# Patient Record
Sex: FEMALE | Race: WHITE | Employment: UNEMPLOYED | URBAN - METROPOLITAN AREA
[De-identification: names, ages, dates, MRNs, and addresses within clinical notes are randomized per-mention and may not be internally consistent; named-entity substitution may affect disease eponyms.]

---

## 2017-03-07 DIAGNOSIS — E11.9 TYPE 2 DIABETES MELLITUS WITHOUT COMPLICATIONS (HCC): ICD-10-CM

## 2017-03-07 DIAGNOSIS — Z12.31 ENCOUNTER FOR SCREENING MAMMOGRAM FOR MALIGNANT NEOPLASM OF BREAST: ICD-10-CM

## 2017-03-07 DIAGNOSIS — Z12.11 ENCOUNTER FOR SCREENING FOR MALIGNANT NEOPLASM OF COLON: ICD-10-CM

## 2017-03-25 ENCOUNTER — ALLSCRIPTS OFFICE VISIT (OUTPATIENT)
Dept: OTHER | Facility: OTHER | Age: 62
End: 2017-03-25

## 2017-03-31 ENCOUNTER — OFFICE VISIT (OUTPATIENT)
Dept: URGENT CARE | Age: 62
End: 2017-03-31
Payer: COMMERCIAL

## 2017-03-31 PROCEDURE — 99213 OFFICE O/P EST LOW 20 MIN: CPT | Performed by: FAMILY MEDICINE

## 2017-06-23 DIAGNOSIS — E11.9 TYPE 2 DIABETES MELLITUS WITHOUT COMPLICATIONS (HCC): ICD-10-CM

## 2017-07-12 ENCOUNTER — TRANSCRIBE ORDERS (OUTPATIENT)
Dept: ADMINISTRATIVE | Facility: HOSPITAL | Age: 62
End: 2017-07-12

## 2017-07-12 ENCOUNTER — APPOINTMENT (OUTPATIENT)
Dept: LAB | Facility: HOSPITAL | Age: 62
End: 2017-07-12
Attending: INTERNAL MEDICINE
Payer: COMMERCIAL

## 2017-07-12 DIAGNOSIS — E11.9 TYPE 2 DIABETES MELLITUS WITHOUT COMPLICATIONS (HCC): ICD-10-CM

## 2017-07-12 LAB
ALBUMIN SERPL BCP-MCNC: 2.9 G/DL (ref 3.5–5)
ALP SERPL-CCNC: 81 U/L (ref 46–116)
ALT SERPL W P-5'-P-CCNC: 17 U/L (ref 12–78)
ANION GAP SERPL CALCULATED.3IONS-SCNC: 10 MMOL/L (ref 4–13)
AST SERPL W P-5'-P-CCNC: 6 U/L (ref 5–45)
BILIRUB SERPL-MCNC: 0.2 MG/DL (ref 0.2–1)
BUN SERPL-MCNC: 29 MG/DL (ref 5–25)
CALCIUM SERPL-MCNC: 8.8 MG/DL (ref 8.3–10.1)
CHLORIDE SERPL-SCNC: 107 MMOL/L (ref 100–108)
CHOLEST SERPL-MCNC: 210 MG/DL (ref 50–200)
CO2 SERPL-SCNC: 23 MMOL/L (ref 21–32)
CREAT SERPL-MCNC: 1.48 MG/DL (ref 0.6–1.3)
EST. AVERAGE GLUCOSE BLD GHB EST-MCNC: 169 MG/DL
GFR SERPL CREATININE-BSD FRML MDRD: 36 ML/MIN/1.73SQ M
GLUCOSE P FAST SERPL-MCNC: 140 MG/DL (ref 65–99)
HBA1C MFR BLD: 7.5 % (ref 4.2–6.3)
HDLC SERPL-MCNC: 39 MG/DL (ref 40–60)
LDLC SERPL CALC-MCNC: 125 MG/DL (ref 0–100)
POTASSIUM SERPL-SCNC: 5.2 MMOL/L (ref 3.5–5.3)
PROT SERPL-MCNC: 7.8 G/DL (ref 6.4–8.2)
SODIUM SERPL-SCNC: 140 MMOL/L (ref 136–145)
TRIGL SERPL-MCNC: 228 MG/DL

## 2017-07-12 PROCEDURE — 36415 COLL VENOUS BLD VENIPUNCTURE: CPT

## 2017-07-12 PROCEDURE — 80053 COMPREHEN METABOLIC PANEL: CPT

## 2017-07-12 PROCEDURE — 83036 HEMOGLOBIN GLYCOSYLATED A1C: CPT

## 2017-07-12 PROCEDURE — 80061 LIPID PANEL: CPT

## 2017-08-15 ENCOUNTER — ALLSCRIPTS OFFICE VISIT (OUTPATIENT)
Dept: OTHER | Facility: OTHER | Age: 62
End: 2017-08-15

## 2017-08-15 DIAGNOSIS — E11.9 TYPE 2 DIABETES MELLITUS WITHOUT COMPLICATIONS (HCC): ICD-10-CM

## 2017-08-15 DIAGNOSIS — I10 ESSENTIAL (PRIMARY) HYPERTENSION: ICD-10-CM

## 2017-08-15 DIAGNOSIS — E03.9 HYPOTHYROIDISM: ICD-10-CM

## 2017-09-23 DIAGNOSIS — E11.9 TYPE 2 DIABETES MELLITUS WITHOUT COMPLICATIONS (HCC): ICD-10-CM

## 2017-11-29 ENCOUNTER — OFFICE VISIT (OUTPATIENT)
Dept: URGENT CARE | Facility: CLINIC | Age: 62
End: 2017-11-29
Payer: COMMERCIAL

## 2017-11-29 PROCEDURE — 99213 OFFICE O/P EST LOW 20 MIN: CPT

## 2017-12-04 ENCOUNTER — APPOINTMENT (OUTPATIENT)
Dept: LAB | Facility: HOSPITAL | Age: 62
End: 2017-12-04
Attending: INTERNAL MEDICINE
Payer: COMMERCIAL

## 2017-12-04 ENCOUNTER — TRANSCRIBE ORDERS (OUTPATIENT)
Dept: ADMINISTRATIVE | Facility: HOSPITAL | Age: 62
End: 2017-12-04

## 2017-12-04 DIAGNOSIS — E03.9 HYPOTHYROIDISM: ICD-10-CM

## 2017-12-04 DIAGNOSIS — E11.9 TYPE 2 DIABETES MELLITUS WITHOUT COMPLICATIONS (HCC): ICD-10-CM

## 2017-12-04 DIAGNOSIS — I10 ESSENTIAL (PRIMARY) HYPERTENSION: ICD-10-CM

## 2017-12-04 LAB
ALBUMIN SERPL BCP-MCNC: 3.1 G/DL (ref 3.5–5)
ALP SERPL-CCNC: 78 U/L (ref 46–116)
ALT SERPL W P-5'-P-CCNC: 23 U/L (ref 12–78)
ANION GAP SERPL CALCULATED.3IONS-SCNC: 9 MMOL/L (ref 4–13)
AST SERPL W P-5'-P-CCNC: 10 U/L (ref 5–45)
BILIRUB SERPL-MCNC: 0.3 MG/DL (ref 0.2–1)
BUN SERPL-MCNC: 32 MG/DL (ref 5–25)
CALCIUM SERPL-MCNC: 8.8 MG/DL (ref 8.3–10.1)
CHLORIDE SERPL-SCNC: 103 MMOL/L (ref 100–108)
CO2 SERPL-SCNC: 24 MMOL/L (ref 21–32)
CREAT SERPL-MCNC: 1.22 MG/DL (ref 0.6–1.3)
CREAT UR-MCNC: 44.7 MG/DL
EST. AVERAGE GLUCOSE BLD GHB EST-MCNC: 174 MG/DL
GFR SERPL CREATININE-BSD FRML MDRD: 48 ML/MIN/1.73SQ M
GLUCOSE P FAST SERPL-MCNC: 133 MG/DL (ref 65–99)
HBA1C MFR BLD: 7.7 % (ref 4.2–6.3)
MICROALBUMIN UR-MCNC: 27.6 MG/L (ref 0–20)
MICROALBUMIN/CREAT 24H UR: 62 MG/G CREATININE (ref 0–30)
POTASSIUM SERPL-SCNC: 5 MMOL/L (ref 3.5–5.3)
PROT SERPL-MCNC: 8.5 G/DL (ref 6.4–8.2)
SODIUM SERPL-SCNC: 136 MMOL/L (ref 136–145)
TSH SERPL DL<=0.05 MIU/L-ACNC: 2.6 UIU/ML (ref 0.36–3.74)

## 2017-12-04 PROCEDURE — 83036 HEMOGLOBIN GLYCOSYLATED A1C: CPT

## 2017-12-04 PROCEDURE — 80053 COMPREHEN METABOLIC PANEL: CPT

## 2017-12-04 PROCEDURE — 82570 ASSAY OF URINE CREATININE: CPT

## 2017-12-04 PROCEDURE — 36415 COLL VENOUS BLD VENIPUNCTURE: CPT

## 2017-12-04 PROCEDURE — 82043 UR ALBUMIN QUANTITATIVE: CPT

## 2017-12-04 PROCEDURE — 84443 ASSAY THYROID STIM HORMONE: CPT

## 2017-12-05 NOTE — PROGRESS NOTES
Assessment    1  Acute URI (465 9) (J06 9)    Plan  Acute URI    · Amoxicillin 875 MG Oral Tablet; take 1 tablet every twelve hours   · Ventolin  (90 Base) MCG/ACT Inhalation Aerosol Solution; INHALE 2  PUFFS EVERY 4 HOURS AS NEEDED  Unlinked    · Aspirin EC Low Dose 81 MG Oral Tablet Delayed Release    Discussion/Summary  Medication Side Effects Reviewed: Possible side effects of new medications were reviewed with the patient/guardian today  Understands and agrees with treatment plan: The treatment plan was reviewed with the patient/guardian  The patient/guardian understands and agrees with the treatment plan   Counseling Documentation With Imm: The patient was counseled regarding instructions for management  Chief Complaint    1  Cold Symptoms  Chief Complaint Free Text Note Form: C/O harsh persistent cough , chest congestion and pain in chest and ribs with cough x 4 days      History of Present Illness  HPI: Started with nasal congestion and dry cough 4 days ago  Coughing to the point of vomiting  no fever or chills  Hospital Based Practices Required Assessment:   Pain Assessment   the patient states they do not have pain  (on a scale of 0 to 10, the patient rates the pain at 0 )   Abuse And Domestic Violence Screen    Yes, the patient is safe at home  The patient states no one is hurting them  Depression And Suicide Screen  No, the patient has not had thoughts of hurting themself  No, the patient has not felt depressed in the past 7 days  Cold Symptoms: EDDIE KWONG presents with complaints of cold symptoms     Associated symptoms include nasal congestion, runny nose, post nasal drainage and dry cough, but no sneezing, no scratchy throat, no sore throat, no productive cough, no facial pressure, no facial pain, no headache, no plugged ear(s), no ear pain, no swollen lymph nodes, no wheezing, no shortness of breath, no fatigue, no weakness, no nausea, no vomiting, no diarrhea, no fever and no chills  Review of Systems  Focused-Female:   Constitutional: no fever and no chills  ENT: no hearing loss and no hoarseness  Cardiovascular: no palpitations  Respiratory: no shortness of breath, no wheezing and no shortness of breath during exertion  Gastrointestinal: no abdominal pain, no nausea, no vomiting and no diarrhea  Integumentary: no rashes  Neurological: no headache and no dizziness  ROS Reviewed:   ROS reviewed  Active Problems    1  Advance directive discussed with patient (V65 49) (Z71 89)   2  Depression (311) (F32 9)   3  DMII (diabetes mellitus, type 2) (250 00) (E11 9)   4  Encounter for Hemoccult screening (V76 51) (Z12 11)   5  Hyperlipidemia (272 4) (E78 5)   6  Hypertension (401 9) (I10)   7  Hypothyroidism (244 9) (E03 9)   8  Need for influenza vaccination (V04 81) (Z23)   9  Neuralgia (729 2) (M79 2)   10  Skin lesion of chest wall (709 9) (L98 9)   11  Visit for screening mammogram (V76 12) (Z12 31)   12  Vitamin D deficiency (268 9) (E55 9)    Past Medical History    1  History of Bacterial sepsis (038 9,995 91) (A41 9)   2  History of Cushing's syndrome (V12 29) (Z86 39)   3  History of Nipple pain (611 71) (N64 4)  Active Problems And Past Medical History Reviewed: The active problems and past medical history were reviewed and updated today  Family History  Mother    1  Family history of Breast Cancer (V16 3)  Family History    2  Family history of Breast Cancer (V16 3)    Social History    · Always uses seat belt   · Always uses sunscreen   · Caffeine Use   · Current every day smoker (305 1) (F17 200)   · Denied: History of Current Every Day Smoker   · Dental care, regularly   · No drug use   · No living will   · Uses Safety Equipment - Seatbelts  Social History Reviewed: The social history was reviewed and updated today  Surgical History    1  History of Adrenal Gland   2  History of Hernia Repair   3  History of Injection Of Trigger Point(S)   4  History of Tonsillectomy With Adenoidectomy   5  History of Tubal Ligation    Current Meds   1  Aspirin EC Low Dose 81 MG Oral Tablet Delayed Release; Take 1 tablet daily as directed   Recorded   2  [employer] Contour Next EZ w/Device Kit; USE AS DIRECTED; Therapy: 44CYQ3248 to (Evaluate:22Nov2013)  Requested for: 21Nov2013; Last   Rx:21Nov2013 Ordered   3  [employer]; Accu check BID; Therapy: 55QUC3424 to (Evaluate:53Hlj4654)  Requested for: 21Nov2013; Last   Rx:21Nov2013 Ordered   4  Glimepiride 4 MG Oral Tablet; take one tablet by mouth twice daily; Therapy: 62VTC0128 to (Evaluate:13Mar2018)  Requested for: 90XDR2741; Last   Rx:16Jun2017 Ordered   5  Hydrocortisone 20 MG Oral Tablet; TAKE ONE TABLET BY MOUTH ONCE DAILY; Therapy: 53Utk6294 to (Evaluate:25Stn8502)  Requested for: 48LLM7532; Last   Rx:13Mar2017 Ordered   6  Lisinopril 10 MG Oral Tablet; TAKE ONE TABLET BY MOUTH ONCE DAILY; Therapy: 14Qre2504 to (Evaluate:81Fox9011)  Requested for: 13Oct2017; Last   Rx:13Oct2017 Ordered   7  Loratadine 10 MG Oral Tablet; TAKE 1 TABLET DAILY AS NEEDED; Therapy: 24Kkg8384 to (Evaluate:77Try2204)  Requested for: 61Xek3307; Last   Rx:51Rgo4610 Ordered   8  MetFORMIN HCl - 500 MG Oral Tablet; TAKE ONE TABLET BY MOUTH ONCE DAILY; Therapy: 71MMC1433 to (Evaluate:10Aug2018)  Requested for: 50LTW9027; Last   Rx:36Jts8890 Ordered   9  Pravastatin Sodium 80 MG Oral Tablet; TAKE ONE TABLET BY MOUTH ONCE DAILY; Therapy: 27Yrp6403 to (Evaluate:10Fkp5025)  Requested for: 26WDZ1191; Last   Rx:30Nov2016 Ordered   10  Sertraline HCl - 100 MG Oral Tablet; TAKE ONE TABLET BY MOUTH ONCE DAILY AS    DIRECTED; Therapy: 71BHB9377 to (Evaluate:13Mar2018)  Requested for: 47DXF6875; Last    Rx:16Jun2017 Ordered   11  Vitamin D (Ergocalciferol) 79089 UNIT Oral Capsule; TAKE 1 CAPSULE BY MOUTH    EVERY WEEK Recorded  Medication List Reviewed: The medication list was reviewed and updated today  Allergies    1  Fosamax TABS   2  Paxil TABS    Vitals  Signs   Recorded: 61IGN2777 10:26AM   Temperature: 97 2 F  Heart Rate: 90  Respiration: 20  Systolic: 594  Diastolic: 60  Height: 5 ft 2 in  Weight: 161 lb 2 08 oz  BMI Calculated: 29 47  BSA Calculated: 1 74  O2 Saturation: 95  Pain Scale: 0    Physical Exam    Constitutional   General appearance: No acute distress, well appearing and well nourished  Eyes   Conjunctiva and lids: No swelling, erythema or discharge  Ears, Nose, Mouth, and Throat   External inspection of ears and nose: Normal     Otoscopic examination: Tympanic membranes translucent with normal light reflex  Canals patent without erythema  Nasal mucosa, septum, and turbinates: Abnormal   There was a mucoid discharge from both nares  The bilateral nasal mucosa was boggy  Oropharynx: Normal with no erythema, edema, exudate or lesions  Pulmonary   Respiratory effort: No increased work of breathing or signs of respiratory distress  Auscultation of lungs: Clear to auscultation  Cardiovascular   Auscultation of heart: Normal rate and rhythm, normal S1 and S2, without murmurs  Lymphatic   Palpation of lymph nodes in neck: No lymphadenopathy  Musculoskeletal   Gait and station: Normal     Skin   Skin and subcutaneous tissue: Normal without rashes or lesions      Psychiatric   Mood and affect: Normal        Future Appointments    Date/Time Provider Specialty Site   12/11/2017 09:30 AM Juan A Quach DO Internal Medicine [facility] INTERNAL MEDICINE [facility]     Signatures   Electronically signed by : LOLA Sweeney; Nov 29 2017 10:51AM EST                       (Author)

## 2017-12-06 ENCOUNTER — ALLSCRIPTS OFFICE VISIT (OUTPATIENT)
Dept: OTHER | Facility: OTHER | Age: 62
End: 2017-12-06

## 2017-12-06 ENCOUNTER — TRANSCRIBE ORDERS (OUTPATIENT)
Dept: RADIOLOGY | Facility: MEDICAL CENTER | Age: 62
End: 2017-12-06

## 2017-12-06 ENCOUNTER — APPOINTMENT (OUTPATIENT)
Dept: RADIOLOGY | Facility: MEDICAL CENTER | Age: 62
End: 2017-12-06
Payer: COMMERCIAL

## 2017-12-06 DIAGNOSIS — E11.9 TYPE 2 DIABETES MELLITUS WITHOUT COMPLICATIONS (HCC): ICD-10-CM

## 2017-12-06 DIAGNOSIS — J20.9 ACUTE BRONCHITIS: ICD-10-CM

## 2017-12-06 DIAGNOSIS — Z12.11 ENCOUNTER FOR SCREENING FOR MALIGNANT NEOPLASM OF COLON: ICD-10-CM

## 2017-12-06 PROCEDURE — 71020 HB CHEST X-RAY 2VW FRONTAL&LATL: CPT

## 2018-01-02 ENCOUNTER — ALLSCRIPTS OFFICE VISIT (OUTPATIENT)
Dept: OTHER | Facility: OTHER | Age: 63
End: 2018-01-02

## 2018-01-13 VITALS
SYSTOLIC BLOOD PRESSURE: 122 MMHG | WEIGHT: 169 LBS | OXYGEN SATURATION: 98 % | HEIGHT: 62 IN | HEART RATE: 86 BPM | TEMPERATURE: 97.8 F | BODY MASS INDEX: 31.1 KG/M2 | DIASTOLIC BLOOD PRESSURE: 72 MMHG

## 2018-01-14 VITALS
TEMPERATURE: 97.4 F | HEART RATE: 96 BPM | WEIGHT: 161.13 LBS | BODY MASS INDEX: 29.65 KG/M2 | SYSTOLIC BLOOD PRESSURE: 118 MMHG | OXYGEN SATURATION: 95 % | HEIGHT: 62 IN | DIASTOLIC BLOOD PRESSURE: 78 MMHG

## 2018-01-18 NOTE — PROGRESS NOTES
Assessment    1  Shingles rash (053 9) (B02 9)    Plan  Shingles rash    · TraMADol HCl - 50 MG Oral Tablet; TAKE 1 TABLET 3 TIMES DAILY AS NEEDED   · ValACYclovir HCl - 1 GM Oral Tablet; Take one tablet bid    Discussion/Summary  Discussion Summary:   When the rash resolves, consider getting the Shingles vaccine at a local pharmacy  Understands and agrees with treatment plan: The treatment plan was reviewed with the patient/guardian  The patient/guardian understands and agrees with the treatment plan   Follow Up Instructions: Follow Up with your Primary Care Provider in 4-5 days  If your symptoms worsen, go to the nearest [facility] Emergency Department  Chief Complaint    1  Rash  Chief Complaint Free Text Note Form: c/o of painful/itchy rash and on the her chest area since Wedsnesday      History of Present Illness  HPI: As above  Pt is under a lot of stress caring for her 2 young grand children, and an ill   Describes a burning type pain in the area of the rash - very tender to light touch  Review of Systems  Focused-Female:   Constitutional: No fever, no chills, feels well, no tiredness, no recent weight gain or loss  Integumentary: as noted in HPI  Active Problems    1  Advance directive discussed with patient (V65 49) (Z71 89)   2  Depression (311) (F32 9)   3  DMII (diabetes mellitus, type 2) (250 00) (E11 9)   4  Encounter for Hemoccult screening (V76 51) (Z12 11)   5  Encounter for screening for malignant neoplasm of colon (V76 51) (Z12 11)   6  Hyperlipidemia (272 4) (E78 5)   7  Hypertension (401 9) (I10)   8  Hypothyroidism (244 9) (E03 9)   9  Need for influenza vaccination (V04 81) (Z23)   10  Neuralgia (729 2) (M79 2)   11  Shingles rash (053 9) (B02 9)   12  Vaginal Pap smear (V76 47) (Z12 72)   13  Visit for screening mammogram (V76 12) (Z12 31)   14  Vitamin D deficiency (268 9) (E55 9)    Past Medical History    1   History of Bacterial sepsis (038 9,995 91) (A41 9)   2  History of Chest congestion (786 9) (R09 89)   3  History of Cough (786 2) (R05)   4  History of backache (V13 59) (Z87 39)   5  History of Cushing's syndrome (V12 29) (Z86 39)   6  History of Nipple pain (611 71) (N64 4)   7  History of Sinusitis (473 9) (J32 9)   8  History of Urinary tract infection (599 0) (N39 0)   9  History of Urinary tract infection, site unspecified (599 0) (N39 0)  Active Problems And Past Medical History Reviewed: The active problems and past medical history were reviewed and updated today  Family History  Mother    1  Family history of Breast Cancer (V16 3)  Family History    2  Family history of Breast Cancer (V16 3)  Family History Reviewed: The family history was reviewed and updated today  Social History    · Always uses seat belt   · Always uses sunscreen   · Caffeine Use   · Current every day smoker (305 1) (F17 200)   · Denied: History of Current Every Day Smoker   · Dental care, regularly   · No drug use   · No living will   · Uses Safety Equipment - Seatbelts  Social History Reviewed: The social history was reviewed and updated today  Surgical History    1  History of Adrenal Gland   2  History of Hernia Repair   3  History of Injection Of Trigger Point(S)   4  History of Tonsillectomy With Adenoidectomy   5  History of Tubal Ligation    Current Meds   1  Aspirin EC Low Dose 81 MG Oral Tablet Delayed Release; Take 1 tablet daily as directed   Recorded   2  [employer] Contour Next EZ w/Device Kit; USE AS DIRECTED; Therapy: 29HFH9776 to (Evaluate:22Nov2013)  Requested for: 21Nov2013; Last   Rx:21Nov2013 Ordered   3  [employer]; Accu check BID; Therapy: 69DZD7913 to (Evaluate:71Bul0881)  Requested for: 21Nov2013; Last   Rx:21Nov2013 Ordered   4  Gabapentin 100 MG Oral Capsule; Take 1 daily; Therapy: 61Hcd7437 to (Last Rx:57Ayd5849)  Requested for: 77Zck8445 Ordered   5   Gabapentin 300 MG Oral Capsule; TAKE 1 CAPSULE AT BEDTIME; Therapy: 19WGU4632 to (Last Rx:25Mar2017)  Requested for: 25Mar2017 Ordered   6  Glimepiride 4 MG Oral Tablet; TAKE TWO TABLETS BY MOUTH ONCE DAILY; Therapy: 41JIY0976 to (Kirill Garcia)  Requested for: 05BHU4464; Last   Rx:22Xbz2900 Ordered   7  Hydrocortisone 20 MG Oral Tablet; TAKE ONE TABLET BY MOUTH ONCE DAILY; Therapy: 35Mch3792 to (Evaluate:53Ruk6406)  Requested for: 29XEP8621; Last   Rx:13Mar2017 Ordered   8  Lisinopril 10 MG Oral Tablet; TAKE ONE TABLET BY MOUTH ONCE DAILY; Therapy: 06Mxm4973 to (Donita Avendano)  Requested for: 95Xcp8149; Last   Rx:89Vsj4513 Ordered   9  Loratadine 10 MG Oral Tablet; TAKE 1 TABLET DAILY AS NEEDED; Therapy: 83Udj7728 to (Evaluate:68Kxi3836)  Requested for: 14Nmf5960; Last   Rx:78Xrq5416 Ordered   10  MetFORMIN HCl - 500 MG Oral Tablet; TAKE ONE TABLET BY MOUTH ONCE DAILY; Therapy: 49CSB8573 to (Evaluate:11Jun2017)  Requested for: 11GVG6572; Last    Rx:16Jun2016 Ordered   11  Pravastatin Sodium 80 MG Oral Tablet; TAKE ONE TABLET BY MOUTH ONCE DAILY; Therapy: 00Lun1495 to (Evaluate:95Elj6669)  Requested for: 77NED4019; Last    Rx:30Nov2016 Ordered   12  Sertraline HCl - 100 MG Oral Tablet; TAKE ONE TABLET BY MOUTH ONCE DAILY AS    DIRECTED; Therapy: 24Uyo6693 to (Evaluate:26Jun2017)  Requested for: 28Mar2017; Last    Rx:28Mar2017 Ordered   13  ValACYclovir HCl - 1 GM Oral Tablet; Take one tablet bid; Therapy: 06Tzj3183 to (Vadim Aguilar)  Requested for: 65Zug3084; Last    Rx:75Rew2513 Ordered   14  Vitamin D (Ergocalciferol) 41144 UNIT Oral Capsule; TAKE 1 CAPSULE BY MOUTH    EVERY WEEK Recorded  Medication List Reviewed: The medication list was reviewed and updated today  Allergies    1  Fosamax TABS   2   Paxil TABS    Vitals  Signs   Recorded: 34GGN4276 09:13AM   Temperature: 97 9 F  Heart Rate: 80  Respiration: 20  Systolic: 542  Diastolic: 76  Height: 5 ft 2 in  Weight: 169 lb   BMI Calculated: 30 91  BSA Calculated: 1 78  O2 Saturation: 98  Pain Scale: 8    Physical Exam    Constitutional   General appearance: No acute distress, well appearing and well nourished  Skin   Examination of the skin for lesions: Abnormal   Red, raised, vesicular type rash in a dermatomal distribution of the right anterior chest in the T5 dermatome        Future Appointments    Date/Time Provider Specialty Site   07/24/2017 09:30 AM Ruba Dawkins DO Internal Medicine [facility] INTERNAL MEDICINE [facility]     Signatures   Electronically signed by : CRISTOPHER Plummer ; Mar 31 2017  9:42AM EST                       (Author)

## 2018-01-23 VITALS
DIASTOLIC BLOOD PRESSURE: 80 MMHG | BODY MASS INDEX: 29.9 KG/M2 | HEART RATE: 97 BPM | OXYGEN SATURATION: 98 % | WEIGHT: 162.5 LBS | SYSTOLIC BLOOD PRESSURE: 142 MMHG | TEMPERATURE: 93.5 F | HEIGHT: 62 IN

## 2018-01-23 VITALS
OXYGEN SATURATION: 98 % | SYSTOLIC BLOOD PRESSURE: 128 MMHG | BODY MASS INDEX: 30.09 KG/M2 | HEART RATE: 91 BPM | WEIGHT: 163.5 LBS | DIASTOLIC BLOOD PRESSURE: 78 MMHG | HEIGHT: 62 IN | TEMPERATURE: 94.6 F

## 2018-01-24 NOTE — MISCELLANEOUS
Assessment   1  Bacterial sepsis (038 9,995 91) (A41 9)1   2  DMII (diabetes mellitus, type 2) (250 00) (E11 9)1   3  Urinary tract infection, site unspecified (599 0) (N39 0)1   4  Hypertension (401 9) (I10)1   5  History of Cushing's syndrome (V12 29) (Z86 39)2      1 Amended By: Betito Ham; May 29 2016 4:24 PM EST   2 Amended By: Betito Ham; May 29 2016 4:25 PM EST    Plan  Bacterial sepsis    · (1) CBC/ PLT (NO DIFF); Status:Active; Requested for:21Xlt3302;   Perform:[facility]; ZES:23MMZ8831;SSBIMBD; For:Bacterial sepsis; Ordered   By:Khris Palmer;  DMII (diabetes mellitus, type 2)    · Start: Glimepiride 4 MG Oral Tablet; TAKE 1 TABLET TWICE DAILY  Rx By: Betito Ham; Dispense: 90 Days ; #:180 Tablet; Refill: 3;For: DMII (diabetes   mellitus, type 2); DILIP = N; Verified Transmission to [facility]; Last   Updated By: System, [employer]; 5/27/2016 10:48:34 AM  DMII (diabetes mellitus, type 2), Health Maintenance    · *VB-Foot Exam; Status:Complete;   Done: 37WXY9670 10:40AM  Performed: In Office; MOC:43UBC2744;ESBWYDO; For:DMII (diabetes mellitus, type 2),   Health Maintenance; Ordered By:Khris Palmer;  Hyperlipidemia, Hypertension    · Follow-up visit in 2 weeks Evaluation and Treatment  Follow-up  Status: Hold For -  Scheduling  Requested for: 27JFW5207  Ordered; For: Hyperlipidemia, Hypertension;  Ordered By: Betito Ham  Performed:     Due: 51BPR3816  Hypertension    · Start: Hydrochlorothiazide 12 5 MG Oral Tablet; one po monday wednesday friday  Rx By: Betito Ham; Dispense: 0 Days ; #:15 Tablet; Refill: 0;For: Hypertension;   DILIP = N; Sent To: [facility]   · (1) BASIC METABOLIC PROFILE; Status:Active; Requested for:35Wrf0924;   Perform:[facility]; JUP:42IOL2797;EWHHLSL; For:Hypertension; Ordered   By:Khris Palmer;    Discussion/Summary  Discussion Summary:   Bernice labs  Increase fluids   Activity as tolerated, walk daily in home  Findennis cipro rx  Rto 2 weeks1    Medication SE Review and Pt Understands Tx: Possible side effects of new medications were reviewed with the patient/guardian today1  The treatment plan was reviewed with the patient/guardian  The patient/guardian understands and agrees with the treatment plan1        1 Amended By: Christiano De La Cruz; May 29 2016 4:26 PM EST    Chief Complaint  Chief Complaint Free Text Note Form: Pt presents for [facility] F/up exam  Med list updated  Pt has some questions about changing her diabetic medication  I didn't refill her med since she wanted to speak with doctor first  Foot exam was normal  She had a recent eye exam but doesn't rem  the doctors name  She did quit smoking and is wearing the patch since in the hospital       History of Present Illness  TCM Communication [facility]: The patient is being contacted for follow-up after hospitalization and  sepsis due to uti1   Hospital  records were reviewed1  1   She was hospitalized at [facility]  The date of admission: 05/19/2016, date of discharge: 05/23/2016  Diagnosis: sepsis  She was discharged to home  Medications reviewed and updated today  She scheduled a follow up appointment  The patient is currently asymptomatic  Counseling was provided to the patient  Communication performed and completed by nile   HPI: Pt slowly recovering from urosepsis  On cipro  Regaining strength and eating better  Fbs today 145  No fever/chills No cp or sob   1        1 Amended By: Christiano De La Cruz; May 29 2016 4:21 PM EST    Review of Systems  Complete-Female:   Constitutional:1  feeling poorly1  and feeling tired1 , but no fever1  and no chills1   Eyes:1  No complaints of eye pain, no red eyes, no eyesight problems, no discharge, no dry eyes, no itching of eyes1   ENT:1  no complaints of earache, no loss of hearing, no nose bleeds, no nasal discharge, no sore throat, no hoarseness1      Cardiovascular:1  No complaints of slow heart rate, no fast heart rate, no chest pain, no palpitations, no leg claudication, no lower extremity edema1   Respiratory:1  shortness of breath during exertion1   Gastrointestinal:1  no abdominal pain1 , no nausea1  and no vomiting1   Genitourinary:1  No complaints of dysuria, no incontinence, no pelvic pain, no dysmenorrhea, no vaginal discharge or bleeding1 , no dysuria1  and no incontinence1   Musculoskeletal:1  arthralgias1  and joint stiffness1   Integumentary:1  No complaints of skin rash or lesions, no itching, no skin wounds, no breast pain or lump1   Neurological:1  No complaints of headache, no confusion, no convulsions, no numbness, no dizziness or fainting, no tingling, no limb weakness, no difficulty walking1   Psychiatric:1  Not suicidal, no sleep disturbance, no anxiety or depression, no change in personality, no emotional problems1   Endocrine:1  No complaints of proptosis, no hot flashes, no muscle weakness, no deepening of the voice, no feelings of weakness1   Hematologic/Lymphatic:1  No complaints of swollen glands, no swollen glands in the neck, does not bleed easily, does not bruise easily1         1 Amended By: Alli Gave; May 29 2016 4:23 PM EST    Active Problems   1  Chest congestion (786 9) (R09 89)  2  Depression (311) (F32 9)  3  DMII (diabetes mellitus, type 2) (250 00) (E11 9)  4  Hyperlipidemia (272 4) (E78 5)  5  Hypertension (401 9) (I10)  6  Hypothyroidism (244 9) (E03 9)  7  Nipple pain (611 71) (N64 4)  8  Sinusitis (473 9) (J32 9)  9  Visit for screening mammogram (V76 12) (Z12 31)  10  Vitamin D deficiency (268 9) (E55 9)    Past Medical History   1  History of Cough (786 2) (R05)  2  History of backache (V13 59) (Z87 39)  3  History of Urinary tract infection (599 0) (N39 0)    Surgical History   1  History of Adrenal Gland  2  History of Hernia Repair  3  History of Injection Of Trigger Point(S)  4  History of Tonsillectomy With Adenoidectomy  5   History of Tubal Ligation    Family History  Family History   1  Family history of Breast Cancer (V16 3)  Family History Reviewed: The family history was reviewed and updated today  Social History    · Caffeine Use   · Current Every Day Smoker (305 1)   · Current Smoker (305 1)   · Uses Safety Equipment - Seatbelts  Social History Reviewed: The social history was reviewed and updated today  The social history was reviewed and is unchanged  Current Meds  1  Amoxicillin 500 MG Oral Capsule; TAKE 1 CAPSULE 3 TIMES DAILY; Therapy: 42XBK5863 to (Altaf Edouard)  Requested for: 25Apr2016; Last   Rx:09Ien4126 Ordered  2  Azithromycin 250 MG Oral Tablet; TAKE 2 TABLETS ON DAY 1 THEN TAKE 1 TABLET A   DAY FOR 4 DAYS; Therapy: 18ZXB2141 to (Daisy Brooks)  Requested for: 13MSE6228; Last   Rx:49Mkr1253 Ordered  3  Citlali Contour Next EZ w/Device Kit; USE AS DIRECTED; Therapy: 46JDX6799 to (Evaluate:22Nov2013)  Requested for: 21Nov2013; Last   Rx:21Nov2013 Ordered  4  Retevo In [employer]; Accu check BID; Therapy: 23QSN8498 to (Evaluate:20May2014)  Requested for: 21Nov2013; Last   Rx:21Nov2013 Ordered  5  Benzonatate 100 MG Oral Capsule; TAKE 1 CAPSULE 3 TIMES DAILY AS NEEDED; Therapy: 61WBN4371 to (Connie Abebe)  Requested for: 53VJV1211; Last   Rx:64Sxk0341 Ordered  6  Cyclobenzaprine HCl - 10 MG Oral Tablet; TAKE ONE TABLET BY MOUTH AT BEDTIME; Therapy: 72RSJ0145 to (Evaluate:14Mar2014)  Requested for: 37SHB3262; Last   Rx:13Jan2014 Ordered  7  Glimepiride 4 MG Oral Tablet; TAKE TWO TABLETS BY MOUTH ONCE DAILY; Therapy: 94HIV3519 to (Connie Abebe)  Requested for: 07GMH2364; Last   Rx:56Tep5755 Ordered  8  Hydrocortisone 10 MG Oral Tablet; Take 1 tablet daily; Therapy: 59YEP8273 to (Evaluate:22Nov2013)  Requested for: 12VWV5686; Last   Rx:34Jlq1708 Ordered  9  Hydrocortisone 20 MG Oral Tablet; TAKE ONE TABLET BY MOUTH ONCE DAILY;    Therapy: 59Ygx4417 to (Evaluate:17Oct2016)  Requested for: 21Jan2016; Last   Rx:21Jan2016 Ordered  10  Levofloxacin 500 MG Oral Tablet; TAKE 1 TABLET DAILY AS DIRECTED; Therapy: 21Jan2016 to (Evaluate:28Jan2016)  Requested for: 21Jan2016; Last    Rx:21Jan2016 Ordered  11  Lisinopril 10 MG Oral Tablet; TAKE ONE TABLET BY MOUTH ONCE DAILY; Therapy: 95Pck8011 to (Evaluate:87Hwq4023)  Requested for: 22BLL6195; Last    Rx:19Oct2015 Ordered  12  Loratadine 10 MG Oral Tablet; TAKE 1 TABLET DAILY AS NEEDED; Therapy: 63Sjp3148 to (Evaluate:89Ncc0771)  Requested for: 82Brl4968; Last    Rx:77Uyp6534 Ordered  13  MetFORMIN HCl - 500 MG Oral Tablet; TAKE ONE TABLET BY MOUTH ONCE DAILY; Therapy: 99RUG9677 to (Evaluate:71Usr1367)  Requested for: 97QGL8918; Last    Rx:12Lgk7153 Ordered  14  Pravastatin Sodium 80 MG Oral Tablet; TAKE ONE TABLET BY MOUTH ONCE DAILY; Therapy: 43UAK9322 to (Evaluate:16Tme8384)  Requested for: 67RID7631; Last    Rx:19Oct2015 Ordered  15  Sertraline HCl - 100 MG Oral Tablet; TAKE ONE TABLET BY MOUTH ONCE DAILY AS    DIRECTED; Therapy: 54UBP3983 to (Evaluate:72Uhw2023)  Requested for: 26IYX5887; Last    Rx:25Nnt5625 Ordered  16  Simvastatin 20 MG Oral Tablet; TAKE ONE TABLET BY MOUTH ONCE DAILY; Therapy: 66SAN9742 to (Evaluate:66Kgy6637)  Requested for: 21Jan2016; Last    Rx:21Jan2016 Ordered  17  Symbicort 80-4 5 MCG/ACT Inhalation Aerosol; INHALE 2 PUFFS TWICE DAILY  RINSE    MOUTH AFTER USE; Therapy: 63Tua4076 to (Last Pama Ket)  Requested for: 26Oec8837 Ordered  Medication List Reviewed: The medication list was reviewed and updated today  Allergies   1  Paxil TABS    Vitals  Signs [Data Includes: Current Encounter]   Recorded: [id number] 17:59GG    Systolic: 133 1    Diastolic: 78 1      1 Amended By: Jennifer Harrington; May 29 2016 4:23 PM EST    Physical Exam    Constitutional1    General appearance: No acute distress, well appearing and well nourished  1    Eyes1    Conjunctiva and lids: No swelling, erythema or discharge  1    Pupils and irises: Equal, round and reactive to light  1    Ears, Nose, Mouth, and Throat1    External inspection of ears and nose: Normal 1    Otoscopic examination: Tympanic membranes translucent with normal light reflex  Canals patent without erythema  1    Nasal mucosa, septum, and turbinates: Normal without edema or erythema  1    Oropharynx: Normal with no erythema, edema, exudate or lesions  1    Pulmonary1    Respiratory effort: No increased work of breathing or signs of respiratory distress  1    Auscultation of lungs: Clear to auscultation  1  Clear1   Cardiovascular1    Palpation of heart: Normal PMI, no thrills  1    Auscultation of heart: Normal rate and rhythm, normal S1 and S2, without murmurs  1    Examination of extremities for edema and/or varicosities: Normal 1    Carotid pulses: Normal 1    Abdomen1    Abdomen: Abnormal  1  Mikd ttp at heparin injection sites1   Lymphatic1    Palpation of lymph nodes in neck: No lymphadenopathy  1    Musculoskeletal1    Gait and station: Normal 1    Digits and nails: Normal without clubbing or cyanosis  1    Inspection/palpation of joints, bones, and muscles: Normal 1    Skin1            1,2    Skin and subcutaneous tissue: Abnormal  2  Superficisl bruising lower abdomen2   Neurologic1    Cranial nerves: Cranial nerves 2-12 intact  1    Reflexes: 2+ and symmetric  1    Sensation: No sensory loss  1    Psychiatric1    Orientation to person, place, and time: Normal 1    Mood and affect: Normal 1    Diabetic Foot Screen: Normal     Socks and shoes removed, the Right Foot: the foot was normal, no swelling, no erythema  The toes on the right were normal and with full range of motion  The sensory exam showed  normal vibratory sensation at the level of the toes on the right  Normal tactile sensation with monofilament testing throughout the right foot  Socks and shoes removed, Left Foot: the foot was normal, no swelling, no erythema  The toes on the left were normal and with full range of motion  The sensory exam showed  normal vibratory sensation at the level of the toes on the left , Normal tactile sensation with monofilament testing throughout the left foot  Pulses:   2+ in the dorsalis pedis on the right  Pulses:   2+ in the dorsalis pedis on the left         1 Amended By: Maira Kohler; May 29 2016 4:23 PM EST   2 Amended By: Maira Kohler; May 29 2016 4:26 PM EST    Results/Data  Encounter Results   *VB-Foot Exam 46CQZ7737 10:40AM Maira Kohler     Test Name Result Flag Reference   FOOT EXAM [id number]         Signatures   Electronically signed by : Marciano Thomas OM; May 25 2016 11:22AM EST                       (Author)    Electronically signed by : Uri Crooks DO; May 27 2016 10:50AM EST                       (Author)    Electronically signed by : Uri Crooks DO; May 29 2016  4:26PM EST                       (Author)

## 2018-01-25 DIAGNOSIS — Z12.31 ENCOUNTER FOR SCREENING MAMMOGRAM FOR MALIGNANT NEOPLASM OF BREAST: ICD-10-CM

## 2018-02-22 DIAGNOSIS — E78.2 MIXED HYPERLIPIDEMIA: Primary | ICD-10-CM

## 2018-02-22 RX ORDER — PRAVASTATIN SODIUM 80 MG/1
TABLET ORAL
Qty: 90 TABLET | Refills: 2 | Status: SHIPPED | OUTPATIENT
Start: 2018-02-22 | End: 2018-12-18 | Stop reason: SDUPTHER

## 2018-04-26 DIAGNOSIS — F32.A DEPRESSION, UNSPECIFIED DEPRESSION TYPE: Primary | ICD-10-CM

## 2018-04-26 RX ORDER — SERTRALINE HYDROCHLORIDE 100 MG/1
TABLET, FILM COATED ORAL
Qty: 90 TABLET | Refills: 2 | Status: SHIPPED | OUTPATIENT
Start: 2018-04-26 | End: 2019-01-15 | Stop reason: SDUPTHER

## 2018-06-10 ENCOUNTER — TRANSCRIBE ORDERS (OUTPATIENT)
Dept: ADMINISTRATIVE | Facility: HOSPITAL | Age: 63
End: 2018-06-10

## 2018-06-10 ENCOUNTER — LAB (OUTPATIENT)
Dept: LAB | Facility: HOSPITAL | Age: 63
End: 2018-06-10
Attending: INTERNAL MEDICINE
Payer: COMMERCIAL

## 2018-06-10 DIAGNOSIS — Z12.11 ENCOUNTER FOR SCREENING FOR MALIGNANT NEOPLASM OF COLON: ICD-10-CM

## 2018-06-10 LAB — HEMOCCULT STL QL IA: POSITIVE

## 2018-06-10 PROCEDURE — G0328 FECAL BLOOD SCRN IMMUNOASSAY: HCPCS

## 2018-06-11 ENCOUNTER — TRANSCRIBE ORDERS (OUTPATIENT)
Dept: INTERNAL MEDICINE CLINIC | Facility: CLINIC | Age: 63
End: 2018-06-11

## 2018-06-11 ENCOUNTER — TELEPHONE (OUTPATIENT)
Dept: INTERNAL MEDICINE CLINIC | Facility: CLINIC | Age: 63
End: 2018-06-11

## 2018-06-11 DIAGNOSIS — E03.9 HYPOTHYROIDISM, UNSPECIFIED TYPE: ICD-10-CM

## 2018-06-11 DIAGNOSIS — E11.9 TYPE 2 DIABETES MELLITUS WITHOUT COMPLICATION, WITHOUT LONG-TERM CURRENT USE OF INSULIN (HCC): ICD-10-CM

## 2018-06-11 DIAGNOSIS — R19.5 POSITIVE FECAL OCCULT BLOOD TEST: Primary | ICD-10-CM

## 2018-06-11 DIAGNOSIS — E55.9 VITAMIN D DEFICIENCY: Primary | ICD-10-CM

## 2018-06-14 ENCOUNTER — TELEPHONE (OUTPATIENT)
Dept: GASTROENTEROLOGY | Facility: HOSPITAL | Age: 63
End: 2018-06-14

## 2018-06-14 NOTE — TELEPHONE ENCOUNTER
Called patient, she has an appointment scheduled with GI on 7/13, offered her sooner appointment in [facility] but she stated she is unable to travel  I will put her on cancellation list if something should open at [facility]

## 2018-06-17 ENCOUNTER — APPOINTMENT (OUTPATIENT)
Dept: LAB | Facility: HOSPITAL | Age: 63
End: 2018-06-17
Attending: INTERNAL MEDICINE
Payer: COMMERCIAL

## 2018-06-17 DIAGNOSIS — E11.9 TYPE 2 DIABETES MELLITUS WITHOUT COMPLICATION, WITHOUT LONG-TERM CURRENT USE OF INSULIN (HCC): ICD-10-CM

## 2018-06-17 DIAGNOSIS — E03.9 HYPOTHYROIDISM, UNSPECIFIED TYPE: ICD-10-CM

## 2018-06-17 DIAGNOSIS — E55.9 VITAMIN D DEFICIENCY: ICD-10-CM

## 2018-06-17 LAB
25(OH)D3 SERPL-MCNC: 31.2 NG/ML (ref 30–100)
ALBUMIN SERPL BCP-MCNC: 2.9 G/DL (ref 3.5–5)
ALP SERPL-CCNC: 86 U/L (ref 46–116)
ALT SERPL W P-5'-P-CCNC: 16 U/L (ref 12–78)
ANION GAP SERPL CALCULATED.3IONS-SCNC: 10 MMOL/L (ref 4–13)
AST SERPL W P-5'-P-CCNC: 6 U/L (ref 5–45)
BASOPHILS # BLD AUTO: 0.05 THOUSANDS/ΜL (ref 0–0.1)
BASOPHILS NFR BLD AUTO: 1 % (ref 0–1)
BILIRUB SERPL-MCNC: 0.2 MG/DL (ref 0.2–1)
BUN SERPL-MCNC: 26 MG/DL (ref 5–25)
CALCIUM SERPL-MCNC: 9 MG/DL (ref 8.3–10.1)
CHLORIDE SERPL-SCNC: 106 MMOL/L (ref 100–108)
CO2 SERPL-SCNC: 21 MMOL/L (ref 21–32)
CREAT SERPL-MCNC: 1.14 MG/DL (ref 0.6–1.3)
EOSINOPHIL # BLD AUTO: 0.3 THOUSAND/ΜL (ref 0–0.61)
EOSINOPHIL NFR BLD AUTO: 3 % (ref 0–6)
ERYTHROCYTE [DISTWIDTH] IN BLOOD BY AUTOMATED COUNT: 13.3 % (ref 11.6–15.1)
GFR SERPL CREATININE-BSD FRML MDRD: 52 ML/MIN/1.73SQ M
GLUCOSE P FAST SERPL-MCNC: 129 MG/DL (ref 65–99)
HCT VFR BLD AUTO: 39.6 % (ref 34.8–46.1)
HGB BLD-MCNC: 13.4 G/DL (ref 11.5–15.4)
LYMPHOCYTES # BLD AUTO: 3.31 THOUSANDS/ΜL (ref 0.6–4.47)
LYMPHOCYTES NFR BLD AUTO: 30 % (ref 14–44)
MCH RBC QN AUTO: 30.7 PG (ref 26.8–34.3)
MCHC RBC AUTO-ENTMCNC: 33.8 G/DL (ref 31.4–37.4)
MCV RBC AUTO: 91 FL (ref 82–98)
MONOCYTES # BLD AUTO: 0.7 THOUSAND/ΜL (ref 0.17–1.22)
MONOCYTES NFR BLD AUTO: 6 % (ref 4–12)
NEUTROPHILS # BLD AUTO: 6.52 THOUSANDS/ΜL (ref 1.85–7.62)
NEUTS SEG NFR BLD AUTO: 60 % (ref 43–75)
PLATELET # BLD AUTO: 303 THOUSANDS/UL (ref 149–390)
PMV BLD AUTO: 10.3 FL (ref 8.9–12.7)
POTASSIUM SERPL-SCNC: 5 MMOL/L (ref 3.5–5.3)
PROT SERPL-MCNC: 8.1 G/DL (ref 6.4–8.2)
RBC # BLD AUTO: 4.36 MILLION/UL (ref 3.81–5.12)
SODIUM SERPL-SCNC: 137 MMOL/L (ref 136–145)
TSH SERPL DL<=0.05 MIU/L-ACNC: 1.73 UIU/ML (ref 0.36–3.74)
WBC # BLD AUTO: 10.88 THOUSAND/UL (ref 4.31–10.16)

## 2018-06-17 PROCEDURE — 82306 VITAMIN D 25 HYDROXY: CPT

## 2018-06-17 PROCEDURE — 85025 COMPLETE CBC W/AUTO DIFF WBC: CPT

## 2018-06-17 PROCEDURE — 36415 COLL VENOUS BLD VENIPUNCTURE: CPT

## 2018-06-17 PROCEDURE — 84443 ASSAY THYROID STIM HORMONE: CPT

## 2018-06-17 PROCEDURE — 80053 COMPREHEN METABOLIC PANEL: CPT

## 2018-06-20 ENCOUNTER — TELEPHONE (OUTPATIENT)
Dept: INTERNAL MEDICINE CLINIC | Facility: CLINIC | Age: 63
End: 2018-06-20

## 2018-06-20 ENCOUNTER — OFFICE VISIT (OUTPATIENT)
Dept: GASTROENTEROLOGY | Facility: HOSPITAL | Age: 63
End: 2018-06-20
Payer: COMMERCIAL

## 2018-06-20 VITALS
TEMPERATURE: 97.1 F | HEIGHT: 61 IN | HEART RATE: 83 BPM | BODY MASS INDEX: 30.78 KG/M2 | SYSTOLIC BLOOD PRESSURE: 117 MMHG | DIASTOLIC BLOOD PRESSURE: 68 MMHG | WEIGHT: 163 LBS

## 2018-06-20 DIAGNOSIS — R19.5 POSITIVE FIT (FECAL IMMUNOCHEMICAL TEST): Primary | ICD-10-CM

## 2018-06-20 DIAGNOSIS — R05.9 COUGH: Primary | ICD-10-CM

## 2018-06-20 PROCEDURE — 99243 OFF/OP CNSLTJ NEW/EST LOW 30: CPT | Performed by: INTERNAL MEDICINE

## 2018-06-20 RX ORDER — BENZONATATE 100 MG/1
100 CAPSULE ORAL 3 TIMES DAILY PRN
Qty: 30 CAPSULE | Refills: 0 | Status: ON HOLD | OUTPATIENT
Start: 2018-06-20 | End: 2018-07-10 | Stop reason: ALTCHOICE

## 2018-06-20 NOTE — PROGRESS NOTES
[facility] - Outpatient Consultation  Karissa Macdonald 65 y o  female MRN: [id number]  Encounter: [id number]          ASSESSMENT AND PLAN:      1  Positive FIT (fecal immunochemical test)  -she has no overt bleeding or anemia  This is found on routine testing  I reviewed differential diagnosis with the patient including hemorrhoids, AVMs, advanced adenoma or malignancy  We will schedule her for colonoscopy for further evaluation  We discussed risks and benefits of colonoscopy  Risks include but not limited to infection, bleeding, perforation, or missed lesion  Bowel prep instructions for suprep given  2   Adrenal insufficiency -on hydrocortisone 20 mg daily  Follow-up as needed  ______________________________________________________________________    HPI:  62-year-old female here for colonoscopy consult  She had routine fecal immunochemical testing for colon cancer screening which came back positive  She denies abdominal pain, melena or hematochezia  She reports occasional loose 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  She denies weight loss  No known family history of GI malignancy  She never had colonoscopy before  She has history of adrenal insufficiency currently on hydrocortisone 20 mg daily  She had a right adrenal resection  Her hemoglobin is 13 4    REVIEW OF SYSTEMS:    CONSTITUTIONAL: Denies any fever, chills, rigors, and weight loss  HEENT: No earache or tinnitus  Denies hearing loss or visual disturbances  CARDIOVASCULAR: No chest pain or palpitations  RESPIRATORY: Denies any cough, hemoptysis, shortness of breath or dyspnea on exertion  GASTROINTESTINAL: As noted in the History of Present Illness  GENITOURINARY: No problems with urination  Denies any hematuria or dysuria  NEUROLOGIC: No dizziness or vertigo, denies headaches  MUSCULOSKELETAL: Denies any muscle or joint pain     SKIN: Denies skin rashes or itching  ENDOCRINE: Denies excessive thirst  Denies intolerance to heat or cold  PSYCHOSOCIAL: Denies depression or anxiety  Denies any recent memory loss  Historical Information   Past Medical History:   Diagnosis Date    Adrenal insufficiency ([facility] )     Bacterial sepsis ([place] [address] )     last assessed: 06/13/16    Cushing's syndrome ([place] [address] )     last assessed: 05/29/16    Depression     last assessed: 01/02/18    Diabetes mellitus ([place] [address] )     Hyperlipidemia     Hypertension      Past Surgical History:   Procedure Laterality Date    ADRENALECTOMY  12/2002    CHOLECYSTECTOMY      HERNIA REPAIR      OTHER SURGICAL HISTORY      Injection of trigger joint    TONSILLECTOMY AND ADENOIDECTOMY      TUBAL LIGATION       Social History   History   Alcohol Use No     History   Drug Use No     History   Smoking Status    Current Every Day Smoker   Smokeless Tobacco    Not on file     Family History   Problem Relation Age of Onset    Breast cancer Mother     Breast cancer Family        Meds/Allergies       Current Outpatient Prescriptions:     acetaminophen (TYLENOL) 325 mg tablet    albuterol (VENTOLIN HFA) 90 mcg/act inhaler    benzonatate (TESSALON PERLES) 100 mg capsule    ergocalciferol (VITAMIN D2) 50,000 units    glimepiride (AMARYL) 4 mg tablet    glucose blood (KEITH CONTOUR NEXT TEST) test strip    hydrocortisone (CORTEF) 10 mg tablet    lisinopril (ZESTRIL) 10 mg tablet    loratadine (CLARITIN) 10 mg tablet    metFORMIN (GLUCOPHAGE) 500 mg tablet    pravastatin (PRAVACHOL) 80 mg tablet    sertraline (ZOLOFT) 100 mg tablet    Allergies   Allergen Reactions    Fosamax [Alendronate]     Paxil [Paroxetine Hcl] Rash           Objective     Blood pressure 117/68, pulse 83, temperature (!) 97 1 °F (36 2 °C), temperature source Tympanic, height 5' 1" (1 549 m), weight 73 9 kg (163 lb)  Body mass index is 30 8 kg/m²          PHYSICAL EXAM:      General Appearance:   Alert, cooperative, no distress HEENT:   Normocephalic, atraumatic, anicteric      Neck:  Supple, symmetrical, trachea midline   Lungs:   Clear to auscultation bilaterally; no rales, rhonchi or wheezing; respirations unlabored    Heart[de-identified]   Regular rate and rhythm; no murmur, rub, or gallop  Abdomen:   Soft, non-tender, non-distended; normal bowel sounds; no masses, no organomegaly    Genitalia:   Deferred    Rectal:   Deferred    Extremities:  No cyanosis, clubbing or edema    Pulses:  2+ and symmetric    Skin:  No jaundice, rashes, or lesions    Lymph nodes:  No palpable cervical lymphadenopathy        Lab Results:   No visits with results within 1 Day(s) from this visit     Latest known visit with results is:   Appointment on 06/17/2018   Component Date Value    Vit D, 25-Hydroxy 06/17/2018 31 2     Sodium 06/17/2018 137     Potassium 06/17/2018 5 0     Chloride 06/17/2018 106     CO2 06/17/2018 21     Anion Gap 06/17/2018 10     BUN 06/17/2018 26*    Creatinine 06/17/2018 1 14     Glucose, Fasting 06/17/2018 129*    Calcium 06/17/2018 9 0     AST 06/17/2018 6     ALT 06/17/2018 16     Alkaline Phosphatase 06/17/2018 86     Total Protein 06/17/2018 8 1     Albumin 06/17/2018 2 9*    Total Bilirubin 06/17/2018 0 20     eGFR 06/17/2018 52     TSH 3RD GENERATON 06/17/2018 1 728     WBC 06/17/2018 10 88*    RBC 06/17/2018 4 36     Hemoglobin 06/17/2018 13 4     Hematocrit 06/17/2018 39 6     MCV 06/17/2018 91     MCH 06/17/2018 30 7     MCHC 06/17/2018 33 8     RDW 06/17/2018 13 3     MPV 06/17/2018 10 3     Platelets 38/43/0272 303     Neutrophils Relative 06/17/2018 60     Lymphocytes Relative 06/17/2018 30     Monocytes Relative 06/17/2018 6     Eosinophils Relative 06/17/2018 3     Basophils Relative 06/17/2018 1     Neutrophils Absolute 06/17/2018 6 52     Lymphocytes Absolute 06/17/2018 3 31     Monocytes Absolute 06/17/2018 0 70     Eosinophils Absolute 06/17/2018 0 30     Basophils Absolute 06/17/2018 0 05

## 2018-06-20 NOTE — LETTER
June 20, 2018     Kasey Jennings DO  [address] [address] [place] [address]    Patient: Rosanna Najera   YOB: 1955   Date of Visit: 6/20/2018       Dear Dr Nadia Buenrostro: Thank you for referring Bradley George to me for evaluation  Below are my notes for this consultation  If you have questions, please do not hesitate to call me  I look forward to following your patient along with you  Sincerely,        Noe Hart MD        CC: No Recipients  Noe Hart MD  6/20/2018  4:06 PM  Sign at close encounter  [facility] - Outpatient Consultation  Rosanna Najera 65 y o  female MRN: [id number]  Encounter: [id number]          ASSESSMENT AND PLAN:      1  Positive FIT (fecal immunochemical test)  -she has no overt bleeding or anemia  This is found on routine testing  I reviewed differential diagnosis with the patient including hemorrhoids, AVMs, advanced adenoma or malignancy  We will schedule her for colonoscopy for further evaluation  We discussed risks and benefits of colonoscopy  Risks include but not limited to infection, bleeding, perforation, or missed lesion  Bowel prep instructions for suprep given  2   Adrenal insufficiency -on hydrocortisone 20 mg daily  Follow-up as needed  ______________________________________________________________________    HPI:  78-year-old female here for colonoscopy consult  She had routine fecal immunochemical testing for colon cancer screening which came back positive  She denies abdominal pain, melena or hematochezia  She reports occasional loose 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  She denies weight loss  No known family history of GI malignancy  She never had colonoscopy before  She has history of adrenal insufficiency currently on hydrocortisone 20 mg daily  She had a right adrenal resection        Her hemoglobin is 13 4    REVIEW OF SYSTEMS:    CONSTITUTIONAL: Denies any fever, chills, rigors, and weight loss  HEENT: No earache or tinnitus  Denies hearing loss or visual disturbances  CARDIOVASCULAR: No chest pain or palpitations  RESPIRATORY: Denies any cough, hemoptysis, shortness of breath or dyspnea on exertion  GASTROINTESTINAL: As noted in the History of Present Illness  GENITOURINARY: No problems with urination  Denies any hematuria or dysuria  NEUROLOGIC: No dizziness or vertigo, denies headaches  MUSCULOSKELETAL: Denies any muscle or joint pain  SKIN: Denies skin rashes or itching  ENDOCRINE: Denies excessive thirst  Denies intolerance to heat or cold  PSYCHOSOCIAL: Denies depression or anxiety  Denies any recent memory loss         Historical Information   Past Medical History:   Diagnosis Date    [facility] insufficiency ([facility] )     Bacterial sepsis ([place] [address] )     last assessed: 06/13/16    Cushing's syndrome ([place] [address] )     last assessed: 05/29/16    Depression     last assessed: 01/02/18    Diabetes mellitus ([place] [address] )     Hyperlipidemia     Hypertension      Past Surgical History:   Procedure Laterality Date    ADRENALECTOMY  12/2002    CHOLECYSTECTOMY      HERNIA REPAIR      OTHER SURGICAL HISTORY      Injection of trigger joint    TONSILLECTOMY AND ADENOIDECTOMY      TUBAL LIGATION       Social History   History   Alcohol Use No     History   Drug Use No     History   Smoking Status    Current Every Day Smoker   Smokeless Tobacco    Not on file     Family History   Problem Relation Age of Onset    Breast cancer Mother     Breast cancer Family        Meds/Allergies       Current Outpatient Prescriptions:     acetaminophen (TYLENOL) 325 mg tablet    albuterol (VENTOLIN HFA) 90 mcg/act inhaler    benzonatate (TESSALON PERLES) 100 mg capsule    ergocalciferol (VITAMIN D2) 50,000 units    glimepiride (AMARYL) 4 mg tablet    glucose blood (KEITH CONTOUR NEXT TEST) test strip    hydrocortisone (CORTEF) 10 mg tablet    lisinopril (ZESTRIL) 10 mg tablet    loratadine (CLARITIN) 10 mg tablet    metFORMIN (GLUCOPHAGE) 500 mg tablet    pravastatin (PRAVACHOL) 80 mg tablet    sertraline (ZOLOFT) 100 mg tablet    Allergies   Allergen Reactions    Fosamax [Alendronate]     Paxil [Paroxetine Hcl] Rash           Objective     Blood pressure 117/68, pulse 83, temperature (!) 97 1 °F (36 2 °C), temperature source Tympanic, height 5' 1" (1 549 m), weight 73 9 kg (163 lb)  Body mass index is 30 8 kg/m²  PHYSICAL EXAM:      General Appearance:   Alert, cooperative, no distress   HEENT:   Normocephalic, atraumatic, anicteric      Neck:  Supple, symmetrical, trachea midline   Lungs:   Clear to auscultation bilaterally; no rales, rhonchi or wheezing; respirations unlabored    Heart[de-identified]   Regular rate and rhythm; no murmur, rub, or gallop  Abdomen:   Soft, non-tender, non-distended; normal bowel sounds; no masses, no organomegaly    Genitalia:   Deferred    Rectal:   Deferred    Extremities:  No cyanosis, clubbing or edema    Pulses:  2+ and symmetric    Skin:  No jaundice, rashes, or lesions    Lymph nodes:  No palpable cervical lymphadenopathy        Lab Results:   No visits with results within 1 Day(s) from this visit     Latest known visit with results is:   Appointment on 06/17/2018   Component Date Value    Vit D, 25-Hydroxy 06/17/2018 31 2     Sodium 06/17/2018 137     Potassium 06/17/2018 5 0     Chloride 06/17/2018 106     CO2 06/17/2018 21     Anion Gap 06/17/2018 10     BUN 06/17/2018 26*    Creatinine 06/17/2018 1 14     Glucose, Fasting 06/17/2018 129*    Calcium 06/17/2018 9 0     AST 06/17/2018 6     ALT 06/17/2018 16     Alkaline Phosphatase 06/17/2018 86     Total Protein 06/17/2018 8 1     Albumin 06/17/2018 2 9*    Total Bilirubin 06/17/2018 0 20     eGFR 06/17/2018 52     TSH 3RD GENERATON 06/17/2018 1 728     WBC 06/17/2018 10 88*    RBC 06/17/2018 4 36     Hemoglobin 06/17/2018 13 4     Hematocrit 06/17/2018 39 6     MCV 06/17/2018 91     MCH 06/17/2018 30 7     MCHC 06/17/2018 33 8     RDW 06/17/2018 13 3     MPV 06/17/2018 10 3     Platelets 49/83/7706 303     Neutrophils Relative 06/17/2018 60     Lymphocytes Relative 06/17/2018 30     Monocytes Relative 06/17/2018 6     Eosinophils Relative 06/17/2018 3     Basophils Relative 06/17/2018 1     Neutrophils Absolute 06/17/2018 6 52     Lymphocytes Absolute 06/17/2018 3 31     Monocytes Absolute 06/17/2018 0 70     Eosinophils Absolute 06/17/2018 0 30     Basophils Absolute 06/17/2018 0 05

## 2018-06-22 DIAGNOSIS — R19.5 POSITIVE FIT (FECAL IMMUNOCHEMICAL TEST): Primary | ICD-10-CM

## 2018-06-22 RX ORDER — PEG-3350, SODIUM SULFATE, SODIUM CHLORIDE, POTASSIUM CHLORIDE, SODIUM ASCORBATE AND ASCORBIC ACID 7.5-2.691G
KIT ORAL
Qty: 1 EACH | Refills: 0 | Status: ON HOLD | OUTPATIENT
Start: 2018-06-22 | End: 2018-07-10 | Stop reason: ALTCHOICE

## 2018-06-22 NOTE — TELEPHONE ENCOUNTER
Called pt and set up her colon at [facility] for July 10,2018 with Dr Tim Gomez prep was called in to the pts pharmacy  Diabetic instructions were mailed out to the patient  Pt is aware she will receive a call the day prior with and arrival time

## 2018-07-09 ENCOUNTER — ANESTHESIA EVENT (OUTPATIENT)
Dept: PERIOP | Facility: HOSPITAL | Age: 63
End: 2018-07-09
Payer: COMMERCIAL

## 2018-07-10 ENCOUNTER — ANESTHESIA (OUTPATIENT)
Dept: PERIOP | Facility: HOSPITAL | Age: 63
End: 2018-07-10
Payer: COMMERCIAL

## 2018-07-10 ENCOUNTER — HOSPITAL ENCOUNTER (OUTPATIENT)
Facility: HOSPITAL | Age: 63
Setting detail: OUTPATIENT SURGERY
Discharge: HOME | End: 2018-07-10
Attending: INTERNAL MEDICINE | Admitting: INTERNAL MEDICINE
Payer: COMMERCIAL

## 2018-07-10 VITALS
HEART RATE: 61 BPM | DIASTOLIC BLOOD PRESSURE: 68 MMHG | OXYGEN SATURATION: 99 % | TEMPERATURE: 96.8 F | SYSTOLIC BLOOD PRESSURE: 107 MMHG | RESPIRATION RATE: 18 BRPM

## 2018-07-10 DIAGNOSIS — R19.5 POSITIVE FIT (FECAL IMMUNOCHEMICAL TEST): ICD-10-CM

## 2018-07-10 LAB — GLUCOSE SERPL-MCNC: 156 MG/DL (ref 65–140)

## 2018-07-10 PROCEDURE — 45385 COLONOSCOPY W/LESION REMOVAL: CPT | Performed by: INTERNAL MEDICINE

## 2018-07-10 PROCEDURE — 88305 TISSUE EXAM BY PATHOLOGIST: CPT | Performed by: PATHOLOGY

## 2018-07-10 PROCEDURE — 82948 REAGENT STRIP/BLOOD GLUCOSE: CPT

## 2018-07-10 RX ORDER — LIDOCAINE HYDROCHLORIDE 10 MG/ML
INJECTION, SOLUTION INFILTRATION; PERINEURAL AS NEEDED
Status: DISCONTINUED | OUTPATIENT
Start: 2018-07-10 | End: 2018-07-10 | Stop reason: SURG

## 2018-07-10 RX ORDER — PROPOFOL 10 MG/ML
INJECTION, EMULSION INTRAVENOUS CONTINUOUS PRN
Status: DISCONTINUED | OUTPATIENT
Start: 2018-07-10 | End: 2018-07-10 | Stop reason: SURG

## 2018-07-10 RX ORDER — SODIUM CHLORIDE, SODIUM LACTATE, POTASSIUM CHLORIDE, CALCIUM CHLORIDE 600; 310; 30; 20 MG/100ML; MG/100ML; MG/100ML; MG/100ML
125 INJECTION, SOLUTION INTRAVENOUS CONTINUOUS
Status: CANCELLED | OUTPATIENT
Start: 2018-07-10

## 2018-07-10 RX ORDER — PROPOFOL 10 MG/ML
INJECTION, EMULSION INTRAVENOUS AS NEEDED
Status: DISCONTINUED | OUTPATIENT
Start: 2018-07-10 | End: 2018-07-10 | Stop reason: SURG

## 2018-07-10 RX ORDER — SODIUM CHLORIDE, SODIUM LACTATE, POTASSIUM CHLORIDE, CALCIUM CHLORIDE 600; 310; 30; 20 MG/100ML; MG/100ML; MG/100ML; MG/100ML
125 INJECTION, SOLUTION INTRAVENOUS CONTINUOUS
Status: DISCONTINUED | OUTPATIENT
Start: 2018-07-10 | End: 2018-07-10

## 2018-07-10 RX ORDER — EPHEDRINE SULFATE 50 MG/ML
INJECTION, SOLUTION INTRAVENOUS AS NEEDED
Status: DISCONTINUED | OUTPATIENT
Start: 2018-07-10 | End: 2018-07-10 | Stop reason: SURG

## 2018-07-10 RX ADMIN — PROPOFOL 30 MG: 10 INJECTION, EMULSION INTRAVENOUS at 10:47

## 2018-07-10 RX ADMIN — SODIUM CHLORIDE, SODIUM LACTATE, POTASSIUM CHLORIDE, AND CALCIUM CHLORIDE 125 ML/HR: .6; .31; .03; .02 INJECTION, SOLUTION INTRAVENOUS at 08:44

## 2018-07-10 RX ADMIN — LIDOCAINE HYDROCHLORIDE 30 MG: 10 INJECTION, SOLUTION INFILTRATION; PERINEURAL at 10:17

## 2018-07-10 RX ADMIN — EPHEDRINE SULFATE 10 MG: 50 INJECTION, SOLUTION INTRAMUSCULAR; INTRAVENOUS; SUBCUTANEOUS at 10:32

## 2018-07-10 RX ADMIN — PROPOFOL 100 MG: 10 INJECTION, EMULSION INTRAVENOUS at 10:17

## 2018-07-10 RX ADMIN — SODIUM CHLORIDE, SODIUM LACTATE, POTASSIUM CHLORIDE, AND CALCIUM CHLORIDE: .6; .31; .03; .02 INJECTION, SOLUTION INTRAVENOUS at 09:26

## 2018-07-10 RX ADMIN — PROPOFOL 130 MCG/KG/MIN: 10 INJECTION, EMULSION INTRAVENOUS at 10:18

## 2018-07-10 NOTE — ANESTHESIA POSTPROCEDURE EVALUATION
Post-Op Assessment Note      CV Status:  Stable    Mental Status:  Alert and awake    Hydration Status:  Euvolemic    PONV Controlled:  Controlled    Airway Patency:  Patent    Post Op Vitals Reviewed: Yes          Staff: CRNA       Comments: Awake, reflexes intact, maintains own airway, VSS          /59 (07/10/18 1103)    Temp 97 6 °F (36 4 °C) (07/10/18 1103)    Pulse 63 (07/10/18 1103)   Resp 16 (07/10/18 1103)    SpO2 99 % (07/10/18 1103)

## 2018-07-10 NOTE — H&P (VIEW-ONLY)
[facility] - Outpatient Consultation  Eloisa Done 65 y o  female MRN: [id number]  Encounter: [id number]          ASSESSMENT AND PLAN:      1  Positive FIT (fecal immunochemical test)  -she has no overt bleeding or anemia  This is found on routine testing  I reviewed differential diagnosis with the patient including hemorrhoids, AVMs, advanced adenoma or malignancy  We will schedule her for colonoscopy for further evaluation  We discussed risks and benefits of colonoscopy  Risks include but not limited to infection, bleeding, perforation, or missed lesion  Bowel prep instructions for suprep given  2   Adrenal insufficiency -on hydrocortisone 20 mg daily  Follow-up as needed  ______________________________________________________________________    HPI:  50-year-old female here for colonoscopy consult  She had routine fecal immunochemical testing for colon cancer screening which came back positive  She denies abdominal pain, melena or hematochezia  She reports occasional loose 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  She denies weight loss  No known family history of GI malignancy  She never had colonoscopy before  She has history of adrenal insufficiency currently on hydrocortisone 20 mg daily  She had a right adrenal resection  Her hemoglobin is 13 4    REVIEW OF SYSTEMS:    CONSTITUTIONAL: Denies any fever, chills, rigors, and weight loss  HEENT: No earache or tinnitus  Denies hearing loss or visual disturbances  CARDIOVASCULAR: No chest pain or palpitations  RESPIRATORY: Denies any cough, hemoptysis, shortness of breath or dyspnea on exertion  GASTROINTESTINAL: As noted in the History of Present Illness  GENITOURINARY: No problems with urination  Denies any hematuria or dysuria  NEUROLOGIC: No dizziness or vertigo, denies headaches  MUSCULOSKELETAL: Denies any muscle or joint pain     SKIN: Denies skin rashes or itching  ENDOCRINE: Denies excessive thirst  Denies intolerance to heat or cold  PSYCHOSOCIAL: Denies depression or anxiety  Denies any recent memory loss  Historical Information   Past Medical History:   Diagnosis Date    Ad[facility]al insufficiency ([facility] )     Bacterial sepsis ([place] [address] )     last assessed: 06/13/16    Cushing's syndrome ([place] [address] )     last assessed: 05/29/16    Depression     last assessed: 01/02/18    Diabetes mellitus ([place] [address] )     Hyperlipidemia     Hypertension      Past Surgical History:   Procedure Laterality Date    ADRENALECTOMY  12/2002    CHOLECYSTECTOMY      HERNIA REPAIR      OTHER SURGICAL HISTORY      Injection of trigger joint    TONSILLECTOMY AND ADENOIDECTOMY      TUBAL LIGATION       Social History   History   Alcohol Use No     History   Drug Use No     History   Smoking Status    Current Every Day Smoker   Smokeless Tobacco    Not on file     Family History   Problem Relation Age of Onset    Breast cancer Mother     Breast cancer Family        Meds/Allergies       Current Outpatient Prescriptions:     acetaminophen (TYLENOL) 325 mg tablet    albuterol (VENTOLIN HFA) 90 mcg/act inhaler    benzonatate (TESSALON PERLES) 100 mg capsule    ergocalciferol (VITAMIN D2) 50,000 units    glimepiride (AMARYL) 4 mg tablet    glucose blood (KEITH CONTOUR NEXT TEST) test strip    hydrocortisone (CORTEF) 10 mg tablet    lisinopril (ZESTRIL) 10 mg tablet    loratadine (CLARITIN) 10 mg tablet    metFORMIN (GLUCOPHAGE) 500 mg tablet    pravastatin (PRAVACHOL) 80 mg tablet    sertraline (ZOLOFT) 100 mg tablet    Allergies   Allergen Reactions    Fosamax [Alendronate]     Paxil [Paroxetine Hcl] Rash           Objective     Blood pressure 117/68, pulse 83, temperature (!) 97 1 °F (36 2 °C), temperature source Tympanic, height 5' 1" (1 549 m), weight 73 9 kg (163 lb)  Body mass index is 30 8 kg/m²          PHYSICAL EXAM:      General Appearance:   Alert, cooperative, no distress HEENT:   Normocephalic, atraumatic, anicteric      Neck:  Supple, symmetrical, trachea midline   Lungs:   Clear to auscultation bilaterally; no rales, rhonchi or wheezing; respirations unlabored    Heart[de-identified]   Regular rate and rhythm; no murmur, rub, or gallop  Abdomen:   Soft, non-tender, non-distended; normal bowel sounds; no masses, no organomegaly    Genitalia:   Deferred    Rectal:   Deferred    Extremities:  No cyanosis, clubbing or edema    Pulses:  2+ and symmetric    Skin:  No jaundice, rashes, or lesions    Lymph nodes:  No palpable cervical lymphadenopathy        Lab Results:   No visits with results within 1 Day(s) from this visit     Latest known visit with results is:   Appointment on 06/17/2018   Component Date Value    Vit D, 25-Hydroxy 06/17/2018 31 2     Sodium 06/17/2018 137     Potassium 06/17/2018 5 0     Chloride 06/17/2018 106     CO2 06/17/2018 21     Anion Gap 06/17/2018 10     BUN 06/17/2018 26*    Creatinine 06/17/2018 1 14     Glucose, Fasting 06/17/2018 129*    Calcium 06/17/2018 9 0     AST 06/17/2018 6     ALT 06/17/2018 16     Alkaline Phosphatase 06/17/2018 86     Total Protein 06/17/2018 8 1     Albumin 06/17/2018 2 9*    Total Bilirubin 06/17/2018 0 20     eGFR 06/17/2018 52     TSH 3RD GENERATON 06/17/2018 1 728     WBC 06/17/2018 10 88*    RBC 06/17/2018 4 36     Hemoglobin 06/17/2018 13 4     Hematocrit 06/17/2018 39 6     MCV 06/17/2018 91     MCH 06/17/2018 30 7     MCHC 06/17/2018 33 8     RDW 06/17/2018 13 3     MPV 06/17/2018 10 3     Platelets 81/34/3097 303     Neutrophils Relative 06/17/2018 60     Lymphocytes Relative 06/17/2018 30     Monocytes Relative 06/17/2018 6     Eosinophils Relative 06/17/2018 3     Basophils Relative 06/17/2018 1     Neutrophils Absolute 06/17/2018 6 52     Lymphocytes Absolute 06/17/2018 3 31     Monocytes Absolute 06/17/2018 0 70     Eosinophils Absolute 06/17/2018 0 30     Basophils Absolute 06/17/2018 0 05

## 2018-07-10 NOTE — OP NOTE
**** GI/ENDOSCOPY REPORT ****     PATIENT NAME: EDDIE KWONG ------ VISIT ID:  Patient ID: [id number]   YOB: 1955     INTRODUCTION: Colonoscopy - A 65 female patient presents for an outpatient   Colonoscopy at [facility]  PREVIOUS COLONOSCOPY: No prior colonoscopy  INDICATIONS: Fecal occult blood positive  CONSENT:  The benefits, risks, and alternatives to the procedure were   discussed and informed consent was obtained from the patient  PREPARATION: EKG, pulse, pulse oximetry and blood pressure were monitored   throughout the procedure  The patient was identified by myself both   verbally and by visual inspection of ID band  ASA Classification: Class 2   - Patient has mild to moderate systemic disturbance that may or may not be   related to the disorder requiring surgery  Airway Assessment   Classification: Airway class 2 - Visualization of the soft palate, fauces   and uvula  MEDICATIONS: Anesthesia-check records     PROCEDURE:  The endoscope was passed without difficulty through the anus   under direct visualization and advanced to the cecum, confirmed by   ileocecal valve and appendiceal orifice  The scope was withdrawn and the   mucosa was carefully examined  The quality of the preparation was good  Cecal Intubation Time: Minute(s) Scope Withdrawal Time: Minute(s)     RECTAL EXAM: Normal rectal exam      FINDINGS:  A single pedunculated polyp, measuring 15 mm in size, was found   in the sigmoid colon  The polyp was completely removed by snare cautery   polypectomy  Retrieved  One clip placed to prevent post polypectomy   bleeding  A single pedunculated polyp, measuring 10 mm in size, was found   in the sigmoid colon  The polyp was completely removed by snare cautery   polypectomy  Retrieved  Two polyps, measuring between 5 and 10 mm in size,   were found in the descending colon  All polyps were completely removed by   snare cautery polypectomy   The polyps were retrieved  A single flat polyp,   measuring between 5 and 10 mm in size, was found in the transverse colon  The polyp was completely removed by snare cautery polypectomy  Retrieved  Three flat polyps, measuring between 5 and 10 mm in size, were found in   the ascending colon  All polyps were completely removed by snare cautery   polypectomy  The polyps were retrieved  Two flat polyps, measuring between   5 and 10 mm in size, were found in the cecum  All polyps were completely   removed by snare cautery polypectomy  The polyps were partially retrieved  COMPLICATIONS: There were no complications  IMPRESSIONS: A single pedunculated polyp found in the sigmoid colon;   removed by snare cautery polypectomy  A single pedunculated polyp found in   the sigmoid colon; removed by snare cautery polypectomy  Two polyps found   in the descending colon; all polyps removed by snare cautery polypectomy  A single flat polyp found in the transverse colon; removed by snare   cautery polypectomy  Three flat polyps found in the ascending colon; all   polyps removed by snare cautery polypectomy  Two flat polyps found in the   cecum; all polyps removed by snare cautery polypectomy  RECOMMENDATIONS: Colonoscopy recommended in 1 year ( 10 polps removed   today) Discharge home when standard parameters are met  Resume regular   diet as tolerated  Follow-up on the results of the biopsy specimens  PATHOLOGY SPECIMENS: Completely removed by snare cautery polypectomy  Completely removed by snare cautery polypectomy  All polyps completely   removed by snare cautery polypectomy  Completely removed by snare cautery   polypectomy  All polyps completely removed by snare cautery polypectomy  All polyps completely removed by snare cautery polypectomy       ESTIMATED BLOOD LOSS:     PROCEDURE CODES:     ICD-9 Codes: 792 1 Nonspecific abnormal findings in stool contents 211 3   Benign neoplasm of colon 211 3 Benign neoplasm of colon 211 3 Benign   neoplasm of colon 211 3 Benign neoplasm of colon 211 3 Benign neoplasm of   colon 211 3 Benign neoplasm of colon     ICD-10 Codes: R19 5 Other fecal abnormalities K63 5 Polyp of colon K63 5   Polyp of colon K63 5 Polyp of colon K63 5 Polyp of colon K63 5 Polyp of   colon K63 5 Polyp of colon     PERFORMED BY: MAYA Ibrahim  on 07/10/2018  Version 1, electronically signed by MAAY Hutson  on 07/10/2018   at 11:02

## 2018-07-10 NOTE — ANESTHESIA PREPROCEDURE EVALUATION
Review of Systems/Medical History  Patient summary reviewed  Chart reviewed  No history of anesthetic complications     Cardiovascular  EKG reviewed, Hyperlipidemia, Hypertension ,   Comment: Echo 2016:  Normal Rt and Lt systolic ventric  function  EF 60%,  Pulmonary  Smoker cigarette smoker  ,        GI/Hepatic            Endo/Other  Diabetes type 2 , Yuri's disease,   Comment: Hx Cushing's syndrome    GYN       Hematology   Musculoskeletal       Neurology   Psychology   Anxiety, Depression ,              Physical Exam    Airway    Mallampati score: II  TM Distance: >3 FB  Neck ROM: full     Dental   upper dentures,     Cardiovascular      Pulmonary      Other Findings        Anesthesia Plan  ASA Score- 3     Anesthesia Type- IV sedation with anesthesia with ASA Monitors  Additional Monitors:   Airway Plan:     Comment: Routine monitors, IV sedation  Questions answered, consent obtained        Plan Factors-  Patient did not smoke on day of surgery  Induction- intravenous  Postoperative Plan-     Informed Consent- Anesthetic plan and risks discussed with patient

## 2018-07-20 ENCOUNTER — OFFICE VISIT (OUTPATIENT)
Dept: INTERNAL MEDICINE CLINIC | Facility: CLINIC | Age: 63
End: 2018-07-20
Payer: COMMERCIAL

## 2018-07-20 VITALS
WEIGHT: 163 LBS | HEART RATE: 91 BPM | OXYGEN SATURATION: 97 % | DIASTOLIC BLOOD PRESSURE: 72 MMHG | SYSTOLIC BLOOD PRESSURE: 128 MMHG | HEIGHT: 61 IN | TEMPERATURE: 96.8 F | BODY MASS INDEX: 30.78 KG/M2

## 2018-07-20 DIAGNOSIS — E11.9 TYPE 2 DIABETES MELLITUS WITHOUT COMPLICATION, WITHOUT LONG-TERM CURRENT USE OF INSULIN (HCC): Primary | ICD-10-CM

## 2018-07-20 DIAGNOSIS — E27.40 ADRENAL INSUFFICIENCY (HCC): ICD-10-CM

## 2018-07-20 DIAGNOSIS — I10 ESSENTIAL HYPERTENSION: ICD-10-CM

## 2018-07-20 LAB — SL AMB POCT HEMOGLOBIN AIC: 7.7

## 2018-07-20 PROCEDURE — 99214 OFFICE O/P EST MOD 30 MIN: CPT | Performed by: INTERNAL MEDICINE

## 2018-07-20 PROCEDURE — 83036 HEMOGLOBIN GLYCOSYLATED A1C: CPT | Performed by: INTERNAL MEDICINE

## 2018-07-20 PROCEDURE — 3008F BODY MASS INDEX DOCD: CPT | Performed by: INTERNAL MEDICINE

## 2018-07-20 PROCEDURE — 3074F SYST BP LT 130 MM HG: CPT | Performed by: INTERNAL MEDICINE

## 2018-07-20 PROCEDURE — 3078F DIAST BP <80 MM HG: CPT | Performed by: INTERNAL MEDICINE

## 2018-07-20 NOTE — PROGRESS NOTES
Assessment/Plan:         Diagnoses and all orders for this visit:    Type 2 diabetes mellitus without complication, without long-term current use of insulin (HCC)  -     POCT hemoglobin A1c  Hgb a1c stable at 7 7 - she is challenged with stress due to her husbands illness    Essential hypertension  No added salt  She is more active now since her  is not well    Adrenal insufficiency ([facility] )  Continue present rx - needs long term cortef  Flu shot when available    Rto 4 months     Patient ID: Rita Sosa is a 65 y o  female  HPI   Pt doing ok but her  has MRI for recent brain lesion discovered so she is busy with home  She has more work at home now  No chest pain or sob  No falls  Has dry cough at times but sudafed has helped  She is more active at home with outside work and chores since her  is not well but he is still working      Review of Systems   Constitutional: Negative  HENT: Negative  Eyes: Negative  Respiratory: Negative  Cardiovascular: Negative  Gastrointestinal: Negative  Endocrine: Negative  Genitourinary: Negative  Musculoskeletal: Negative  Skin: Negative  Allergic/Immunologic: Negative  Neurological: Negative  Hematological: Negative  Psychiatric/Behavioral: Negative        Past Medical History:   Diagnosis Date    Adrenal insufficiency ([facility] )     Anxiety     Bacterial sepsis ([facility] )     last assessed: 06/13/16    Cushing's syndrome ([facility] )     last assessed: 05/29/16    Depression     last assessed: 01/02/18    Diabetes mellitus ([facility] )     Hyperlipidemia     Hypertension      Past Surgical History:   Procedure Laterality Date    ADRENALECTOMY  12/2002    CHOLECYSTECTOMY      HERNIA REPAIR      umbilical    OTHER SURGICAL HISTORY      Injection of trigger joint    AL COLONOSCOPY FLX DX W/COLLJ SPEC WHEN PFRMD N/A 7/10/2018    Procedure: COLONOSCOPY;  Surgeon: Rola Mcnamara MD;  Location: [facility];  Service: Gastroenterology   Cori Larson TONSILLECTOMY AND ADENOIDECTOMY      TUBAL LIGATION       Social History     Social History    Marital status: /Civil Union     Spouse name: N/A    Number of children: N/A    Years of education: N/A     Occupational History    Not on file  Social History Main Topics    Smoking status: Current Every Day Smoker     Packs/day: 0 50    Smokeless tobacco: Never Used    Alcohol use No    Drug use: No    Sexual activity: Not on file     Other Topics Concern    Not on file     Social History Narrative    Always uses seat belt    Always uses sunscreen    Caffeine use    Dental care, regularly    No living will    Uses safety equipment - seatbelts         Allergies   Allergen Reactions    Fosamax [Alendronate] Diarrhea    Paxil [Paroxetine Hcl] Rash       /72   Pulse 91   Temp (!) 96 8 °F (36 °C) (Tympanic)   Ht 5' 1" (1 549 m)   Wt 73 9 kg (163 lb)   SpO2 97%   BMI 30 80 kg/m²      Physical Exam   Constitutional: She is oriented to person, place, and time  She appears well-developed and well-nourished  No distress  HENT:   Head: Normocephalic and atraumatic  Right Ear: External ear normal    Left Ear: External ear normal    Nose: Nose normal    Mouth/Throat: Oropharynx is clear and moist  No oropharyngeal exudate  Eyes: Conjunctivae and EOM are normal  Pupils are equal, round, and reactive to light  No scleral icterus  Neck: Normal range of motion  Neck supple  No JVD present  Cardiovascular: Normal rate, regular rhythm, normal heart sounds and intact distal pulses  Pulmonary/Chest: Effort normal and breath sounds normal    Abdominal: Soft  Bowel sounds are normal  She exhibits no distension  There is no tenderness  There is no rebound and no guarding  Musculoskeletal: Normal range of motion  She exhibits no edema, tenderness or deformity  Lymphadenopathy:     She has no cervical adenopathy  Neurological: She is alert and oriented to person, place, and time   She has normal reflexes  She displays normal reflexes  No cranial nerve deficit  She exhibits normal muscle tone  Coordination normal    Skin: Skin is warm and dry  She is not diaphoretic  Psychiatric: She has a normal mood and affect  Her behavior is normal  Judgment and thought content normal    Nursing note and vitals reviewed    CN intact

## 2018-08-27 DIAGNOSIS — E11.9 TYPE 2 DIABETES MELLITUS WITHOUT COMPLICATION, WITHOUT LONG-TERM CURRENT USE OF INSULIN (HCC): Primary | ICD-10-CM

## 2018-08-27 RX ORDER — GLIMEPIRIDE 4 MG/1
TABLET ORAL
Qty: 180 TABLET | Refills: 1 | Status: SHIPPED | OUTPATIENT
Start: 2018-08-27 | End: 2019-04-02 | Stop reason: SDUPTHER
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
## 2018-09-04 DIAGNOSIS — E11.9 TYPE 2 DIABETES MELLITUS WITHOUT COMPLICATION, WITHOUT LONG-TERM CURRENT USE OF INSULIN (HCC): Primary | ICD-10-CM

## 2018-10-24 DIAGNOSIS — I10 ESSENTIAL HYPERTENSION: Primary | ICD-10-CM

## 2018-10-24 DIAGNOSIS — E27.1 ADRENAL INSUFFICIENCY (ADDISON'S DISEASE) (HCC): ICD-10-CM

## 2018-10-24 PROCEDURE — 4010F ACE/ARB THERAPY RXD/TAKEN: CPT | Performed by: INTERNAL MEDICINE

## 2018-10-24 RX ORDER — HYDROCORTISONE 20 MG/1
TABLET ORAL
Qty: 90 TABLET | Refills: 2 | Status: SHIPPED | OUTPATIENT
Start: 2018-10-24 | End: 2019-01-24 | Stop reason: SDUPTHER

## 2018-10-24 RX ORDER — LISINOPRIL 10 MG/1
TABLET ORAL
Qty: 90 TABLET | Refills: 2 | Status: SHIPPED | OUTPATIENT
Start: 2018-10-24 | End: 2019-07-11 | Stop reason: SDUPTHER

## 2018-12-18 DIAGNOSIS — E78.2 MIXED HYPERLIPIDEMIA: ICD-10-CM

## 2018-12-18 RX ORDER — PRAVASTATIN SODIUM 80 MG/1
TABLET ORAL
Qty: 90 TABLET | Refills: 2 | Status: SHIPPED | OUTPATIENT
Start: 2018-12-18 | End: 2019-08-19 | Stop reason: SDUPTHER

## 2019-01-15 DIAGNOSIS — F32.A DEPRESSION, UNSPECIFIED DEPRESSION TYPE: ICD-10-CM

## 2019-01-15 RX ORDER — SERTRALINE HYDROCHLORIDE 100 MG/1
TABLET, FILM COATED ORAL
Qty: 90 TABLET | Refills: 2 | Status: SHIPPED | OUTPATIENT
Start: 2019-01-15 | End: 2020-01-21

## 2019-01-24 ENCOUNTER — OFFICE VISIT (OUTPATIENT)
Dept: INTERNAL MEDICINE CLINIC | Facility: CLINIC | Age: 64
End: 2019-01-24
Payer: COMMERCIAL

## 2019-01-24 VITALS
DIASTOLIC BLOOD PRESSURE: 72 MMHG | BODY MASS INDEX: 31.01 KG/M2 | HEART RATE: 75 BPM | OXYGEN SATURATION: 98 % | WEIGHT: 164.25 LBS | SYSTOLIC BLOOD PRESSURE: 132 MMHG | HEIGHT: 61 IN | TEMPERATURE: 95.3 F

## 2019-01-24 DIAGNOSIS — B02.9 HERPES ZOSTER WITHOUT COMPLICATION: Primary | ICD-10-CM

## 2019-01-24 DIAGNOSIS — E66.9 OBESITY (BMI 30.0-34.9): ICD-10-CM

## 2019-01-24 DIAGNOSIS — E11.9 TYPE 2 DIABETES MELLITUS WITHOUT COMPLICATION, WITHOUT LONG-TERM CURRENT USE OF INSULIN (HCC): ICD-10-CM

## 2019-01-24 DIAGNOSIS — E27.1 ADRENAL INSUFFICIENCY (ADDISON'S DISEASE) (HCC): ICD-10-CM

## 2019-01-24 PROCEDURE — 99214 OFFICE O/P EST MOD 30 MIN: CPT | Performed by: INTERNAL MEDICINE

## 2019-01-24 PROCEDURE — 3008F BODY MASS INDEX DOCD: CPT | Performed by: INTERNAL MEDICINE

## 2019-01-24 RX ORDER — GABAPENTIN 300 MG/1
300 CAPSULE ORAL
Qty: 30 CAPSULE | Refills: 1 | Status: SHIPPED | OUTPATIENT
Start: 2019-01-24 | End: 2019-12-04 | Stop reason: ALTCHOICE

## 2019-01-24 RX ORDER — VALACYCLOVIR HYDROCHLORIDE 1 G/1
1000 TABLET, FILM COATED ORAL 2 TIMES DAILY
Qty: 14 TABLET | Refills: 0 | Status: SHIPPED | OUTPATIENT
Start: 2019-01-24 | End: 2019-01-31

## 2019-01-24 RX ORDER — PEDI MULTIVIT NO.25/FOLIC ACID 300 MCG
1 TABLET,CHEWABLE ORAL DAILY
COMMUNITY
End: 2019-12-04 | Stop reason: ALTCHOICE

## 2019-01-24 RX ORDER — HYDROCORTISONE 20 MG/1
20 TABLET ORAL 2 TIMES DAILY
Qty: 180 TABLET | Refills: 2 | Status: SHIPPED | OUTPATIENT
Start: 2019-01-24 | End: 2020-02-18

## 2019-01-24 NOTE — PROGRESS NOTES
Diabetic Foot Exam    Patient's shoes and socks removed  Right Foot/Ankle   Right Foot Inspection  Skin Exam: skin normal and skin intact no dry skin, no warmth, no callus, no erythema, no maceration, no abnormal color, no pre-ulcer, no ulcer and no callus                          Toe Exam: ROM and strength within normal limits  Sensory       Monofilament testing: intact      Left Foot/Ankle  Left Foot Inspection  Skin Exam: skin normal and skin intactno dry skin, no warmth, no erythema, no maceration, normal color, no pre-ulcer, no ulcer and no callus                         Toe Exam: ROM and strength within normal limits                   Sensory       Monofilament: intact    Assign Risk Category:  Deformity present;  Loss of protective sensation; Weak pulses       Risk: 2

## 2019-01-24 NOTE — PROGRESS NOTES
Assessment/Plan:      Diagnoses and all orders for this visit:    Herpes zoster without complication  Valtrex Bid for 7 days  Increase cortef to bid  Neurontin at hs    Type 2 diabetes mellitus without complication, without long-term current use of insulin (HCC)  -     Microalbumin / creatinine urine ratio  -     HEMOGLOBIN A1C W/ EAG ESTIMATION; Future  Pt will go tomorrow    Other orders  -     Pediatric Multiple Vit-C-FA (PEDIATRIC MULTIVITAMIN) chewable tablet; Chew 1 tablet daily    Rto prn         Patient ID: Scot Self is a 59 y o  female  HPI   Pt has painful rash on lower abdomen since Sunday  No open areas Her  is not well and his cancer has progressed -145 range  She has had rash since Sunday and thinks stress is a factor    Review of Systems   Constitutional: Positive for fatigue  Negative for chills and fever  HENT: Negative  Respiratory: Negative  Cardiovascular: Negative  Musculoskeletal: Positive for arthralgias  Skin: Positive for rash  Psychiatric/Behavioral: The patient is nervous/anxious        Past Medical History:   Diagnosis Date    Adrenal insufficiency ([facility] )     Anxiety     Bacterial sepsis ([facility] )     last assessed: 06/13/16    Cushing's syndrome ([facility] )     last assessed: 05/29/16    Depression     last assessed: 01/02/18    Diabetes mellitus ([facility] )     Hyperlipidemia     Hypertension      Past Surgical History:   Procedure Laterality Date    ADRENALECTOMY  12/2002    CHOLECYSTECTOMY      HERNIA REPAIR      umbilical    OTHER SURGICAL HISTORY      Injection of trigger joint    VT COLONOSCOPY FLX DX W/COLLJ SPEC WHEN PFRMD N/A 7/10/2018    Procedure: COLONOSCOPY;  Surgeon: Meena Cunningham MD;  Location: [facility];  Service: Gastroenterology    TONSILLECTOMY AND ADENOIDECTOMY      TUBAL LIGATION       Social History     Social History    Marital status: /Civil Union     Spouse name: N/A    Number of children: N/A    Years of education: N/A Occupational History    Not on file  Social History Main Topics    Smoking status: Current Every Day Smoker     Packs/day: 0 50    Smokeless tobacco: Never Used    Alcohol use No    Drug use: No    Sexual activity: Not on file     Other Topics Concern    Not on file     Social History Narrative    Always uses seat belt    Always uses sunscreen    Caffeine use    Dental care, regularly    No living will    Uses safety equipment - seatbelts         Allergies   Allergen Reactions    Fosamax [Alendronate] Diarrhea    Paxil [Paroxetine Hcl] Rash       Vitals:    01/24/19 1420 01/24/19 1437   BP:  132/72   Pulse: 75    Temp: (!) 95 3 °F (35 2 °C)    TempSrc: Tympanic    SpO2: 98%    Weight: 74 5 kg (164 lb 4 oz)    Height: 5' 1" (1 549 m)             Physical Exam   Constitutional: She is oriented to person, place, and time  She appears well-developed and well-nourished  No distress  HENT:   Head: Normocephalic and atraumatic  Right Ear: External ear normal    Left Ear: External ear normal    Nose: Nose normal    Mouth/Throat: Oropharynx is clear and moist  No oropharyngeal exudate  Eyes: Pupils are equal, round, and reactive to light  Conjunctivae and EOM are normal  No scleral icterus  Neck: Normal range of motion  Neck supple  No JVD present  Cardiovascular: Normal rate and regular rhythm  Pulmonary/Chest: Effort normal and breath sounds normal    Abdominal: There is tenderness  Musculoskeletal: Normal range of motion  Lymphadenopathy:     She has no cervical adenopathy  Neurological: She is alert and oriented to person, place, and time  She has normal reflexes  No cranial nerve deficit  Skin: Skin is warm and dry  Rash noted  Psychiatric: She has a normal mood and affect  Her behavior is normal  Judgment and thought content normal    Nursing note and vitals reviewed  rash right lower quadrant - zosterBMI Counseling: Body mass index is 31 03 kg/m²   Discussed the patient's BMI with her  The BMI is above average  BMI counseling and education was provided to the patient  Nutrition recommendations include decreasing overall calorie intake

## 2019-01-24 NOTE — PATIENT INSTRUCTIONS
Shingles   AMBULATORY CARE:   Shingles  is a painful rash  Shingles is caused by the same virus that causes chickenpox (varicella-zoster virus)  After you get chickenpox, the virus stays in your body for several years without causing any symptoms  Shingles occurs when the virus becomes active again  Once active, the virus will travel along a nerve to your skin and cause a rash  Common signs and symptoms include the following:  Shingles often starts with pain in the back, chest, neck, or face  A rash then develops in the same area  The rash is usually found on only one side of the body  The rash may feel itchy or painful  It starts as red dots that become blisters filled with fluid  The blisters usually grow bigger, become filled with pus, and then crust over after a few days  You may also have any of the following:  · Fatigue and muscle weakness    · Pain when your skin is lightly touched    · Headache    · Fever    · Eye pain when exposed to light  Seek care immediately if:   · You have painful, red, warm skin around the blisters, or the blisters drain pus  · Your neck is stiff or you have trouble moving it  · You have trouble moving your arms, legs, or face  · You have a seizure  · You have weakness in an arm or leg  · You become confused, or have difficulty speaking  · You have dizziness, a severe headache, or hearing or vision loss  Contact your healthcare provider if:   · You feel weak or have a headache  · You have a cough, chills, or a fever  · You have abdominal pain or nausea, or you are vomiting  · Your rash becomes more itchy or painful  · Your rash spreads to other parts of your body  · Your pain worsens and does not go away even after you take medicine  · You have questions or concerns about your condition or care  Medicines:   · Antiviral medicine  helps decrease symptoms and healing time  They may also decrease your risk of developing nerve pain   You will need to start taking them within 3 days of the start of symptoms to prevent nerve pain  · Pain medicine  may be prescribed or suggested by your healthcare provider  You may need NSAIDs, acetaminophen, or opioid medicine depending on how much pain you are in  Do not wait until the pain is severe before you take more pain medicine  · Topical anesthetics  are used to numb the skin and decrease pain  They can be a cream, gel, spray, or patch  · Anticonvulsants  decrease nerve pain and may help you sleep at night  · Antidepressants  may be used to decrease nerve pain  Follow up with your healthcare provider as directed:  Write down your questions so you remember to ask them during your visits  Self-care:  Keep your rash clean and dry  Cover your rash with a bandage or clothing  Do not use bandages that stick to your skin  The sticky part may irritate your skin and make your rash last longer  Prevent the spread of shingles: The virus can be passed to a person who has never had chickenpox  This person may get chickenpox, but not shingles  You may pass the virus to others as long as you have a rash  The virus is spread by direct contact with the fluid from the blisters  Usually, you cannot spread the virus once the blisters dry up  Prevent shingles or another shingles outbreak:  A vaccine may be given to help prevent shingles  Ask for more information about this vaccine  © 2017 [employer] Information is for End User's use only and may not be sold, redistributed or otherwise used for commercial purposes  All illustrations and images included in CareNotes® are the copyrighted property of A D A M , Inc  or Ryne Johns  The above information is an  only  It is not intended as medical advice for individual conditions or treatments  Talk to your doctor, nurse or pharmacist before following any medical regimen to see if it is safe and effective for you        Low Fat Diet   AMBULATORY CARE:   A low-fat diet  is an eating plan that is low in total fat, unhealthy fat, and cholesterol  You may need to follow a low-fat diet if you have trouble digesting or absorbing fat  You may also need to follow this diet if you have high cholesterol  You can also lower your cholesterol by increasing the amount of fiber in your diet  Soluble fiber is a type of fiber that helps to decrease cholesterol levels  Different types of fat in food:   · Limit unhealthy fats  A diet that is high in cholesterol, saturated fat, and trans fat may cause unhealthy cholesterol levels  Unhealthy cholesterol levels increase your risk of heart disease  ¨ Cholesterol:  Limit intake of cholesterol to less than 200 mg per day  Cholesterol is found in meat, eggs, and dairy  ¨ Saturated fat:  Limit saturated fat to less than 7% of your total daily calories  Ask your dietitian how many calories you need each day  Saturated fat is found in butter, cheese, ice cream, whole milk, and palm oil  Saturated fat is also found in meat, such as beef, pork, chicken skin, and processed meats  Processed meats include sausage, hot dogs, and bologna  ¨ Trans fat:  Avoid trans fat as much as possible  Trans fat is used in fried and baked foods  Foods that say trans fat free on the label may still have up to 0 5 grams of trans fat per serving  · Include healthy fats  Replace foods that are high in saturated and trans fat with foods high in healthy fats  This may help to decrease high cholesterol levels  ¨ Monounsaturated fats: These are found in avocados, nuts, and vegetable oils, such as olive, canola, and sunflower oil  ¨ Polyunsaturated fats: These can be found in vegetable oils, such as soybean or corn oil  Omega-3 fats can help to decrease the risk of heart disease  Omega-3 fats are found in fish, such as salmon, herring, trout, and tuna   Omega-3 fats can also be found in plant foods, such as walnuts, flaxseed, soybeans, and canola oil    Foods to limit or avoid:   · Grains:      ¨ Snacks that are made with partially hydrogenated oils, such as chips, regular crackers, and butter-flavored popcorn    ¨ High-fat baked goods, such as biscuits, croissants, doughnuts, pies, cookies, and pastries    · Dairy:      ¨ Whole milk, 2% milk, and yogurt and ice cream made with whole milk    ¨ Half and half creamer, heavy cream, and whipping cream    ¨ Cheese, cream cheese, and sour cream    · Meats and proteins:      ¨ High-fat cuts of meat (T-bone steak, regular hamburger, and ribs)    ¨ Fried meat, poultry (turkey and chicken), and fish    ¨ Poultry (chicken and turkey) with skin    ¨ Cold cuts (salami or bologna), hot dogs, maddox, and sausage    ¨ Whole eggs and egg yolks    · Vegetables and fruits with added fat:      ¨ Fried vegetables or vegetables in butter or high-fat sauces, such as cream or cheese sauces    ¨ Fried fruit or fruit served with butter or cream    · Fats:      ¨ Butter, stick margarine, and shortening    ¨ Coconut, palm oil, and palm kernel oil  Foods to include:   · Grains:      ¨ Whole-grain breads, cereals, pasta, and brown rice    ¨ Low-fat crackers and pretzels    · Vegetables and fruits:      ¨ Fresh, frozen, or canned vegetables (no salt or low-sodium)    ¨ Fresh, frozen, dried, or canned fruit (canned in light syrup or fruit juice)    ¨ Avocado    · Low-fat dairy products:      ¨ Nonfat (skim) or 1% milk    ¨ Nonfat or low-fat cheese, yogurt, and cottage cheese    · Meats and proteins:      ¨ Chicken or turkey with no skin    ¨ Baked or broiled fish    ¨ Lean beef and pork (loin, round, extra lean hamburger)    ¨ Beans and peas, unsalted nuts, soy products    ¨ Egg whites and substitutes    ¨ Seeds and nuts    · Fats:      ¨ Unsaturated oil, such as canola, olive, peanut, soybean, or sunflower oil    ¨ Soft or liquid margarine and vegetable oil spread    ¨ Low-fat salad dressing  Other ways to decrease fat:   · Read food labels before you buy foods  Choose foods that have less than 30% of calories from fat  Choose low-fat or fat-free dairy products  Remember that fat free does not mean calorie free  These foods still contain calories, and too many calories can lead to weight gain  · Trim fat from meat and avoid fried food  Trim all visible fat from meat before you cook it  Remove the skin from poultry  Do not hernandez meat, fish, or poultry  Bake, roast, boil, or broil these foods instead  Avoid fried foods  Eat a baked potato instead of [place] fries  Steam vegetables instead of sautéing them in butter  · Add less fat to foods  Use imitation maddox bits on salads and baked potatoes instead of regular maddox bits  Use fat-free or low-fat salad dressings instead of regular dressings  Use low-fat or nonfat butter-flavored topping instead of regular butter or margarine on popcorn and other foods  Ways to decrease fat in recipes:  Replace high-fat ingredients with low-fat or nonfat ones  This may cause baked goods to be drier than usual  You may need to use nonfat cooking spray on pans to prevent food from sticking  You also may need to change the amount of other ingredients, such as water, in the recipe  Try the following:  · Use low-fat or light margarine instead of regular margarine or shortening  · Use lean ground turkey breast or chicken, or lean ground beef (less than 5% fat) instead of hamburger  · Add 1 teaspoon of canola oil to 8 ounces of skim milk instead of using cream or half and half  · Use grated zucchini, carrots, or apples in breads instead of coconut  · Use blenderized, low-fat cottage cheese, plain tofu, or low-fat ricotta cheese instead of cream cheese  · Use 1 egg white and 1 teaspoon of canola oil, or use ¼ cup (2 ounces) of fat-free egg substitute instead of a whole egg  · Replace half of the oil that is called for in a recipe with applesauce when you bake  Use 3 tablespoons of cocoa powder and 1 tablespoon of canola oil instead of a square of baking chocolate  How to increase fiber:  Eat enough high-fiber foods to get 20 to 30 grams of fiber every day  Slowly increase your fiber intake to avoid stomach cramps, gas, and other problems  · Eat 3 ounces of whole-grain foods each day  An ounce is about 1 slice of bread  Eat whole-grain breads, such as whole-wheat bread  Whole wheat, whole-wheat flour, or other whole grains should be listed as the first ingredient on the food label  Replace white flour with whole-grain flour or use half of each in recipes  Whole-grain flour is heavier than white flour, so you may have to add more yeast or baking powder  · Eat a high-fiber cereal for breakfast   Oatmeal is a good source of soluble fiber  Look for cereals that have bran or fiber in the name  Choose whole-grain products, such as brown rice, barley, and whole-wheat pasta  · Eat more beans, peas, and lentils  For example, add beans to soups or salads  Eat at least 5 cups of fruits and vegetables each day  Eat fruits and vegetables with the peel because the peel is high in fiber  © 2017 [employer]  Information is for End User's use only and may not be sold, redistributed or otherwise used for commercial purposes  All illustrations and images included in CareNotes® are the copyrighted property of [employer] Inc  or Ryne Johns  The above information is an  only  It is not intended as medical advice for individual conditions or treatments  Talk to your doctor, nurse or pharmacist before following any medical regimen to see if it is safe and effective for you

## 2019-02-06 DIAGNOSIS — B02.9 HERPES ZOSTER WITHOUT COMPLICATION: Primary | ICD-10-CM

## 2019-02-06 RX ORDER — VALACYCLOVIR HYDROCHLORIDE 1 G/1
1000 TABLET, FILM COATED ORAL 2 TIMES DAILY
Qty: 14 TABLET | Refills: 0 | Status: SHIPPED | OUTPATIENT
Start: 2019-02-06 | End: 2020-07-14

## 2019-03-01 LAB
LEFT EYE DIABETIC RETINOPATHY: NORMAL
RIGHT EYE DIABETIC RETINOPATHY: NORMAL

## 2019-04-02 DIAGNOSIS — E11.9 TYPE 2 DIABETES MELLITUS WITHOUT COMPLICATION, WITHOUT LONG-TERM CURRENT USE OF INSULIN (HCC): ICD-10-CM

## 2019-04-02 RX ORDER — GLIMEPIRIDE 4 MG/1
4 TABLET ORAL 2 TIMES DAILY
Qty: 180 TABLET | Refills: 3 | Status: SHIPPED | OUTPATIENT
Start: 2019-04-02 | End: 2020-03-18 | Stop reason: SDUPTHER

## 2019-07-11 DIAGNOSIS — I10 ESSENTIAL HYPERTENSION: ICD-10-CM

## 2019-07-11 PROCEDURE — 4010F ACE/ARB THERAPY RXD/TAKEN: CPT | Performed by: INTERNAL MEDICINE

## 2019-07-11 RX ORDER — LISINOPRIL 10 MG/1
TABLET ORAL
Qty: 90 TABLET | Refills: 2 | Status: SHIPPED | OUTPATIENT
Start: 2019-07-11 | End: 2020-06-15

## 2019-08-02 DIAGNOSIS — E11.9 TYPE 2 DIABETES MELLITUS WITHOUT COMPLICATION, WITHOUT LONG-TERM CURRENT USE OF INSULIN (HCC): ICD-10-CM

## 2019-08-02 DIAGNOSIS — I21.A1 TYPE 2 MYOCARDIAL INFARCTION WITHOUT ST ELEVATION (HCC): ICD-10-CM

## 2019-08-02 DIAGNOSIS — E55.9 VITAMIN D DEFICIENCY: ICD-10-CM

## 2019-08-02 DIAGNOSIS — E03.9 HYPOTHYROIDISM, UNSPECIFIED TYPE: Primary | ICD-10-CM

## 2019-08-02 DIAGNOSIS — E78.5 HYPERLIPIDEMIA, UNSPECIFIED HYPERLIPIDEMIA TYPE: ICD-10-CM

## 2019-08-02 DIAGNOSIS — E27.1 ADRENAL INSUFFICIENCY (ADDISON'S DISEASE) (HCC): ICD-10-CM

## 2019-08-03 ENCOUNTER — APPOINTMENT (OUTPATIENT)
Dept: LAB | Facility: HOSPITAL | Age: 64
End: 2019-08-03
Attending: INTERNAL MEDICINE
Payer: COMMERCIAL

## 2019-08-03 DIAGNOSIS — I21.A1 TYPE 2 MYOCARDIAL INFARCTION WITHOUT ST ELEVATION (HCC): ICD-10-CM

## 2019-08-03 DIAGNOSIS — E11.9 TYPE 2 DIABETES MELLITUS WITHOUT COMPLICATION, WITHOUT LONG-TERM CURRENT USE OF INSULIN (HCC): ICD-10-CM

## 2019-08-03 DIAGNOSIS — E27.1 ADRENAL INSUFFICIENCY (ADDISON'S DISEASE) (HCC): ICD-10-CM

## 2019-08-03 DIAGNOSIS — E03.9 HYPOTHYROIDISM, UNSPECIFIED TYPE: ICD-10-CM

## 2019-08-03 DIAGNOSIS — E55.9 VITAMIN D DEFICIENCY: ICD-10-CM

## 2019-08-03 DIAGNOSIS — E78.5 HYPERLIPIDEMIA, UNSPECIFIED HYPERLIPIDEMIA TYPE: ICD-10-CM

## 2019-08-03 LAB
25(OH)D3 SERPL-MCNC: 31 NG/ML (ref 30–100)
ALBUMIN SERPL BCP-MCNC: 3 G/DL (ref 3.5–5)
ALP SERPL-CCNC: 76 U/L (ref 46–116)
ALT SERPL W P-5'-P-CCNC: 16 U/L (ref 12–78)
ANION GAP SERPL CALCULATED.3IONS-SCNC: 9 MMOL/L (ref 4–13)
AST SERPL W P-5'-P-CCNC: 10 U/L (ref 5–45)
BASOPHILS # BLD AUTO: 0.06 THOUSANDS/ΜL (ref 0–0.1)
BASOPHILS NFR BLD AUTO: 0 % (ref 0–1)
BILIRUB SERPL-MCNC: 0.4 MG/DL (ref 0.2–1)
BUN SERPL-MCNC: 25 MG/DL (ref 5–25)
CALCIUM SERPL-MCNC: 9.4 MG/DL (ref 8.3–10.1)
CHLORIDE SERPL-SCNC: 106 MMOL/L (ref 100–108)
CHOLEST SERPL-MCNC: 237 MG/DL (ref 50–200)
CO2 SERPL-SCNC: 25 MMOL/L (ref 21–32)
CREAT SERPL-MCNC: 1.26 MG/DL (ref 0.6–1.3)
CREAT UR-MCNC: 51.9 MG/DL
EOSINOPHIL # BLD AUTO: 0.3 THOUSAND/ΜL (ref 0–0.61)
EOSINOPHIL NFR BLD AUTO: 2 % (ref 0–6)
ERYTHROCYTE [DISTWIDTH] IN BLOOD BY AUTOMATED COUNT: 13.1 % (ref 11.6–15.1)
EST. AVERAGE GLUCOSE BLD GHB EST-MCNC: 177 MG/DL
GFR SERPL CREATININE-BSD FRML MDRD: 46 ML/MIN/1.73SQ M
GLUCOSE P FAST SERPL-MCNC: 59 MG/DL (ref 65–99)
HBA1C MFR BLD: 7.8 % (ref 4.2–6.3)
HCT VFR BLD AUTO: 40.5 % (ref 34.8–46.1)
HDLC SERPL-MCNC: 39 MG/DL (ref 40–60)
HGB BLD-MCNC: 12.8 G/DL (ref 11.5–15.4)
IMM GRANULOCYTES # BLD AUTO: 0.08 THOUSAND/UL (ref 0–0.2)
IMM GRANULOCYTES NFR BLD AUTO: 1 % (ref 0–2)
LDLC SERPL CALC-MCNC: 153 MG/DL (ref 0–100)
LYMPHOCYTES # BLD AUTO: 4.39 THOUSANDS/ΜL (ref 0.6–4.47)
LYMPHOCYTES NFR BLD AUTO: 28 % (ref 14–44)
MCH RBC QN AUTO: 29.7 PG (ref 26.8–34.3)
MCHC RBC AUTO-ENTMCNC: 31.6 G/DL (ref 31.4–37.4)
MCV RBC AUTO: 94 FL (ref 82–98)
MICROALBUMIN UR-MCNC: 6.4 MG/L (ref 0–20)
MICROALBUMIN/CREAT 24H UR: 12 MG/G CREATININE (ref 0–30)
MONOCYTES # BLD AUTO: 0.93 THOUSAND/ΜL (ref 0.17–1.22)
MONOCYTES NFR BLD AUTO: 6 % (ref 4–12)
NEUTROPHILS # BLD AUTO: 9.75 THOUSANDS/ΜL (ref 1.85–7.62)
NEUTS SEG NFR BLD AUTO: 63 % (ref 43–75)
NONHDLC SERPL-MCNC: 198 MG/DL
NRBC BLD AUTO-RTO: 0 /100 WBCS
PLATELET # BLD AUTO: 286 THOUSANDS/UL (ref 149–390)
PMV BLD AUTO: 10.2 FL (ref 8.9–12.7)
POTASSIUM SERPL-SCNC: 4.3 MMOL/L (ref 3.5–5.3)
PROT SERPL-MCNC: 8.6 G/DL (ref 6.4–8.2)
RBC # BLD AUTO: 4.31 MILLION/UL (ref 3.81–5.12)
SODIUM SERPL-SCNC: 140 MMOL/L (ref 136–145)
TRIGL SERPL-MCNC: 224 MG/DL
TSH SERPL DL<=0.05 MIU/L-ACNC: 1.02 UIU/ML (ref 0.36–3.74)
WBC # BLD AUTO: 15.51 THOUSAND/UL (ref 4.31–10.16)

## 2019-08-03 PROCEDURE — 82043 UR ALBUMIN QUANTITATIVE: CPT | Performed by: INTERNAL MEDICINE

## 2019-08-03 PROCEDURE — 82570 ASSAY OF URINE CREATININE: CPT | Performed by: INTERNAL MEDICINE

## 2019-08-03 PROCEDURE — 84443 ASSAY THYROID STIM HORMONE: CPT

## 2019-08-03 PROCEDURE — 80053 COMPREHEN METABOLIC PANEL: CPT

## 2019-08-03 PROCEDURE — 82306 VITAMIN D 25 HYDROXY: CPT

## 2019-08-03 PROCEDURE — 80061 LIPID PANEL: CPT

## 2019-08-03 PROCEDURE — 36415 COLL VENOUS BLD VENIPUNCTURE: CPT

## 2019-08-03 PROCEDURE — 83036 HEMOGLOBIN GLYCOSYLATED A1C: CPT

## 2019-08-03 PROCEDURE — 85025 COMPLETE CBC W/AUTO DIFF WBC: CPT

## 2019-08-19 DIAGNOSIS — E78.2 MIXED HYPERLIPIDEMIA: ICD-10-CM

## 2019-08-19 DIAGNOSIS — E11.9 TYPE 2 DIABETES MELLITUS WITHOUT COMPLICATION, WITHOUT LONG-TERM CURRENT USE OF INSULIN (HCC): ICD-10-CM

## 2019-08-19 RX ORDER — PRAVASTATIN SODIUM 80 MG/1
TABLET ORAL
Qty: 90 TABLET | Refills: 2 | Status: SHIPPED | OUTPATIENT
Start: 2019-08-19 | End: 2020-09-10

## 2019-12-04 ENCOUNTER — OFFICE VISIT (OUTPATIENT)
Dept: INTERNAL MEDICINE CLINIC | Facility: CLINIC | Age: 64
End: 2019-12-04
Payer: COMMERCIAL

## 2019-12-04 ENCOUNTER — VBI (OUTPATIENT)
Dept: INTERNAL MEDICINE CLINIC | Facility: CLINIC | Age: 64
End: 2019-12-04

## 2019-12-04 VITALS
SYSTOLIC BLOOD PRESSURE: 126 MMHG | WEIGHT: 157.38 LBS | HEART RATE: 89 BPM | TEMPERATURE: 95.7 F | BODY MASS INDEX: 29.71 KG/M2 | DIASTOLIC BLOOD PRESSURE: 78 MMHG | OXYGEN SATURATION: 93 % | HEIGHT: 61 IN

## 2019-12-04 DIAGNOSIS — Z12.31 ENCOUNTER FOR SCREENING MAMMOGRAM FOR MALIGNANT NEOPLASM OF BREAST: ICD-10-CM

## 2019-12-04 DIAGNOSIS — F41.9 ANXIETY: ICD-10-CM

## 2019-12-04 DIAGNOSIS — E03.9 HYPOTHYROIDISM, UNSPECIFIED TYPE: ICD-10-CM

## 2019-12-04 DIAGNOSIS — E11.9 TYPE 2 DIABETES MELLITUS WITHOUT COMPLICATION, WITHOUT LONG-TERM CURRENT USE OF INSULIN (HCC): Primary | ICD-10-CM

## 2019-12-04 DIAGNOSIS — I10 ESSENTIAL HYPERTENSION: ICD-10-CM

## 2019-12-04 LAB — SL AMB POCT HEMOGLOBIN AIC: 7.3 (ref ?–6.5)

## 2019-12-04 PROCEDURE — 3074F SYST BP LT 130 MM HG: CPT | Performed by: INTERNAL MEDICINE

## 2019-12-04 PROCEDURE — 3051F HG A1C>EQUAL 7.0%<8.0%: CPT | Performed by: INTERNAL MEDICINE

## 2019-12-04 PROCEDURE — 3008F BODY MASS INDEX DOCD: CPT | Performed by: INTERNAL MEDICINE

## 2019-12-04 PROCEDURE — 99214 OFFICE O/P EST MOD 30 MIN: CPT | Performed by: INTERNAL MEDICINE

## 2019-12-04 PROCEDURE — 83036 HEMOGLOBIN GLYCOSYLATED A1C: CPT | Performed by: INTERNAL MEDICINE

## 2019-12-04 PROCEDURE — 3078F DIAST BP <80 MM HG: CPT | Performed by: INTERNAL MEDICINE

## 2019-12-04 RX ORDER — DULOXETIN HYDROCHLORIDE 20 MG/1
20 CAPSULE, DELAYED RELEASE ORAL DAILY
Qty: 30 CAPSULE | Refills: 3 | Status: SHIPPED | OUTPATIENT
Start: 2019-12-04 | End: 2020-03-12 | Stop reason: ALTCHOICE

## 2019-12-04 NOTE — PROGRESS NOTES
Assessment/Plan:         Diagnoses and all orders for this visit:    Type 2 diabetes mellitus without complication, without long-term current use of insulin (HCC)  -     POCT hemoglobin A1c  -     Comprehensive metabolic panel; Future  Pt will start monitoring again and work on diet     Encounter for screening mammogram for malignant neoplasm of breast  -     Mammo screening bilateral w 3d & cad; Future  Pt will setup appt    Essential hypertension  No added salt Continue present rx    Anxiety  Add Cymbalta 20mg daily     Hypothyroidism, unspecified type  -     TSH, 3rd generation; Future  Rx for tsh in 3 months    Other orders  -     Cancel: HEMOGLOBIN A1C W/ EAG ESTIMATION; Future    Rto 3 months       Patient ID: Anay Mendoza is a 62 y o  female  HPI  Pt has been more anxious and tearful since loss of her  She does have her granddaughter living with her  She has some hard days especially with the holidays upon her No harmful thoughts   She does not sleep well She has not been checking her sugars due to the stress and loss of her spouse She struggles with repairs at the house and feels everything is going awry  No chest pain or sob  Her  was on hospice and she is aware they have a support group which she will consider in the future but not ready for now      Review of Systems   Constitutional: Negative  HENT: Negative  Respiratory: Negative  Cardiovascular: Negative  Gastrointestinal: Negative  Genitourinary: Negative  Musculoskeletal: Negative  Skin: Negative  Neurological: Negative  Hematological: Negative  Psychiatric/Behavioral: Negative          Past Medical History:   Diagnosis Date    Adrenal insufficiency ([facility] )     Anxiety     Bacterial sepsis ([facility] )     last assessed: 06/13/16    [facility]     last assessed: 05/29/16    Depression     last assessed: 01/02/18    Diabetes mellitus ([facility] )     Hyperlipidemia     Hypertension      Past Surgical History:   Procedure Laterality Date    ADRENALECTOMY  12/2002    CHOLECYSTECTOMY      HERNIA REPAIR      umbilical    OTHER SURGICAL HISTORY      Injection of trigger joint    SD COLONOSCOPY FLX DX W/COLLJ SPEC WHEN PFRMD N/A 7/10/2018    Procedure: COLONOSCOPY;  Surgeon: Dwaine Hickey MD;  Location: [facility];  Service: Gastroenterology    TONSILLECTOMY AND ADENOIDECTOMY      TUBAL LIGATION       Social History     Socioeconomic History    Marital status: /Civil Union     Spouse name: Not on file    Number of children: Not on file    Years of education: Not on file    Highest education level: Not on file   Occupational History    Not on file   Social Needs    Financial resource strain: Not on file    Food insecurity:     Worry: Not on file     Inability: Not on file    Transportation needs:     Medical: Not on file     Non-medical: Not on file   Tobacco Use    Smoking status: Current Every Day Smoker     Packs/day: 0 50    Smokeless tobacco: Never Used   Substance and Sexual Activity    Alcohol use: No    Drug use: No    Sexual activity: Not on file   Lifestyle    Physical activity:     Days per week: Not on file     Minutes per session: Not on file    Stress: Not on file   Relationships    Social connections:     Talks on phone: Not on file     Gets together: Not on file     Attends [employer] service: Not on file     Active member of club or organization: Not on file     Attends meetings of clubs or organizations: Not on file     Relationship status: Not on file    Intimate partner violence:     Fear of current or ex partner: Not on file     Emotionally abused: Not on file     Physically abused: Not on file     Forced sexual activity: Not on file   Other Topics Concern    Not on file   Social History Narrative    Always uses seat belt    Always uses sunscreen    Caffeine use    Dental care, regularly    No living will    Uses safety equipment - seatbelts     Allergies   Allergen Reactions    Fosamax [Alendronate] Diarrhea    Paxil [Paroxetine Hcl] Rash           /78   Pulse 89   Temp (!) 95 7 °F (35 4 °C) (Tympanic)   Ht 5' 1" (1 549 m)   Wt 71 4 kg (157 lb 6 oz)   SpO2 93%   BMI 29 74 kg/m²          Physical Exam   Constitutional: She is oriented to person, place, and time  She appears well-developed and well-nourished  No distress  HENT:   Head: Normocephalic and atraumatic  Mouth/Throat: Oropharynx is clear and moist  No oropharyngeal exudate  Eyes: Pupils are equal, round, and reactive to light  Conjunctivae and EOM are normal  No scleral icterus  Neck: Normal range of motion  Neck supple  No JVD present  Cardiovascular: Normal rate and regular rhythm  Pulmonary/Chest: Effort normal and breath sounds normal  No respiratory distress  She has no wheezes  Abdominal: Soft  Bowel sounds are normal    Musculoskeletal: Normal range of motion  Lymphadenopathy:     She has no cervical adenopathy  Neurological: She is alert and oriented to person, place, and time  Skin: Skin is warm and dry  Capillary refill takes less than 2 seconds  She is not diaphoretic  No erythema  Psychiatric: She has a normal mood and affect  Her behavior is normal  Judgment and thought content normal    Nursing note and vitals reviewed

## 2019-12-04 NOTE — LETTER
Diabetic Eye Exam Form    Date Requested: 19  Patient: Bishop Libman  Patient : 1955   Referring Provider: Stephanie Lyman DO    Dilated Retinal Exam        Yes         No     Date of Diabetic Eye Exam ______________________________  Left Eye      Exam did show retinopathy    Exam did not show retinopathy         Mild       Moderate       None       Proliferative       Severe     Right Eye     Exam did show retinopathy    Exam did not show retinopathy         Mild       Moderate       None       Proliferative       Severe     Comments __________________________________________________________    Practice Providing Exam ______________________________________________    Exam Performed By (print name) _______________________________________      Provider Signature ___________________________________________________      These reports are needed for  compliance    Please fax this completed form and a copy of the Diabetic Eye Exam report to our office as soon as possible to [phone] mich Rodriguez: Phone [phone]    We thank you for your assistance in treating our mutual patient

## 2020-01-21 DIAGNOSIS — F32.A DEPRESSION, UNSPECIFIED DEPRESSION TYPE: ICD-10-CM

## 2020-01-21 RX ORDER — SERTRALINE HYDROCHLORIDE 100 MG/1
TABLET, FILM COATED ORAL
Qty: 90 TABLET | Refills: 0 | Status: SHIPPED | OUTPATIENT
Start: 2020-01-21 | End: 2020-06-15

## 2020-02-18 DIAGNOSIS — E27.1 ADRENAL INSUFFICIENCY (ADDISON'S DISEASE) (HCC): ICD-10-CM

## 2020-02-18 RX ORDER — HYDROCORTISONE 20 MG/1
20 TABLET ORAL 2 TIMES DAILY
Qty: 60 TABLET | Refills: 3 | Status: SHIPPED | OUTPATIENT
Start: 2020-02-18 | End: 2020-07-14

## 2020-03-08 ENCOUNTER — APPOINTMENT (OUTPATIENT)
Dept: LAB | Facility: HOSPITAL | Age: 65
End: 2020-03-08
Attending: INTERNAL MEDICINE
Payer: COMMERCIAL

## 2020-03-08 DIAGNOSIS — E11.9 TYPE 2 DIABETES MELLITUS WITHOUT COMPLICATION, WITHOUT LONG-TERM CURRENT USE OF INSULIN (HCC): ICD-10-CM

## 2020-03-08 DIAGNOSIS — E03.9 HYPOTHYROIDISM, UNSPECIFIED TYPE: ICD-10-CM

## 2020-03-08 LAB
ALBUMIN SERPL BCP-MCNC: 3.1 G/DL (ref 3.5–5)
ALP SERPL-CCNC: 72 U/L (ref 46–116)
ALT SERPL W P-5'-P-CCNC: 15 U/L (ref 12–78)
ANION GAP SERPL CALCULATED.3IONS-SCNC: 8 MMOL/L (ref 4–13)
AST SERPL W P-5'-P-CCNC: 10 U/L (ref 5–45)
BILIRUB SERPL-MCNC: 0.3 MG/DL (ref 0.2–1)
BUN SERPL-MCNC: 44 MG/DL (ref 5–25)
CALCIUM SERPL-MCNC: 9.3 MG/DL (ref 8.3–10.1)
CHLORIDE SERPL-SCNC: 103 MMOL/L (ref 100–108)
CO2 SERPL-SCNC: 23 MMOL/L (ref 21–32)
CREAT SERPL-MCNC: 1.36 MG/DL (ref 0.6–1.3)
EST. AVERAGE GLUCOSE BLD GHB EST-MCNC: 163 MG/DL
GFR SERPL CREATININE-BSD FRML MDRD: 41 ML/MIN/1.73SQ M
GLUCOSE P FAST SERPL-MCNC: 113 MG/DL (ref 65–99)
HBA1C MFR BLD: 7.3 %
POTASSIUM SERPL-SCNC: 4.9 MMOL/L (ref 3.5–5.3)
PROT SERPL-MCNC: 8.7 G/DL (ref 6.4–8.2)
SODIUM SERPL-SCNC: 134 MMOL/L (ref 136–145)
TSH SERPL DL<=0.05 MIU/L-ACNC: 1.37 UIU/ML (ref 0.36–3.74)

## 2020-03-08 PROCEDURE — 83036 HEMOGLOBIN GLYCOSYLATED A1C: CPT

## 2020-03-08 PROCEDURE — 80053 COMPREHEN METABOLIC PANEL: CPT

## 2020-03-08 PROCEDURE — 36415 COLL VENOUS BLD VENIPUNCTURE: CPT

## 2020-03-08 PROCEDURE — 84443 ASSAY THYROID STIM HORMONE: CPT

## 2020-03-12 ENCOUNTER — OFFICE VISIT (OUTPATIENT)
Dept: INTERNAL MEDICINE CLINIC | Facility: CLINIC | Age: 65
End: 2020-03-12
Payer: COMMERCIAL

## 2020-03-12 VITALS
SYSTOLIC BLOOD PRESSURE: 122 MMHG | WEIGHT: 168.13 LBS | BODY MASS INDEX: 31.74 KG/M2 | HEIGHT: 61 IN | DIASTOLIC BLOOD PRESSURE: 76 MMHG

## 2020-03-12 DIAGNOSIS — K63.5 POLYP OF COLON, UNSPECIFIED PART OF COLON, UNSPECIFIED TYPE: ICD-10-CM

## 2020-03-12 DIAGNOSIS — E27.1 ADRENAL INSUFFICIENCY (ADDISON'S DISEASE) (HCC): ICD-10-CM

## 2020-03-12 DIAGNOSIS — Z00.00 ANNUAL PHYSICAL EXAM: Primary | ICD-10-CM

## 2020-03-12 DIAGNOSIS — E11.9 TYPE 2 DIABETES MELLITUS WITHOUT COMPLICATION, WITHOUT LONG-TERM CURRENT USE OF INSULIN (HCC): ICD-10-CM

## 2020-03-12 DIAGNOSIS — F32.9 REACTIVE DEPRESSION: ICD-10-CM

## 2020-03-12 PROCEDURE — 99396 PREV VISIT EST AGE 40-64: CPT | Performed by: INTERNAL MEDICINE

## 2020-03-12 PROCEDURE — 3051F HG A1C>EQUAL 7.0%<8.0%: CPT | Performed by: INTERNAL MEDICINE

## 2020-03-12 NOTE — PROGRESS NOTES
Assessment/Plan:         Diagnoses and all orders for this visit:    Annual physical exam  Eye exam utd  Lo carb diet  Encouraged increase activity - pt is doing better and getting out more  Increase water ntake    Type 2 diabetes mellitus without complication, without long-term current use of insulin ([facility] )  -     HEMOGLOBIN A1C W/ EAG ESTIMATION; Future  HgbA1c 7 3 Monitor in 3 months  Walking program encouraged    Reactive depression  Continue Zoloft She is doing better since last visit She started crocheting again and has been getting out of the house    Rto 3months         Patient ID: John Sage is a 62 y o  female  HPI  Pt doing much better She did not take antidepressant and is feeling more settled She is crocheting again and getting out of the house Still having stress with her granddaughter who wants to move back with her Mom so patient is considering downsizing and letting her do so She still smokes and has dry cough from smoking which is unchanged She is less tired and her sugars have been 160 range per patient usually if she checks No falls She hopes to get outside more once the weather improves       Review of Systems   Constitutional: Negative  HENT: Negative  Respiratory: Positive for cough  Still smoking  Dry cough no change   Cardiovascular: Negative for chest pain and leg swelling  Gastrointestinal: Negative  Genitourinary: Negative  Musculoskeletal: Positive for arthralgias  Skin: Negative  Neurological: Negative  Hematological: Negative  Psychiatric/Behavioral: Negative          Past Medical History:   Diagnosis Date    Adrenal insufficiency ([facility] )     Anxiety     [facility]     last assessed: 06/13/16    [facility]     last assessed: 05/29/16    Depression     last assessed: 01/02/18    Diabetes mellitus [facility]     Hyperlipidemia     Hypertension      Past Surgical History:   Procedure Laterality Date    ADRENALECTOMY  12/2002  CHOLECYSTECTOMY      HERNIA REPAIR      umbilical    OTHER SURGICAL HISTORY      Injection of trigger joint    WA COLONOSCOPY FLX DX W/COLLJ SPEC WHEN PFRMD N/A 7/10/2018    Procedure: COLONOSCOPY;  Surgeon: Robyn Melton MD;  Location: [facility];  Service: Gastroenterology    TONSILLECTOMY AND ADENOIDECTOMY      TUBAL LIGATION       Social History     Socioeconomic History    Marital status: /Civil Union     Spouse name: Not on file    Number of children: Not on file    Years of education: Not on file    Highest education level: Not on file   Occupational History    Not on file   Social Needs    Financial resource strain: Not on file    Food insecurity:     Worry: Not on file     Inability: Not on file    Transportation needs:     Medical: Not on file     Non-medical: Not on file   Tobacco Use    Smoking status: Current Every Day Smoker     Packs/day: 0 50    Smokeless tobacco: Never Used   Substance and Sexual Activity    Alcohol use: No    Drug use: No    Sexual activity: Not on file   Lifestyle    Physical activity:     Days per week: Not on file     Minutes per session: Not on file    Stress: Not on file   Relationships    Social connections:     Talks on phone: Not on file     Gets together: Not on file     Attends [employer] service: Not on file     Active member of club or organization: Not on file     Attends meetings of clubs or organizations: Not on file     Relationship status: Not on file    Intimate partner violence:     Fear of current or ex partner: Not on file     Emotionally abused: Not on file     Physically abused: Not on file     Forced sexual activity: Not on file   Other Topics Concern    Not on file   Social History Narrative    Always uses seat belt    Always uses sunscreen    Caffeine use    Dental care, regularly    No living will    Uses safety equipment - seatbelts     Allergies   Allergen Reactions    Fosamax [Alendronate] Diarrhea    Paxil [Paroxetine Hcl] Rash     BMI Counseling: Body mass index is 31 77 kg/m²  The BMI is above normal  Nutrition recommendations include moderation in carbohydrate intake  Exercise recommendations include strength training exercises  Tobacco Cessation Counseling: Tobacco cessation counseling was provided  The patient is sincerely urged to quit consumption of tobacco  She is not ready to quit tobacco  Medication options not discussed  /76   Ht 5' 1" (1 549 m)   Wt 76 3 kg (168 lb 2 oz)   BMI 31 77 kg/m²          Physical Exam   Constitutional: She is oriented to person, place, and time  She appears well-developed and well-nourished  No distress  HENT:   Head: Normocephalic and atraumatic  Mouth/Throat: Oropharynx is clear and moist    Eyes: Pupils are equal, round, and reactive to light  Conjunctivae and EOM are normal  No scleral icterus  Neck: Normal range of motion  Neck supple  No JVD present  Cardiovascular: Normal rate and regular rhythm  No murmur heard  Pulmonary/Chest: Effort normal and breath sounds normal  No respiratory distress  She has no wheezes  Abdominal: Soft  Bowel sounds are normal  She exhibits no distension  There is no tenderness  Musculoskeletal: Normal range of motion  She exhibits no edema  Lymphadenopathy:     She has no cervical adenopathy  Neurological: She is alert and oriented to person, place, and time  Skin: Skin is warm and dry  Capillary refill takes less than 2 seconds  She is not diaphoretic  Psychiatric: She has a normal mood and affect  Her behavior is normal  Judgment and thought content normal    Nursing note and vitals reviewed

## 2020-03-12 NOTE — PROGRESS NOTES
Diabetic Foot Exam    Patient's shoes and socks removed  Right Foot/Ankle   Right Foot Inspection  Skin Exam: skin normal and skin intact no dry skin, no warmth, no callus, no erythema, no maceration, no abnormal color, no pre-ulcer, no ulcer and no callus                          Toe Exam: ROM and strength within normal limits  Sensory   Vibration: intact    Monofilament testing: intact  Vascular    The right DP pulse is 2+  The right PT pulse is 1+  Left Foot/Ankle  Left Foot Inspection  Skin Exam: skin normal and skin intactno dry skin, no warmth, no erythema, no maceration, normal color, no pre-ulcer, no ulcer and no callus                         Toe Exam: ROM and strength within normal limits                   Sensory   Vibration: diminished    Monofilament: intact  Vascular    The left DP pulse is 2+  The left PT pulse is 1+  Assign Risk Category:  Deformity present; Loss of protective sensation;  No weak pulses       Risk: 1

## 2020-03-18 DIAGNOSIS — E11.9 TYPE 2 DIABETES MELLITUS WITHOUT COMPLICATION, WITHOUT LONG-TERM CURRENT USE OF INSULIN (HCC): ICD-10-CM

## 2020-03-18 RX ORDER — GLIMEPIRIDE 4 MG/1
4 TABLET ORAL 2 TIMES DAILY
Qty: 180 TABLET | Refills: 3 | Status: SHIPPED | OUTPATIENT
Start: 2020-03-18 | End: 2021-01-18

## 2020-06-10 ENCOUNTER — APPOINTMENT (OUTPATIENT)
Dept: LAB | Facility: HOSPITAL | Age: 65
End: 2020-06-10
Attending: INTERNAL MEDICINE
Payer: COMMERCIAL

## 2020-06-10 DIAGNOSIS — E11.9 TYPE 2 DIABETES MELLITUS WITHOUT COMPLICATION, WITHOUT LONG-TERM CURRENT USE OF INSULIN (HCC): ICD-10-CM

## 2020-06-10 LAB
EST. AVERAGE GLUCOSE BLD GHB EST-MCNC: 174 MG/DL
HBA1C MFR BLD: 7.7 %

## 2020-06-10 PROCEDURE — 36415 COLL VENOUS BLD VENIPUNCTURE: CPT

## 2020-06-10 PROCEDURE — 83036 HEMOGLOBIN GLYCOSYLATED A1C: CPT

## 2020-06-10 PROCEDURE — 3051F HG A1C>EQUAL 7.0%<8.0%: CPT | Performed by: NURSE PRACTITIONER

## 2020-06-12 ENCOUNTER — TELEPHONE (OUTPATIENT)
Dept: INTERNAL MEDICINE CLINIC | Facility: CLINIC | Age: 65
End: 2020-06-12

## 2020-06-14 DIAGNOSIS — I10 ESSENTIAL HYPERTENSION: ICD-10-CM

## 2020-06-14 DIAGNOSIS — F32.A DEPRESSION, UNSPECIFIED DEPRESSION TYPE: ICD-10-CM

## 2020-06-15 PROCEDURE — 4010F ACE/ARB THERAPY RXD/TAKEN: CPT | Performed by: NURSE PRACTITIONER

## 2020-06-15 RX ORDER — LISINOPRIL 10 MG/1
TABLET ORAL
Qty: 90 TABLET | Refills: 0 | Status: SHIPPED | OUTPATIENT
Start: 2020-06-15 | End: 2021-01-18

## 2020-06-15 RX ORDER — SERTRALINE HYDROCHLORIDE 100 MG/1
TABLET, FILM COATED ORAL
Qty: 90 TABLET | Refills: 0 | Status: SHIPPED | OUTPATIENT
Start: 2020-06-15 | End: 2020-09-10

## 2020-07-02 ENCOUNTER — TELEPHONE (OUTPATIENT)
Dept: GASTROENTEROLOGY | Facility: CLINIC | Age: 65
End: 2020-07-02

## 2020-07-07 ENCOUNTER — TELEPHONE (OUTPATIENT)
Dept: INTERNAL MEDICINE CLINIC | Facility: CLINIC | Age: 65
End: 2020-07-07

## 2020-07-14 ENCOUNTER — CONSULT (OUTPATIENT)
Dept: ENDOCRINOLOGY | Facility: CLINIC | Age: 65
End: 2020-07-14
Payer: COMMERCIAL

## 2020-07-14 VITALS
BODY MASS INDEX: 31.15 KG/M2 | HEART RATE: 75 BPM | WEIGHT: 165 LBS | TEMPERATURE: 98 F | DIASTOLIC BLOOD PRESSURE: 60 MMHG | SYSTOLIC BLOOD PRESSURE: 110 MMHG | HEIGHT: 61 IN

## 2020-07-14 DIAGNOSIS — E55.9 VITAMIN D DEFICIENCY: ICD-10-CM

## 2020-07-14 DIAGNOSIS — E27.1 ADRENAL INSUFFICIENCY (ADDISON'S DISEASE) (HCC): ICD-10-CM

## 2020-07-14 DIAGNOSIS — M81.0 OSTEOPOROSIS, UNSPECIFIED OSTEOPOROSIS TYPE, UNSPECIFIED PATHOLOGICAL FRACTURE PRESENCE: ICD-10-CM

## 2020-07-14 PROCEDURE — 3051F HG A1C>EQUAL 7.0%<8.0%: CPT | Performed by: INTERNAL MEDICINE

## 2020-07-14 PROCEDURE — 99244 OFF/OP CNSLTJ NEW/EST MOD 40: CPT | Performed by: INTERNAL MEDICINE

## 2020-07-14 RX ORDER — HYDROCORTISONE 5 MG/1
TABLET ORAL
Qty: 360 TABLET | Refills: 1 | Status: SHIPPED | OUTPATIENT
Start: 2020-07-14 | End: 2020-08-25 | Stop reason: DRUGHIGH

## 2020-07-14 NOTE — PROGRESS NOTES
Luis Manuel Schwarz 62 y o  female MRN: [id number]    Encounter: [id number]      Assessment/Plan     Problem List Items Addressed This Visit        Endocrine    Adrenal insufficiency (Delta's disease) ([facility] [address] )     She has been on hydrocortisone since adrenalectomy for likely Cushing's 20 years back-she will likely need lifelong steroids  Counseled on stress dose, counseled on importance of getting medical alert bracelet  For now I will check an a m  cortisol and ACTH after holding hydrocortisone for 24 hours  After that she will change her dose to 15 mg in the morning and 5 mg around 3-4 pm         Relevant Medications    hydrocortisone (CORTEF) 5 mg tablet    Other Relevant Orders    Cortisol Level, AM Specimen Lab Collect    ACTH- Lab Collect       Musculoskeletal and Integument    Osteoporosis     She recalls being told that she had osteoporosis about 20 years back  Was on Fosamax for a few months and stopped due to side effects  will repeat DEXA scan and after that will discuss pharmacological therapy if  appropriate         Relevant Orders    Vitamin D 25 hydroxy Lab Collect    DXA bone density spine hip and pelvis       Other    Vitamin D deficiency     Continue supplementations         Relevant Orders    Vitamin D 25 hydroxy Lab Collect        CC:   Adrenal insufficiency    History of Present Illness     HPI:  62-year-old female referred here for evaluation of adrenal insufficiency  She underwent Right adrenalectomy in 2001 -has been on steroids since then - he does not recall being told that she had Cushing's disease prior to the surgery  She has been on steroid since the surgery and it appears that there a few attempts made to decrease the dose which were not successful  she is currently on hydrocortisone 20 mg once daily and denies missing any doses  She denies any admissions for adrenal crisis    Last hospitalization in 2016 was for sepsis and she did require stress dose steroids when she was hospitalized    C/o fatigue , weight gain -5-10 lbs   Occasional difficulty stairs ,   Occasional dizziness and lightheaded    History of osteoporosis and was on fosamax for a few months and stopped due to GI SE -she thinks that was about 20 years back-does not recall her last DEXA was done  No history of fragility fractures    had ankle fracture 25 years back - casted         Review of Systems   Constitutional: Positive for fatigue  Negative for unexpected weight change  Cardiovascular: Positive for palpitations  Negative for leg swelling  Gastrointestinal: Negative for diarrhea, nausea and vomiting  Endocrine: Negative for polydipsia and polyuria  Musculoskeletal: Negative for gait problem  Skin: Negative for wound  Neurological: Positive for light-headedness  Psychiatric/Behavioral: Positive for sleep disturbance  Negative for confusion  All other systems reviewed and are negative        Historical Information   Past Medical History:   Diagnosis Date    Adrenal insufficiency ([facility] )     Anxiety     Bacterial sepsis ([facility] )     last assessed: 06/13/16    Cushing's syndrome ([place] [address] )     last assessed: 05/29/16    Depression     last assessed: 01/02/18    Diabetes mellitus ([place] [address] )     Hyperlipidemia     Hypertension      Past Surgical History:   Procedure Laterality Date    ADRENALECTOMY  12/2002    CHOLECYSTECTOMY      HERNIA REPAIR      umbilical    OTHER SURGICAL HISTORY      Injection of trigger joint    ME COLONOSCOPY FLX DX W/COLLJ SPEC WHEN PFRMD N/A 7/10/2018    Procedure: COLONOSCOPY;  Surgeon: Teetee Newman MD;  Location: [facility];  Service: Gastroenterology    TONSILLECTOMY AND ADENOIDECTOMY      TUBAL LIGATION       Social History   Social History     Substance and Sexual Activity   Alcohol Use No     Social History     Substance and Sexual Activity   Drug Use No     Social History     Tobacco Use   Smoking Status Current Every Day Smoker    Packs/day: 0 50   Smokeless Tobacco Never Used     Family History:   Family History   Problem Relation Age of Onset    Breast cancer Mother     Breast cancer Family        Meds/Allergies   Current Outpatient Medications   Medication Sig Dispense Refill    glimepiride (AMARYL) 4 mg tablet Take 1 tablet (4 mg total) by mouth 2 (two) times a day 180 tablet 3    glucose blood (KEITH CONTOUR NEXT TEST) test strip by In Vitro route 2 (two) times a day      lisinopril (ZESTRIL) 10 mg tablet Take 1 tablet by mouth once daily 90 tablet 0    metFORMIN (GLUCOPHAGE) 500 mg tablet TAKE 1 TABLET BY MOUTH ONCE DAILY 90 tablet 3    pravastatin (PRAVACHOL) 80 mg tablet TAKE 1 TABLET BY MOUTH ONCE DAILY 90 tablet 2    sertraline (ZOLOFT) 100 mg tablet TAKE 1 TABLET BY MOUTH ONCE DAILY AS DIRECTED 90 tablet 0    hydrocortisone (CORTEF) 5 mg tablet 3 tabs in morning and 1 tab at 4 pm 360 tablet 1     No current facility-administered medications for this visit  Allergies   Allergen Reactions    Fosamax [Alendronate] Diarrhea    Paxil [Paroxetine Hcl] Rash       Objective   Vitals: Blood pressure 110/60, pulse 75, temperature 98 °F (36 7 °C), height 5' 1" (1 549 m), weight 74 8 kg (165 lb)  Physical Exam   Constitutional: She is oriented to person, place, and time  She appears well-developed and well-nourished  No distress  HENT:   Head: Normocephalic and atraumatic  Eyes: EOM are normal  No scleral icterus  Neck: Normal range of motion  Neck supple  No thyromegaly present  Cardiovascular: Normal rate, regular rhythm and normal heart sounds  No murmur heard  Pulmonary/Chest: Effort normal and breath sounds normal  No respiratory distress  She has no wheezes  She has no rales  Abdominal: Soft  Bowel sounds are normal  She exhibits no distension  There is no tenderness  There is no guarding  Musculoskeletal: Normal range of motion  She exhibits no edema or deformity  Lymphadenopathy:     She has no cervical adenopathy     Neurological: She is alert and oriented to person, place, and time  Skin: Skin is warm and dry  Psychiatric: She has a normal mood and affect  Her behavior is normal  Judgment and thought content normal    Vitals reviewed  The history was obtained from the review of the chart, patient and family  Lab Results:   Lab Results   Component Value Date/Time    Potassium 4 9 03/08/2020 08:47 AM    Potassium 4 3 08/03/2019 08:15 AM    Chloride 103 03/08/2020 08:47 AM    Chloride 106 08/03/2019 08:15 AM    CO2 23 03/08/2020 08:47 AM    CO2 25 08/03/2019 08:15 AM    BUN 44 (H) 03/08/2020 08:47 AM    BUN 25 08/03/2019 08:15 AM    Creatinine 1 36 (H) 03/08/2020 08:47 AM    Creatinine 1 26 08/03/2019 08:15 AM    Glucose, Fasting 113 (H) 03/08/2020 08:47 AM    Glucose, Fasting 59 (L) 08/03/2019 08:15 AM    Calcium 9 3 03/08/2020 08:47 AM    Calcium 9 4 08/03/2019 08:15 AM    eGFR 41 03/08/2020 08:47 AM    eGFR 46 08/03/2019 08:15 AM    TSH 3RD GENERATON 1 369 03/08/2020 08:47 AM    TSH 3RD GENERATON 1 022 08/03/2019 08:15 AM             Imaging Studies:    Study Result     CT ABDOMEN AND PELVIS WITHOUT IV CONTRAST     INDICATION:  Weakness  Abdominal pain  Nausea  Vomiting  Back pain x5 days  Right adrenal gland removed  Tobacco abuse history  GFR 13      COMPARISON: October 17, 2013     TECHNIQUE:  CT examination of the abdomen and pelvis was performed without intravenous contrast   This examination, like all CT scans performed in the [facility], was performed utilizing techniques to minimize radiation dose exposure,   including the use of iterative reconstruction and automated exposure control  Axial, sagittal, and coronal reformatted images were submitted for interpretation  Intravenous contrast was not administered as part of this examination    Enteric contrast   was administered      FINDINGS:     ABDOMEN     LOWER CHEST:  Minimal bibasilar dependent atelectatic changes      LIVER/BILIARY TREE: Unremarkable      GALLBLADDER:  Gallbladder surgically absent      SPLEEN:  Unremarkable      PANCREAS:  Unremarkable      ADRENAL GLANDS:  Unremarkable      KIDNEYS/URETERS:  Prominent right renal pelvis  Streaking of the right perinephric fat noted and thickening of intravenous fascia is seen      Lower pole left renal cyst   Otherwise left kidney unremarkable      STOMACH AND BOWEL:  Stomach and small bowel unremarkable  Occasional colonic diverticula noted without evidence of acute diverticulitis      APPENDIX:  No findings to suggest appendicitis      ABDOMINOPELVIC CAVITY:  No ascites or free intraperitoneal air  No lymphadenopathy      VESSELS:  Unremarkable for patient's age      PELVIS     REPRODUCTIVE ORGANS:  Unremarkable for patient's age      URINARY BLADDER:  Unremarkable      ABDOMINAL WALL/INGUINAL REGIONS:  Unremarkable      OSSEOUS STRUCTURES:  Old healed right posterior 7th rib fracture      IMPRESSION:     Prominent right renal collecting system and streaking of the right perinephric fat may be secondary to recent passage of right ureteral calculus or infection/pyelonephritis      Colonic diverticular disease without evidence of acute diverticulitis      Cholecystectomy         Workstation performed: [id number]                I have personally reviewed pertinent reports  Portions of the record may have been created with voice recognition software  Occasional wrong word or "sound a like" substitutions may have occurred due to the inherent limitations of voice recognition software  Read the chart carefully and recognize, using context, where substitutions have occurred

## 2020-07-14 NOTE — PATIENT INSTRUCTIONS
Do labs tomorrow morning, fasting between 7-9 am     For mild illness/low grade fever, double hydrocortisone dose for 2-3 days then return to baseline dose  - For high grade fever, triple dose for 2-3 days then return to normal dose  - For nausea, vomiting, unable to keep Hydrocortisone pills down, use SoluCortef emergency injection and present to Er  Garden Disease   WHAT YOU NEED TO KNOW:   · Yuri disease is a condition that causes low levels of aldosterone and cortisol  These hormones are made by your adrenal glands  Cortisol helps your body handle stress  Aldosterone helps your body balance salt, potassium, and fluid  · An adrenal crisis happens when your cortisol and aldosterone levels suddenly drop  This may lead to low blood pressure, dehydration, and low blood sugar  An adrenal crisis is life-threatening and needs immediate treatment  An adrenal crisis can happen if you suddenly stop taking your medicine  It can also happen when your body is under more stress than usual  This may happen during surgery, an illness, or trauma  DISCHARGE INSTRUCTIONS:   Call 911 for the following:   · You have a seizure  · You lose consciousness or cannot be woken  · You have shortness of breath or trouble breathing  Return to the emergency department if:  The following are early signs and symptoms of an adrenal crisis:  · Your heart is beating faster than usual      · You have a headache, hallucinations, or feel confused  · You have muscle weakness or muscle cramps  · You have severe pain in your stomach, back, or legs  · You have numbness and tingling in your fingers or around your mouth  · You have trouble staying awake  · You urinate less than usual or stop urinating  Contact your healthcare provider if:   · You have a fever  · You have symptoms of a cold or the flu such as a cough or congestion  · You have 2 or more episodes of diarrhea      · You have nausea or stomach pain, or are vomiting  · You are vomiting so much that you cannot drink any liquids  · You sweat more than usual     · You have questions or concerns about your condition or care  Medicines: You may  need any of the following:  · Steroid medicine  is given to increase your cortisol level  Take this medicine as directed  Steroid medicine helps your body handle stress and prevent an adrenal crisis  Steroid medicine will also help decrease your symptoms such as weakness and fatigue  Always carry extra steroid medicine with you  You may need to take steroid medicine several many times a day  Do not skip a dose of your medicine  Do not stop taking this medicine  without talking to your healthcare provider  You may have an adrenal crisis if you skip a dose or stop taking your medicine  · Aldosterone supplements  may be given to help your body balance salt and fluid  This can help prevent dehydration and low sodium (salt) levels  · Sodium supplements  help increase the amount of salt in your blood  You may need to take salt supplements every day  Instead you may need to take salt supplements before exercise, in hot weather, or when you have diarrhea or are vomiting  Manage your condition during sick days:  Sick days may include days you have a cold, diarrhea, or are vomiting  Your body will need more steroid medicine when you are sick to prevent an adrenal crisis  Increase your steroid dose as directed when you are sick  You may need to inject your steroid medicine if you are vomiting and cannot swallow your medicine  Your healthcare provider will show you how to inject your medicine  Ask if you need to drink an oral rehydration solution (ORS) when you are sick  An ORS has the right amounts of water, salts, and sugar that you need to replace lost body fluids  Check your blood pressure and blood sugar as directed:  Write down your blood pressure readings and blood sugar levels   Bring these numbers with you to your follow-up appointments  Ask your healthcare provider for more information on how to check your blood pressure and blood sugar level  Wear medical alert jewelry or carry a card that says you have Yuri disease:  Ask your healthcare provider where to get these items  Ask about vaccines:  Vaccines can help prevent illnesses that may cause an adrenal crisis  Ask your healthcare provider if you should get a flu or pneumonia vaccine, and when to get the vaccine  Follow up with your healthcare provider as directed: You will need ongoing blood tests to check your hormone levels  Write down your questions so you remember to ask them during your visits  © 2017 [employer] Information is for End User's use only and may not be sold, redistributed or otherwise used for commercial purposes  All illustrations and images included in CareNotes® are the copyrighted property of [employer]  or [employer]ne [employer]  The above information is an  only  It is not intended as medical advice for individual conditions or treatments  Talk to your doctor, nurse or pharmacist before following any medical regimen to see if it is safe and effective for you

## 2020-07-14 NOTE — ASSESSMENT & PLAN NOTE
She has been on hydrocortisone since adrenalectomy for likely Cushing's 20 years back-she will likely need lifelong steroids  Counseled on stress dose, counseled on importance of getting medical alert huey  For now I will check an a m  cortisol and ACTH after holding hydrocortisone for 24 hours    After that she will change her dose to 15 mg in the morning and 5 mg around 3-4 pm

## 2020-07-14 NOTE — ASSESSMENT & PLAN NOTE
She recalls being told that she had osteoporosis about 20 years back  Was on Fosamax for a few months and stopped due to side effects    will repeat DEXA scan and after that will discuss pharmacological therapy if  appropriate

## 2020-07-15 ENCOUNTER — LAB (OUTPATIENT)
Dept: LAB | Facility: HOSPITAL | Age: 65
End: 2020-07-15
Attending: INTERNAL MEDICINE
Payer: COMMERCIAL

## 2020-07-15 DIAGNOSIS — E55.9 VITAMIN D DEFICIENCY: ICD-10-CM

## 2020-07-15 DIAGNOSIS — M81.0 OSTEOPOROSIS, UNSPECIFIED OSTEOPOROSIS TYPE, UNSPECIFIED PATHOLOGICAL FRACTURE PRESENCE: ICD-10-CM

## 2020-07-15 DIAGNOSIS — E27.1 ADRENAL INSUFFICIENCY (ADDISON'S DISEASE) (HCC): ICD-10-CM

## 2020-07-15 LAB
25(OH)D3 SERPL-MCNC: 29.1 NG/ML (ref 30–100)
CORTIS AM PEAK SERPL-MCNC: 21.4 UG/DL (ref 4.2–22.4)

## 2020-07-15 PROCEDURE — 82306 VITAMIN D 25 HYDROXY: CPT

## 2020-07-15 PROCEDURE — 82533 TOTAL CORTISOL: CPT

## 2020-07-15 PROCEDURE — 82024 ASSAY OF ACTH: CPT

## 2020-07-15 PROCEDURE — 36415 COLL VENOUS BLD VENIPUNCTURE: CPT

## 2020-07-16 LAB — ACTH PLAS-MCNC: 72.9 PG/ML (ref 7.2–63.3)

## 2020-07-20 ENCOUNTER — TELEPHONE (OUTPATIENT)
Dept: ENDOCRINOLOGY | Facility: CLINIC | Age: 65
End: 2020-07-20

## 2020-07-20 NOTE — TELEPHONE ENCOUNTER
----- Message from Oma Germain MD sent at 7/20/2020 12:17 PM EDT -----  Please call the patient regarding labs - did she do the labs after being off hydrocortisone for 24 hours ? So no evening dose the day before the labs and labs done before the morning dose of hydrocortisone - if so her adrenal gland appears to be making cortisol - for now decrease hydrocortisone to 10 mg morning and 5 mg in afternoon and let me know how she is feeling in the next few weeks   Vitamin D slightly low - how much is she taking ?

## 2020-07-20 NOTE — RESULT ENCOUNTER NOTE
Please call the patient regarding labs - did she do the labs after being off hydrocortisone for 24 hours ? So no evening dose the day before the labs and labs done before the morning dose of hydrocortisone - if so her adrenal gland appears to be making cortisol - for now decrease hydrocortisone to 10 mg morning and 5 mg in afternoon and let me know how she is feeling in the next few weeks   Vitamin D slightly low - how much is she taking ?

## 2020-07-20 NOTE — TELEPHONE ENCOUNTER
Patient informed of lab results, patient is currently taking Vitamin D 1,000 iu daily  Patient was off hydrocortisone for 24 hours

## 2020-07-27 ENCOUNTER — OFFICE VISIT (OUTPATIENT)
Dept: INTERNAL MEDICINE CLINIC | Facility: CLINIC | Age: 65
End: 2020-07-27
Payer: COMMERCIAL

## 2020-07-27 VITALS
TEMPERATURE: 98.6 F | HEART RATE: 89 BPM | HEIGHT: 61 IN | BODY MASS INDEX: 30.82 KG/M2 | DIASTOLIC BLOOD PRESSURE: 78 MMHG | WEIGHT: 163.25 LBS | SYSTOLIC BLOOD PRESSURE: 130 MMHG | OXYGEN SATURATION: 97 %

## 2020-07-27 DIAGNOSIS — E55.9 VITAMIN D DEFICIENCY: ICD-10-CM

## 2020-07-27 DIAGNOSIS — E27.1 ADRENAL INSUFFICIENCY (ADDISON'S DISEASE) (HCC): ICD-10-CM

## 2020-07-27 DIAGNOSIS — R19.5 POSITIVE FIT (FECAL IMMUNOCHEMICAL TEST): ICD-10-CM

## 2020-07-27 DIAGNOSIS — E11.9 TYPE 2 DIABETES MELLITUS WITHOUT COMPLICATION, WITHOUT LONG-TERM CURRENT USE OF INSULIN (HCC): Primary | ICD-10-CM

## 2020-07-27 PROBLEM — B02.9 HERPES ZOSTER WITHOUT COMPLICATION: Status: RESOLVED | Noted: 2019-01-24 | Resolved: 2020-07-27

## 2020-07-27 PROCEDURE — 4004F PT TOBACCO SCREEN RCVD TLK: CPT | Performed by: INTERNAL MEDICINE

## 2020-07-27 PROCEDURE — 3008F BODY MASS INDEX DOCD: CPT | Performed by: INTERNAL MEDICINE

## 2020-07-27 PROCEDURE — 99214 OFFICE O/P EST MOD 30 MIN: CPT | Performed by: INTERNAL MEDICINE

## 2020-07-27 PROCEDURE — 2022F DILAT RTA XM EVC RTNOPTHY: CPT | Performed by: INTERNAL MEDICINE

## 2020-07-27 PROCEDURE — 3075F SYST BP GE 130 - 139MM HG: CPT | Performed by: INTERNAL MEDICINE

## 2020-07-27 PROCEDURE — 3078F DIAST BP <80 MM HG: CPT | Performed by: INTERNAL MEDICINE

## 2020-07-27 PROCEDURE — 3051F HG A1C>EQUAL 7.0%<8.0%: CPT | Performed by: INTERNAL MEDICINE

## 2020-07-27 NOTE — PROGRESS NOTES
Assessment/Plan:         Diagnoses and all orders for this visit:    Type 2 diabetes mellitus without complication, without long-term current use of insulin ([facility])  -     HEMOGLOBIN A1C W/ EAG ESTIMATION; Future  She has followup with endocrine scheduled  Recent labs reviewed She will improve her diet    Vitamin D deficiency  Increase vit d 2000 iu daily     Positive FIT (fecal immunochemical test)  -     Ambulatory referral to Colorectal Surgery; Future  Refer to colorectal     Adrenal insufficiency (Ashaway's disease) [facility])  She is followed by Dr Michele Pineda and has appt in August       Rto 6 months       Patient ID: Evelyn Abarca is a 62 y o  female  HPI  Pt doing better overall She is followed by Dr Michele Pineda and has followup in August She feels better overall She is smoking  She wants to setup Mammo, dexa and colon screen  She has been keeping busy with [employer] and art work which she is enjoying No change in bowels and is going to schedule colonoscopy    Review of Systems   Constitutional: Negative  HENT: Negative  Eyes: Negative for visual disturbance  Respiratory: Negative  Cardiovascular: Negative  Gastrointestinal: Negative  Genitourinary: Negative  Musculoskeletal: Negative  Skin: Negative  Neurological: Negative  Hematological: Negative  Psychiatric/Behavioral: Negative          Past Medical History:   Diagnosis Date    Adrenal insufficiency ([facility] [address] [facility]     Anxiety     Bacterial sepsis ([facility] [address] )     last assessed: 06/13/16    Cushing's syndrome ([facility] [address] )     last assessed: 05/29/16    Depression     last assessed: 01/02/18    Diabetes mellitus ([facility] [address] [facility]     Hyperlipidemia     Hypertension      Past Surgical History:   Procedure Laterality Date    ADRENALECTOMY  12/2002    CHOLECYSTECTOMY      HERNIA REPAIR      umbilical    OTHER SURGICAL HISTORY      Injection of trigger joint    GA COLONOSCOPY FLX DX W/COLLJ SPEC WHEN PFRMD N/A 7/10/2018    Procedure: COLONOSCOPY;  Surgeon: Mohini Marmolejo MD;  Location: [facility];  Service: Gastroenterology    TONSILLECTOMY AND ADENOIDECTOMY      TUBAL LIGATION       Social History     Socioeconomic History    Marital status: /Civil Union     Spouse name: Not on file    Number of children: Not on file    Years of education: Not on file    Highest education level: Not on file   Occupational History    Not on file   Social Needs    Financial resource strain: Not on file    Food insecurity:     Worry: Not on file     Inability: Not on file    Transportation needs:     Medical: Not on file     Non-medical: Not on file   Tobacco Use    Smoking status: Current Every Day Smoker     Packs/day: 0 50    Smokeless tobacco: Never Used   Substance and Sexual Activity    Alcohol use: No    Drug use: No    Sexual activity: Not on file   Lifestyle    Physical activity:     Days per week: Not on file     Minutes per session: Not on file    Stress: Not on file   Relationships    Social connections:     Talks on phone: Not on file     Gets together: Not on file     Attends [employer] service: Not on file     Active member of club or organization: Not on file     Attends meetings of clubs or organizations: Not on file     Relationship status: Not on file    Intimate partner violence:     Fear of current or ex partner: Not on file     Emotionally abused: Not on file     Physically abused: Not on file     Forced sexual activity: Not on file   Other Topics Concern    Not on file   Social History Narrative    Always uses seat belt    Always uses sunscreen    Caffeine use    Dental care, regularly    No living will    Uses safety equipment - seatbelts     Allergies   Allergen Reactions    Fosamax [Alendronate] Diarrhea    Paxil [Paroxetine Hcl] Rash       Tobacco Cessation Counseling: Tobacco cessation counseling was provided   The patient is sincerely urged to quit consumption of tobacco  She is not ready to quit tobacco  Medication options discussed  /78   Pulse 89   Temp 98 6 °F (37 °C) (Tympanic)   Ht 5' 1" (1 549 m)   Wt 74 kg (163 lb 4 oz)   SpO2 97%   BMI 30 85 kg/m²          Physical Exam   Constitutional: She is oriented to person, place, and time  She appears well-developed and well-nourished  No distress  HENT:   Head: Normocephalic and atraumatic  Right Ear: External ear normal    Left Ear: External ear normal    Nose: Nose normal    Mouth/Throat: Oropharynx is clear and moist  No oropharyngeal exudate  Eyes: Pupils are equal, round, and reactive to light  Conjunctivae and EOM are normal  No scleral icterus  Neck: Normal range of motion  Neck supple  No JVD present  Cardiovascular: Normal rate and regular rhythm  No murmur heard  Pulmonary/Chest: Effort normal and breath sounds normal  No respiratory distress  She has no wheezes  Abdominal: Soft  Bowel sounds are normal  She exhibits no distension  There is no tenderness  Musculoskeletal: Normal range of motion  She exhibits no edema  Lymphadenopathy:     She has no cervical adenopathy  Neurological: She is alert and oriented to person, place, and time  She displays normal reflexes  No cranial nerve deficit  She exhibits normal muscle tone  Coordination normal    Skin: Skin is warm and dry  Capillary refill takes less than 2 seconds  She is not diaphoretic  No erythema  Psychiatric: She has a normal mood and affect  Her behavior is normal  Judgment and thought content normal    Vitals reviewed

## 2020-08-25 ENCOUNTER — OFFICE VISIT (OUTPATIENT)
Dept: ENDOCRINOLOGY | Facility: CLINIC | Age: 65
End: 2020-08-25
Payer: COMMERCIAL

## 2020-08-25 VITALS
HEIGHT: 61 IN | DIASTOLIC BLOOD PRESSURE: 60 MMHG | BODY MASS INDEX: 31.38 KG/M2 | WEIGHT: 166.2 LBS | HEART RATE: 76 BPM | SYSTOLIC BLOOD PRESSURE: 110 MMHG

## 2020-08-25 DIAGNOSIS — E27.1 ADRENAL INSUFFICIENCY (ADDISON'S DISEASE) (HCC): Primary | ICD-10-CM

## 2020-08-25 PROCEDURE — 99214 OFFICE O/P EST MOD 30 MIN: CPT | Performed by: NURSE PRACTITIONER

## 2020-08-25 PROCEDURE — 3051F HG A1C>EQUAL 7.0%<8.0%: CPT | Performed by: NURSE PRACTITIONER

## 2020-08-25 PROCEDURE — 3074F SYST BP LT 130 MM HG: CPT | Performed by: NURSE PRACTITIONER

## 2020-08-25 PROCEDURE — 4004F PT TOBACCO SCREEN RCVD TLK: CPT | Performed by: NURSE PRACTITIONER

## 2020-08-25 PROCEDURE — 2022F DILAT RTA XM EVC RTNOPTHY: CPT | Performed by: NURSE PRACTITIONER

## 2020-08-25 PROCEDURE — 3008F BODY MASS INDEX DOCD: CPT | Performed by: NURSE PRACTITIONER

## 2020-08-25 PROCEDURE — 3078F DIAST BP <80 MM HG: CPT | Performed by: NURSE PRACTITIONER

## 2020-08-25 RX ORDER — HYDROCORTISONE 5 MG/1
TABLET ORAL
Qty: 270 TABLET | Refills: 1 | Status: SHIPPED | OUTPATIENT
Start: 2020-08-25 | End: 2021-03-16 | Stop reason: ALTCHOICE

## 2020-08-25 RX ORDER — MULTIVIT-MIN/IRON/FOLIC ACID/K 18-600-40
3000 CAPSULE ORAL DAILY
COMMUNITY

## 2020-08-25 NOTE — PROGRESS NOTES
Established Patient Progress Note       Chief Complaint   Patient presents with    Adrenal Problem        History of Present Illness:     Brandan Boswell is a 62 y o  female with HTN, HLD, adrenal insufficiency, vitamin-D deficiency, and type 2 diabetes  Patient had right adrenalectomy in 2001 and has been on steroids since then  She does not recall being told that she had Cushing's disease prior to the surgery  At present she is taking hydrocortisone 10 mg in the morning and 5 mg in the afternoon  This reduction occurred after July 15th labs showed normal cortisol level and mildly elevated ACTH  It appears remaining adrenal gland is producing cortisol therefore 20 mg was not indicated  Patient does report that she felt poorly while reducing steroid dose  She states that she had palpitations, fatigue, and daily headaches for approximately one week  She states that all these symptoms have since resolved  She denies missing any doses  She denies any hospital admissions for adrenal crisis  Last hospitalization was in 2016  This was for sepsis and she did require stress dose steroids while hospitalized  Since her last appointment in this office, she did purchase a medical alert bracelet  Her primary complaint today is fatigue  She states that she gets sleepy in the evening but only sleeps for an hour or 2 at a time  The patient watches television in bed  And is not on a regular schedule at this time  She lost her  1 year ago and is still adjusting  For vitamin-D deficiency, she had been taking 1000 mg of vitamin-D daily  Last vitamin-D level was low at 29  She is now taking 2000 International Units daily  For type 2 diabetes she is taking glimepiride 4 mg 2 times daily and metformin 500 mg once daily  Patient did not bring in blood sugar logs today  She denies any episodes of hyper or hypoglycemia  For HTN, she is taking lisinopril  She denies HA and cough   For HLD, she is taking pravastatin  She denies myalgias  Patient recalls being told that she had osteoporosis about 20 years ago  For a time she was taking Fosamax but then stopped due to side effects  She has a repeat DEXA scan ordered that will be discussed after resulted  This is scheduled for September        Patient Active Problem List   Diagnosis    Pyelonephritis    Hypertension    Type 2 diabetes mellitus with hyperglycemia (HCC)    Adrenal insufficiency ([place]'s disease) ([facility] [address] [facility]    Tobacco abuse    Non-ST elevation myocardial infarction (NSTEMI), type 2    Vitamin D deficiency    Positive FIT (fecal immunochemical test)    Depression    DMII (diabetes mellitus, type 2) (HCC)    Hyperlipidemia    Hypothyroidism    Anxiety    Annual physical exam    Osteoporosis      Past Medical History:   Diagnosis Date    Adrenal insufficiency ([facility] )     Anxiety     Bacterial sepsis ([place] [address] )     last assessed: 06/13/16    Cushing's syndrome ([facility] )     last assessed: 05/29/16    Depression     last assessed: 01/02/18    Diabetes mellitus ([facility] )     Hyperlipidemia     Hypertension       Past Surgical History:   Procedure Laterality Date    ADRENALECTOMY  12/2002    CHOLECYSTECTOMY      HERNIA REPAIR      umbilical    OTHER SURGICAL HISTORY      Injection of trigger joint    PA COLONOSCOPY FLX DX W/COLLJ SPEC WHEN PFRMD N/A 7/10/2018    Procedure: COLONOSCOPY;  Surgeon: Meena Cunningham MD;  Location: [facility];  Service: Gastroenterology    TONSILLECTOMY AND ADENOIDECTOMY      TUBAL LIGATION        Family History   Problem Relation Age of Onset    Breast cancer Mother     Breast cancer Family      Social History     Tobacco Use    Smoking status: Current Every Day Smoker     Packs/day: 0 50    Smokeless tobacco: Never Used   Substance Use Topics    Alcohol use: No     Allergies   Allergen Reactions    Fosamax [Alendronate] Diarrhea    Paxil [Paroxetine Hcl] Rash       Current Outpatient Medications:     glucose blood (KEITH CONTOUR NEXT TEST) test strip, by In Vitro route 2 (two) times a day, Disp: , Rfl:     lisinopril (ZESTRIL) 10 mg tablet, Take 1 tablet by mouth once daily, Disp: 90 tablet, Rfl: 0    metFORMIN (GLUCOPHAGE) 500 mg tablet, TAKE 1 TABLET BY MOUTH ONCE DAILY, Disp: 90 tablet, Rfl: 3    pravastatin (PRAVACHOL) 80 mg tablet, TAKE 1 TABLET BY MOUTH ONCE DAILY, Disp: 90 tablet, Rfl: 2    sertraline (ZOLOFT) 100 mg tablet, TAKE 1 TABLET BY MOUTH ONCE DAILY AS DIRECTED, Disp: 90 tablet, Rfl: 0    glimepiride (AMARYL) 4 mg tablet, Take 1 tablet (4 mg total) by mouth 2 (two) times a day, Disp: 180 tablet, Rfl: 3    hydrocortisone (CORTEF) 5 mg tablet, Take two tablets (10mg) every morning and 1 tablet (5mg) in the afternoon  , Disp: 270 tablet, Rfl: 1    Review of Systems   Constitutional: Positive for fatigue  Negative for activity change, appetite change and unexpected weight change  Eyes: Negative for visual disturbance  Respiratory: Negative for cough, chest tightness and shortness of breath  Cardiovascular: Negative for chest pain, palpitations and leg swelling  Gastrointestinal: Negative for constipation, diarrhea, nausea and vomiting  Endocrine: Negative for polydipsia, polyphagia and polyuria  Genitourinary: Negative for frequency  Musculoskeletal: Positive for arthralgias  Negative for back pain, gait problem, joint swelling and myalgias  Skin: Negative for wound  Neurological: Negative for dizziness, weakness, light-headedness, numbness and headaches  Psychiatric/Behavioral: Positive for sleep disturbance  Negative for decreased concentration and dysphoric mood  The patient is not nervous/anxious  Physical Exam:  Body mass index is 31 4 kg/m²    /60   Pulse 76   Ht 5' 1" (1 549 m)   Wt 75 4 kg (166 lb 3 2 oz)   BMI 31 40 kg/m²    Wt Readings from Last 3 Encounters:   08/25/20 75 4 kg (166 lb 3 2 oz)   07/27/20 74 kg (163 lb 4 oz)   07/14/20 74 8 kg (165 lb) Physical Exam  Constitutional:       Appearance: Normal appearance  She is well-developed  HENT:      Head: Normocephalic  Comments: Mask in place  Eyes:      Pupils: Pupils are equal, round, and reactive to light  Neck:      Musculoskeletal: Normal range of motion and neck supple  Thyroid: No thyromegaly  Cardiovascular:      Rate and Rhythm: Normal rate and regular rhythm  Pulses: Normal pulses  Heart sounds: Normal heart sounds  Pulmonary:      Effort: Pulmonary effort is normal       Breath sounds: Normal breath sounds  Abdominal:      General: Bowel sounds are normal  There is no distension  Palpations: Abdomen is soft  Tenderness: There is no abdominal tenderness  Musculoskeletal:      Right lower leg: No edema  Left lower leg: No edema  Lymphadenopathy:      Cervical: No cervical adenopathy  Skin:     General: Skin is warm and dry  Capillary Refill: Capillary refill takes less than 2 seconds  Neurological:      Mental Status: She is alert and oriented to person, place, and time     Psychiatric:         Mood and Affect: Mood normal          Behavior: Behavior normal          Labs:       Lab Results   Component Value Date    CREATININE 1 36 (H) 03/08/2020    CREATININE 1 26 08/03/2019    CREATININE 1 14 06/17/2018    BUN 44 (H) 03/08/2020     12/28/2014    K 4 9 03/08/2020     03/08/2020    CO2 23 03/08/2020     eGFR   Date Value Ref Range Status   03/08/2020 41 ml/min/1 73sq m Final       Lab Results   Component Value Date    HDL 39 (L) 08/03/2019    TRIG 224 (H) 08/03/2019       Lab Results   Component Value Date    ALT 15 03/08/2020    AST 10 03/08/2020    ALKPHOS 72 03/08/2020    BILITOT 0 4 12/28/2014         Impression & Plan:    Problem List Items Addressed This Visit        Endocrine    Adrenal insufficiency ([place]'s disease) ([facility] [address] ) - Primary    Relevant Medications    hydrocortisone (CORTEF) 5 mg tablet        Component Latest Ref Rng & Units 6/10/2020 7/15/2020   Hemoglobin A1C      Normal 3 8-5 6%; PreDiabetic 5 7-6 4%; Diabetic >=6 5%; Glycemic control for adults with diabetes <7 0% % 7 7 (H)    eAG, EST AVG Glucose      mg/dl 174    Cortisol - AM      4 2 - 22 4 ug/dL  21 4   ACTH      7 2 - 63 3 pg/mL  72 9 (H)   Vit D, 25-Hydroxy      30 0 - 100 0 ng/mL  29 1 (L)     No orders of the defined types were placed in this encounter  Patient Instructions   1  Melatonin 5mg        Discussed with the patient and all questioned fully answered  She will call me if any problems arise  Follow-up appointment in 3 months       Counseled patient on diagnostic results, prognosis, risk and benefit of treatment options, instruction for management, importance of treatment compliance, Risk  factor reduction and impressions      NATHAN Carter

## 2020-08-25 NOTE — ASSESSMENT & PLAN NOTE
The patient is currently above goal   Discussed increasing metformin to 500 mg twice a day  At this point patient would like to work on diet and exercise  Will continue to monitor    Lab Results   Component Value Date    HGBA1C 7 7 (H) 06/10/2020

## 2020-08-25 NOTE — ASSESSMENT & PLAN NOTE
Based on most recent lab work, the patient did not require 20 mg of Cortef daily  Patient states she feels well after reduction  Continue current regimen of 10 mg in the morning and 5 mg in the afternoon

## 2020-09-04 ENCOUNTER — HOSPITAL ENCOUNTER (OUTPATIENT)
Dept: BONE DENSITY | Facility: HOSPITAL | Age: 65
Discharge: HOME | End: 2020-09-04
Attending: INTERNAL MEDICINE
Payer: COMMERCIAL

## 2020-09-04 ENCOUNTER — HOSPITAL ENCOUNTER (OUTPATIENT)
Dept: MAMMOGRAPHY | Facility: HOSPITAL | Age: 65
Discharge: HOME | End: 2020-09-04
Attending: INTERNAL MEDICINE
Payer: COMMERCIAL

## 2020-09-04 VITALS — BODY MASS INDEX: 31.34 KG/M2 | HEIGHT: 61 IN | WEIGHT: 166 LBS

## 2020-09-04 DIAGNOSIS — M81.0 OSTEOPOROSIS, UNSPECIFIED OSTEOPOROSIS TYPE, UNSPECIFIED PATHOLOGICAL FRACTURE PRESENCE: ICD-10-CM

## 2020-09-04 DIAGNOSIS — Z12.31 ENCOUNTER FOR SCREENING MAMMOGRAM FOR MALIGNANT NEOPLASM OF BREAST: ICD-10-CM

## 2020-09-04 PROCEDURE — 77080 DXA BONE DENSITY AXIAL: CPT

## 2020-09-04 PROCEDURE — 77063 BREAST TOMOSYNTHESIS BI: CPT

## 2020-09-04 PROCEDURE — 77067 SCR MAMMO BI INCL CAD: CPT

## 2020-09-09 ENCOUNTER — TELEPHONE (OUTPATIENT)
Dept: ENDOCRINOLOGY | Facility: CLINIC | Age: 65
End: 2020-09-09

## 2020-09-09 NOTE — RESULT ENCOUNTER NOTE
Please call the patient regarding dexa scan- shows osteopenia but may meet criteria for starting medication to decrease risk of fracture - will discuss on visit

## 2020-09-09 NOTE — TELEPHONE ENCOUNTER
----- Message from Frederick Howe MD sent at 9/9/2020  8:50 AM EDT -----  Please call the patient regarding dexa scan- shows osteopenia but may meet criteria for starting medication to decrease risk of fracture - will discuss on visit

## 2020-09-10 DIAGNOSIS — E78.2 MIXED HYPERLIPIDEMIA: ICD-10-CM

## 2020-09-10 DIAGNOSIS — F32.A DEPRESSION, UNSPECIFIED DEPRESSION TYPE: ICD-10-CM

## 2020-09-10 RX ORDER — PRAVASTATIN SODIUM 80 MG/1
TABLET ORAL
Qty: 90 TABLET | Refills: 0 | Status: SHIPPED | OUTPATIENT
Start: 2020-09-10 | End: 2021-01-18

## 2020-09-10 RX ORDER — SERTRALINE HYDROCHLORIDE 100 MG/1
TABLET, FILM COATED ORAL
Qty: 90 TABLET | Refills: 0 | Status: SHIPPED | OUTPATIENT
Start: 2020-09-10 | End: 2021-01-18

## 2020-11-25 ENCOUNTER — LAB (OUTPATIENT)
Dept: LAB | Facility: HOSPITAL | Age: 65
End: 2020-11-25
Payer: COMMERCIAL

## 2020-11-25 DIAGNOSIS — E27.1 ADRENAL INSUFFICIENCY (ADDISON'S DISEASE) (HCC): ICD-10-CM

## 2020-11-25 LAB
ANION GAP SERPL CALCULATED.3IONS-SCNC: 8 MMOL/L (ref 4–13)
BUN SERPL-MCNC: 26 MG/DL (ref 5–25)
CALCIUM SERPL-MCNC: 9.3 MG/DL (ref 8.3–10.1)
CHLORIDE SERPL-SCNC: 104 MMOL/L (ref 100–108)
CO2 SERPL-SCNC: 24 MMOL/L (ref 21–32)
CREAT SERPL-MCNC: 1.26 MG/DL (ref 0.6–1.3)
GFR SERPL CREATININE-BSD FRML MDRD: 45 ML/MIN/1.73SQ M
GLUCOSE P FAST SERPL-MCNC: 117 MG/DL (ref 65–99)
POTASSIUM SERPL-SCNC: 4.9 MMOL/L (ref 3.5–5.3)
SODIUM SERPL-SCNC: 136 MMOL/L (ref 136–145)

## 2020-11-25 PROCEDURE — 36415 COLL VENOUS BLD VENIPUNCTURE: CPT

## 2020-11-25 PROCEDURE — 80048 BASIC METABOLIC PNL TOTAL CA: CPT

## 2021-01-18 DIAGNOSIS — E11.9 TYPE 2 DIABETES MELLITUS WITHOUT COMPLICATION, WITHOUT LONG-TERM CURRENT USE OF INSULIN (HCC): ICD-10-CM

## 2021-01-18 DIAGNOSIS — F32.A DEPRESSION, UNSPECIFIED DEPRESSION TYPE: ICD-10-CM

## 2021-01-18 DIAGNOSIS — E78.2 MIXED HYPERLIPIDEMIA: ICD-10-CM

## 2021-01-18 DIAGNOSIS — I10 ESSENTIAL HYPERTENSION: ICD-10-CM

## 2021-01-18 PROCEDURE — 4010F ACE/ARB THERAPY RXD/TAKEN: CPT | Performed by: INTERNAL MEDICINE

## 2021-01-18 RX ORDER — GLIMEPIRIDE 4 MG/1
TABLET ORAL
Qty: 180 TABLET | Refills: 0 | Status: SHIPPED | OUTPATIENT
Start: 2021-01-18 | End: 2021-04-26

## 2021-01-18 RX ORDER — LISINOPRIL 10 MG/1
TABLET ORAL
Qty: 90 TABLET | Refills: 0 | Status: SHIPPED | OUTPATIENT
Start: 2021-01-18 | End: 2021-04-26

## 2021-01-18 RX ORDER — SERTRALINE HYDROCHLORIDE 100 MG/1
TABLET, FILM COATED ORAL
Qty: 90 TABLET | Refills: 0 | Status: SHIPPED | OUTPATIENT
Start: 2021-01-18 | End: 2021-04-27

## 2021-01-18 RX ORDER — PRAVASTATIN SODIUM 80 MG/1
TABLET ORAL
Qty: 90 TABLET | Refills: 0 | Status: SHIPPED | OUTPATIENT
Start: 2021-01-18 | End: 2021-04-26

## 2021-01-27 ENCOUNTER — OFFICE VISIT (OUTPATIENT)
Dept: INTERNAL MEDICINE CLINIC | Facility: CLINIC | Age: 66
End: 2021-01-27
Payer: COMMERCIAL

## 2021-01-27 VITALS
HEART RATE: 72 BPM | HEIGHT: 61 IN | OXYGEN SATURATION: 96 % | BODY MASS INDEX: 30.87 KG/M2 | WEIGHT: 163.5 LBS | SYSTOLIC BLOOD PRESSURE: 124 MMHG | DIASTOLIC BLOOD PRESSURE: 78 MMHG | TEMPERATURE: 96.4 F

## 2021-01-27 DIAGNOSIS — E27.1 ADRENAL INSUFFICIENCY (ADDISON'S DISEASE) (HCC): ICD-10-CM

## 2021-01-27 DIAGNOSIS — Z23 IMMUNIZATION DUE: ICD-10-CM

## 2021-01-27 DIAGNOSIS — E11.9 TYPE 2 DIABETES MELLITUS WITHOUT COMPLICATION, WITHOUT LONG-TERM CURRENT USE OF INSULIN (HCC): Primary | ICD-10-CM

## 2021-01-27 DIAGNOSIS — R19.5 POSITIVE FIT (FECAL IMMUNOCHEMICAL TEST): ICD-10-CM

## 2021-01-27 DIAGNOSIS — Z72.0 TOBACCO ABUSE: ICD-10-CM

## 2021-01-27 DIAGNOSIS — E03.9 HYPOTHYROIDISM, UNSPECIFIED TYPE: ICD-10-CM

## 2021-01-27 LAB — SL AMB POCT HEMOGLOBIN AIC: 7.2 (ref ?–6.5)

## 2021-01-27 PROCEDURE — 3725F SCREEN DEPRESSION PERFORMED: CPT | Performed by: INTERNAL MEDICINE

## 2021-01-27 PROCEDURE — 3074F SYST BP LT 130 MM HG: CPT | Performed by: INTERNAL MEDICINE

## 2021-01-27 PROCEDURE — 90670 PCV13 VACCINE IM: CPT | Performed by: INTERNAL MEDICINE

## 2021-01-27 PROCEDURE — 4004F PT TOBACCO SCREEN RCVD TLK: CPT | Performed by: INTERNAL MEDICINE

## 2021-01-27 PROCEDURE — 83036 HEMOGLOBIN GLYCOSYLATED A1C: CPT | Performed by: INTERNAL MEDICINE

## 2021-01-27 PROCEDURE — 3078F DIAST BP <80 MM HG: CPT | Performed by: INTERNAL MEDICINE

## 2021-01-27 PROCEDURE — 3008F BODY MASS INDEX DOCD: CPT | Performed by: INTERNAL MEDICINE

## 2021-01-27 PROCEDURE — 3051F HG A1C>EQUAL 7.0%<8.0%: CPT | Performed by: INTERNAL MEDICINE

## 2021-01-27 PROCEDURE — 99214 OFFICE O/P EST MOD 30 MIN: CPT | Performed by: INTERNAL MEDICINE

## 2021-01-27 PROCEDURE — 90471 IMMUNIZATION ADMIN: CPT | Performed by: INTERNAL MEDICINE

## 2021-01-27 NOTE — PROGRESS NOTES
Diabetic Foot Exam    Patient's shoes and socks removed  Right Foot/Ankle   Right Foot Inspection  Skin Exam: skin normal and skin intact no dry skin, no warmth, no callus, no erythema, no maceration, no abnormal color, no pre-ulcer, no ulcer and no callus                          Toe Exam: ROM and strength within normal limits  Sensory       Monofilament testing: intact  Vascular    The right DP pulse is 2+  The right PT pulse is 1+  Left Foot/Ankle  Left Foot Inspection  Skin Exam: skin normal and skin intactno dry skin, no warmth, no erythema, no maceration, normal color, no pre-ulcer, no ulcer and no callus                         Toe Exam: ROM and strength within normal limits                   Sensory       Monofilament: intact  Vascular    The left DP pulse is 2+  The left PT pulse is 1+  Assign Risk Category:  No deformity present;  Loss of protective sensation; Weak pulses       Risk: 1

## 2021-01-27 NOTE — PROGRESS NOTES
Assessment/Plan:         Diagnoses and all orders for this visit:    Type 2 diabetes mellitus without complication, without long-term current use of insulin (HCC)  -     POCT hemoglobin A1c  A1c has improved to 7 2  Lo carb diet discussed   She has endocrine appt in march     Hypothyroidism, unspecified type  Pt has upcoming appt with endocrine  Stable on current rx     Positive FIT (fecal immunochemical test)  Pt aware and did get letter from colorectal but hesistant to have done due to covid She will plan for spring likely     Tobacco abuse Encouraged smoking cessation    Rto 4 months       Patient ID: Marisa Palafox is a 60 y o  female  HPI  Pt doing ok She still has he granddaughter living with her Still lonely after loss of her  She has a good friend she keeps in touch with often and sees at times She is still smoking Blood sugars have been around 140-150 range Diet fair control - not as interested in eating She stays active at home No falls She is interested in the covid vaccine when available       Review of Systems   Constitutional: Negative  HENT: Negative  Respiratory: Negative  Cardiovascular: Negative  Gastrointestinal: Negative  Genitourinary: Negative  Musculoskeletal: Positive for arthralgias  Neurological: Negative for dizziness, light-headedness and numbness  Hematological: Negative  Psychiatric/Behavioral: Negative for sleep disturbance  The patient is not nervous/anxious          Past Medical History:   Diagnosis Date    Ad[facility]al insufficiency ([facility] [address] [facility]     Anxiety     Bacterial sepsis ([facility] [address] )     last assessed: 06/13/16    Cushing's syndrome ([facility] [address] )     last assessed: 05/29/16    Depression     last assessed: 01/02/18    Diabetes mellitus ([facility] [address] )     Hyperlipidemia     Hypertension      Past Surgical History:   Procedure Laterality Date    ADRENALECTOMY  12/2002    CHOLECYSTECTOMY      HERNIA REPAIR      umbilical    OTHER SURGICAL HISTORY      Injection of trigger joint    GA COLONOSCOPY FLX DX W/COLLJ SPEC WHEN PFRMD N/A 7/10/2018    Procedure: COLONOSCOPY;  Surgeon: Dwaine Hickey MD;  Location: [facility];  Service: Gastroenterology    TONSILLECTOMY AND ADENOIDECTOMY      TUBAL LIGATION       Social History     Socioeconomic History    Marital status: /Civil Union     Spouse name: Not on file    Number of children: Not on file    Years of education: Not on file    Highest education level: Not on file   Occupational History    Not on file   Social Needs    Financial resource strain: Not on file    Food insecurity     Worry: Not on file     Inability: Not on file    Transportation needs     Medical: Not on file     Non-medical: Not on file   Tobacco Use    Smoking status: Current Every Day Smoker     Packs/day: 0 50    Smokeless tobacco: Never Used   Substance and Sexual Activity    Alcohol use: No    Drug use: No    Sexual activity: Not on file   Lifestyle    Physical activity     Days per week: Not on file     Minutes per session: Not on file    Stress: Not on file   Relationships    Social connections     Talks on phone: Not on file     Gets together: Not on file     Attends Anabaptism service: Not on file     Active member of club or organization: Not on file     Attends meetings of clubs or organizations: Not on file     Relationship status: Not on file    Intimate partner violence     Fear of current or ex partner: Not on file     Emotionally abused: Not on file     Physically abused: Not on file     Forced sexual activity: Not on file   Other Topics Concern    Not on file   Social History Narrative    Always uses seat belt    Always uses sunscreen    Caffeine use    Dental care, regularly    No living will    Uses safety equipment - seatbelts     Allergies   Allergen Reactions    Fosamax [Alendronate] Diarrhea    Paxil [Paroxetine Hcl] Rash           /78   Pulse 72   Temp (!) 96 4 °F (35 8 °C) (Temporal)   Ht 5' 1" (1 549 m)   Wt 74 2 kg (163 lb 8 oz)   SpO2 96%   BMI 30 89 kg/m²          Physical Exam  Vitals signs reviewed  Constitutional:       General: She is not in acute distress  Appearance: Normal appearance  She is not ill-appearing or toxic-appearing  HENT:      Head: Normocephalic and atraumatic  Right Ear: Tympanic membrane, ear canal and external ear normal  There is no impacted cerumen  Left Ear: Tympanic membrane, ear canal and external ear normal  There is no impacted cerumen  Nose: Nose normal       Mouth/Throat:      Mouth: Mucous membranes are dry  Eyes:      General: No scleral icterus  Extraocular Movements: Extraocular movements intact  Conjunctiva/sclera: Conjunctivae normal       Pupils: Pupils are equal, round, and reactive to light  Neck:      Musculoskeletal: Normal range of motion and neck supple  No neck rigidity  Cardiovascular:      Rate and Rhythm: Normal rate and regular rhythm  Pulses: Normal pulses  Heart sounds: Normal heart sounds  Pulmonary:      Effort: Pulmonary effort is normal  No respiratory distress  Breath sounds: No wheezing  Abdominal:      General: Bowel sounds are normal  There is no distension  Palpations: Abdomen is soft  Tenderness: There is no abdominal tenderness  Musculoskeletal: Normal range of motion  General: No swelling  Lymphadenopathy:      Cervical: No cervical adenopathy  Skin:     General: Skin is dry  Coloration: Skin is not jaundiced  Neurological:      General: No focal deficit present  Mental Status: She is alert and oriented to person, place, and time  Mental status is at baseline  Cranial Nerves: No cranial nerve deficit  Sensory: Sensory deficit present  Motor: No weakness  Psychiatric:         Mood and Affect: Mood normal          Behavior: Behavior normal          Thought Content:  Thought content normal          Judgment: Judgment normal

## 2021-03-12 ENCOUNTER — OFFICE VISIT (OUTPATIENT)
Dept: ENDOCRINOLOGY | Facility: CLINIC | Age: 66
End: 2021-03-12
Payer: COMMERCIAL

## 2021-03-12 VITALS — HEART RATE: 74 BPM | HEIGHT: 61 IN | BODY MASS INDEX: 30.96 KG/M2 | WEIGHT: 164 LBS

## 2021-03-12 DIAGNOSIS — E27.1 ADRENAL INSUFFICIENCY (ADDISON'S DISEASE) (HCC): ICD-10-CM

## 2021-03-12 DIAGNOSIS — E55.9 VITAMIN D DEFICIENCY: ICD-10-CM

## 2021-03-12 DIAGNOSIS — M81.0 OSTEOPOROSIS, UNSPECIFIED OSTEOPOROSIS TYPE, UNSPECIFIED PATHOLOGICAL FRACTURE PRESENCE: Primary | ICD-10-CM

## 2021-03-12 PROCEDURE — 4004F PT TOBACCO SCREEN RCVD TLK: CPT | Performed by: INTERNAL MEDICINE

## 2021-03-12 PROCEDURE — 99214 OFFICE O/P EST MOD 30 MIN: CPT | Performed by: INTERNAL MEDICINE

## 2021-03-12 PROCEDURE — 3008F BODY MASS INDEX DOCD: CPT | Performed by: INTERNAL MEDICINE

## 2021-03-12 NOTE — PROGRESS NOTES
Cristina Juarez 60 y o  female MRN: [id number]    Encounter: [id number]       Assessment/Plan     Assessment: This is a 60y o -year-old female with Adrenal insufficiency status post right adrenalectomy in 2001  As well as type 2 diabetes and osteoporosis  Plan:    1) Adrenal insuffiency:  Will check a m  cortisol levels and ACTH,  Patient was instructed to skip her hydrocortisone evening dose today and morning dose tomorrow  She is then to continue her hydrocortisone until further notice   To avoid precipitating adrenal crisis  2) Osteoporosis:   Discussed the potential benefits and risks of Reclast therapy  Provided with educational information, patient will discuss with daughter and make an informed decision at that time  Will also measure vitamin-D levels  3) Diabetes mellitus 2:   Continue current treatment  Continue to monitor may see some improvement if and when hydrocortisone is discontinued  CC: Diabetes    History of Present Illness     HPI:    Bryce Adame is here for follow-up Yuri's disease  Says she states she has been doing well following her hydrocortisone dose decrease  She complains of some lower extremity weakness which she describes as difficulty standing up and poor balance  She is however able to ambulate appropriately and get up a  From a chair  Without evidence of proximal muscle weakness  She has not needed to use any intramuscular hydrocortisone  Has not had any recent illness or trauma  Review of Systems   Constitutional: Negative for activity change, appetite change, chills, fatigue, fever and unexpected weight change  Respiratory: Negative for shortness of breath  Cardiovascular: Negative for chest pain         Historical Information   Past Medical History:   Diagnosis Date    Adrenal insufficiency ([facility] [address] [facility]     Anxiety     Bacterial sepsis ([facility] [address] )     last assessed: 06/13/16    Cushing's syndrome ([facility] [address] )     last assessed: 05/29/16    Depression     last assessed: 01/02/18    Diabetes mellitus ([facility] [address] [facility]     Hyperlipidemia     Hypertension      Past Surgical History:   Procedure Laterality Date    ADRENALECTOMY  12/2002    CHOLECYSTECTOMY      HERNIA REPAIR      umbilical    OTHER SURGICAL HISTORY      Injection of trigger joint    [place] COLONOSCOPY FLX DX W/COLLJ SPEC WHEN PFRMD N/A 7/10/2018    Procedure: COLONOSCOPY;  Surgeon: Cortez Flynn MD;  Location: [facility];  Service: Gastroenterology    TONSILLECTOMY AND ADENOIDECTOMY      TUBAL LIGATION       Social History   Social History     Substance and Sexual Activity   Alcohol Use No     Social History     Substance and Sexual Activity   Drug Use No     Social History     Tobacco Use   Smoking Status Current Every Day Smoker    Packs/day: 0 50   Smokeless Tobacco Never Used     Family History:   Family History   Problem Relation Age of Onset    Breast cancer Mother     No Known Problems Sister     No Known Problems Daughter     No Known Problems Daughter     No Known Problems Paternal Aunt     No Known Problems Paternal Aunt     No Known Problems Paternal Aunt        Meds/Allergies   Current Outpatient Medications   Medication Sig Dispense Refill    glimepiride (AMARYL) 4 mg tablet Take 1 tablet by mouth twice daily 180 tablet 0    glucose blood (KEITH CONTOUR NEXT TEST) test strip by In Vitro route 2 (two) times a day      hydrocortisone (CORTEF) 5 mg tablet Take two tablets (10mg) every morning and 1 tablet (5mg) in the afternoon   270 tablet 1    hydrocortisone sodium succinate, PF, (Solu-CORTEF) 100 mg SOLR Inject 2 mL (100 mg total) into a muscle as needed (low cortisol) 1 each 1    lisinopril (ZESTRIL) 10 mg tablet Take 1 tablet by mouth once daily 90 tablet 0    metFORMIN (GLUCOPHAGE) 500 mg tablet Take 1 tablet by mouth once daily 90 tablet 0    pravastatin (PRAVACHOL) 80 mg tablet Take 1 tablet by mouth once daily 90 tablet 0    sertraline (ZOLOFT) 100 mg tablet TAKE 1 TABLET BY MOUTH ONCE DAILY AS DIRECTED 90 tablet 0    Vitamin D, Cholecalciferol, 25 MCG (1000 UT) TABS Take 3,000 Units by mouth daily Take 2 capsules (2,000 iu) daily  No current facility-administered medications for this visit  Allergies   Allergen Reactions    Fosamax [Alendronate] Diarrhea    Paxil [Paroxetine Hcl] Rash       Objective   Vitals: Pulse 74, height 5' 1" (1 549 m), weight 74 4 kg (164 lb)  Physical Exam  Vitals signs and nursing note reviewed  Constitutional:       General: She is not in acute distress  Appearance: She is obese  She is not ill-appearing  Comments: There is evidence of centripetal obesity as well as thin extremities  HENT:      Head: Normocephalic and atraumatic  Right Ear: Tympanic membrane, ear canal and external ear normal       Left Ear: Tympanic membrane, ear canal and external ear normal       Nose: Nose normal       Mouth/Throat:      Mouth: Mucous membranes are moist       Pharynx: Oropharynx is clear  No oropharyngeal exudate or posterior oropharyngeal erythema  Eyes:      Extraocular Movements: Extraocular movements intact  Conjunctiva/sclera: Conjunctivae normal       Pupils: Pupils are equal, round, and reactive to light  Neck:      Musculoskeletal: Normal range of motion  Cardiovascular:      Rate and Rhythm: Normal rate and regular rhythm  Pulses: Normal pulses  Heart sounds: Normal heart sounds  No murmur  No friction rub  No gallop  Pulmonary:      Effort: Pulmonary effort is normal       Breath sounds: Normal breath sounds  No wheezing  Abdominal:      General: There is no distension  Tenderness: There is no abdominal tenderness  Musculoskeletal: Normal range of motion  Skin:     General: Skin is warm  Capillary Refill: Capillary refill takes less than 2 seconds  Neurological:      General: No focal deficit present  Mental Status: She is alert and oriented to person, place, and time   Mental status is at baseline  Psychiatric:         Mood and Affect: Mood normal          Behavior: Behavior normal          Thought Content: Thought content normal          Judgment: Judgment normal          The history was obtained from the review of the chart, patient  Lab Results:   Lab Results   Component Value Date/Time    Hemoglobin A1C 7 2 (A) 01/27/2021 01:05 PM    Hemoglobin A1C 7 7 (H) 06/10/2020 06:26 AM    BUN 26 (H) 11/25/2020 08:47 AM    Potassium 4 9 11/25/2020 08:47 AM    Chloride 104 11/25/2020 08:47 AM    CO2 24 11/25/2020 08:47 AM    Creatinine 1 26 11/25/2020 08:47 AM           Imaging Studies: I have personally reviewed pertinent reports  Portions of the record may have been created with voice recognition software  Occasional wrong word or "sound a like" substitutions may have occurred due to the inherent limitations of voice recognition software  Read the chart carefully and recognize, using context, where substitutions have occurred

## 2021-03-12 NOTE — PATIENT INSTRUCTIONS
DONT TAKE HYDROCORTISONE THIS EVENING  AND TOMORROW MORNING - LABS TO BE DONE MORNING AND FASTING TOMORROW 7-9 AM   RESTART HYDROCORTISONE AFTER THE LAB DRAW               Zoledronic Acid (By injection)   Zoledronic Acid (lby-ei-UPVG-ik AS-id)  Treats high blood calcium levels  Also treats bone damage caused by Paget disease, multiple myeloma, and cancers that spread to the bone  Also treats osteoporosis and reduces the risk of hip fractures in certain patients  Brand Name(s): Reclast, Zoledronic Acid Novaplus   There may be other brand names for this medicine  When This Medicine Should Not Be Used: This medicine is not right for everyone  You should not receive it if you had an allergic reaction to zoledronic acid, or if you are pregnant  How to Use This Medicine:   Injectable  · A nurse or other health provider will give you this medicine  · Your doctor will prescribe your dose and schedule  This medicine is given through a needle placed in a vein  · Your doctor may tell you to drink extra liquids before your treatment to prevent kidney problems  · Your doctor may also give you vitamin D and calcium supplements  Tell your doctor if you are not able to take these medicines  · This medicine should come with a Medication Guide  Ask your pharmacist for a copy if you do not have one  · Missed dose: You must use this medicine on a fixed schedule  Call your doctor or pharmacist if you miss a dose  Drugs and Foods to Avoid:   Ask your doctor or pharmacist before using any other medicine, including over-the-counter medicines, vitamins, and herbal products  · Do not use other medicines that also contain zoledronic acid  Do not use zoledronic acid together with another bisphosphonate medicine  · Some foods and medicines can affect how zoledronic acid works   Tell your doctor if you are using digoxin, antibiotics, diuretics (water pills), an NSAID pain or arthritis medicine (such as aspirin, celecoxib, ibuprofen, naproxen), steroid medicines, or cancer medicines  Warnings While Using This Medicine:   · It is not safe to take this medicine during pregnancy  It could harm an unborn baby  Tell your doctor right away if you become pregnant  · Tell your doctor if you are breastfeeding, or if you have kidney disease, anemia, aspirin-sensitive asthma, bleeding problems, cancer, congestive heart failure, low blood calcium levels, stomach absorption problems, mineral imbalance, dental problems or gum disease  Also tell your doctor if you had surgery on your bowel or parathyroid or thyroid gland  · This medicine may cause the following problems:  ? Jaw or teeth problems  ? Severe bone, joint, or muscle pain  ? Increased risk of thigh bone fracture  ? Low calcium levels in your blood  · You must have regular dental exams while you are being treated with this medicine  Tell your dentist or oral surgeon that you are using this medicine  · Your doctor will do lab tests at regular visits to check on the effects of this medicine  Keep all appointments    Possible Side Effects While Using This Medicine:   Call your doctor right away if you notice any of these side effects:  · Allergic reaction: Itching or hives, swelling in your face or hands, swelling or tingling in your mouth or throat, chest tightness, trouble breathing  · Chest pain, trouble breathing, fast or uneven heartbeat  · Decrease in how much or how often you urinate, blood in the urine, lower back or side pain, burning or painful urination  · Muscle spasm or twitching, or numbness or tingling in your fingers, feet, or around your mouth  · Pain, swelling, or numbness in the mouth or jaw, loose teeth or other teeth problems  · Severe muscle, bone, or joint pain  · Unusual pain in your thigh, groin, or hip  If you notice these less serious side effects, talk with your doctor:   · Fever, chills, cough, sore throat, and body aches  · Headache  · Mild nausea, constipation, diarrhea, stomach pain or upset  · Redness, pain, or swelling of your skin where the needle is placed  If you notice other side effects that you think are caused by this medicine, tell your doctor  Call your doctor for medical advice about side effects  You may report side effects to FDA at 9-214-IMS-9215  © Copyright [address] Information is for End User's use only and may not be sold, redistributed or otherwise used for commercial purposes  The above information is an  only  It is not intended as medical advice for individual conditions or treatments  Talk to your doctor, nurse or pharmacist before following any medical regimen to see if it is safe and effective for you  Calcium and Osteoporosis   WHAT YOU NEED TO KNOW:   Calcium is important for osteoporosis because calcium helps build bone mass  Osteoporosis is a long-term medical condition that causes your body to break down more bone than it makes  Your bones become weak, brittle, and more likely to fracture  DISCHARGE INSTRUCTIONS:   Your calcium needs:   · Women:      ? 19 to 50 years: 1,000 mg    ? Over 50: 1,200 mg    ? Pregnant or breastfeeding, 19 years to 50 years: 1,000 mg    · Men:      ? 19 to 70: 1,000 mg    ? Over 70: 1,200 mg    Foods that are high in calcium: The following list shows the number of calcium milligrams (mg) per serving  Your dietitian or healthcare provider can help you create a balanced meal plan for your calcium needs  · Dairy:      ? 1 cup of low-fat plain yogurt (415 mg) or low-fat fruit yogurt (245 to 384 mg)    ? 1½ ounces of shredded cheddar cheese (306 mg) or part skim mozzarella cheese (275 mg)    ? 1 cup of skim, 2%, or whole milk (300 mg)    ? 1 cup of cottage cheese made with 2% milk fat (138 mg)    ? ½ cup of frozen yogurt (103 mg)    · Other foods:      ? 1 cup of calcium-fortified orange juice (300 mg)    ? ½ cup of cooked carloz greens (220 mg)    ?  4 canned sardines, with bones (242 mg)    ? ½ cup of tofu (with added calcium) (204 mg)     How to get extra calcium:   · Add powdered milk to puddings, cocoa, custard, or hot cereal     · Sift powdered milk into flour when you make cakes, cookies, or breads  · Use low-fat or fat-free milk instead of water in pancake mix, mashed potatoes, pudding, or hot breakfast cereal     · Add low-fat or fat-free cheese to salad, soup, or pasta  · Add tofu (with added calcium) to vegetable stir-hernandez  · Take calcium supplements if you cannot get enough calcium from the foods you eat  Your body can absorb the most calcium from supplements when you take 500 mg or less at one time  Do not take more than 2,500 mg of calcium supplements each day  Follow up with your doctor or dietitian as directed:  Write down your questions so you remember to ask them during your visits  © Copyright [address] [place] [address] Drive Information is for End User's use only and may not be sold, redistributed or otherwise used for commercial purposes  All illustrations and images included in CareNotes® are the copyrighted property of A D A M , Inc  or [facility] Ricardo Good   The above information is an  only  It is not intended as medical advice for individual conditions or treatments  Talk to your doctor, nurse or pharmacist before following any medical regimen to see if it is safe and effective for you

## 2021-03-13 ENCOUNTER — LAB (OUTPATIENT)
Dept: LAB | Facility: HOSPITAL | Age: 66
End: 2021-03-13
Attending: INTERNAL MEDICINE
Payer: COMMERCIAL

## 2021-03-13 DIAGNOSIS — E55.9 VITAMIN D DEFICIENCY: ICD-10-CM

## 2021-03-13 DIAGNOSIS — M81.0 OSTEOPOROSIS, UNSPECIFIED OSTEOPOROSIS TYPE, UNSPECIFIED PATHOLOGICAL FRACTURE PRESENCE: ICD-10-CM

## 2021-03-13 DIAGNOSIS — E27.1 ADRENAL INSUFFICIENCY (ADDISON'S DISEASE) (HCC): ICD-10-CM

## 2021-03-13 LAB
25(OH)D3 SERPL-MCNC: 46.5 NG/ML (ref 30–100)
ANION GAP SERPL CALCULATED.3IONS-SCNC: 9 MMOL/L (ref 4–13)
BUN SERPL-MCNC: 24 MG/DL (ref 5–25)
CALCIUM SERPL-MCNC: 9.8 MG/DL (ref 8.3–10.1)
CHLORIDE SERPL-SCNC: 103 MMOL/L (ref 100–108)
CO2 SERPL-SCNC: 24 MMOL/L (ref 21–32)
CORTIS AM PEAK SERPL-MCNC: 15 UG/DL (ref 4.2–22.4)
CREAT SERPL-MCNC: 1.27 MG/DL (ref 0.6–1.3)
GFR SERPL CREATININE-BSD FRML MDRD: 45 ML/MIN/1.73SQ M
GLUCOSE P FAST SERPL-MCNC: 114 MG/DL (ref 65–99)
POTASSIUM SERPL-SCNC: 5.2 MMOL/L (ref 3.5–5.3)
SODIUM SERPL-SCNC: 136 MMOL/L (ref 136–145)

## 2021-03-13 PROCEDURE — 82306 VITAMIN D 25 HYDROXY: CPT

## 2021-03-13 PROCEDURE — 36415 COLL VENOUS BLD VENIPUNCTURE: CPT

## 2021-03-13 PROCEDURE — 82024 ASSAY OF ACTH: CPT

## 2021-03-13 PROCEDURE — 82533 TOTAL CORTISOL: CPT

## 2021-03-13 PROCEDURE — 80048 BASIC METABOLIC PNL TOTAL CA: CPT

## 2021-03-15 LAB — ACTH PLAS-MCNC: 48.6 PG/ML (ref 7.2–63.3)

## 2021-03-16 ENCOUNTER — TELEPHONE (OUTPATIENT)
Dept: ENDOCRINOLOGY | Facility: CLINIC | Age: 66
End: 2021-03-16

## 2021-03-16 DIAGNOSIS — E27.1 ADRENAL INSUFFICIENCY (ADDISON'S DISEASE) (HCC): Primary | ICD-10-CM

## 2021-03-16 NOTE — TELEPHONE ENCOUNTER
Pt called to let you know that  she received a call from [facility] to get her First Covid vaccine tomorrow 03/17/2021 at 2:00 pm  Patient would like to know   if she should take in tablet of her Coreft 5mg before getting the vaccine, Please advise

## 2021-03-16 NOTE — RESULT ENCOUNTER NOTE
Please call the patient regarding labs - morning cortisol ok- discontinue hydrocortisone and repeat am cortisol and ACTH in 1 month

## 2021-03-17 ENCOUNTER — IMMUNIZATIONS (OUTPATIENT)
Dept: FAMILY MEDICINE CLINIC | Facility: HOSPITAL | Age: 66
End: 2021-03-17

## 2021-03-17 DIAGNOSIS — Z23 ENCOUNTER FOR IMMUNIZATION: Primary | ICD-10-CM

## 2021-03-17 PROCEDURE — 0011A SARS-COV-2 / COVID-19 MRNA VACCINE (MODERNA) 100 MCG: CPT

## 2021-03-17 PROCEDURE — 91301 SARS-COV-2 / COVID-19 MRNA VACCINE (MODERNA) 100 MCG: CPT

## 2021-03-17 NOTE — TELEPHONE ENCOUNTER
Patient wants to know if she should take 10 mg this morning and every morning for a weeks  and 5 mg in the afternoon for this week

## 2021-04-12 ENCOUNTER — TELEPHONE (OUTPATIENT)
Dept: ENDOCRINOLOGY | Facility: CLINIC | Age: 66
End: 2021-04-12

## 2021-04-12 NOTE — TELEPHONE ENCOUNTER
Pt called she is going on Thursday for her second covid shot and want to known if she should take her steroid or not

## 2021-04-12 NOTE — TELEPHONE ENCOUNTER
Continue taking it 1 week after the second shot- if she is not feeling well , fever etc then double up for a couple of days

## 2021-04-13 ENCOUNTER — TELEPHONE (OUTPATIENT)
Dept: ENDOCRINOLOGY | Facility: CLINIC | Age: 66
End: 2021-04-13

## 2021-04-13 NOTE — TELEPHONE ENCOUNTER
Spoke to pt and she is ware to continue taking for 1 week after the second covid shot if not feeling well then can double up for a few days, she will go off it then and wait 30 days to do blood work for her apt in July

## 2021-04-15 ENCOUNTER — IMMUNIZATIONS (OUTPATIENT)
Dept: FAMILY MEDICINE CLINIC | Facility: HOSPITAL | Age: 66
End: 2021-04-15

## 2021-04-15 DIAGNOSIS — Z23 ENCOUNTER FOR IMMUNIZATION: Primary | ICD-10-CM

## 2021-04-15 PROCEDURE — 91301 SARS-COV-2 / COVID-19 MRNA VACCINE (MODERNA) 100 MCG: CPT

## 2021-04-15 PROCEDURE — 0012A SARS-COV-2 / COVID-19 MRNA VACCINE (MODERNA) 100 MCG: CPT

## 2021-04-26 DIAGNOSIS — E78.2 MIXED HYPERLIPIDEMIA: ICD-10-CM

## 2021-04-26 DIAGNOSIS — I10 ESSENTIAL HYPERTENSION: ICD-10-CM

## 2021-04-26 DIAGNOSIS — E11.9 TYPE 2 DIABETES MELLITUS WITHOUT COMPLICATION, WITHOUT LONG-TERM CURRENT USE OF INSULIN (HCC): ICD-10-CM

## 2021-04-26 PROCEDURE — 4010F ACE/ARB THERAPY RXD/TAKEN: CPT | Performed by: INTERNAL MEDICINE

## 2021-04-26 RX ORDER — LISINOPRIL 10 MG/1
TABLET ORAL
Qty: 90 TABLET | Refills: 0 | Status: SHIPPED | OUTPATIENT
Start: 2021-04-26 | End: 2021-08-26

## 2021-04-26 RX ORDER — GLIMEPIRIDE 4 MG/1
TABLET ORAL
Qty: 180 TABLET | Refills: 0 | Status: SHIPPED | OUTPATIENT
Start: 2021-04-26 | End: 2021-08-26

## 2021-04-26 RX ORDER — PRAVASTATIN SODIUM 80 MG/1
TABLET ORAL
Qty: 90 TABLET | Refills: 0 | Status: SHIPPED | OUTPATIENT
Start: 2021-04-26 | End: 2021-05-20 | Stop reason: SDUPTHER

## 2021-04-27 DIAGNOSIS — F32.A DEPRESSION, UNSPECIFIED DEPRESSION TYPE: ICD-10-CM

## 2021-04-27 RX ORDER — SERTRALINE HYDROCHLORIDE 100 MG/1
TABLET, FILM COATED ORAL
Qty: 90 TABLET | Refills: 0 | Status: SHIPPED | OUTPATIENT
Start: 2021-04-27 | End: 2021-08-26

## 2021-05-20 DIAGNOSIS — E78.2 MIXED HYPERLIPIDEMIA: ICD-10-CM

## 2021-05-20 RX ORDER — PRAVASTATIN SODIUM 80 MG/1
80 TABLET ORAL DAILY
Qty: 90 TABLET | Refills: 3 | Status: SHIPPED | OUTPATIENT
Start: 2021-05-20

## 2021-05-29 ENCOUNTER — LAB (OUTPATIENT)
Dept: LAB | Facility: HOSPITAL | Age: 66
End: 2021-05-29
Attending: INTERNAL MEDICINE
Payer: COMMERCIAL

## 2021-05-29 DIAGNOSIS — E27.1 ADRENAL INSUFFICIENCY (ADDISON'S DISEASE) (HCC): ICD-10-CM

## 2021-05-29 LAB — CORTIS AM PEAK SERPL-MCNC: 15.9 UG/DL (ref 4.2–22.4)

## 2021-05-29 PROCEDURE — 36415 COLL VENOUS BLD VENIPUNCTURE: CPT

## 2021-05-29 PROCEDURE — 82533 TOTAL CORTISOL: CPT

## 2021-05-29 PROCEDURE — 82024 ASSAY OF ACTH: CPT

## 2021-06-01 ENCOUNTER — OFFICE VISIT (OUTPATIENT)
Dept: INTERNAL MEDICINE CLINIC | Facility: CLINIC | Age: 66
End: 2021-06-01
Payer: COMMERCIAL

## 2021-06-01 VITALS
WEIGHT: 163.13 LBS | TEMPERATURE: 96.6 F | BODY MASS INDEX: 30.8 KG/M2 | HEIGHT: 61 IN | SYSTOLIC BLOOD PRESSURE: 134 MMHG | DIASTOLIC BLOOD PRESSURE: 80 MMHG

## 2021-06-01 DIAGNOSIS — Z00.00 ANNUAL PHYSICAL EXAM: Primary | ICD-10-CM

## 2021-06-01 DIAGNOSIS — E11.9 TYPE 2 DIABETES MELLITUS WITHOUT COMPLICATION, WITHOUT LONG-TERM CURRENT USE OF INSULIN (HCC): ICD-10-CM

## 2021-06-01 LAB
ACTH PLAS-MCNC: 59.7 PG/ML (ref 7.2–63.3)
SL AMB POCT HEMOGLOBIN AIC: 7.3 (ref ?–6.5)

## 2021-06-01 PROCEDURE — 83036 HEMOGLOBIN GLYCOSYLATED A1C: CPT | Performed by: INTERNAL MEDICINE

## 2021-06-01 PROCEDURE — 3008F BODY MASS INDEX DOCD: CPT | Performed by: INTERNAL MEDICINE

## 2021-06-01 PROCEDURE — 4004F PT TOBACCO SCREEN RCVD TLK: CPT | Performed by: INTERNAL MEDICINE

## 2021-06-01 PROCEDURE — 3051F HG A1C>EQUAL 7.0%<8.0%: CPT | Performed by: INTERNAL MEDICINE

## 2021-06-01 PROCEDURE — 99396 PREV VISIT EST AGE 40-64: CPT | Performed by: INTERNAL MEDICINE

## 2021-06-01 NOTE — PATIENT INSTRUCTIONS

## 2021-06-01 NOTE — PROGRESS NOTES
ADULT ANNUAL [facility]  INTERNAL MEDICINE AT [facility]    NAME: Joel Murguia  AGE: 60 y o  SEX: female  : 1955     DATE: 2021     Assessment and Plan:     Problem List Items Addressed This Visit        Endocrine    DMII (diabetes mellitus, type 2) ([facility] [address] )    Relevant Orders    POCT hemoglobin A1c (Completed)    HEMOGLOBIN A1C W/ EAG ESTIMATION       Other    Annual physical exam - Primary      A1c stable range  Setup Iris here  Smoking cessation  RTO 4 months     Immunizations and preventive care screenings were discussed with patient today  Appropriate education was printed on patient's after visit summary  Counseling:  · Exercise: the importance of regular exercise/physical activity was discussed  Recommend exercise 3-5 times per week for at least 30 minutes  BMI Counseling: Body mass index is 30 82 kg/m²  The BMI is above normal  Nutrition recommendations include consuming healthier snacks and moderation in carbohydrate intake  Exercise recommendations include exercising 3-5 times per week  No pharmacotherapy was ordered  No follow-ups on file  Chief Complaint:     Chief Complaint   Patient presents with    Annual Exam      History of Present Illness:     Adult Annual Physical   Patient here for a comprehensive physical exam  The patient reports problems - stressed about sewage pip issue this weekend She did get labs for endocrine this weekend  Still smoking Encouraged cessation    Diet and Physical Activity  · Diet/Nutrition: low calorie diet and low carb diet  · Exercise: walking  Depression Screening  PHQ-9 Depression Screening    PHQ-9:   Frequency of the following problems over the past two weeks:           General Health  · Sleep: gets 4-6 hours of sleep on average  · Hearing: decreased - bilateral   · Vision: no vision problems and wears glasses  · Dental: regular dental visits         /GYN Health  · Patient is: postmenopausal  · Last menstrual period: years  · Contraceptive method: postmenopausal      Review of Systems:     Review of Systems   Constitutional: Negative for chills and fever  HENT: Negative  Eyes: Negative for visual disturbance  Respiratory: Negative for cough and shortness of breath  Cardiovascular: Negative for chest pain, palpitations and leg swelling  Gastrointestinal: Negative for abdominal distention and abdominal pain  Genitourinary: Positive for frequency  Negative for difficulty urinating and flank pain  Musculoskeletal: Positive for arthralgias  Negative for joint swelling  Neurological: Negative for dizziness, light-headedness and headaches  Psychiatric/Behavioral: Positive for sleep disturbance  The patient is nervous/anxious         Past Medical History:     Past Medical History:   Diagnosis Date    Adrenal insufficiency ([facility] )     Anxiety     Bacterial sepsis ([place] [address] )     last assessed: 06/13/16    Cushing's syndrome ([facility] )     last assessed: 05/29/16    Depression     last assessed: 01/02/18    Diabetes mellitus ([facility] )     Hyperlipidemia     Hypertension       Past Surgical History:     Past Surgical History:   Procedure Laterality Date    ADRENALECTOMY  12/2002    CHOLECYSTECTOMY      HERNIA REPAIR      umbilical    OTHER SURGICAL HISTORY      Injection of trigger joint    VT COLONOSCOPY FLX DX W/COLLJ SPEC WHEN PFRMD N/A 7/10/2018    Procedure: COLONOSCOPY;  Surgeon: Destiney Apple MD;  Location: [facility];  Service: Gastroenterology    TONSILLECTOMY AND ADENOIDECTOMY      TUBAL LIGATION        Social History:     E-Cigarette/Vaping    E-Cigarette Use Never User      E-Cigarette/Vaping Substances    Nicotine No     THC No     CBD No     Flavoring No     Other No     Unknown No      Social History     Socioeconomic History    Marital status: /Civil Union     Spouse name: None    Number of children: None    Years of education: None    Highest education level: None   Occupational History    None   Social Needs    Financial resource strain: None    Food insecurity     Worry: None     Inability: None    Transportation needs     Medical: None     Non-medical: None   Tobacco Use    Smoking status: Current Every Day Smoker     Packs/day: 0 50    Smokeless tobacco: Never Used   Substance and Sexual Activity    Alcohol use: No    Drug use: No    Sexual activity: None   Lifestyle    Physical activity     Days per week: None     Minutes per session: None    Stress: None   Relationships    Social connections     Talks on phone: None     Gets together: None     Attends [employer] service: None     Active member of club or organization: None     Attends meetings of clubs or organizations: None     Relationship status: None    Intimate partner violence     Fear of current or ex partner: None     Emotionally abused: None     Physically abused: None     Forced sexual activity: None   Other Topics Concern    None   Social History Narrative    Always uses seat belt    Always uses sunscreen    Caffeine use    Dental care, regularly    No living will    Uses safety equipment - seatbelts      Family History:     Family History   Problem Relation Age of Onset    Breast cancer Mother     No Known Problems Sister     No Known Problems Daughter     No Known Problems Daughter     No Known Problems Paternal Aunt     No Known Problems Paternal Aunt     No Known Problems Paternal Aunt       Current Medications:     Current Outpatient Medications   Medication Sig Dispense Refill    glimepiride (AMARYL) 4 mg tablet Take 1 tablet by mouth twice daily 180 tablet 0    glucose blood (KEITH CONTOUR NEXT TEST) test strip by In Vitro route 2 (two) times a day      lisinopril (ZESTRIL) 10 mg tablet Take 1 tablet by mouth once daily 90 tablet 0    metFORMIN (GLUCOPHAGE) 500 mg tablet Take 1 tablet by mouth once daily 90 tablet 0    pravastatin (PRAVACHOL) 80 mg tablet Take 1 tablet (80 mg total) by mouth daily 90 tablet 3    sertraline (ZOLOFT) 100 mg tablet TAKE 1 TABLET BY MOUTH ONCE DAILY AS DIRECTED 90 tablet 0    Vitamin D, Cholecalciferol, 25 MCG (1000 UT) TABS Take 3,000 Units by mouth daily Take 2 capsules (2,000 iu) daily   hydrocortisone sodium succinate, PF, (Solu-CORTEF) 100 mg SOLR Inject 2 mL (100 mg total) into a muscle as needed (low cortisol) (Patient not taking: Reported on 6/1/2021) 1 each 1     No current facility-administered medications for this visit  Allergies: Allergies   Allergen Reactions    Fosamax [Alendronate] Diarrhea    Paxil [Paroxetine Hcl] Rash      Physical Exam:     /80   Temp (!) 96 6 °F (35 9 °C) (Temporal)   Ht 5' 1" (1 549 m)   Wt 74 kg (163 lb 2 oz)   BMI 30 82 kg/m²     Physical Exam  Constitutional:       General: She is not in acute distress  Appearance: Normal appearance  She is not ill-appearing, toxic-appearing or diaphoretic  HENT:      Head: Normocephalic and atraumatic  Right Ear: Tympanic membrane and external ear normal       Left Ear: Tympanic membrane and external ear normal       Nose: Nose normal       Mouth/Throat:      Mouth: Mucous membranes are dry  Eyes:      General: No scleral icterus  Extraocular Movements: Extraocular movements intact  Conjunctiva/sclera: Conjunctivae normal       Pupils: Pupils are equal, round, and reactive to light  Neck:      Musculoskeletal: Normal range of motion and neck supple  No neck rigidity  Cardiovascular:      Rate and Rhythm: Normal rate and regular rhythm  Pulses: Normal pulses  Heart sounds: Normal heart sounds  No murmur  Pulmonary:      Effort: Pulmonary effort is normal  No respiratory distress  Breath sounds: Normal breath sounds  No wheezing  Abdominal:      General: Bowel sounds are normal       Palpations: Abdomen is soft  Lymphadenopathy:      Cervical: No cervical adenopathy     Neurological: Mental Status: She is alert            Ochoa Cristobal DO  [facility] INTERNAL MEDICINE AT [facility]

## 2021-06-02 ENCOUNTER — TELEPHONE (OUTPATIENT)
Dept: ENDOCRINOLOGY | Facility: CLINIC | Age: 66
End: 2021-06-02

## 2021-06-02 NOTE — TELEPHONE ENCOUNTER
----- Message from Frederick Howe MD sent at 6/2/2021 12:40 AM EDT -----  Please call the patient regarding labs - cortisol normal

## 2021-06-10 ENCOUNTER — VBI (OUTPATIENT)
Dept: ADMINISTRATIVE | Facility: OTHER | Age: 66
End: 2021-06-10

## 2021-06-25 ENCOUNTER — CLINICAL SUPPORT (OUTPATIENT)
Dept: INTERNAL MEDICINE CLINIC | Facility: CLINIC | Age: 66
End: 2021-06-25
Payer: COMMERCIAL

## 2021-06-25 DIAGNOSIS — E11.9 TYPE 2 DIABETES MELLITUS WITHOUT COMPLICATION, WITHOUT LONG-TERM CURRENT USE OF INSULIN (HCC): Primary | ICD-10-CM

## 2021-06-25 PROCEDURE — 92250 FUNDUS PHOTOGRAPHY W/I&R: CPT | Performed by: INTERNAL MEDICINE

## 2021-06-27 LAB
LEFT EYE DIABETIC RETINOPATHY: ABNORMAL
LEFT EYE IMAGE QUALITY: ABNORMAL
LEFT EYE MACULAR EDEMA: ABNORMAL
LEFT EYE OTHER RETINOPATHY: ABNORMAL
RIGHT EYE DIABETIC RETINOPATHY: ABNORMAL
RIGHT EYE IMAGE QUALITY: ABNORMAL
RIGHT EYE MACULAR EDEMA: ABNORMAL
RIGHT EYE OTHER RETINOPATHY: ABNORMAL
SEVERITY (EYE EXAM): ABNORMAL

## 2021-06-27 PROCEDURE — 2025F 7 FLD RTA PHOTO W/O RTNOPTHY: CPT | Performed by: INTERNAL MEDICINE

## 2021-07-16 ENCOUNTER — VBI (OUTPATIENT)
Dept: ADMINISTRATIVE | Facility: OTHER | Age: 66
End: 2021-07-16

## 2021-07-30 ENCOUNTER — TELEMEDICINE (OUTPATIENT)
Dept: ENDOCRINOLOGY | Facility: CLINIC | Age: 66
End: 2021-07-30
Payer: COMMERCIAL

## 2021-07-30 DIAGNOSIS — M81.0 OSTEOPOROSIS, UNSPECIFIED OSTEOPOROSIS TYPE, UNSPECIFIED PATHOLOGICAL FRACTURE PRESENCE: Primary | ICD-10-CM

## 2021-07-30 DIAGNOSIS — Z79.52 CURRENT CHRONIC USE OF SYSTEMIC STEROIDS: ICD-10-CM

## 2021-07-30 DIAGNOSIS — E55.9 VITAMIN D DEFICIENCY: ICD-10-CM

## 2021-07-30 PROCEDURE — 99214 OFFICE O/P EST MOD 30 MIN: CPT | Performed by: INTERNAL MEDICINE

## 2021-07-30 NOTE — PROGRESS NOTES
Virtual Brief Visit    Verification of patient location:    Patient is located in the following state in which I hold an active license [place]      Assessment/Plan:    Problem List Items Addressed This Visit        Musculoskeletal and Integument    Osteoporosis - Primary     DEXA scan reviewed with the patient-counseled to take calcium and vitamin-D supplementations-fall prevention  Discussed treatment with IV bisphosphonates and she is agreeable-side effects discussed            Other    Current chronic use of systemic steroids     Off hydrocortisone past 2-3 months-will monitor a m  cortisol/ACTH         Relevant Orders    Cortisol Level, AM Specimen Lab Collect    ACTH- Lab Collect    Vitamin D deficiency     Continue supplementations         Relevant Orders    Comprehensive metabolic panel Lab Collect                Reason for visit is   Chief Complaint   Patient presents with    Virtual Brief Visit        Encounter provider Kenyatta Cardenas MD    Provider located at [address] [facility],[address]  [address] [place] [id number] [phone]    Recent Visits  No visits were found meeting these conditions  Showing recent visits within past 7 days and meeting all other requirements  Today's Visits  Date Type Provider Dept   07/30/21 Telemedicine Kenyatta Cardenas MD [facility]   Showing today's visits and meeting all other requirements  Future Appointments  No visits were found meeting these conditions  Showing future appointments within next 150 days and meeting all other requirements       After connecting through [employer] and patient was informed that this is a secure, HIPAA-compliant platform  She agrees to proceed  , the patient was identified by name and date of birth   Adrianne Muñoz was informed that this is a telemedicine visit and that the visit is being conducted through [facility] and patient was informed that this is a secure, HIPAA-compliant platform  She agrees to proceed  My office door was closed  No one else was in the room  She acknowledged consent and understanding of privacy and security of the platform  The patient has agreed to participate and understands she can discontinue the visit at any time  Patient is aware this is a billable service  Subjective    Jairo Rodríguez is a 60 y o  female  With osteoporosis and vitamin D  Seen in follow up  HPI 69-year-old female with history of chronic steroid use since right adrenalectomy 20 years back    In the past year steroids were tapered and she has been Off hydrocortisone for the past 2 1/2 months -   C/o fatigue , weight stable , rare dizziness   Sleep disturbances    Calcium -tums daily- 2-3  Serving of dairy  Vitamin D3 2000 iu daily   No falls/fractures      Was on fosamax 15 years back stopped due to GI SE  Ankle fracture 25 years back- no other fragility fractures , no unsteady gait    Past Medical History:   Diagnosis Date    Adrenal insufficiency (HCC)     Anxiety     Bacterial sepsis ([facility] )     last assessed: 06/13/16    Cushing's syndrome ([place] [address] )     last assessed: 05/29/16    Depression     last assessed: 01/02/18    Diabetes mellitus ([facility] )     Hyperlipidemia     Hypertension        Past Surgical History:   Procedure Laterality Date    ADRENALECTOMY  12/2002    CHOLECYSTECTOMY      HERNIA REPAIR      umbilical    OTHER SURGICAL HISTORY      Injection of trigger joint    MN COLONOSCOPY FLX DX W/COLLJ SPEC WHEN PFRMD N/A 7/10/2018    Procedure: COLONOSCOPY;  Surgeon: Mary Ellen Swenson MD;  [facility];  Service: Gastroenterology    TONSILLECTOMY AND ADENOIDECTOMY      TUBAL LIGATION         Current Outpatient Medications   Medication Sig Dispense Refill    glimepiride (AMARYL) 4 mg tablet Take 1 tablet by mouth twice daily 180 tablet 0    glucose blood (KEITH CONTOUR NEXT TEST) test strip by In Vitro route 2 (two) times a day      lisinopril (ZESTRIL) 10 mg tablet Take 1 tablet by mouth once daily 90 tablet 0    metFORMIN (GLUCOPHAGE) 500 mg tablet Take 1 tablet by mouth once daily 90 tablet 0    pravastatin (PRAVACHOL) 80 mg tablet Take 1 tablet (80 mg total) by mouth daily 90 tablet 3    sertraline (ZOLOFT) 100 mg tablet TAKE 1 TABLET BY MOUTH ONCE DAILY AS DIRECTED 90 tablet 0    Vitamin D, Cholecalciferol, 25 MCG (1000 UT) TABS Take 3,000 Units by mouth daily Take 2 capsules (2,000 iu) daily  No current facility-administered medications for this visit  Allergies   Allergen Reactions    Fosamax [Alendronate] Diarrhea    Paxil [Paroxetine Hcl] Rash       Review of Systems    There were no vitals filed for this visit  I spent 18 minutes directly with the patient during this visit    VIRTUAL VISIT [address] verbally agrees to participate in [employer]  Pt is aware that [employer] could be limited without vital signs or the ability to perform a full hands-on physical exam  Ndiia [employer] understands she or the provider may request at any time to terminate the video visit and request the patient to seek care or treatment in person

## 2021-07-30 NOTE — ASSESSMENT & PLAN NOTE
DEXA scan reviewed with the patient-counseled to take calcium and vitamin-D supplementations-fall prevention    Discussed treatment with IV bisphosphonates and she is agreeable-side effects discussed

## 2021-07-30 NOTE — PATIENT INSTRUCTIONS
Zoledronic Acid (By injection)   Zoledronic Acid (mcj-tw-BASH-ik AS-id)  Treats high blood calcium levels  Also treats bone damage caused by Paget disease, multiple myeloma, and cancers that spread to the bone  Also treats osteoporosis and reduces the risk of hip fractures in certain patients  Brand Name(s): Reclast, Zoledronic Acid Novaplus   There may be other brand names for this medicine  When This Medicine Should Not Be Used: This medicine is not right for everyone  You should not receive it if you had an allergic reaction to zoledronic acid, or if you are pregnant  How to Use This Medicine:   Injectable  · A nurse or other health provider will give you this medicine  · Your doctor will prescribe your dose and schedule  This medicine is given through a needle placed in a vein  · Your doctor may tell you to drink extra liquids before your treatment to prevent kidney problems  · Your doctor may also give you vitamin D and calcium supplements  Tell your doctor if you are not able to take these medicines  · This medicine should come with a Medication Guide  Ask your pharmacist for a copy if you do not have one  · Missed dose: You must use this medicine on a fixed schedule  Call your doctor or pharmacist if you miss a dose  Drugs and Foods to Avoid:   Ask your doctor or pharmacist before using any other medicine, including over-the-counter medicines, vitamins, and herbal products  · Do not use other medicines that also contain zoledronic acid  Do not use zoledronic acid together with another bisphosphonate medicine  · Some foods and medicines can affect how zoledronic acid works  Tell your doctor if you are using digoxin, antibiotics, diuretics (water pills), an NSAID pain or arthritis medicine (such as aspirin, celecoxib, ibuprofen, naproxen), steroid medicines, or cancer medicines  Warnings While Using This Medicine:   · It is not safe to take this medicine during pregnancy   It could harm an unborn mellissa Mcgarry your doctor right away if you become pregnant  · Tell your doctor if you are breastfeeding, or if you have kidney disease, anemia, aspirin-sensitive asthma, bleeding problems, cancer, congestive heart failure, low blood calcium levels, stomach absorption problems, mineral imbalance, dental problems or gum disease  Also tell your doctor if you had surgery on your bowel or parathyroid or thyroid gland  · This medicine may cause the following problems:  ? Jaw or teeth problems  ? Severe bone, joint, or muscle pain  ? Increased risk of thigh bone fracture  ? Low calcium levels in your blood  · You must have regular dental exams while you are being treated with this medicine  Tell your dentist or oral surgeon that you are using this medicine  · Your doctor will do lab tests at regular visits to check on the effects of this medicine  Keep all appointments    Possible Side Effects While Using This Medicine:   Call your doctor right away if you notice any of these side effects:  · Allergic reaction: Itching or hives, swelling in your face or hands, swelling or tingling in your mouth or throat, chest tightness, trouble breathing  · Chest pain, trouble breathing, fast or uneven heartbeat  · Decrease in how much or how often you urinate, blood in the urine, lower back or side pain, burning or painful urination  · Muscle spasm or twitching, or numbness or tingling in your fingers, feet, or around your mouth  · Pain, swelling, or numbness in the mouth or jaw, loose teeth or other teeth problems  · Severe muscle, bone, or joint pain  · Unusual pain in your thigh, groin, or hip  If you notice these less serious side effects, talk with your doctor:   · Fever, chills, cough, sore throat, and body aches  · Headache  · Mild nausea, constipation, diarrhea, stomach pain or upset  · Redness, pain, or swelling of your skin where the needle is placed  If you notice other side effects that you think are caused by this medicine, tell your doctor  Call your doctor for medical advice about side effects  You may report side effects to FDA at 2-953-FDA-8549  © Copyright [facility] 2021 Information is for End User's use only and may not be sold, redistributed or otherwise used for commercial purposes  The above information is an  only  It is not intended as medical advice for individual conditions or treatments  Talk to your doctor, nurse or pharmacist before following any medical regimen to see if it is safe and effective for you

## 2021-08-03 ENCOUNTER — TELEPHONE (OUTPATIENT)
Dept: ENDOCRINOLOGY | Facility: CLINIC | Age: 66
End: 2021-08-03

## 2021-08-03 DIAGNOSIS — M81.0 OSTEOPOROSIS, UNSPECIFIED OSTEOPOROSIS TYPE, UNSPECIFIED PATHOLOGICAL FRACTURE PRESENCE: Primary | ICD-10-CM

## 2021-08-03 RX ORDER — SODIUM CHLORIDE 9 MG/ML
20 INJECTION, SOLUTION INTRAVENOUS ONCE
Status: CANCELLED | OUTPATIENT
Start: 2021-08-03

## 2021-08-03 RX ORDER — ZOLEDRONIC ACID 5 MG/100ML
5 INJECTION, SOLUTION INTRAVENOUS ONCE
Status: CANCELLED | OUTPATIENT
Start: 2021-08-03

## 2021-08-03 NOTE — TELEPHONE ENCOUNTER
Left message for [facility],trying  to schedule patient for reclast, patient prefers after 08/16 to do infusion

## 2021-08-03 NOTE — TELEPHONE ENCOUNTER
----- Message from Maida Cortés MD sent at 7/30/2021  7:57 PM EDT -----  Set up for Reclast infusion at infusion center at [facility]

## 2021-08-04 DIAGNOSIS — M81.0 OSTEOPOROSIS, UNSPECIFIED OSTEOPOROSIS TYPE, UNSPECIFIED PATHOLOGICAL FRACTURE PRESENCE: Primary | ICD-10-CM

## 2021-08-04 RX ORDER — ZOLEDRONIC ACID 5 MG/100ML
5 INJECTION, SOLUTION INTRAVENOUS ONCE
Status: CANCELLED | OUTPATIENT
Start: 2021-08-17

## 2021-08-04 RX ORDER — SODIUM CHLORIDE 9 MG/ML
20 INJECTION, SOLUTION INTRAVENOUS ONCE
Status: CANCELLED | OUTPATIENT
Start: 2021-08-17

## 2021-08-05 ENCOUNTER — TELEPHONE (OUTPATIENT)
Dept: ENDOCRINOLOGY | Facility: CLINIC | Age: 66
End: 2021-08-05

## 2021-08-05 NOTE — TELEPHONE ENCOUNTER
----- Message from Taye Aldana MD sent at 8/3/2021  3:22 PM EDT -----  done  ----- Message -----  From:  Dottie Castelan  Sent: 8/3/2021   2:58 PM EDT  To: Taye Aldana MD    Can you please put reclast orders so I can schedule this  ----- Message -----  From: Taye Aldana MD  Sent: 7/30/2021   7:57 PM EDT  To: Dottie Castelan    Set up for Reclast infusion at infusion center at [facility]

## 2021-08-06 ENCOUNTER — VBI (OUTPATIENT)
Dept: ADMINISTRATIVE | Facility: OTHER | Age: 66
End: 2021-08-06

## 2021-08-08 ENCOUNTER — LAB (OUTPATIENT)
Dept: LAB | Facility: HOSPITAL | Age: 66
End: 2021-08-08
Attending: INTERNAL MEDICINE
Payer: COMMERCIAL

## 2021-08-08 DIAGNOSIS — Z79.52 CURRENT CHRONIC USE OF SYSTEMIC STEROIDS: ICD-10-CM

## 2021-08-08 DIAGNOSIS — M81.0 OSTEOPOROSIS, UNSPECIFIED OSTEOPOROSIS TYPE, UNSPECIFIED PATHOLOGICAL FRACTURE PRESENCE: ICD-10-CM

## 2021-08-08 DIAGNOSIS — E55.9 VITAMIN D DEFICIENCY: ICD-10-CM

## 2021-08-08 DIAGNOSIS — E11.9 TYPE 2 DIABETES MELLITUS WITHOUT COMPLICATION, WITHOUT LONG-TERM CURRENT USE OF INSULIN (HCC): ICD-10-CM

## 2021-08-08 LAB
ALBUMIN SERPL BCP-MCNC: 2.8 G/DL (ref 3.5–5)
ALP SERPL-CCNC: 86 U/L (ref 46–116)
ALT SERPL W P-5'-P-CCNC: 17 U/L (ref 12–78)
ANION GAP SERPL CALCULATED.3IONS-SCNC: 10 MMOL/L (ref 4–13)
AST SERPL W P-5'-P-CCNC: 9 U/L (ref 5–45)
BILIRUB SERPL-MCNC: 0.33 MG/DL (ref 0.2–1)
BUN SERPL-MCNC: 31 MG/DL (ref 5–25)
CALCIUM ALBUM COR SERPL-MCNC: 9.6 MG/DL (ref 8.3–10.1)
CALCIUM SERPL-MCNC: 8.6 MG/DL (ref 8.3–10.1)
CHLORIDE SERPL-SCNC: 105 MMOL/L (ref 100–108)
CO2 SERPL-SCNC: 24 MMOL/L (ref 21–32)
CORTIS AM PEAK SERPL-MCNC: 22.1 UG/DL (ref 4.2–22.4)
CREAT SERPL-MCNC: 1.54 MG/DL (ref 0.6–1.3)
EST. AVERAGE GLUCOSE BLD GHB EST-MCNC: 151 MG/DL
GFR SERPL CREATININE-BSD FRML MDRD: 35 ML/MIN/1.73SQ M
GLUCOSE P FAST SERPL-MCNC: 149 MG/DL (ref 65–99)
HBA1C MFR BLD: 6.9 %
POTASSIUM SERPL-SCNC: 5.1 MMOL/L (ref 3.5–5.3)
PROT SERPL-MCNC: 8.8 G/DL (ref 6.4–8.2)
SODIUM SERPL-SCNC: 139 MMOL/L (ref 136–145)

## 2021-08-08 PROCEDURE — 82024 ASSAY OF ACTH: CPT

## 2021-08-08 PROCEDURE — 83036 HEMOGLOBIN GLYCOSYLATED A1C: CPT

## 2021-08-08 PROCEDURE — 80053 COMPREHEN METABOLIC PANEL: CPT

## 2021-08-08 PROCEDURE — 36415 COLL VENOUS BLD VENIPUNCTURE: CPT

## 2021-08-08 PROCEDURE — 82533 TOTAL CORTISOL: CPT

## 2021-08-08 PROCEDURE — 3044F HG A1C LEVEL LT 7.0%: CPT | Performed by: INTERNAL MEDICINE

## 2021-08-08 NOTE — RESULT ENCOUNTER NOTE
Please call the patient regarding labs - morning cortisol is normal - she may be a little dehydrated as the kidney function is a little worse - keep well hydrated and repeat BMP in 1 week

## 2021-08-09 ENCOUNTER — TELEPHONE (OUTPATIENT)
Dept: ENDOCRINOLOGY | Facility: CLINIC | Age: 66
End: 2021-08-09

## 2021-08-09 DIAGNOSIS — M81.0 OSTEOPOROSIS, UNSPECIFIED OSTEOPOROSIS TYPE, UNSPECIFIED PATHOLOGICAL FRACTURE PRESENCE: Primary | ICD-10-CM

## 2021-08-09 NOTE — TELEPHONE ENCOUNTER
----- Message from Matias Ribera MD sent at 8/8/2021  1:32 PM EDT -----  Please call the patient regarding labs - morning cortisol is normal - she may be a little dehydrated as the kidney function is a little worse - keep well hydrated and repeat BMP in 1 week

## 2021-08-10 LAB — ACTH PLAS-MCNC: 107 PG/ML (ref 7.2–63.3)

## 2021-08-11 NOTE — RESULT ENCOUNTER NOTE
Please call the patient regarding labs - cortisol normal but ACTH high - repeat morning and fasting ACTH and cortisol in 3 months

## 2021-08-17 ENCOUNTER — LAB (OUTPATIENT)
Dept: LAB | Facility: HOSPITAL | Age: 66
End: 2021-08-17
Attending: INTERNAL MEDICINE
Payer: COMMERCIAL

## 2021-08-17 ENCOUNTER — HOSPITAL ENCOUNTER (OUTPATIENT)
Dept: INFUSION CENTER | Facility: HOSPITAL | Age: 66
Discharge: HOME | End: 2021-08-17
Attending: INTERNAL MEDICINE
Payer: COMMERCIAL

## 2021-08-17 VITALS
HEART RATE: 72 BPM | WEIGHT: 162.04 LBS | DIASTOLIC BLOOD PRESSURE: 68 MMHG | BODY MASS INDEX: 30.59 KG/M2 | SYSTOLIC BLOOD PRESSURE: 136 MMHG | TEMPERATURE: 96.4 F | RESPIRATION RATE: 18 BRPM | HEIGHT: 61 IN | OXYGEN SATURATION: 95 %

## 2021-08-17 DIAGNOSIS — M81.0 OSTEOPOROSIS, UNSPECIFIED OSTEOPOROSIS TYPE, UNSPECIFIED PATHOLOGICAL FRACTURE PRESENCE: ICD-10-CM

## 2021-08-17 DIAGNOSIS — M81.0 OSTEOPOROSIS, UNSPECIFIED OSTEOPOROSIS TYPE, UNSPECIFIED PATHOLOGICAL FRACTURE PRESENCE: Primary | ICD-10-CM

## 2021-08-17 LAB
ALBUMIN SERPL BCP-MCNC: 3.2 G/DL (ref 3.5–5)
ALP SERPL-CCNC: 85 U/L (ref 46–116)
ALT SERPL W P-5'-P-CCNC: 15 U/L (ref 12–78)
ANION GAP SERPL CALCULATED.3IONS-SCNC: 8 MMOL/L (ref 4–13)
AST SERPL W P-5'-P-CCNC: 10 U/L (ref 5–45)
BILIRUB SERPL-MCNC: 0.24 MG/DL (ref 0.2–1)
BUN SERPL-MCNC: 22 MG/DL (ref 5–25)
CALCIUM ALBUM COR SERPL-MCNC: 9.8 MG/DL (ref 8.3–10.1)
CALCIUM SERPL-MCNC: 9.2 MG/DL (ref 8.3–10.1)
CHLORIDE SERPL-SCNC: 101 MMOL/L (ref 100–108)
CO2 SERPL-SCNC: 26 MMOL/L (ref 21–32)
CREAT SERPL-MCNC: 1.19 MG/DL (ref 0.6–1.3)
GFR SERPL CREATININE-BSD FRML MDRD: 48 ML/MIN/1.73SQ M
GLUCOSE SERPL-MCNC: 81 MG/DL (ref 65–140)
POTASSIUM SERPL-SCNC: 4.8 MMOL/L (ref 3.5–5.3)
PROT SERPL-MCNC: 9.1 G/DL (ref 6.4–8.2)
SODIUM SERPL-SCNC: 135 MMOL/L (ref 136–145)

## 2021-08-17 PROCEDURE — 96365 THER/PROPH/DIAG IV INF INIT: CPT

## 2021-08-17 PROCEDURE — 36415 COLL VENOUS BLD VENIPUNCTURE: CPT

## 2021-08-17 PROCEDURE — 80053 COMPREHEN METABOLIC PANEL: CPT

## 2021-08-17 RX ORDER — SODIUM CHLORIDE 9 MG/ML
20 INJECTION, SOLUTION INTRAVENOUS ONCE
OUTPATIENT
Start: 2022-08-17

## 2021-08-17 RX ORDER — ZOLEDRONIC ACID 5 MG/100ML
5 INJECTION, SOLUTION INTRAVENOUS ONCE
OUTPATIENT
Start: 2022-08-17

## 2021-08-17 RX ORDER — ZOLEDRONIC ACID 5 MG/100ML
5 INJECTION, SOLUTION INTRAVENOUS ONCE
Status: COMPLETED | OUTPATIENT
Start: 2021-08-17 | End: 2021-08-17

## 2021-08-17 RX ORDER — SODIUM CHLORIDE 9 MG/ML
20 INJECTION, SOLUTION INTRAVENOUS ONCE
Status: COMPLETED | OUTPATIENT
Start: 2021-08-17 | End: 2021-08-17

## 2021-08-17 RX ADMIN — SODIUM CHLORIDE 20 ML/HR: 0.9 INJECTION, SOLUTION INTRAVENOUS at 15:45

## 2021-08-17 RX ADMIN — ZOLEDRONIC ACID 5 MG: 5 INJECTION, SOLUTION INTRAVENOUS at 15:58

## 2021-08-17 NOTE — PROGRESS NOTES
1033 CrCl 30 6 from labs 08/08/2021 and message sent to Renny Rodriguez Waiting for instructions   1043 Patient is to have a BMP on arrival  Patient made aware of same and she will go to lab at 2:30pm for a BMP

## 2021-08-17 NOTE — DISCHARGE INSTRUCTIONS
Zoledronic Acid (By injection)   Zoledronic Acid (ukk-ag-BWIY-ik AS-id)  Treats high blood calcium levels  Also treats bone damage caused by Paget disease, multiple myeloma, and cancers that spread to the bone  Also treats osteoporosis and reduces the risk of hip fractures in certain patients  Brand Name(s): Reclast, Zoledronic Acid Novaplus   There may be other brand names for this medicine  When This Medicine Should Not Be Used: This medicine is not right for everyone  You should not receive it if you had an allergic reaction to zoledronic acid, or if you are pregnant  How to Use This Medicine:   Injectable  · A nurse or other health provider will give you this medicine  · Your doctor will prescribe your dose and schedule  This medicine is given through a needle placed in a vein  · Your doctor may tell you to drink extra liquids before your treatment to prevent kidney problems  · Your doctor may also give you vitamin D and calcium supplements  Tell your doctor if you are not able to take these medicines  · This medicine should come with a Medication Guide  Ask your pharmacist for a copy if you do not have one  · Missed dose: You must use this medicine on a fixed schedule  Call your doctor or pharmacist if you miss a dose  Drugs and Foods to Avoid:   Ask your doctor or pharmacist before using any other medicine, including over-the-counter medicines, vitamins, and herbal products  · Do not use other medicines that also contain zoledronic acid  Do not use zoledronic acid together with another bisphosphonate medicine  · Some foods and medicines can affect how zoledronic acid works  Tell your doctor if you are using digoxin, antibiotics, diuretics (water pills), an NSAID pain or arthritis medicine (such as aspirin, celecoxib, ibuprofen, naproxen), steroid medicines, or cancer medicines  Warnings While Using This Medicine:   · It is not safe to take this medicine during pregnancy   It could harm an unborn baby  Tell your doctor right away if you become pregnant  · Tell your doctor if you are breastfeeding, or if you have kidney disease, anemia, aspirin-sensitive asthma, bleeding problems, cancer, congestive heart failure, low blood calcium levels, stomach absorption problems, mineral imbalance, dental problems or gum disease  Also tell your doctor if you had surgery on your bowel or parathyroid or thyroid gland  · This medicine may cause the following problems:  ? Jaw or teeth problems  ? Severe bone, joint, or muscle pain  ? Increased risk of thigh bone fracture  ? Low calcium levels in your blood  · You must have regular dental exams while you are being treated with this medicine  Tell your dentist or oral surgeon that you are using this medicine  · Your doctor will do lab tests at regular visits to check on the effects of this medicine  Keep all appointments    Possible Side Effects While Using This Medicine:   Call your doctor right away if you notice any of these side effects:  · Allergic reaction: Itching or hives, swelling in your face or hands, swelling or tingling in your mouth or throat, chest tightness, trouble breathing  · Chest pain, trouble breathing, fast or uneven heartbeat  · Decrease in how much or how often you urinate, blood in the urine, lower back or side pain, burning or painful urination  · Muscle spasm or twitching, or numbness or tingling in your fingers, feet, or around your mouth  · Pain, swelling, or numbness in the mouth or jaw, loose teeth or other teeth problems  · Severe muscle, bone, or joint pain  · Unusual pain in your thigh, groin, or hip  If you notice these less serious side effects, talk with your doctor:   · Fever, chills, cough, sore throat, and body aches  · Headache  · Mild nausea, constipation, diarrhea, stomach pain or upset  · Redness, pain, or swelling of your skin where the needle is placed  If you notice other side effects that you think are caused by this medicine, tell your doctor  Call your doctor for medical advice about side effects  You may report side effects to FDA at 6-473-FDA-2583  © Copyright [facility] 2021 Information is for End User's use only and may not be sold, redistributed or otherwise used for commercial purposes  The above information is an  only  It is not intended as medical advice for individual conditions or treatments  Talk to your doctor, nurse or pharmacist before following any medical regimen to see if it is safe and effective for you

## 2021-08-17 NOTE — PLAN OF CARE
Problem: Potential for Falls  Goal: Patient will remain free of falls  Description: INTERVENTIONS:  - Educate patient/family on patient safety including physical limitations  - Instruct patient to call for assistance with activity   - Consult OT/PT to assist with strengthening/mobility   - Keep Call bell within reach  - Keep bed low and locked with side rails adjusted as appropriate  - Keep care items and personal belongings within reach  - Initiate and maintain comfort rounds  - Make Fall Risk Sign visible to staff  - Apply yellow socks and bracelet for high fall risk patients  - Consider moving patient to room near nurses station  Outcome: Progressing     Problem: INFECTION - ADULT  Goal: Absence or prevention of progression during hospitalization  Description: INTERVENTIONS:  - Assess and monitor for signs and symptoms of infection  - Monitor lab/diagnostic results  - Monitor all insertion sites, i e  indwelling lines, tubes, and drains  - Monitor endotracheal if appropriate and nasal secretions for changes in amount and color  - [place] appropriate cooling/warming therapies per order  - Administer medications as ordered  - Instruct and encourage patient and family to use good hand hygiene technique  - Identify and instruct in appropriate isolation precautions for identified infection/condition  Outcome: Progressing     Problem: Knowledge Deficit  Goal: Patient/family/caregiver demonstrates understanding of disease process, treatment plan, medications, and discharge instructions  Description: Complete learning assessment and assess knowledge base    Interventions:  - Provide teaching at level of understanding  - Provide teaching via preferred learning methods  Outcome: Progressing

## 2021-08-26 PROCEDURE — 4010F ACE/ARB THERAPY RXD/TAKEN: CPT | Performed by: INTERNAL MEDICINE

## 2021-10-05 ENCOUNTER — OFFICE VISIT (OUTPATIENT)
Dept: FAMILY MEDICINE CLINIC | Facility: CLINIC | Age: 66
End: 2021-10-05
Payer: COMMERCIAL

## 2021-10-05 VITALS
WEIGHT: 160.2 LBS | BODY MASS INDEX: 30.25 KG/M2 | HEIGHT: 61 IN | TEMPERATURE: 97 F | SYSTOLIC BLOOD PRESSURE: 118 MMHG | DIASTOLIC BLOOD PRESSURE: 70 MMHG

## 2021-10-05 DIAGNOSIS — I10 PRIMARY HYPERTENSION: ICD-10-CM

## 2021-10-05 DIAGNOSIS — Z12.31 SCREENING MAMMOGRAM FOR BREAST CANCER: Primary | ICD-10-CM

## 2021-10-05 DIAGNOSIS — F32.9 REACTIVE DEPRESSION: ICD-10-CM

## 2021-10-05 DIAGNOSIS — E11.65 TYPE 2 DIABETES MELLITUS WITH HYPERGLYCEMIA, WITHOUT LONG-TERM CURRENT USE OF INSULIN (HCC): ICD-10-CM

## 2021-10-05 DIAGNOSIS — E11.9 TYPE 2 DIABETES MELLITUS WITHOUT COMPLICATION, WITHOUT LONG-TERM CURRENT USE OF INSULIN (HCC): ICD-10-CM

## 2021-10-05 DIAGNOSIS — R19.5 POSITIVE FIT (FECAL IMMUNOCHEMICAL TEST): ICD-10-CM

## 2021-10-05 PROBLEM — Z79.52 CURRENT CHRONIC USE OF SYSTEMIC STEROIDS: Status: RESOLVED | Noted: 2021-07-30 | Resolved: 2021-10-05

## 2021-10-05 PROCEDURE — 99214 OFFICE O/P EST MOD 30 MIN: CPT | Performed by: INTERNAL MEDICINE

## 2021-10-05 PROCEDURE — 3008F BODY MASS INDEX DOCD: CPT | Performed by: INTERNAL MEDICINE

## 2021-10-05 PROCEDURE — 4004F PT TOBACCO SCREEN RCVD TLK: CPT | Performed by: INTERNAL MEDICINE

## 2021-10-08 ENCOUNTER — TELEPHONE (OUTPATIENT)
Dept: SURGERY | Facility: HOSPITAL | Age: 66
End: 2021-10-08

## 2021-10-10 ENCOUNTER — ANESTHESIA (OUTPATIENT)
Dept: ANESTHESIOLOGY | Facility: HOSPITAL | Age: 66
End: 2021-10-10

## 2021-10-10 ENCOUNTER — ANESTHESIA EVENT (OUTPATIENT)
Dept: ANESTHESIOLOGY | Facility: HOSPITAL | Age: 66
End: 2021-10-10

## 2021-10-11 ENCOUNTER — ANESTHESIA (OUTPATIENT)
Dept: PERIOP | Facility: HOSPITAL | Age: 66
End: 2021-10-11

## 2021-10-11 ENCOUNTER — ANESTHESIA EVENT (OUTPATIENT)
Dept: PERIOP | Facility: HOSPITAL | Age: 66
End: 2021-10-11

## 2021-10-11 ENCOUNTER — HOSPITAL ENCOUNTER (OUTPATIENT)
Dept: PERIOP | Facility: HOSPITAL | Age: 66
Setting detail: OUTPATIENT SURGERY
Discharge: HOME | End: 2021-10-11
Attending: COLON & RECTAL SURGERY
Payer: COMMERCIAL

## 2021-10-11 VITALS
OXYGEN SATURATION: 97 % | WEIGHT: 160 LBS | BODY MASS INDEX: 30.21 KG/M2 | HEART RATE: 58 BPM | RESPIRATION RATE: 18 BRPM | TEMPERATURE: 97 F | SYSTOLIC BLOOD PRESSURE: 119 MMHG | HEIGHT: 61 IN | DIASTOLIC BLOOD PRESSURE: 64 MMHG

## 2021-10-11 DIAGNOSIS — Z86.010 PERSONAL HISTORY OF COLONIC POLYPS: ICD-10-CM

## 2021-10-11 LAB — GLUCOSE SERPL-MCNC: 112 MG/DL (ref 65–140)

## 2021-10-11 PROCEDURE — 88305 TISSUE EXAM BY PATHOLOGIST: CPT | Performed by: PATHOLOGY

## 2021-10-11 PROCEDURE — 45385 COLONOSCOPY W/LESION REMOVAL: CPT | Performed by: COLON & RECTAL SURGERY

## 2021-10-11 PROCEDURE — 82948 REAGENT STRIP/BLOOD GLUCOSE: CPT

## 2021-10-11 RX ORDER — LIDOCAINE HYDROCHLORIDE 10 MG/ML
INJECTION, SOLUTION EPIDURAL; INFILTRATION; INTRACAUDAL; PERINEURAL AS NEEDED
Status: DISCONTINUED | OUTPATIENT
Start: 2021-10-11 | End: 2021-10-11

## 2021-10-11 RX ORDER — PROPOFOL 10 MG/ML
INJECTION, EMULSION INTRAVENOUS CONTINUOUS PRN
Status: DISCONTINUED | OUTPATIENT
Start: 2021-10-11 | End: 2021-10-11

## 2021-10-11 RX ORDER — SODIUM CHLORIDE, SODIUM LACTATE, POTASSIUM CHLORIDE, CALCIUM CHLORIDE 600; 310; 30; 20 MG/100ML; MG/100ML; MG/100ML; MG/100ML
INJECTION, SOLUTION INTRAVENOUS CONTINUOUS PRN
Status: DISCONTINUED | OUTPATIENT
Start: 2021-10-11 | End: 2021-10-11

## 2021-10-11 RX ORDER — PROPOFOL 10 MG/ML
INJECTION, EMULSION INTRAVENOUS AS NEEDED
Status: DISCONTINUED | OUTPATIENT
Start: 2021-10-11 | End: 2021-10-11

## 2021-10-11 RX ADMIN — LIDOCAINE HYDROCHLORIDE 50 MG: 10 INJECTION, SOLUTION EPIDURAL; INFILTRATION; INTRACAUDAL; PERINEURAL at 13:27

## 2021-10-11 RX ADMIN — SODIUM CHLORIDE, SODIUM LACTATE, POTASSIUM CHLORIDE, AND CALCIUM CHLORIDE: .6; .31; .03; .02 INJECTION, SOLUTION INTRAVENOUS at 13:25

## 2021-10-11 RX ADMIN — PROPOFOL 80 MG: 10 INJECTION, EMULSION INTRAVENOUS at 13:27

## 2021-10-11 RX ADMIN — PROPOFOL 120 MCG/KG/MIN: 10 INJECTION, EMULSION INTRAVENOUS at 13:27

## 2022-02-06 ENCOUNTER — APPOINTMENT (OUTPATIENT)
Dept: LAB | Facility: HOSPITAL | Age: 67
End: 2022-02-06
Attending: INTERNAL MEDICINE
Payer: MEDICARE

## 2022-02-06 DIAGNOSIS — E11.65 TYPE 2 DIABETES MELLITUS WITH HYPERGLYCEMIA, WITHOUT LONG-TERM CURRENT USE OF INSULIN (HCC): ICD-10-CM

## 2022-02-06 LAB
EST. AVERAGE GLUCOSE BLD GHB EST-MCNC: 151 MG/DL
HBA1C MFR BLD: 6.9 %

## 2022-02-06 PROCEDURE — 36415 COLL VENOUS BLD VENIPUNCTURE: CPT

## 2022-02-06 PROCEDURE — 83036 HEMOGLOBIN GLYCOSYLATED A1C: CPT

## 2022-02-09 ENCOUNTER — OFFICE VISIT (OUTPATIENT)
Dept: ENDOCRINOLOGY | Facility: CLINIC | Age: 67
End: 2022-02-09
Payer: MEDICARE

## 2022-02-09 VITALS
BODY MASS INDEX: 29.45 KG/M2 | DIASTOLIC BLOOD PRESSURE: 72 MMHG | HEART RATE: 70 BPM | SYSTOLIC BLOOD PRESSURE: 128 MMHG | HEIGHT: 61 IN | WEIGHT: 156 LBS

## 2022-02-09 DIAGNOSIS — E11.65 TYPE 2 DIABETES MELLITUS WITH HYPERGLYCEMIA, WITHOUT LONG-TERM CURRENT USE OF INSULIN (HCC): ICD-10-CM

## 2022-02-09 DIAGNOSIS — E78.5 HYPERLIPIDEMIA, UNSPECIFIED HYPERLIPIDEMIA TYPE: ICD-10-CM

## 2022-02-09 DIAGNOSIS — M81.0 OSTEOPOROSIS, UNSPECIFIED OSTEOPOROSIS TYPE, UNSPECIFIED PATHOLOGICAL FRACTURE PRESENCE: Primary | ICD-10-CM

## 2022-02-09 DIAGNOSIS — I10 PRIMARY HYPERTENSION: ICD-10-CM

## 2022-02-09 DIAGNOSIS — Z86.39 HISTORY OF ADRENAL INSUFFICIENCY: ICD-10-CM

## 2022-02-09 DIAGNOSIS — E55.9 VITAMIN D DEFICIENCY: ICD-10-CM

## 2022-02-09 DIAGNOSIS — N18.31 STAGE 3A CHRONIC KIDNEY DISEASE (HCC): ICD-10-CM

## 2022-02-09 PROCEDURE — 99214 OFFICE O/P EST MOD 30 MIN: CPT | Performed by: STUDENT IN AN ORGANIZED HEALTH CARE EDUCATION/TRAINING PROGRAM

## 2022-02-09 NOTE — PATIENT INSTRUCTIONS
Goal calcium intake 1200 mg daily    Dietary sources of calcium and the amount of calcium (in milligrams) provided per serving are listed below  The actual amount of calcium may differ slightly depending upon the brand of the food       Dairy:   1 cup of skim milk (302 mg)  1 cup of 2 percent milk (297 mg)  1 cup of whole milk (291 mg)  1 cup of buttermilk (285 mg)  1 cup of lactose reduced milk (285-302 mg)  1/3 cup of powdered nonfat milk (283 mg)  1 cup of lowfat plain yogurt (415 mg)  1 cup of lowfat fruit yogurt (245-384 mg)  1/2 cup ice cream (100 mg)  1 cup sour cream, cultured (250 mg)  1 cup of cottage cheese made with one percent milk fat (138 mg)  1 5 ounces of part skim mozzarella cheese (275 mg)  1 0 ounces of hard cheese (cheddar or salud) (200 mg)  1 ounce Brie cheese (50 mg)  1 Tablespoon Parmesan Cheese (70 mg)  1 ounce Swiss or Gruyere cheese (270 mg)    Dairy Alternatives:   1 cup Soy milk, calcium fortified (200 to 400 mg)  1 cup [place] milk, calcium fortified (450 mg)      Fruits and Vegetables:   1 cup of calcium-fortified orange juice (300 mg)  1 cup of orange juice from concentrate (20 mg)  1 cup acorn squash, cooked (90 mg)  1 cup raw arugula (125 mg)  1 cup raw bok danielle (40 mg)  1 cup chard or okra, cooked (100 mg)  1 cup spinach, cooked (240 mg)  1/2 cup of cooked carloz greens (220 mg)  1/2 cup of cooked turnip greens (99 mg)  1/2 cup of steamed broccoli (47 mg)  1/2 cup of cooked kale (45 mg)  1 cup, dried, uncooked figs (300 mg)  1 cup raw kiwi (50 mg)    Legumes:  1 cup garbanzo beans (80 mg)  1/2 cup Legumes, general, cooked (15-50 mg)  1 cup melo beans (75)  1/2 cup boiled soy beans (100 mg)  1/2 cup tempeh (75 mg)  4 oz Tofu, firm, calcium set (250-750 mg)  4 oz Tofu, soft regular (120-390 mg)  1/2 cup White beans, cooked (70 mg)    Grains:  1/2 to 1 cup Cereals (calcium fortified) (250-1000 mg)  1/2 cup amaranth (919 mg)  1 slice bread, calcium fortified (150-200 mg)  1 cup brown rice, long grain, raw (50 mg)  1 package oatmeal (100-150 mg)  2 corn tortillas 85 mg)    Seafood:   4 canned sardines, with bones (242 mg)  3 ounces of cooked crab (50 mg)  3 ounces of Tenino, canned with bones (170 to 210 mg)  3 ounces of canned mackerel (250 mg)    Nuts and Seeds:   1 ounce almonds, toasted unblanched (80 mg)  1 ounce sesame seeds, whole roasted (280 mg)  1 ounce (2 Tbsp) Sesame tahini (130 mg)  1 ounce dried sunflower seeds (50 mg)    Desserts:   1/2 cup of frozen yogurt (103 mg)  1/2 cup of vanilla ice cream (85 mg)    Other:  1 tbsp Blackstrap Molasses (135 mg)    What are some ways to add extra calcium to the foods I eat? You can increase your calcium intake by adding foods that contain calcium to the foods you normally eat  For example, non-fat powdered dry milk has about 52 mg of calcium in one tablespoon  You can add powdered milk to several different foods  The following are some ideas for adding extra calcium to your foods:   Add 3 tablespoons of powdered milk to each cup of milk in puddings, cocoa, or custard  Add 4 tablespoons of powdered milk to each cup of hot cereal before cooking  Sift two tablespoons of powdered milk into each cup of flour in cakes, cookies, or breads  Use lowfat or fat free milk instead of water in pancake mix, mashed potatoes, pudding, and hot breakfast cereals  Add lowfat or fat free cheese to salad, soup, or pasta  Add tofu with added calcium to vegetable stir hernandez

## 2022-02-09 NOTE — ASSESSMENT & PLAN NOTE
Currently on therapy with zoledronic acid, dose #2 planned for August 2021  Last DXA September 2020  May consider repeating DXA following 3rd dose of zoledronic acid in 2023  Will consider holiday versus extended course versus alternative therapy at that point depending on results and events

## 2022-02-09 NOTE — PROGRESS NOTES
Monet Chou 73 y o  female MRN: [id number]    Encounter: [id number]      Assessment/Plan     Problem List Items Addressed This Visit        Endocrine    Type 2 diabetes mellitus with hyperglycemia ([facility] )     Stable control  Advised checking BG at scattering times  Continue with present therapy            Cardiovascular and Mediastinum    Hypertension     Controlled  Continue with lisinopril  Musculoskeletal and Integument    Osteoporosis - Primary     Currently on therapy with zoledronic acid, dose #2 planned for August 2021  Last DXA September 2020  May consider repeating DXA following 3rd dose of zoledronic acid in 2023  Will consider holiday versus extended course versus alternative therapy at that point depending on results and events  Relevant Orders    Basic metabolic panel Lab Collect    Albumin Lab Collect       Other    Vitamin D deficiency    Relevant Orders    Vitamin D 25 hydroxy Lab Collect    Hyperlipidemia     Continue with statin         History of adrenal insufficiency     History of Cushing's sp adrenalectomy and long-standing steroid replacement therapy  Currently stable off steroid replacement for at least 8 months  Last labs showed high normal cortisol with elevated ACTH  Will plan to repeat         Relevant Orders    ACTH- Lab Collect    Cortisol Level, AM Specimen Lab Collect      Other Visit Diagnoses     Stage 3a chronic kidney disease ([facility] )        Relevant Orders    Basic metabolic panel Lab Collect    Albumin Lab Collect          CC: Osteoporosis / History of Cushing's s/p adrenalectomy and secondary adrenal insufficiency / type 2 diabetes    History of Present Illness     HPI:    Levar Negrete returns today for follow up of multiple endocrine issues  She received her first infusion of reclast in August 2021 and denies adverse reactions, aside from mentioning developing trigger finger  She has had no recent fractures, but notes one fall down the steps   She is usually sturdy on her feet  She takes Vit D 1000 units daily and gets calcium through a balanced diet  She remains of steroid therapy  She reports some intentional weight loss but no nausea or vomiting  She does get dizziness spells if she sits up too quickly but has not had any syncope  She reports dizziness spells are chronic and existed even when she was on steroid therapy  She has a dark complexion but reports that she has [employer] and that her complexion is always this way  For diabetes, she takes metformin 500 mg once daily and glimepiride 4 mg daily  She checks FBG and they are usually <110  She does not take daytime sugars  For blood pressure she takes lisinopril 10 mg daily  She cholesterol she takes pravastatin 80 mg daily  Review of Systems   Constitutional: Negative for fatigue, fever and unexpected weight change  HENT: Negative for trouble swallowing and voice change  Eyes: Negative for photophobia and visual disturbance  Respiratory: Negative for cough and shortness of breath  Cardiovascular: Negative for chest pain and leg swelling  Gastrointestinal: Negative for abdominal pain, nausea and vomiting  Endocrine: Negative for polydipsia and polyuria  Neurological: Positive for dizziness (When sits up too fast - chronic, even when on the steroid)  Negative for syncope  Psychiatric/Behavioral: Negative for agitation and behavioral problems         Historical Information   Past Medical History:   Diagnosis Date    Adrenal insufficiency ([facility] )     Anxiety     Bacterial sepsis ([facility] )     last assessed: 06/13/16    Cushing's syndrome ([facility] )     last assessed: 05/29/16    Depression     last assessed: 01/02/18    Diabetes mellitus ([facility] )     Hyperlipidemia     Hypertension      Past Surgical History:   Procedure Laterality Date    ADRENALECTOMY  12/2002    CHOLECYSTECTOMY      HERNIA REPAIR      umbilical    OTHER SURGICAL HISTORY      Injection of trigger joint    UT COLONOSCOPY FLX DX W/COLLJ SPEC WHEN PFRMD N/A 7/10/2018    Procedure: COLONOSCOPY;  Surgeon: Yeny Pulliam MD;  Location: [facility];  Service: Gastroenterology    TONSILLECTOMY AND ADENOIDECTOMY      TUBAL LIGATION       Social History   Social History     Substance and Sexual Activity   Alcohol Use No     Social History     Substance and Sexual Activity   Drug Use No     Social History     Tobacco Use   Smoking Status Current Every Day Smoker    Packs/day: 0 50    Years: 50 00    Pack years: 25 00   Smokeless Tobacco Never Used     Family History:   Family History   Problem Relation Age of Onset    Breast cancer Mother     No Known Problems Sister     No Known Problems Daughter     No Known Problems Daughter     No Known Problems Paternal Aunt     No Known Problems Paternal Aunt     No Known Problems Paternal Aunt        Meds/Allergies   Current Outpatient Medications   Medication Sig Dispense Refill    glimepiride (AMARYL) 4 mg tablet Take 1 tablet by mouth twice daily 180 tablet 0    glucose blood (KEITH CONTOUR NEXT TEST) test strip by In Vitro route 2 (two) times a day      lisinopril (ZESTRIL) 10 mg tablet Take 1 tablet by mouth once daily 90 tablet 3    metFORMIN (GLUCOPHAGE) 500 mg tablet Take 1 tablet by mouth once daily 90 tablet 3    pravastatin (PRAVACHOL) 80 mg tablet Take 1 tablet (80 mg total) by mouth daily 90 tablet 3    sertraline (ZOLOFT) 100 mg tablet TAKE 1 TABLET BY MOUTH ONCE DAILY AS DIRECTED 90 tablet 3    Vitamin D, Cholecalciferol, 25 MCG (1000 UT) TABS Take 3,000 Units by mouth daily Take 2 capsules (2,000 iu) daily   CALCIUM PO Take by mouth Pt not sure of dose (Patient not taking: Reported on 2/9/2022 )       No current facility-administered medications for this visit       Allergies   Allergen Reactions    Fosamax [Alendronate] Diarrhea    Paxil [Paroxetine Hcl] Rash       Objective   Vitals: Blood pressure 128/72, pulse 70, height 5' 1" (1 549 m), weight 70 8 kg (156 lb)  Physical Exam  Vitals reviewed  Constitutional:       General: She is not in acute distress  Appearance: She is not ill-appearing or diaphoretic  HENT:      Head: Normocephalic and atraumatic  Nose: Nose normal    Eyes:      General: No scleral icterus  Conjunctiva/sclera: Conjunctivae normal    Cardiovascular:      Rate and Rhythm: Normal rate and regular rhythm  Pulmonary:      Effort: Pulmonary effort is normal  No respiratory distress  Musculoskeletal:         General: No tenderness  Cervical back: Normal range of motion  Neurological:      General: No focal deficit present  Mental Status: She is alert  Psychiatric:         Mood and Affect: Mood normal          Behavior: Behavior normal          The history was obtained from the review of the chart, patient      Lab Results:   Lab Results   Component Value Date/Time    Potassium 4 8 08/17/2021 02:23 PM    Potassium 5 1 08/08/2021 08:34 AM    Potassium 5 2 03/13/2021 07:16 AM    Chloride 101 08/17/2021 02:23 PM    Chloride 105 08/08/2021 08:34 AM    Chloride 103 03/13/2021 07:16 AM    CO2 26 08/17/2021 02:23 PM    CO2 24 08/08/2021 08:34 AM    CO2 24 03/13/2021 07:16 AM    BUN 22 08/17/2021 02:23 PM    BUN 31 (H) 08/08/2021 08:34 AM    BUN 24 03/13/2021 07:16 AM    Creatinine 1 19 08/17/2021 02:23 PM    Creatinine 1 54 (H) 08/08/2021 08:34 AM    Creatinine 1 27 03/13/2021 07:16 AM    Glucose, Fasting 149 (H) 08/08/2021 08:34 AM    Glucose, Fasting 114 (H) 03/13/2021 07:16 AM    Calcium 9 2 08/17/2021 02:23 PM    Calcium 8 6 08/08/2021 08:34 AM    Calcium 9 8 03/13/2021 07:16 AM    eGFR 48 08/17/2021 02:23 PM    eGFR 35 08/08/2021 08:34 AM    eGFR 45 03/13/2021 07:16 AM    Vit D, 25-Hydroxy 46 5 03/13/2021 07:16 AM     Component      Latest Ref Rng & Units 8/8/2021   Cortisol - AM      4 2 - 22 4 ug/dL 22 1   ACTH      7 2 - 63 3 pg/mL 107 0 (H)     Lab Results   Component Value Date    CHOLESTEROL 237 (H) 08/03/2019    CHOLESTEROL 210 (H) 07/12/2017    CHOLESTEROL 348 (H) 09/10/2016     Lab Results   Component Value Date    HDL 39 (L) 08/03/2019    HDL 39 (L) 07/12/2017    HDL 41 09/10/2016     Lab Results   Component Value Date    TRIG 224 (H) 08/03/2019    TRIG 228 (H) 07/12/2017    TRIG 252 (H) 09/10/2016     Lab Results   Component Value Date    NONHDLC 198 08/03/2019     Lab Results   Component Value Date    LDLCALC 153 (H) 08/03/2019       Imaging Studies:         Results for orders placed during the hospital encounter of 09/04/20    DXA bone density spine hip and pelvis    Impression  1  Low bone mass (osteopenia)  2   The 10 year risk of hip fracture is 3 3% with the 10 year risk of major osteoporotic fracture being 16% as calculated by the [facility]/WHO fracture risk assessment tool (FRAX)  3   The current NOF guidelines recommend treating patients with a T-score of -2 5 or less in the lumbar spine or hips, or in post-menopausal women and men over the age of 48 with low bone mass (osteopenia) and a FRAX 10 year risk score of >3% for hip  fracture and/or >20% for major osteoporotic fracture  4   The NOF recommends follow-up DXA in 1-2 years after initiating therapy for osteoporosis and every 2 years thereafter  More frequent evaluation is appropriate for patients with conditions associated with rapid bone loss, such as glucocorticoid  therapy  The interval between DXA screenings may be longer for individuals without major risk factors and initial T-score in the normal or upper low bone mass range  The FRAX algorithm has certain limitations:  -FRAX has not been validated in patients currently or previously treated with pharmacotherapy for osteoporosis  In such patients, clinical judgment must be exercised in interpreting FRAX scores    -Prior hip, vertebral and humeral fragility fractures appear to confer greater risk of subsequent fracture than fractures at other sites (this is especially true for individuals with severe vertebral fractures), but quantification of this incremental  risk is not possible with FRAX  -FRAX underestimates fracture risk in patients with history of multiple fragility fractures  -FRAX may underestimate fracture risk in patients with history of frequent falls   -It is not appropriate to use FRAX to monitor treatment response  WHO CLASSIFICATION:  Normal (a T-score of -1 0 or higher)  Low bone mineral density (a T-score of less than -1 0 but higher than -2 5)  Osteoporosis (a T-score of -2 5 or less)  Severe osteoporosis (a T-score of -2 5 or less with a fragility fracture)              Workstation performed: [id number]            I have personally reviewed pertinent reports  Portions of the record may have been created with voice recognition software  Occasional wrong word or "sound a like" substitutions may have occurred due to the inherent limitations of voice recognition software  Read the chart carefully and recognize, using context, where substitutions have occurred

## 2022-02-09 NOTE — ASSESSMENT & PLAN NOTE
History of Cushing's sp adrenalectomy and long-standing steroid replacement therapy  Currently stable off steroid replacement for at least 8 months  Last labs showed high normal cortisol with elevated ACTH   Will plan to repeat

## 2022-03-07 ENCOUNTER — OFFICE VISIT (OUTPATIENT)
Dept: FAMILY MEDICINE CLINIC | Facility: CLINIC | Age: 67
End: 2022-03-07
Payer: MEDICARE

## 2022-03-07 VITALS — BODY MASS INDEX: 29.83 KG/M2 | WEIGHT: 158 LBS | TEMPERATURE: 97.7 F | HEIGHT: 61 IN

## 2022-03-07 DIAGNOSIS — M65.331 TRIGGER FINGER, RIGHT MIDDLE FINGER: ICD-10-CM

## 2022-03-07 DIAGNOSIS — Z00.00 MEDICARE ANNUAL WELLNESS VISIT, SUBSEQUENT: Primary | ICD-10-CM

## 2022-03-07 DIAGNOSIS — E11.9 TYPE 2 DIABETES MELLITUS WITHOUT COMPLICATION, WITHOUT LONG-TERM CURRENT USE OF INSULIN (HCC): ICD-10-CM

## 2022-03-07 DIAGNOSIS — E78.2 MIXED HYPERLIPIDEMIA: ICD-10-CM

## 2022-03-07 PROCEDURE — 1123F ACP DISCUSS/DSCN MKR DOCD: CPT | Performed by: INTERNAL MEDICINE

## 2022-03-07 PROCEDURE — G0402 INITIAL PREVENTIVE EXAM: HCPCS | Performed by: INTERNAL MEDICINE

## 2022-03-07 NOTE — PATIENT INSTRUCTIONS

## 2022-03-07 NOTE — PROGRESS NOTES
Assessment and Plan:     Problem List Items Addressed This Visit        Musculoskeletal and Integument    Trigger finger, right middle finger    Relevant Orders    Ambulatory Referral to Orthopedic Surgery       Other    Medicare annual wellness visit, subsequent - Primary      Pt is following with Dr Ji Rossi and has labs ordered I added FLP for August labs   Lo carb diet and exercise encouraged  Eye exam scheduled and upcoming   Rto 6 months   BMI Counseling: Body mass index is 29 85 kg/m²  The BMI is above normal  Nutrition recommendations include consuming healthier snacks, moderation in carbohydrate intake and increasing intake of lean protein  Exercise recommendations include exercising 3-5 times per week  Rationale for BMI follow-up plan is due to patient being overweight or obese  Falls Plan of Care: balance, strength, and gait training instructions were provided  Preventive health issues were discussed with patient, and age appropriate screening tests were ordered as noted in patient's After Visit Summary  Personalized health advice and appropriate referrals for health education or preventive services given if needed, as noted in patient's After Visit Summary       History of Present Illness:     Patient presents for Medicare Annual Wellness visit    Patient Care Team:  Suman Candelaria DO as PCP - General Zita Irwin MD as PCP - [address] [place] [address] (RTE)  Suman Candelaria DO as PCP - PCP-[facility] (RTE)  Esther Deluca DO as PCP - Endocrinology (Endocrinology)  DO Edd Bloom MD as Endoscopist     Problem List:     Patient Active Problem List   Diagnosis    Hypertension    Type 2 diabetes mellitus with hyperglycemia ([facility] )    Tobacco abuse    Non-ST elevation myocardial infarction (NSTEMI), type 2    Vitamin D deficiency    Positive FIT (fecal immunochemical test)    Depression    DMII (diabetes mellitus, type 2) ([facility] )    Hyperlipidemia    Hypothyroidism    Anxiety    Medicare annual wellness visit, subsequent    Osteoporosis    History of adrenal insufficiency    Trigger finger, right middle finger      Past Medical and Surgical History:     Past Medical History:   Diagnosis Date    Ad[facility]al insufficiency ([facility] )     Anxiety     Bacterial sepsis ([facility] )     last assessed: 06/13/16    Cushing's syndrome ([facility] )     last assessed: 05/29/16    Depression     last assessed: 01/02/18    Diabetes mellitus ([place] [address] )     Hyperlipidemia     Hypertension      Past Surgical History:   Procedure Laterality Date    ADRENALECTOMY  12/2002    CHOLECYSTECTOMY      HERNIA REPAIR      umbilical    OTHER SURGICAL HISTORY      Injection of trigger joint    HI COLONOSCOPY FLX DX W/COLLJ SPEC WHEN PFRMD N/A 7/10/2018    Procedure: COLONOSCOPY;  Surgeon: Juan Carlos Isabel MD;  Location: [facility];  Service: Gastroenterology    TONSILLECTOMY AND ADENOIDECTOMY      TUBAL LIGATION        Family History:     Family History   Problem Relation Age of Onset    Breast cancer Mother     No Known Problems Sister     No Known Problems Daughter     No Known Problems Daughter     No Known Problems Paternal Aunt     No Known Problems Paternal Aunt     No Known Problems Paternal Aunt       Social History:     Social History     Socioeconomic History    Marital status:       Spouse name: None    Number of children: None    Years of education: None    Highest education level: None   Occupational History    None   Tobacco Use    Smoking status: Current Every Day Smoker     Packs/day: 0 50     Years: 50 00     Pack years: 25 00    Smokeless tobacco: Never Used   Vaping Use    Vaping Use: Never used   Substance and Sexual Activity    Alcohol use: No    Drug use: No    Sexual activity: None   Other Topics Concern    None   Social History Narrative    Always uses seat belt    Always uses sunscreen    Caffeine use    Dental care, regularly    No living will    Uses safety equipment - seatbelts     Social Determinants of Health     Financial Resource Strain: Not on file   Food Insecurity: Not on file   Transportation Needs: Not on file   Physical Activity: Not on file   Stress: Not on file   Social Connections: Not on file   Intimate Partner Violence: Not on file   Housing Stability: Not on file      Medications and Allergies:     Current Outpatient Medications   Medication Sig Dispense Refill    CALCIUM PO Take by mouth Pt not sure of dose        glimepiride (AMARYL) 4 mg tablet Take 1 tablet by mouth twice daily 180 tablet 0    glucose blood (KEITH CONTOUR NEXT TEST) test strip by In Vitro route 2 (two) times a day      lisinopril (ZESTRIL) 10 mg tablet Take 1 tablet by mouth once daily 90 tablet 3    metFORMIN (GLUCOPHAGE) 500 mg tablet Take 1 tablet by mouth once daily 90 tablet 3    pravastatin (PRAVACHOL) 80 mg tablet Take 1 tablet (80 mg total) by mouth daily 90 tablet 3    sertraline (ZOLOFT) 100 mg tablet TAKE 1 TABLET BY MOUTH ONCE DAILY AS DIRECTED 90 tablet 3    Vitamin D, Cholecalciferol, 25 MCG (1000 UT) TABS Take 3,000 Units by mouth daily Take 2 capsules (2,000 iu) daily  No current facility-administered medications for this visit       Allergies   Allergen Reactions    Fosamax [Alendronate] Diarrhea    Paxil [Paroxetine Hcl] Rash      Immunizations:     Immunization History   Administered Date(s) Administered    COVID-19 MODERNA VACC 0 5 ML IM 03/17/2021, 04/15/2021    Influenza Quadrivalent, 6-35 Months IM 09/13/2016    Pneumococcal Conjugate 13-Valent 01/27/2021    Pneumococcal Conjugate PCV 7 05/19/2016      Health Maintenance:         Topic Date Due    HIV Screening  Never done    Cervical Cancer Screening  Never done    Lung Cancer Screening  Never done    Breast Cancer Screening: Mammogram  09/04/2021    Colorectal Cancer Screening  10/10/2024    Hepatitis C Screening  Completed         Topic Date Due    DTaP,Tdap,and Td Vaccines (1 - Tdap) Never done    Pneumococcal Vaccine: 65+ Years (2 of 4 - PPSV23) 03/24/2021    Influenza Vaccine (1) 09/01/2021    COVID-19 Vaccine (3 - Booster for Moderna series) 09/15/2021      Medicare Health Risk Assessment:     Temp 97 7 °F (36 5 °C) (Temporal)   Ht 5' 1" (1 549 m)   Wt 71 7 kg (158 lb)   BMI 29 85 kg/m²      Beverly Salas is here for her Subsequent Wellness visit  Last Medicare Wellness visit information reviewed, patient interviewed, no change since last AWV  Health Risk Assessment:   Patient rates overall health as very good  Patient feels that their physical health rating is slightly better  Patient is very satisfied with their life  Eyesight was rated as same  Hearing was rated as same  Patient feels that their emotional and mental health rating is much better  Patients states they are never, rarely angry  Patient states they are sometimes unusually tired/fatigued  Pain experienced in the last 7 days has been some  Patient's pain rating has been 3/10  Patient states that she has experienced no weight loss or gain in last 6 months  Depression Screening:   PHQ-9 Score: 0      Fall Risk Screening: In the past year, patient has experienced: no history of falling in past year      Urinary Incontinence Screening:   Patient has not leaked urine accidently in the last six months  Home Safety:  Patient does not have trouble with stairs inside or outside of their home  Patient has working smoke alarms and has working carbon monoxide detector  Home safety hazards include: none  Nutrition:   Current diet is Low Carb  Medications:   Patient is currently taking over-the-counter supplements  OTC medications include: see medication list  Patient is able to manage medications       Activities of Daily Living (ADLs)/Instrumental Activities of Daily Living (IADLs):   Walk and transfer into and out of bed and chair?: Yes  Dress and groom yourself?: Yes    Bathe or shower yourself?: Yes    Feed yourself? Yes  Do your laundry/housekeeping?: Yes  Manage your money, pay your bills and track your expenses?: Yes  Make your own meals?: Yes    Do your own shopping?: Yes    Previous Hospitalizations:   Any hospitalizations or ED visits within the last 12 months?: No      Advance Care Planning:   Living will: Yes    Durable POA for healthcare: Yes    Advanced directive: Yes    End of Life Decisions reviewed with patient: Yes    Provider agrees with end of life decisions: Yes      Comments: Requested copy for chart    Cognitive Screening:   Provider or family/friend/caregiver concerned regarding cognition?: No    PREVENTIVE SCREENINGS      Cardiovascular Screening:    General: History Lipid Disorder and Screening Current      Diabetes Screening:     General: Screening Not Indicated, History Diabetes and Screening Current      Colorectal Cancer Screening:     General: Screening Current      Breast Cancer Screening:     General: Screening Current      Cervical Cancer Screening:    General: Screening Not Indicated      Osteoporosis Screening:    General: Screening Not Indicated and History Osteoporosis      Lung Cancer Screening:     General: Screening Not Indicated      Hepatitis C Screening:    General: Screening Current    Screening, Brief Intervention, and Referral to Treatment (SBIRT)    Screening  Typical number of drinks in a day: 0  Typical number of drinks in a week: 0  Interpretation: Low risk drinking behavior      AUDIT-C Screenin) How often did you have a drink containing alcohol in the past year? never  2) How many drinks did you have on a typical day when you were drinking in the past year? 0  3) How often did you have 6 or more drinks on one occasion in the past year? never    AUDIT-C Score: 0  Interpretation: Score 0-2 (female): Negative screen for alcohol misuse    Single Item Drug Screening:  How often have you used an illegal drug (including marijuana) or a prescription medication for non-medical reasons in the past year? never    Single Item Drug Screen Score: 0  Interpretation: Negative screen for possible drug use disorder      Ochoa Cristobal, DO anterior cervical R/anterior cervical L

## 2022-03-07 NOTE — PROGRESS NOTES
Diabetic Foot Exam    Patient's shoes and socks removed  Right Foot/Ankle   Right Foot Inspection  Skin Exam: skin normal, skin intact and dry skin  No warmth, no callus, no erythema, no maceration, no abnormal color, no pre-ulcer, no ulcer and no callus  Toe Exam: ROM and strength within normal limits  Sensory   Vibration: intact  Monofilament testing: diminished    Vascular  The right DP pulse is 2+  The right PT pulse is 1+  Left Foot/Ankle  Left Foot Inspection  Skin Exam: skin normal, skin intact and dry skin  No warmth, no erythema, no maceration, normal color, no pre-ulcer, no ulcer and no callus  Toe Exam: ROM and strength within normal limits  Sensory   Vibration: intact  Monofilament testing: diminished    Vascular  The left DP pulse is 2+  The left PT pulse is 1+       Assign Risk Category  No deformity present  Loss of protective sensation  No weak pulses  Risk: 1

## 2022-04-12 ENCOUNTER — EVALUATION (OUTPATIENT)
Dept: PHYSICAL THERAPY | Facility: CLINIC | Age: 67
End: 2022-04-12
Payer: MEDICARE

## 2022-04-12 DIAGNOSIS — M65.331 TRIGGER FINGER, RIGHT MIDDLE FINGER: Primary | ICD-10-CM

## 2022-04-12 PROCEDURE — 97110 THERAPEUTIC EXERCISES: CPT | Performed by: PHYSICAL THERAPIST

## 2022-04-12 PROCEDURE — 97140 MANUAL THERAPY 1/> REGIONS: CPT | Performed by: PHYSICAL THERAPIST

## 2022-04-12 PROCEDURE — 97161 PT EVAL LOW COMPLEX 20 MIN: CPT | Performed by: PHYSICAL THERAPIST

## 2022-04-12 PROCEDURE — 97035 APP MDLTY 1+ULTRASOUND EA 15: CPT | Performed by: PHYSICAL THERAPIST

## 2022-04-12 PROCEDURE — 97112 NEUROMUSCULAR REEDUCATION: CPT | Performed by: PHYSICAL THERAPIST

## 2022-04-12 NOTE — LETTER
2022    MIGDALIA Silva    Patient: Marcel Lefort   YOB: 1955   Date of Visit: 2022     Encounter Diagnosis     ICD-10-CM    1  Trigger finger, right middle finger  M65 331        Dear Dr Cuca Valle: Thank you for your recent referral of Marcel Lefort  Please review the attached evaluation summary from Nidia's recent visit  Please verify that you agree with the plan of care by signing the attached order  If you have any questions or concerns, please do not hesitate to call  I sincerely appreciate the opportunity to share in the care of one of your patients and hope to have another opportunity to work with you in the near future  Sincerely,    Ashley Nguyen, ALEXT, CHT    Referring Provider:      I certify that I have read the below Plan of Care and certify the need for these services furnished under this plan of treatment while under my care  MIGDALIA Silva  Via Fax: [phone]          PT Evaluation     Today's date: 2022  Patient name: Marcel Lefort  : 1955  MRN: [id number]  Referring provider: Lauryn Rouse PA-C  Dx:   Encounter Diagnosis     ICD-10-CM    1  Trigger finger, right middle finger  M65 331                   Assessment  Assessment details: Pt is a 72 YO female/male presenting to PT with pain, decreased AROM, strength and tolerance to activity  Pt would benefit from skilled intervention to address these issues and maximize overall function  Occupation- retired  Dominant- right; Involved- right      Goals  ST  Decrease pain to 0-2/10 in 4 weeks            2  Decrease swelling right hand and fingers            3  Increase AROM to [facility] in 6-8 weeks            4  Maintain clean wound and promote healing            5   Provide orthotic for protection, compression for support  LT    Increase functional motion and strength for independence with ADL and self care by DC            2   Ability to RT recreational activity by DC    Plan  Patient would benefit from: skilled physical therapy  Planned modality interventions: cryotherapy, thermotherapy: hydrocollator packs and ultrasound  Planned therapy interventions: activity modification, manual therapy, neuromuscular re-education, strengthening, stretching, therapeutic activities, therapeutic exercise and home exercise program  Frequency: 2x week  Duration in weeks: 4  Treatment plan discussed with: patient        Subjective Evaluation    History of Present Illness  Date of surgery: 2022  Mechanism of injury: Progressive decrease in motion with triggering and locking of Right MF approximately 9 months  Pain  Current pain rating: 3  At best pain rating: 3  At worst pain ratin  Location: right palm and MF  Quality: dull ache and throbbing    Hand dominance: right    Treatments  Current treatment: physical therapy  Patient Goals  Patient goals for therapy: decreased edema, decreased pain, increased motion, increased strength, independence with ADLs/IADLs and return to sport/leisure activities          Objective     General Comments:      Wrist/Hand Comments  AROM right wrist E/F- 55/75                      MF MP- 30/45; PIP- 15/40; DIP- 0/35  Strength- un-assessed  Inspection- sutures at MF A1, clean and dry  Sensation- no tingling noted  Circumference at MPs- 18 2 cm; MF P1- 6 6 cm; RF P1- 6 1 cm             Precautions: avoid strong and aggressive grasp with right hand for 3-4 weeks after surgery      Manuals             STM 15                         isotoner sm lt                         Neuro Re-Ed             HP/pulsed biph 15                                                                                          Ther Ex             TGE 5x Modalities             US 12            CP 15

## 2022-04-12 NOTE — PROGRESS NOTES
PT Evaluation     Today's date: 2022  Patient name: Bishop Libman  : 1955  MRN: [id number]  Referring provider: Belén Coppola PA-C  Dx:   Encounter Diagnosis     ICD-10-CM    1  Trigger finger, right middle finger  M65 331                   Assessment  Assessment details: Pt is a 74 YO female/male presenting to PT with pain, decreased AROM, strength and tolerance to activity  Pt would benefit from skilled intervention to address these issues and maximize overall function  Occupation- retired  Dominant- right; Involved- right      Goals  ST  Decrease pain to 0-2/10 in 4 weeks            2  Decrease swelling right hand and fingers            3  Increase [facility]OM to [facility] in 6-8 weeks            4  Maintain clean wound and promote healing            5   Provide orthotic for protection, compression for support  LT  Increase functional motion and strength for independence with ADL and self care by DC            2   Ability to RT recreational activity by DC    Plan  Patient would benefit from: skilled physical therapy  Planned modality interventions: cryotherapy, thermotherapy: hydrocollator packs and ultrasound  Planned therapy interventions: activity modification, manual therapy, neuromuscular re-education, strengthening, stretching, therapeutic activities, therapeutic exercise and home exercise program  Frequency: 2x week  Duration in weeks: 4  Treatment plan discussed with: patient        Subjective Evaluation    History of Present Illness  Date of surgery: 2022  Mechanism of injury: Progressive decrease in motion with triggering and locking of Right MF approximately 9 months  Pain  Current pain rating: 3  At best pain rating: 3  At worst pain ratin  Location: right palm and MF  Quality: dull ache and throbbing    Hand dominance: right    Treatments  Current treatment: physical therapy  Patient Goals  Patient goals for therapy: decreased edema, decreased pain, increased motion, increased strength, independence with ADLs/IADLs and return to sport/leisure activities          Objective     General Comments:      Wrist/Hand Comments  AROM right wrist E/F- 55/75                      MF MP- 30/45; PIP- 15/40; DIP- 0/35  Strength- un-assessed  Inspection- sutures at MF A1, clean and dry  Sensation- no tingling noted  Circumference at MPs- 18 2 cm; MF P1- 6 6 cm; RF P1- 6 1 cm             Precautions: avoid strong and aggressive grasp with right hand for 3-4 weeks after surgery      Manuals 4/12            STM 15                         isotoner sm lt                         Neuro Re-Ed             HP/pulsed biph 15                                                                                          Ther Ex             TGE 5x                                                                                                                                                                                     Modalities             US 12            CP 15

## 2022-04-14 ENCOUNTER — OFFICE VISIT (OUTPATIENT)
Dept: PHYSICAL THERAPY | Facility: CLINIC | Age: 67
End: 2022-04-14
Payer: MEDICARE

## 2022-04-14 DIAGNOSIS — M65.331 TRIGGER FINGER, RIGHT MIDDLE FINGER: Primary | ICD-10-CM

## 2022-04-14 PROCEDURE — 97035 APP MDLTY 1+ULTRASOUND EA 15: CPT

## 2022-04-14 PROCEDURE — 97112 NEUROMUSCULAR REEDUCATION: CPT

## 2022-04-14 PROCEDURE — 97140 MANUAL THERAPY 1/> REGIONS: CPT

## 2022-04-14 PROCEDURE — 97110 THERAPEUTIC EXERCISES: CPT

## 2022-04-14 NOTE — PROGRESS NOTES
Daily Note     Today's date: 2022  Patient name: Bishop Libman  : 1955  MRN: [id number]  Referring provider: Belén Coppola PA-C  Dx:   Encounter Diagnosis     ICD-10-CM    1  Trigger finger, right middle finger  M65 331                   Subjective: Pt reports her LF is achy on the top "       Objective: See treatment diary below    General Comments:      Wrist/Hand Comments  AROM right wrist E/F- 55/75                      MF MP- 30/45; PIP- 15/40; DIP- 0/35  Strength- un-assessed  Inspection- sutures at MF A1, clean and dry  Sensation- no tingling noted  Circumference at MPs- 18 2 cm; MF P1- 6 6 cm; RF P1- 6 1 cm      Assessment: Tolerated treatment well today  Compliant with program  Continue with current program while monitoring symptoms  M/D appt next Monday  Plan: Progress treatment as tolerated         Precautions: avoid strong and aggressive grasp with right hand for 3-4 weeks after surgery      Manuals            STM 15 15                        isotoner sm lt                         Neuro Re-Ed             HP/pulsed biph 15 15                                                                                         Ther Ex             TGE 5x 5x                                                                                                                                                                                    Modalities             US 12 12           CP 15 15

## 2022-04-19 ENCOUNTER — OFFICE VISIT (OUTPATIENT)
Dept: PHYSICAL THERAPY | Facility: CLINIC | Age: 67
End: 2022-04-19
Payer: MEDICARE

## 2022-04-19 DIAGNOSIS — M65.331 TRIGGER FINGER, RIGHT MIDDLE FINGER: Primary | ICD-10-CM

## 2022-04-19 PROCEDURE — 97110 THERAPEUTIC EXERCISES: CPT | Performed by: PHYSICAL THERAPIST

## 2022-04-19 PROCEDURE — 97140 MANUAL THERAPY 1/> REGIONS: CPT | Performed by: PHYSICAL THERAPIST

## 2022-04-19 PROCEDURE — 97112 NEUROMUSCULAR REEDUCATION: CPT | Performed by: PHYSICAL THERAPIST

## 2022-04-19 PROCEDURE — 97035 APP MDLTY 1+ULTRASOUND EA 15: CPT | Performed by: PHYSICAL THERAPIST

## 2022-04-19 NOTE — PROGRESS NOTES
Daily Note     Today's date: 2022  Patient name: Otf Weiss  : 1955  MRN: [id number]  Referring provider: Steven Bueno PA-C  Dx:   Encounter Diagnosis     ICD-10-CM    1  Trigger finger, right middle finger  M65 331                   Subjective: pt was seen by her physician with sutures removed  Objective: See treatment diary below    AROM right wrist E/F- 55/75                      MF MP- 10/55; PIP- 15/50; DIP- 0/35  Strength- un-assessed  Inspection- sutures removed  Dry incision  Sensation- no tingling noted  Circumference at MPs- 18 2 cm; MF P1- 6 6 cm; RF P1- 6 1 cm     Assessment: Tolerated treatment well  Patient would benefit from continued PT      Plan: Continue per plan of care        Precautions: avoid strong and aggressive grasp with right hand for 3-4 weeks after surgery      Manuals           STM 15 15 15          Coban/kenesio   MAG          isotoner sm lt                         Neuro Re-Ed             HP/pulsed biph 15 15 15                                                                                        Ther Ex             TGE 5x 5x 5x                                                                                                                                                                                   Modalities             US 12 12 12          CP 15 15 15

## 2022-04-21 ENCOUNTER — OFFICE VISIT (OUTPATIENT)
Dept: PHYSICAL THERAPY | Facility: CLINIC | Age: 67
End: 2022-04-21
Payer: MEDICARE

## 2022-04-21 DIAGNOSIS — M65.331 TRIGGER FINGER, RIGHT MIDDLE FINGER: Primary | ICD-10-CM

## 2022-04-21 PROCEDURE — 97112 NEUROMUSCULAR REEDUCATION: CPT

## 2022-04-21 PROCEDURE — 97535 SELF CARE MNGMENT TRAINING: CPT

## 2022-04-21 PROCEDURE — 97140 MANUAL THERAPY 1/> REGIONS: CPT

## 2022-04-21 PROCEDURE — 97110 THERAPEUTIC EXERCISES: CPT

## 2022-04-21 PROCEDURE — 97035 APP MDLTY 1+ULTRASOUND EA 15: CPT

## 2022-04-21 NOTE — PROGRESS NOTES
Daily Note     Today's date: 2022  Patient name: Basia Franks  : 1955  MRN: [id number]  Referring provider: Jhoana Leyva PA-C  Dx:   Encounter Diagnosis     ICD-10-CM    1  Trigger finger, right middle finger  M65 331                   Subjective: Pt reports her finger feels pretty good, "the tape and coban helped"      Objective: See treatment diary below    Objective: See treatment diary below    AROM right wrist E/F- 55/75                      MF MP- 0/85; PIP- 0/80; DIP- 0/45  Strength-   Inspection- sutures removed  Dry incision  Sensation- no tingling noted  Circumference at MPs- 17 9 cm; MF P1- 6 0 cm; RF P1- 5 4 cm    Assessment: Tolerated treatment well today  Pt initiated with theraputty today for function increase     Plan: Continue with current program     Precautions: avoid strong and aggressive grasp with right hand for 3-4 weeks after surgery      Manuals          STM 15 15 15 15         Coban/kenesio   MAG TS         isotoner sm lt                         Neuro Re-Ed             HP/pulsed biph 15 15 15 15                                                                                       Ther Ex             TGE 5x 5x 5x 5x         Theraputty     T2'                   5 finger    T 2/10                                                                                                                                                        Modalities             US 12 12 12 12         CP 15 15 15 15

## 2022-04-26 ENCOUNTER — OFFICE VISIT (OUTPATIENT)
Dept: PHYSICAL THERAPY | Facility: CLINIC | Age: 67
End: 2022-04-26
Payer: MEDICARE

## 2022-04-26 DIAGNOSIS — M65.331 TRIGGER FINGER, RIGHT MIDDLE FINGER: Primary | ICD-10-CM

## 2022-04-26 PROCEDURE — 97140 MANUAL THERAPY 1/> REGIONS: CPT

## 2022-04-26 PROCEDURE — 97110 THERAPEUTIC EXERCISES: CPT

## 2022-04-26 PROCEDURE — 97535 SELF CARE MNGMENT TRAINING: CPT

## 2022-04-26 PROCEDURE — 97112 NEUROMUSCULAR REEDUCATION: CPT

## 2022-04-26 PROCEDURE — 97035 APP MDLTY 1+ULTRASOUND EA 15: CPT

## 2022-04-26 NOTE — PROGRESS NOTES
Daily Note     Today's date: 2022  Patient name: Lisa Atkins  : 1955  MRN: [id number]  Referring provider: Tab Miller PA-C  Dx:   Encounter Diagnosis     ICD-10-CM    1  Trigger finger, right middle finger  M65 331                   Subjective: Patient reports that her finger has improvement with pain and ROM  Objective: See treatment diary below  AROM right wrist E/F- 55/                      MF MP- 10; PIP- 15; DIP- 0/35  Strength- un-assessed  Inspection- sutures removed  Dry incision  Sensation- no tingling noted  Circumference at MPs- 18 2 cm; MF P1- 6 6 cm; RF P1- 6 1 cm    Assessment: Tolerated treatment well  Patient demonstrated fatigue post treatment, exhibited good technique with therapeutic exercises and would benefit from continued PT to improve strength and symptoms  She had no issue with putty today  Plan: Continue per plan of care        Precautions: avoid strong and aggressive grasp with right hand for 3-4 weeks after surgery      Manuals         STM 15 15 15 15 15        Coban/kenesio   MAG TS CM        isotoner sm lt                         Neuro Re-Ed             HP/pulsed biph 15 15 15 15 15                                                                                      Ther Ex             TGE 5x 5x 5x 5x 5x        Theraputty     T2' T 2'                  5 finger    T 2/10 T 2/10                                                                                                                                                       Modalities             US 12 12 12 12 12        CP 15 15 15 15 15

## 2022-04-28 ENCOUNTER — OFFICE VISIT (OUTPATIENT)
Dept: PHYSICAL THERAPY | Facility: CLINIC | Age: 67
End: 2022-04-28
Payer: MEDICARE

## 2022-04-28 DIAGNOSIS — M65.331 TRIGGER FINGER, RIGHT MIDDLE FINGER: Primary | ICD-10-CM

## 2022-04-28 PROCEDURE — 97035 APP MDLTY 1+ULTRASOUND EA 15: CPT

## 2022-04-28 PROCEDURE — 97535 SELF CARE MNGMENT TRAINING: CPT

## 2022-04-28 PROCEDURE — 97140 MANUAL THERAPY 1/> REGIONS: CPT

## 2022-04-28 PROCEDURE — 97112 NEUROMUSCULAR REEDUCATION: CPT

## 2022-04-28 PROCEDURE — 97110 THERAPEUTIC EXERCISES: CPT

## 2022-04-28 NOTE — PROGRESS NOTES
Daily Note     Today's date: 2022  Patient name: Karthik Sommer  : 1955  MRN: [id number]  Referring provider: Yash Vega PA-C  Dx:   Encounter Diagnosis     ICD-10-CM    1  Trigger finger, right middle finger  M65 331                   Subjective: Pt reports her finger feels pretty good  "It feels great to be able to bend and straighten my fingers "      Objective: See treatment diary below  Objective: See treatment diary below  AROM right wrist E/F-                       MF MP- 10/55; PIP- 15/50; DIP- 0  Strength- un-assessed  Inspection- sutures removed  Dry incision  Sensation- no tingling noted  Circumference at MPs- 18 2 cm; MF P1- 6 6 cm; RF P1- 6 1 cm      Assessment: Tolerated treatment well today  Pt able to complete program which included initial  and pinch activities  Plan: Progress treatment as tolerated         Precautions: avoid strong and aggressive grasp with right hand for 3-4 weeks after surgery      Manuals        STM 15 15 15 15 15 15       Coban/kenesio   MAG TS CM TS HEP       isotoner sm lt                         Neuro Re-Ed             HP/pulsed biph 15 15 15 15 15 15                                                                                     Ther Ex             TGE 5x 5x 5x 5x 5x 5x       Theraputty     T2' T 2' T                 5 finger    T 2/10 T 2/10 T 2/10       Blue      II 2/10       Digiflex      Y 2/10                                                                                                                            Modalities             US 12 12 12 12 12 12       CP 15 15 15 15 15 15

## 2022-05-03 ENCOUNTER — OFFICE VISIT (OUTPATIENT)
Dept: PHYSICAL THERAPY | Facility: CLINIC | Age: 67
End: 2022-05-03
Payer: COMMERCIAL

## 2022-05-03 DIAGNOSIS — M65.331 TRIGGER FINGER, RIGHT MIDDLE FINGER: Primary | ICD-10-CM

## 2022-05-03 PROCEDURE — 97112 NEUROMUSCULAR REEDUCATION: CPT

## 2022-05-03 PROCEDURE — 97035 APP MDLTY 1+ULTRASOUND EA 15: CPT

## 2022-05-03 PROCEDURE — 97110 THERAPEUTIC EXERCISES: CPT

## 2022-05-03 PROCEDURE — 97140 MANUAL THERAPY 1/> REGIONS: CPT

## 2022-05-03 NOTE — PROGRESS NOTES
Daily Note     Today's date: 5/3/2022  Patient name: Ricki Gaston  : 1955  MRN: [id number]  Referring provider: Perla Nix PA-C  Dx:   Encounter Diagnosis     ICD-10-CM    1  Trigger finger, right middle finger  M65 331                   Subjective: Pt reports her finger is improving but still tends to "burn at times  Pt educated on possible reasons for that symptom "        Objective: See treatment diary below  AROM right wrist E/F-                       MF MP- 0; PIP- 0; DIP- 060  Strength- 35/50 3jc  key   Inspection- sutures removed  Dry incision  Sensation- no tingling noted  Circumference at MPs- 18 2 cm; MF P1- 6 0 cm; RF P1- 4 5 cm    Assessment: Tolerated treatment well today  Flexbar added today and pt felt slight symptom increase  Pt instructed for modification  Plan: Progress treatment as tolerated         Precautions: avoid strong and aggressive grasp with right hand for 3-4 weeks after surgery      Manuals       STM 15 15 15 15 15 15 15      Coban/kenesio   MAG TS CM TS HEP TS HEP      isotoner sm lt                         Neuro Re-Ed             HP/pulsed biph 15 15 15 15 15 15 15                                                                                    Ther Ex             TGE 5x 5x 5x 5x 5x 5x 5x      Theraputty     T2' T 2' T T 2'                5 finger    T 2/10 T 2/10 T 2/10 T2/10      Blue      II 2/10 III 2/10      Digiflex      Y 2/10 Y 2/10      Flexbar       Y 2/10                                                                                                              Modalities             US 12 12 12 12 12 12 12      CP 15 15 15 15 15 15 15

## 2022-05-03 NOTE — LETTER
May 3, 2022    MIGDALIA Raymond    Patient: Tere Treviño   YOB: 1955   Date of Visit: 5/3/2022     Encounter Diagnosis     ICD-10-CM    1  Trigger finger, right middle finger  M65 331        Dear Dr Jenelle Lambert: Thank you for your recent referral of Tere Treviño  Please review the attached evaluation summary from Nidia's recent visit  Please verify that you agree with the plan of care by signing the attached order  If you have any questions or concerns, please do not hesitate to call  I sincerely appreciate the opportunity to share in the care of one of your patients and hope to have another opportunity to work with you in the near future  Sincerely,    Marija Colon, ALEXT, CHT    Referring Provider:      I certify that I have read the below Plan of Care and certify the need for these services furnished under this plan of treatment while under my care  MIGDALIA Raymond  Via Fax: [phone]          Daily Note     Today's date: 5/3/2022  Patient name: Tere Treviño  : 1955  MRN: [id number]  Referring provider: Ara Briggs PA-C  Dx:   Encounter Diagnosis     ICD-10-CM    1  Trigger finger, right middle finger  M65 331                   Subjective: Pt reports her finger is improving but still tends to "burn at times  Pt educated on possible reasons for that symptom "        Objective: See treatment diary below  AROM right wrist E/F- 55/75                      MF MP- 0/90; PIP- 0/90; DIP- 0/60  Strength- 35/50 3jc 8 key   Inspection- sutures removed  Dry incision  Sensation- no tingling noted  Circumference at MPs- 18 2 cm; MF P1- 6 0 cm; RF P1- 4 5 cm    Assessment: Tolerated treatment well today  Flexbar added today and pt felt slight symptom increase  Pt instructed for modification  Plan: Progress treatment as tolerated         Precautions: avoid strong and aggressive grasp with right hand for 3-4 weeks after surgery      Manuals       STM 15 15 15 15 15 15 15      Coban/kenesio   MAG TS CM TS HEP TS HEP      isotoner sm lt                         Neuro Re-Ed             HP/pulsed biph 15 15 15 15 15 15 15                                                                                    Ther Ex             TGE 5x 5x 5x 5x 5x 5x 5x      Theraputty     T2' T 2' T T 2'                5 finger    T 2/10 T 2/10 T 2/10 T2/10      Blue      II 2/10 III 2/10      Digiflex      Y 2/10 Y 2/10      Flexbar       Y 2/10                                                                                                              Modalities             US 12 12 12 12 12 12 12      CP 15 15 15 15 15 15 15             PT Re-Evaluation     Today's date: 5/3/2022  Patient name: Fransisco Hernandez  : 1955  MRN: [id number]  Referring provider: Christy Akhtar PA-C  Dx:   Encounter Diagnosis     ICD-10-CM    1  Trigger finger, right middle finger  M65 331        Start Time: 1000  Stop Time: 1115  Total time in clinic (min): 75 minutes    Assessment  Assessment details: Pt is a 74 YO female/male presenting to PT with pain, decreased AROM, strength and tolerance to activity  Pt would benefit from skilled intervention to address these issues and maximize overall function  Occupation- retired  Dominant- right; Involved- right  Pt is progressing with AROM and functional strength  She cont to have thick scar along the palmar A1 area, as well as decreased functional strength and swelling that creates decreased AROM  Goals  ST  Decrease pain to 0-2/10 in 4 weeks            2  Decrease swelling right hand and fingers            3  Increase AROM to [facility] in 6-8 weeks            4  Maintain clean wound and promote healing            5   Provide orthotic for protection, compression for support  LT    Increase functional motion and strength for independence with ADL and self care by DC            2  Ability to RT recreational activity by DC    Plan  Patient would benefit from: skilled physical therapy  Planned modality interventions: cryotherapy, thermotherapy: hydrocollator packs and ultrasound  Planned therapy interventions: activity modification, manual therapy, neuromuscular re-education, strengthening, stretching, therapeutic activities, therapeutic exercise and home exercise program  Frequency: 2x week  Duration in weeks: 4  Treatment plan discussed with: patient        Subjective Evaluation    History of Present Illness  Date of surgery: 2022  Mechanism of injury: Progressive decrease in motion with triggering and locking of Right MF approximately 9 months  Pain  Current pain ratin  At best pain ratin  At worst pain ratin  Location: right palm and MF  Quality: dull ache and throbbing    Hand dominance: right    Treatments  Current treatment: physical therapy  Patient Goals  Patient goals for therapy: decreased edema, decreased pain, increased motion, increased strength, independence with ADLs/IADLs and return to sport/leisure activities          Objective     General Comments:      Wrist/Hand Comments  AROM right wrist E/F- 55/75                      MF MP- 0/90; PIP- 0/90; DIP- 0/60  Strength-  II- 35/50; 3 BRENDAN- 6/8; Key- 8/8  Inspection- thickened scar at A1 area  Sensation- no tingling noted  Circumference at MPs- 18 2 cm; MF P1- 6 0 cm; RF P1- 4 5 cm                Precautions: avoid strong and aggressive grasp with right hand      Manuals  5/3         STM 15 15 15 15 15 15 15         Coban/kenvenkato     MAG TS CM TS HEP TS HEP         isotoner sm lt                                             Neuro Re-Ed                       HP/pulsed biph 15 15 15 15 15 15 15                                                                                                                                                         Ther Ex                       TGE 5x 5x 5x 5x 5x 5x 5x         Theraputty        T2' T 2' T T 2'                   5 finger       T 2/10 T 2/10 T 2/10 T2/10         Blue           II 2/10 III 2/10         Digiflex           Y 2/10 Y 2/10         Flexbar             Y 2/10                                                                                                                                                                                                         Modalities                       US 12 12 12 12 12 12 12         CP 15 15 15 15 15 15 15

## 2022-05-03 NOTE — PROGRESS NOTES
PT Re-Evaluation     Today's date: 5/3/2022  Patient name: Ruby Love  : 1955  MRN: [id number]  Referring provider: Corey Still PA-C  Dx:   Encounter Diagnosis     ICD-10-CM    1  Trigger finger, right middle finger  M65 331        Start Time: 1000  Stop Time: 1115  Total time in clinic (min): 75 minutes    Assessment  Assessment details: Pt is a 74 YO female/male presenting to PT with pain, decreased AROM, strength and tolerance to activity  Pt would benefit from skilled intervention to address these issues and maximize overall function  Occupation- retired  Dominant- right; Involved- right  Pt is progressing with AROM and functional strength  She cont to have thick scar along the palmar A1 area, as well as decreased functional strength and swelling that creates decreased AROM  Goals  ST  Decrease pain to 0-2/10 in 4 weeks            2  Decrease swelling right hand and fingers            3  Increase AROM to [facility] in 6-8 weeks            4  Maintain clean wound and promote healing            5   Provide orthotic for protection, compression for support  LT  Increase functional motion and strength for independence with ADL and self care by DC            2   Ability to RT recreational activity by DC    Plan  Patient would benefit from: skilled physical therapy  Planned modality interventions: cryotherapy, thermotherapy: hydrocollator packs and ultrasound  Planned therapy interventions: activity modification, manual therapy, neuromuscular re-education, strengthening, stretching, therapeutic activities, therapeutic exercise and home exercise program  Frequency: 2x week  Duration in weeks: 4  Treatment plan discussed with: patient        Subjective Evaluation    History of Present Illness  Date of surgery: 2022  Mechanism of injury: Progressive decrease in motion with triggering and locking of Right MF approximately 9 months  Pain  Current pain ratin  At best pain ratin  At worst pain ratin  Location: right palm and MF  Quality: dull ache and throbbing    Hand dominance: right    Treatments  Current treatment: physical therapy  Patient Goals  Patient goals for therapy: decreased edema, decreased pain, increased motion, increased strength, independence with ADLs/IADLs and return to sport/leisure activities          Objective     General Comments:      Wrist/Hand Comments  AROM right wrist E/F- 55/75                      MF MP- 0/90; PIP- 0/90; DIP- 0/60  Strength-  II- 35/50; 3 BRENDAN- ; Key-   Inspection- thickened scar at A1 area  Sensation- no tingling noted  Circumference at MPs- 18 2 cm; MF P1- 6 0 cm; RF P1- 4 5 cm                Precautions: avoid strong and aggressive grasp with right hand      Manuals  5/3         STM 15 15 15 15 15 15 15         Coban/kenesio     MAG TS CM TS HEP TS HEP         isotoner sm lt                                             Neuro Re-Ed                       HP/pulsed biph 15 15 15 15 15 15 15                                                                                                                                                         Ther Ex                       TGE 5x 5x 5x 5x 5x 5x 5x         Theraputty        T2' T 2' T T 2'                   5 finger       T 2/10 T 2/10 T 2/10 T2/10         Blue           II 2/10 III 2/10         Digiflex           Y 2/10 Y 2/10         Flexbar             Y 2/10                                                                                                                                                                                                         Modalities                       US 12 12 12 12 12 12 12         CP 15 15 15 15 15 15 15

## 2022-05-05 ENCOUNTER — OFFICE VISIT (OUTPATIENT)
Dept: PHYSICAL THERAPY | Facility: CLINIC | Age: 67
End: 2022-05-05
Payer: COMMERCIAL

## 2022-05-05 DIAGNOSIS — M65.331 TRIGGER FINGER, RIGHT MIDDLE FINGER: Primary | ICD-10-CM

## 2022-05-05 PROCEDURE — 97140 MANUAL THERAPY 1/> REGIONS: CPT

## 2022-05-05 PROCEDURE — 97112 NEUROMUSCULAR REEDUCATION: CPT

## 2022-05-05 PROCEDURE — 97035 APP MDLTY 1+ULTRASOUND EA 15: CPT

## 2022-05-05 PROCEDURE — 97110 THERAPEUTIC EXERCISES: CPT

## 2022-05-05 NOTE — PROGRESS NOTES
PT Discharge    Today's date: 2022  Patient name: Sherry Paz  : 1955  MRN: [id number]  Referring provider: Finesse Iglesias PA-C  Dx:   Encounter Diagnosis     ICD-10-CM    1  Trigger finger, right middle finger  M65 331        Start Time: 1000  Stop Time: 1115  Total time in clinic (min): 75 minutes    Assessment  Assessment details: Pt is a 74 YO female/male presenting to PT with pain, decreased AROM, strength and tolerance to activity  Pt would benefit from skilled intervention to address these issues and maximize overall function  Occupation- retired  Dominant- right; Involved- right  Pt is progressing with AROM and functional strength  She cont to have thick scar along the palmar A1 area, as well as decreased functional strength and swelling that creates decreased AROM  Pt feels she is able to cont as a HEP-  Will transition today  Goals  ST  Decrease pain to 0-2/10 in 4 weeks            2  Decrease swelling right hand and fingers            3  Increase AROM to [facility] in 6-8 weeks            4  Maintain clean wound and promote healing            5   Provide orthotic for protection, compression for support  LT  Increase functional motion and strength for independence with ADL and self care by DC            2   Ability to RT recreational activity by DC  Goals met    Plan  Patient would benefit from: skilled physical therapy  Planned modality interventions: cryotherapy, thermotherapy: hydrocollator packs and ultrasound  Planned therapy interventions: activity modification, manual therapy, neuromuscular re-education, strengthening, stretching, therapeutic activities, therapeutic exercise and home exercise program  Frequency: 2x week  Duration in weeks: 4  Treatment plan discussed with: patient        Subjective Evaluation    History of Present Illness  Date of surgery: 2022  Mechanism of injury: Progressive decrease in motion with triggering and locking of Right MF approximately 9 months  Pain  Current pain ratin  At best pain ratin  At worst pain ratin  Location: right palm and MF  Quality: dull ache and throbbing    Hand dominance: right    Treatments  Current treatment: physical therapy  Patient Goals  Patient goals for therapy: decreased edema, decreased pain, increased motion, increased strength, independence with ADLs/IADLs and return to sport/leisure activities          Objective     General Comments:      Wrist/Hand Comments  AROM right wrist E/F- 55/75                      MF MP- 0/90; PIP- 0/90; DIP- 0/60  Strength-  II- 35/50; 3 BRENDAN- ; Key-   Inspection- thickened scar at A1 area  Sensation- no tingling noted  Circumference at MPs- 18 2 cm; MF P1- 6 0 cm; RF P1- 4 5 cm                Precautions: avoid strong and aggressive grasp with right hand      Manuals 4/12 4/14 4/19 4/21 4/26 4/28 5/3  5       STM 15 15 15 15 15 15 15  15       Coban/kenesio     MAG TS CM TS HEP TS HEP  TS       isotoner sm lt                                             Neuro Re-Ed                       HP/pulsed biph 15 15 15 15 15 15 15 15                                                                                                                                                       Ther Ex                       TGE 5x 5x 5x 5x 5x 5x 5x  5x       Theraputty        T2' T 2' T T 2'  T 2'                 5 finger       T 2/10 T 2/10 T 2/10 T2/10  T 2/10       Blue           II 2/10 III 2/10  III 2/10       Digiflex           Y 2/10 Y 2/10  Y 2/10       Flexbar             Y 2/10  Y 2/10                                                                                                                                                                                                       Modalities                       US 12 12 12 12 12 12 12 12       CP 15 15 15 15 15 15 15  15

## 2022-05-05 NOTE — PROGRESS NOTES
Daily Note     Today's date: 2022  Patient name: Sariah Olsen  : 1955  MRN: [id number]  Referring provider: Rosalinda Tinajero PA-C  Dx:   Encounter Diagnosis     ICD-10-CM    1  Trigger finger, right middle finger  M65 331                   Subjective: Pt states she is feeling "pretty good"  Pt may transition to HEP as pt has tools at home to perform  Objective: See treatment diary below    Wrist/Hand Comments  AROM right wrist E/F- 55/75                      MF MP- 0/90; PIP- 0/90; DIP- 0/60  Strength-  II- 35/50; 3 BRENDAN- ; Key-   Inspection- thickened scar at A1 area  Sensation- no tingling noted  Circumference at MPs- 18 2 cm; MF P1- 6 0 cm; RF P1- 4 5 cm       Assessment: Tolerated treatment well today  Pt responding well to program as minimal complaints were issued  Program increased today and pt responded well  Plan: Progress treatment as tolerated  Precautions: avoid strong and aggressive grasp with right hand      Manuals 4/12 4/14 4/19 4/21 4/26 4/28 5/3  5       STM 15 15 15 15 15 15 15  15       Coban/kenesio     MAG TS CM TS HEP TS HEP  TS HEP       isotoner sm lt                                             Neuro Re-Ed                       HP/pulsed biph 15 15 15 15 15 15 15  15                                                                                                                                                       Ther Ex                       TGE 5x 5x 5x 5x 5x 5x 5x  5x       Theraputty        T2' T 2' T T 2'  T 2'                 5 finger       T 2/10 T 2/10 T 2/10 T2/10  T2/10       Blue           II 2/10 III 2/10  III 2/10       Digiflex           Y 2/10 Y 2/10  Y 2/10       Flexbar             Y 2/10  Y 2/10        DB E/F               3 2/10        Jean                20 2/10                                                                                                                                                       Modalities                       US 12 12 12 12 12 12 12  12       CP 15 15 15 15 15 15 15 DEF

## 2022-05-10 ENCOUNTER — APPOINTMENT (OUTPATIENT)
Dept: PHYSICAL THERAPY | Facility: CLINIC | Age: 67
End: 2022-05-10
Payer: COMMERCIAL

## 2022-05-12 ENCOUNTER — APPOINTMENT (OUTPATIENT)
Dept: PHYSICAL THERAPY | Facility: CLINIC | Age: 67
End: 2022-05-12
Payer: COMMERCIAL

## 2022-05-17 ENCOUNTER — APPOINTMENT (OUTPATIENT)
Dept: PHYSICAL THERAPY | Facility: CLINIC | Age: 67
End: 2022-05-17
Payer: COMMERCIAL

## 2022-05-19 ENCOUNTER — APPOINTMENT (OUTPATIENT)
Dept: PHYSICAL THERAPY | Facility: CLINIC | Age: 67
End: 2022-05-19
Payer: COMMERCIAL

## 2022-05-24 ENCOUNTER — APPOINTMENT (OUTPATIENT)
Dept: PHYSICAL THERAPY | Facility: CLINIC | Age: 67
End: 2022-05-24
Payer: COMMERCIAL

## 2022-05-26 ENCOUNTER — APPOINTMENT (OUTPATIENT)
Dept: PHYSICAL THERAPY | Facility: CLINIC | Age: 67
End: 2022-05-26
Payer: COMMERCIAL

## 2022-05-31 ENCOUNTER — APPOINTMENT (OUTPATIENT)
Dept: PHYSICAL THERAPY | Facility: CLINIC | Age: 67
End: 2022-05-31
Payer: COMMERCIAL

## 2022-06-04 ENCOUNTER — APPOINTMENT (OUTPATIENT)
Dept: RADIOLOGY | Facility: MEDICAL CENTER | Age: 67
End: 2022-06-04
Payer: COMMERCIAL

## 2022-06-04 ENCOUNTER — OFFICE VISIT (OUTPATIENT)
Dept: URGENT CARE | Facility: MEDICAL CENTER | Age: 67
End: 2022-06-04
Payer: COMMERCIAL

## 2022-06-04 VITALS
HEART RATE: 88 BPM | WEIGHT: 156 LBS | SYSTOLIC BLOOD PRESSURE: 136 MMHG | RESPIRATION RATE: 18 BRPM | OXYGEN SATURATION: 97 % | HEIGHT: 61 IN | DIASTOLIC BLOOD PRESSURE: 62 MMHG | BODY MASS INDEX: 29.45 KG/M2 | TEMPERATURE: 98.1 F

## 2022-06-04 DIAGNOSIS — S39.92XA INJURY OF BACK, INITIAL ENCOUNTER: ICD-10-CM

## 2022-06-04 DIAGNOSIS — S49.92XA ARM INJURY, LEFT, INITIAL ENCOUNTER: ICD-10-CM

## 2022-06-04 DIAGNOSIS — S50.12XA CONTUSION OF LEFT FOREARM, INITIAL ENCOUNTER: ICD-10-CM

## 2022-06-04 DIAGNOSIS — S29.019A THORACIC MYOFASCIAL STRAIN, INITIAL ENCOUNTER: ICD-10-CM

## 2022-06-04 DIAGNOSIS — S29.9XXA INJURY OF CHEST WALL, INITIAL ENCOUNTER: ICD-10-CM

## 2022-06-04 DIAGNOSIS — R07.89 CHEST WALL PAIN: Primary | ICD-10-CM

## 2022-06-04 DIAGNOSIS — S51.812A SKIN TEAR OF LEFT FOREARM WITHOUT COMPLICATION, INITIAL ENCOUNTER: ICD-10-CM

## 2022-06-04 PROCEDURE — 72072 X-RAY EXAM THORAC SPINE 3VWS: CPT

## 2022-06-04 PROCEDURE — 90715 TDAP VACCINE 7 YRS/> IM: CPT

## 2022-06-04 PROCEDURE — 99202 OFFICE O/P NEW SF 15 MIN: CPT | Performed by: PHYSICIAN ASSISTANT

## 2022-06-04 PROCEDURE — 73090 X-RAY EXAM OF FOREARM: CPT

## 2022-06-04 PROCEDURE — 90471 IMMUNIZATION ADMIN: CPT | Performed by: PHYSICIAN ASSISTANT

## 2022-06-04 PROCEDURE — 71101 X-RAY EXAM UNILAT RIBS/CHEST: CPT

## 2022-06-04 PROCEDURE — S9083 URGENT CARE CENTER GLOBAL: HCPCS | Performed by: PHYSICIAN ASSISTANT

## 2022-06-04 RX ORDER — METHOCARBAMOL 750 MG/1
750 TABLET, FILM COATED ORAL 3 TIMES DAILY PRN
Qty: 20 TABLET | Refills: 0 | Status: SHIPPED | OUTPATIENT
Start: 2022-06-04

## 2022-06-04 NOTE — PROGRESS NOTES
[facility]        NAME: Candido Pena is a 73 y o  female  : 1955    MRN: [id number]  DATE: 2022  TIME: 6:32 PM    Assessment and Plan   Chest wall pain [R07 89]  1  Chest wall pain     2  Skin tear of left forearm without complication, initial encounter  Tdap Vaccine greater than or equal to 6yo   3  Thoracic myofascial strain, initial encounter     4  Contusion of left forearm, initial encounter     5  Injury of back, initial encounter  XR spine thoracic 3 vw   6  Arm injury, left, initial encounter  XR forearm 2 vw left   7  Injury of chest wall, initial encounter  XR ribs left w pa chest min 3 views         Patient Instructions       Follow up with PCP in 3-5 days  Proceed to  ER if symptoms worsen  Chief Complaint     Chief Complaint   Patient presents with    Fall     Pt  Was using cutting anahi and hit her head on a hanging basket, when she was removing the basket she fell forward over bricks and fell onto left side, and her back was arched, c/o lower back pain and pain under left breast, small abrasion to left arm, pain to left arm, wrist and fingers, denies LOC, GCS 15 ,A x O x 4         History of Present Illness       Patient was outside in her garden when she lost her balance and fell  Initially she struck her head on a empty wire basket which cause no injury  She went to hang something on a wire hook when it gave away and caused her to fall forward  She felt over brick border  In the process her back became arched and she landed on her left side  She sustained a skin tear to left forearm  She is having pain in the left forearm pain in her left ribs and pain in her thoracic spine  No syncope  Last tetanus unknown  Review of Systems   Review of Systems   Constitutional: Negative for chills and fever  Respiratory: Negative for shortness of breath  Cardiovascular:        Left lateral chest wall pain   Musculoskeletal: Positive for arthralgias and back pain  Skin: Positive for wound  Neurological: Negative for dizziness, weakness, numbness and headaches  Current Medications       Current Outpatient Medications:     CALCIUM PO, Take by mouth Pt not sure of dose  , Disp: , Rfl:     glimepiride (AMARYL) 4 mg tablet, Take 1 tablet by mouth twice daily, Disp: 180 tablet, Rfl: 3    glucose blood (KEITH CONTOUR NEXT TEST) test strip, by In Vitro route 2 (two) times a day, Disp: , Rfl:     lisinopril (ZESTRIL) 10 mg tablet, Take 1 tablet by mouth once daily, Disp: 90 tablet, Rfl: 3    metFORMIN (GLUCOPHAGE) 500 mg tablet, Take 1 tablet by mouth once daily, Disp: 90 tablet, Rfl: 3    pravastatin (PRAVACHOL) 80 mg tablet, Take 1 tablet (80 mg total) by mouth daily, Disp: 90 tablet, Rfl: 3    sertraline (ZOLOFT) 100 mg tablet, TAKE 1 TABLET BY MOUTH ONCE DAILY AS DIRECTED, Disp: 90 tablet, Rfl: 3    Vitamin D, Cholecalciferol, 25 MCG (1000 UT) TABS, Take 3,000 Units by mouth daily Take 2 capsules (2,000 iu) daily  , Disp: , Rfl:     Current Allergies     Allergies as of 06/04/2022 - Reviewed 06/04/2022   Allergen Reaction Noted    Fosamax [alendronate] Diarrhea 05/19/2016    Paxil [paroxetine hcl] Rash 05/19/2016            The following portions of the patient's history were reviewed and updated as appropriate: allergies, current medications, past family history, past medical history, past social history, past surgical history and problem list      Past Medical History:   Diagnosis Date    Ad[facility] insufficiency ([facility] )     Anxiety     [facility]     last assessed: 06/13/16    Cushing's syndrome [facility] )     last assessed: 05/29/16    Depression     last assessed: 01/02/18    Diabetes mellitus ([facility] )     Hyperlipidemia     Hypertension        Past Surgical History:   Procedure Laterality Date    ADRENALECTOMY  12/2002    CHOLECYSTECTOMY      HERNIA REPAIR      umbilical    OTHER SURGICAL HISTORY      Injection of trigger joint    IN COLONOSCOPY FLX DX W/COLLJ SPEC WHEN PFRMD N/A 7/10/2018    Procedure: COLONOSCOPY;  Surgeon: Ruth Ann Garduno MD;  Location: [facility];  Service: Gastroenterology    TONSILLECTOMY AND ADENOIDECTOMY      TUBAL LIGATION         Family History   Problem Relation Age of Onset    Breast cancer Mother     No Known Problems Sister     No Known Problems Daughter     No Known Problems Daughter     No Known Problems Paternal Aunt     No Known Problems Paternal Aunt     No Known Problems Paternal Aunt          Medications have been verified  Objective   /62   Pulse 88   Temp 98 1 °F (36 7 °C)   Resp 18   Ht 5' 1" (1 549 m)   Wt 70 8 kg (156 lb)   SpO2 97%   BMI 29 48 kg/m²   No LMP recorded  Patient is postmenopausal        Physical Exam     Physical Exam  Vitals and nursing note reviewed  Constitutional:       Appearance: Normal appearance  HENT:      Head: Normocephalic and atraumatic  Eyes:      Pupils: Pupils are equal, round, and reactive to light  Cardiovascular:      Rate and Rhythm: Normal rate and regular rhythm  Pulmonary:      Effort: Pulmonary effort is normal    Chest:      Chest wall: No deformity  Musculoskeletal:      Cervical back: Normal range of motion  Back:       Comments: 1 5 cm skin tear left forearm, no bleeding  Wound clean no debris  Left forearm with tenderness to palpation   Skin:     General: Skin is warm  Neurological:      Mental Status: She is alert

## 2022-06-13 ENCOUNTER — TELEPHONE (OUTPATIENT)
Dept: FAMILY MEDICINE CLINIC | Facility: CLINIC | Age: 67
End: 2022-06-13

## 2022-06-13 NOTE — TELEPHONE ENCOUNTER
Pt had a fall on 6/4 and went to urgent care  They gave her robaxin-750 but states that is not helping at all with her pain  Asking if you could prescribe anything to help? Kellee Ochoa

## 2022-06-14 ENCOUNTER — OFFICE VISIT (OUTPATIENT)
Dept: FAMILY MEDICINE CLINIC | Facility: CLINIC | Age: 67
End: 2022-06-14
Payer: COMMERCIAL

## 2022-06-14 VITALS
HEIGHT: 61 IN | HEART RATE: 78 BPM | TEMPERATURE: 97.6 F | BODY MASS INDEX: 29.83 KG/M2 | RESPIRATION RATE: 18 BRPM | DIASTOLIC BLOOD PRESSURE: 78 MMHG | SYSTOLIC BLOOD PRESSURE: 130 MMHG | WEIGHT: 158 LBS
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
## 2022-06-14 DIAGNOSIS — Z12.31 ENCOUNTER FOR SCREENING MAMMOGRAM FOR BREAST CANCER: ICD-10-CM

## 2022-06-14 DIAGNOSIS — S20.212S RIB CONTUSION, LEFT, SEQUELA: ICD-10-CM
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
## 2022-06-14 PROCEDURE — 3008F BODY MASS INDEX DOCD: CPT | Performed by: INTERNAL MEDICINE

## 2022-06-14 PROCEDURE — 3075F SYST BP GE 130 - 139MM HG: CPT | Performed by: INTERNAL MEDICINE

## 2022-06-14 PROCEDURE — 4004F PT TOBACCO SCREEN RCVD TLK: CPT | Performed by: INTERNAL MEDICINE

## 2022-06-14 PROCEDURE — 99214 OFFICE O/P EST MOD 30 MIN: CPT | Performed by: INTERNAL MEDICINE

## 2022-06-14 PROCEDURE — 1160F RVW MEDS BY RX/DR IN RCRD: CPT | Performed by: INTERNAL MEDICINE

## 2022-06-14 PROCEDURE — 3078F DIAST BP <80 MM HG: CPT | Performed by: INTERNAL MEDICINE

## 2022-06-14 RX ORDER — TRAMADOL HYDROCHLORIDE 50 MG/1
50 TABLET ORAL EVERY 6 HOURS PRN
Qty: 20 TABLET | Refills: 0 | Status: SHIPPED | OUTPATIENT
Start: 2022-06-14

## 2022-06-14 RX ORDER — OXYCODONE HYDROCHLORIDE 5 MG/1
5 TABLET ORAL
COMMUNITY
Start: 2022-04-07

## 2022-06-14 NOTE — PROGRESS NOTES
Assessment/Plan:         Diagnoses and all orders for this visit:    Fall, sequela  -     traMADol (Ultram) 50 mg tablet; Take 1 tablet (50 mg total) by mouth every 6 (six) hours as needed for moderate pain  Pt deferred Therapy at this time but will use otc lidocaine patch and/or biofreeze topically and ice to area   Rest, limit exertional activity at least for next week as it continues to heal   Encouraged patient to retry muscle relaxant     Encounter for screening mammogram for breast cancer  -     Mammo screening bilateral w 3d & cad; Future  Pt will schedule when feeling better    Rib contusion, left, sequela  -     traMADol (Ultram) 50 mg tablet; Take 1 tablet (50 mg total) by mouth every 6 (six) hours as needed for moderate pain  Pt aware to monitor for constipation and use Tylenol in between for -pain  Encouraged to use Tramadol 2/day and then Tylenol every 6 hours prn     Other orders  -     oxyCODONE (ROXICODONE) 5 immediate release tablet; Take 5 mg by mouth  Pt no longer using (had from April surgery)    Rto as scheduled/prn         Patient ID: Tere Treviño is a 73 y o  female  HPI   Pt fell a week ago Saturday in her yard and struck something when she was trying to hang something on a pole She has pain left rib area [facility] and had xrays not showing any fracture She had a cuple pain pills from trigger finger surgery in April and took at [facility] with some relief She is not using anything topically or ice or heat at this time as she did not know what to use She took one muscle relaxant but did not feel it helped   It does hurt on left side outer aspect of breast to take deep breath but she is not having chest pain or overt sob   She has a lot of stress with house issues and need of large repairs due to issue with  line    Review of Systems   Constitutional: Positive for activity change  Negative for chills and fever  Respiratory: Positive for shortness of breath           Discomfort with deep breath   Cardiovascular: Negative for chest pain  Gastrointestinal: Negative for abdominal distention, abdominal pain and blood in stool  Genitourinary: Negative  Musculoskeletal: Positive for arthralgias and myalgias  Neurological: Negative for dizziness, light-headedness and headaches  Psychiatric/Behavioral: Negative for sleep disturbance  The patient is not nervous/anxious  Past Medical History:   Diagnosis Date    Adrenal insufficiency ([place] [address] )     Anxiety     Bacterial sepsis ([place] [address] )     last assessed: 06/13/16    Cushing's syndrome ([place] [address] )     last assessed: 05/29/16    Depression     last assessed: 01/02/18    Diabetes mellitus ([place] [address] )     Hyperlipidemia     Hypertension      Past Surgical History:   Procedure Laterality Date    ADRENALECTOMY  12/2002    CHOLECYSTECTOMY      HERNIA REPAIR      umbilical    OTHER SURGICAL HISTORY      Injection of trigger joint    [place] COLONOSCOPY FLX DX W/COLLJ SPEC WHEN PFRMD N/A 7/10/2018    Procedure: COLONOSCOPY;  Surgeon: Paulette Manriquez MD;  Location: [facility];  Service: Gastroenterology    TONSILLECTOMY AND ADENOIDECTOMY      TUBAL LIGATION       Social History     Socioeconomic History    Marital status:       Spouse name: Not on file    Number of children: Not on file    Years of education: Not on file    Highest education level: Not on file   Occupational History    Not on file   Tobacco Use    Smoking status: Current Every Day Smoker     Packs/day: 0 50     Years: 50 00     Pack years: 25 00    Smokeless tobacco: Never Used   Vaping Use    Vaping Use: Never used   Substance and Sexual Activity    Alcohol use: No    Drug use: No    Sexual activity: Not on file   Other Topics Concern    Not on file   Social History Narrative    Always uses seat belt    Always uses sunscreen    Caffeine use    Dental care, regularly    No living will    Uses safety equipment - seatbelts     Social Determinants of Health     Financial Resource Strain: Not on file   Food Insecurity: Not on file   Transportation Needs: Not on file   Physical Activity: Not on file   Stress: Not on file   Social Connections: Not on file   Intimate Partner Violence: Not on file   Housing Stability: Not on file     Allergies   Allergen Reactions    Fosamax [Alendronate] Diarrhea    Paxil [Paroxetine Hcl] Rash           /78   Pulse 78   Temp 97 6 °F (36 4 °C) (Temporal)   Resp 18   Ht 5' 1" (1 549 m)   Wt 71 7 kg (158 lb)   BMI 29 85 kg/m²          Physical Exam  Vitals reviewed  Constitutional:       General: She is not in acute distress  Appearance: Normal appearance  She is ill-appearing  She is not toxic-appearing or diaphoretic  HENT:      Head: Normocephalic and atraumatic  Right Ear: External ear normal       Left Ear: External ear normal       Mouth/Throat:      Mouth: Mucous membranes are dry  Cardiovascular:      Rate and Rhythm: Normal rate and regular rhythm  Pulses: Normal pulses  Pulmonary:      Breath sounds: Normal breath sounds  Abdominal:      General: Bowel sounds are normal  There is no distension  Palpations: Abdomen is soft  Tenderness: There is no abdominal tenderness  Musculoskeletal:      Cervical back: Tenderness present  Right lower leg: No edema  Left lower leg: No edema  Skin:     General: Skin is dry  Coloration: Skin is not jaundiced or pale  Neurological:      General: No focal deficit present  Mental Status: She is alert and oriented to person, place, and time  Mental status is at baseline  Cranial Nerves: No cranial nerve deficit  Psychiatric:         Mood and Affect: Mood normal          Behavior: Behavior normal          Thought Content:  Thought content normal          Judgment: Judgment normal

## 2022-07-18 ENCOUNTER — TELEPHONE (OUTPATIENT)
Dept: FAMILY MEDICINE CLINIC | Facility: CLINIC | Age: 67
End: 2022-07-18

## 2022-07-18 NOTE — TELEPHONE ENCOUNTER
Looks like we have cancels for tomorrow - check with Sarabjit Oshea as not sure if they are out of the schedule yet but got 2 messges  So can add tomorrow or she can go to [facility] today

## 2022-07-19 ENCOUNTER — OFFICE VISIT (OUTPATIENT)
Dept: FAMILY MEDICINE CLINIC | Facility: CLINIC | Age: 67
End: 2022-07-19
Payer: COMMERCIAL

## 2022-07-19 ENCOUNTER — APPOINTMENT (OUTPATIENT)
Dept: RADIOLOGY | Facility: MEDICAL CENTER | Age: 67
End: 2022-07-19
Payer: COMMERCIAL

## 2022-07-19 VITALS
BODY MASS INDEX: 28.89 KG/M2 | DIASTOLIC BLOOD PRESSURE: 70 MMHG | SYSTOLIC BLOOD PRESSURE: 128 MMHG | WEIGHT: 153 LBS | TEMPERATURE: 97.5 F | RESPIRATION RATE: 18 BRPM | HEIGHT: 61 IN | HEART RATE: 76 BPM

## 2022-07-19 DIAGNOSIS — R05.9 COUGH: ICD-10-CM

## 2022-07-19 DIAGNOSIS — Z03.818 ENCOUNTER FOR OBSERVATION FOR SUSPECTED EXPOSURE TO OTHER BIOLOGICAL AGENTS RULED OUT: ICD-10-CM

## 2022-07-19 DIAGNOSIS — R05.9 COUGH: Primary | ICD-10-CM

## 2022-07-19 DIAGNOSIS — J40 BRONCHITIS: ICD-10-CM

## 2022-07-19 PROCEDURE — 71046 X-RAY EXAM CHEST 2 VIEWS: CPT

## 2022-07-19 PROCEDURE — 3074F SYST BP LT 130 MM HG: CPT | Performed by: INTERNAL MEDICINE

## 2022-07-19 PROCEDURE — U0005 INFEC AGEN DETEC AMPLI PROBE: HCPCS | Performed by: INTERNAL MEDICINE

## 2022-07-19 PROCEDURE — 3078F DIAST BP <80 MM HG: CPT | Performed by: INTERNAL MEDICINE

## 2022-07-19 PROCEDURE — U0003 INFECTIOUS AGENT DETECTION BY NUCLEIC ACID (DNA OR RNA); SEVERE ACUTE RESPIRATORY SYNDROME CORONAVIRUS 2 (SARS-COV-2) (CORONAVIRUS DISEASE [COVID-19]), AMPLIFIED PROBE TECHNIQUE, MAKING USE OF HIGH THROUGHPUT TECHNOLOGIES AS DESCRIBED BY CMS-2020-01-R: HCPCS | Performed by: INTERNAL MEDICINE

## 2022-07-19 PROCEDURE — 99214 OFFICE O/P EST MOD 30 MIN: CPT | Performed by: INTERNAL MEDICINE

## 2022-07-19 PROCEDURE — 1160F RVW MEDS BY RX/DR IN RCRD: CPT | Performed by: INTERNAL MEDICINE

## 2022-07-19 RX ORDER — DEXTROMETHORPHAN HYDROBROMIDE AND PROMETHAZINE HYDROCHLORIDE 15; 6.25 MG/5ML; MG/5ML
5 SOLUTION ORAL 4 TIMES DAILY PRN
Qty: 180 ML | Refills: 1 | Status: SHIPPED | OUTPATIENT
Start: 2022-07-19 | End: 2022-09-12 | Stop reason: ALTCHOICE

## 2022-07-19 RX ORDER — AZITHROMYCIN 250 MG/1
TABLET, FILM COATED ORAL
Qty: 6 TABLET | Refills: 0 | Status: SHIPPED | OUTPATIENT
Start: 2022-07-19 | End: 2022-07-23

## 2022-07-19 NOTE — PROGRESS NOTES
Assessment/Plan:      Diagnoses and all orders for this visit:    Cough  -     azithromycin (ZITHROMAX) 250 mg tablet; Take 2 tablets today then 1 tablet daily x 4 days  -     Promethazine-DM (PHENERGAN-DM) 6 25-15 mg/5 mL oral syrup; Take 5 mL by mouth 4 (four) times a day as needed for cough  -     XR chest pa & lateral; Future    Bronchitis    Encounter for observation for suspected exposure to other biological agents ruled out  -     Novel Coronavirus (Covid-19),PCR [facility] - Collected in Office      Xray today - pt going from visit and start antibx/cough rx today   Increase fluids, rest   Covid swab done today         Patient ID: Kane Amin is a 73 y o  female  HPI   Pt has cough since Thursday PM No fever Some clemons No ill contacts No chest pain She is eating and drinking ok NO relief with otc cough medication Feels more tired No falls or joint pain No headache No n/v/d    Review of Systems   Constitutional: Positive for activity change  Negative for chills and fever  HENT: Positive for congestion and postnasal drip  Eyes: Negative for visual disturbance  Respiratory: Positive for cough  Negative for shortness of breath and wheezing  Cardiovascular: Negative for chest pain  Musculoskeletal: Positive for arthralgias  Skin: Negative for rash  Neurological: Negative for dizziness, light-headedness and headaches  Psychiatric/Behavioral: Negative for sleep disturbance  The patient is not nervous/anxious          Past Medical History:   Diagnosis Date    Adrenal insufficiency ([facility] [address] )     Anxiety     Bacterial sepsis ([facility] [address] )     last assessed: 06/13/16    Cushing's syndrome ([facility] )     last assessed: 05/29/16    Depression     last assessed: 01/02/18    Diabetes mellitus ([facility] )     Hyperlipidemia     Hypertension      Past Surgical History:   Procedure Laterality Date    ADRENALECTOMY  12/2002    CHOLECYSTECTOMY      HERNIA REPAIR      umbilical    OTHER SURGICAL HISTORY      Injection of You need to take your Effexor and follow-up with your doctor as planned trigger joint    MD COLONOSCOPY FLX DX W/COLLJ SPEC WHEN PFRMD N/A 7/10/2018    Procedure: COLONOSCOPY;  Surgeon: Carri Cortes MD;  Location: [facility];  Service: Gastroenterology    TONSILLECTOMY AND ADENOIDECTOMY      TUBAL LIGATION       Social History     Socioeconomic History    Marital status:      Spouse name: Not on file    Number of children: Not on file    Years of education: Not on file    Highest education level: Not on file   Occupational History    Not on file   Tobacco Use    Smoking status: Current Some Day Smoker     Packs/day: 0 50     Years: 50 00     Pack years: 25 00    Smokeless tobacco: Never Used   Vaping Use    Vaping Use: Never used   Substance and Sexual Activity    Alcohol use: No    Drug use: No    Sexual activity: Not on file   Other Topics Concern    Not on file   Social History Narrative    Always uses seat belt    Always uses sunscreen    Caffeine use    Dental care, regularly    No living will    Uses safety equipment - seatbelts     Social Determinants of Health     Financial Resource Strain: Not on file   Food Insecurity: Not on file   Transportation Needs: Not on file   Physical Activity: Not on file   Stress: Not on file   Social Connections: Not on file   Intimate Partner Violence: Not on file   Housing Stability: Not on file     Allergies   Allergen Reactions    Fosamax [Alendronate] Diarrhea    Paxil [Paroxetine Hcl] Rash     Visit Vitals  /70   Pulse 76   Temp 97 5 °F (36 4 °C) (Temporal)   Resp 18   Ht 5' 1" (1 549 m)   Wt 69 4 kg (153 lb)   BMI 28 91 kg/m²   OB Status Postmenopausal   Smoking Status Current Some Day Smoker   BSA 1 69 m²          Physical Exam  Vitals reviewed  Constitutional:       General: She is not in acute distress  Appearance: Normal appearance  She is not ill-appearing, toxic-appearing or diaphoretic  HENT:      Head: Normocephalic and atraumatic        Right Ear: External ear normal       Left Ear: External ear normal       Nose: Nose normal       Mouth/Throat:      Mouth: Mucous membranes are dry  Eyes:      General: No scleral icterus  Extraocular Movements: Extraocular movements intact  Conjunctiva/sclera: Conjunctivae normal       Pupils: Pupils are equal, round, and reactive to light  Cardiovascular:      Rate and Rhythm: Normal rate and regular rhythm  Pulses: Normal pulses  Heart sounds: Normal heart sounds  No murmur heard  Pulmonary:      Effort: Pulmonary effort is normal  No respiratory distress  Breath sounds: Normal breath sounds  No wheezing  Abdominal:      General: Bowel sounds are normal  There is no distension  Palpations: Abdomen is soft  Tenderness: There is no abdominal tenderness  Musculoskeletal:      Cervical back: Normal range of motion and neck supple  No rigidity  Right lower leg: No edema  Left lower leg: No edema  Lymphadenopathy:      Cervical: No cervical adenopathy  Skin:     General: Skin is dry  Coloration: Skin is not jaundiced or pale  Neurological:      General: No focal deficit present  Mental Status: She is alert and oriented to person, place, and time  Mental status is at baseline  Psychiatric:         Mood and Affect: Mood normal          Behavior: Behavior normal          Thought Content:  Thought content normal          Judgment: Judgment normal

## 2022-07-20 LAB — SARS-COV-2 RNA RESP QL NAA+PROBE: NEGATIVE

## 2022-08-17 ENCOUNTER — APPOINTMENT (OUTPATIENT)
Dept: LAB | Facility: HOSPITAL | Age: 67
End: 2022-08-17
Attending: INTERNAL MEDICINE
Payer: COMMERCIAL

## 2022-08-17 ENCOUNTER — HOSPITAL ENCOUNTER (OUTPATIENT)
Dept: INFUSION CENTER | Facility: HOSPITAL | Age: 67
Discharge: HOME | End: 2022-08-17
Payer: COMMERCIAL

## 2022-08-17 VITALS
TEMPERATURE: 96.5 F | OXYGEN SATURATION: 93 % | HEART RATE: 69 BPM | SYSTOLIC BLOOD PRESSURE: 115 MMHG | DIASTOLIC BLOOD PRESSURE: 61 MMHG | RESPIRATION RATE: 18 BRPM

## 2022-08-17 DIAGNOSIS — M81.0 OSTEOPOROSIS, UNSPECIFIED OSTEOPOROSIS TYPE, UNSPECIFIED PATHOLOGICAL FRACTURE PRESENCE: Primary | ICD-10-CM

## 2022-08-17 DIAGNOSIS — M81.0 OSTEOPOROSIS, UNSPECIFIED OSTEOPOROSIS TYPE, UNSPECIFIED PATHOLOGICAL FRACTURE PRESENCE: ICD-10-CM

## 2022-08-17 DIAGNOSIS — N18.31 STAGE 3A CHRONIC KIDNEY DISEASE (HCC): ICD-10-CM

## 2022-08-17 LAB
ALBUMIN SERPL BCP-MCNC: 2.9 G/DL (ref 3.5–5)
ALP SERPL-CCNC: 72 U/L (ref 46–116)
ALT SERPL W P-5'-P-CCNC: 16 U/L (ref 12–78)
ANION GAP SERPL CALCULATED.3IONS-SCNC: 10 MMOL/L (ref 4–13)
AST SERPL W P-5'-P-CCNC: 6 U/L (ref 5–45)
BILIRUB SERPL-MCNC: 0.22 MG/DL (ref 0.2–1)
BUN SERPL-MCNC: 28 MG/DL (ref 5–25)
CALCIUM ALBUM COR SERPL-MCNC: 10 MG/DL (ref 8.3–10.1)
CALCIUM SERPL-MCNC: 9.1 MG/DL (ref 8.3–10.1)
CHLORIDE SERPL-SCNC: 102 MMOL/L (ref 96–108)
CO2 SERPL-SCNC: 23 MMOL/L (ref 21–32)
CREAT SERPL-MCNC: 1.26 MG/DL (ref 0.6–1.3)
GFR SERPL CREATININE-BSD FRML MDRD: 44 ML/MIN/1.73SQ M
GLUCOSE P FAST SERPL-MCNC: 116 MG/DL (ref 65–99)
POTASSIUM SERPL-SCNC: 4.9 MMOL/L (ref 3.5–5.3)
PROT SERPL-MCNC: 9.1 G/DL (ref 6.4–8.4)
SODIUM SERPL-SCNC: 135 MMOL/L (ref 135–147)

## 2022-08-17 PROCEDURE — 80053 COMPREHEN METABOLIC PANEL: CPT

## 2022-08-17 PROCEDURE — 96365 THER/PROPH/DIAG IV INF INIT: CPT

## 2022-08-17 RX ORDER — SODIUM CHLORIDE 9 MG/ML
20 INJECTION, SOLUTION INTRAVENOUS ONCE
OUTPATIENT
Start: 2023-08-17

## 2022-08-17 RX ORDER — ZOLEDRONIC ACID 5 MG/100ML
5 INJECTION, SOLUTION INTRAVENOUS ONCE
Status: COMPLETED | OUTPATIENT
Start: 2022-08-17 | End: 2022-08-17

## 2022-08-17 RX ORDER — ZOLEDRONIC ACID 5 MG/100ML
5 INJECTION, SOLUTION INTRAVENOUS ONCE
OUTPATIENT
Start: 2023-08-17

## 2022-08-17 RX ORDER — SODIUM CHLORIDE 9 MG/ML
20 INJECTION, SOLUTION INTRAVENOUS ONCE
Status: COMPLETED | OUTPATIENT
Start: 2022-08-17 | End: 2022-08-17

## 2022-08-17 RX ADMIN — SODIUM CHLORIDE 20 ML/HR: 0.9 INJECTION, SOLUTION INTRAVENOUS at 15:05

## 2022-08-17 RX ADMIN — ZOLEDRONIC ACID 5 MG: 5 INJECTION, SOLUTION INTRAVENOUS at 15:33

## 2022-08-17 NOTE — PLAN OF CARE
Problem: Potential for Falls  Goal: Patient will remain free of falls  Description: INTERVENTIONS:  - Educate patient/family on patient safety including physical limitations  - Instruct patient to call for assistance with activity   - Consult OT/PT to assist with strengthening/mobility   - Keep Call bell within reach  - Keep bed low and locked with side rails adjusted as appropriate  - Keep care items and personal belongings within reach  - Initiate and maintain comfort rounds  -     - Consider moving patient to room near nurses station  Outcome: Progressing     Problem: DISCHARGE PLANNING  Goal: Discharge to home or other facility with appropriate resources  Description: INTERVENTIONS:  - Identify barriers to discharge w/patient and caregiver  - Arrange for needed discharge resources and transportation as appropriate  - Identify discharge learning needs (meds, wound care, etc )  - Arrange for interpretive services to assist at discharge as needed  - Refer to Case Management Department for coordinating discharge planning if the patient needs post-hospital services based on physician/advanced practitioner order or complex needs related to functional status, cognitive ability, or social support system  Outcome: Progressing     Problem: Knowledge Deficit  Goal: Patient/family/caregiver demonstrates understanding of disease process, treatment plan, medications, and discharge instructions  Description: Complete learning assessment and assess knowledge base    Interventions:  - Provide teaching at level of understanding  - Provide teaching via preferred learning methods  Outcome: Progressing

## 2022-08-17 NOTE — PROCEDURES
Spoke to dr Katherine Monge who is now pts dr Saeed Roland aware that CTcl is 35 5 and paramrters stat 35   Dr gives ok to treat, run over 30 min not 15

## 2022-08-25 ENCOUNTER — OFFICE VISIT (OUTPATIENT)
Dept: ENDOCRINOLOGY | Facility: CLINIC | Age: 67
End: 2022-08-25
Payer: COMMERCIAL

## 2022-08-25 VITALS
DIASTOLIC BLOOD PRESSURE: 60 MMHG | HEIGHT: 61 IN | SYSTOLIC BLOOD PRESSURE: 110 MMHG | BODY MASS INDEX: 28.66 KG/M2 | HEART RATE: 85 BPM | WEIGHT: 151.8 LBS

## 2022-08-25 DIAGNOSIS — M81.0 OSTEOPOROSIS, UNSPECIFIED OSTEOPOROSIS TYPE, UNSPECIFIED PATHOLOGICAL FRACTURE PRESENCE: ICD-10-CM

## 2022-08-25 DIAGNOSIS — I10 PRIMARY HYPERTENSION: ICD-10-CM

## 2022-08-25 DIAGNOSIS — E78.5 HYPERLIPIDEMIA, UNSPECIFIED HYPERLIPIDEMIA TYPE: ICD-10-CM

## 2022-08-25 DIAGNOSIS — Z86.39 HISTORY OF ADRENAL INSUFFICIENCY: ICD-10-CM

## 2022-08-25 DIAGNOSIS — N18.32 STAGE 3B CHRONIC KIDNEY DISEASE (HCC): ICD-10-CM

## 2022-08-25 DIAGNOSIS — E11.65 TYPE 2 DIABETES MELLITUS WITH HYPERGLYCEMIA, WITHOUT LONG-TERM CURRENT USE OF INSULIN (HCC): Primary | ICD-10-CM

## 2022-08-25 PROCEDURE — 3074F SYST BP LT 130 MM HG: CPT | Performed by: STUDENT IN AN ORGANIZED HEALTH CARE EDUCATION/TRAINING PROGRAM

## 2022-08-25 PROCEDURE — 1160F RVW MEDS BY RX/DR IN RCRD: CPT | Performed by: STUDENT IN AN ORGANIZED HEALTH CARE EDUCATION/TRAINING PROGRAM

## 2022-08-25 PROCEDURE — 3078F DIAST BP <80 MM HG: CPT | Performed by: STUDENT IN AN ORGANIZED HEALTH CARE EDUCATION/TRAINING PROGRAM

## 2022-08-25 PROCEDURE — 99214 OFFICE O/P EST MOD 30 MIN: CPT | Performed by: STUDENT IN AN ORGANIZED HEALTH CARE EDUCATION/TRAINING PROGRAM

## 2022-08-25 RX ORDER — ATORVASTATIN CALCIUM 40 MG/1
40 TABLET, FILM COATED ORAL EVERY EVENING
Qty: 90 TABLET | Refills: 1 | Status: SHIPPED | OUTPATIENT
Start: 2022-08-25 | End: 2023-02-21

## 2022-08-25 RX ORDER — BLOOD SUGAR DIAGNOSTIC
STRIP MISCELLANEOUS
Qty: 100 EACH | Refills: 3 | Status: SHIPPED | OUTPATIENT
Start: 2022-08-25

## 2022-08-25 RX ORDER — BLOOD-GLUCOSE METER
KIT MISCELLANEOUS
Qty: 1 KIT | Refills: 0 | Status: SHIPPED | OUTPATIENT
Start: 2022-08-25

## 2022-08-25 RX ORDER — LANCETS 33 GAUGE
EACH MISCELLANEOUS
Qty: 100 EACH | Refills: 3 | Status: SHIPPED | OUTPATIENT
Start: 2022-08-25

## 2022-08-25 NOTE — PROGRESS NOTES
Chichi Teague 73 y o  female MRN: [id number]    Encounter: [id number]      Assessment/Plan     Problem List Items Addressed This Visit        Endocrine    Type 2 diabetes mellitus with hyperglycemia, without long-term current use of insulin ([facility] [place] [address] ) - Primary     Diabetes remains in stable control on metformin 500 mg daily and glimepiride 4 mg bid  I provided Tyler Durán a script for a new glucometer for her to monitor her blood sugars  We will plan to continue to monitor her diabetes on her present therapy         Relevant Medications    Blood Glucose Monitoring Suppl (OneTouch Verio Reflect) w/Device KIT    glucose blood (OneTouch Verio) test strip    OneTouch Delica Lancets 28T MISC    Other Relevant Orders    Basic metabolic panel Lab Collect    HEMOGLOBIN A1C W/ EAG ESTIMATION Lab Collect       Musculoskeletal and Integument    Osteoporosis     Tyler Durán is s/p reclast x2  She is scheduled for her 3rd dose next year  Her CKD may become a barrier to her receiving this therapy  I advised that we will plan to administer 3 doses of reclast and that we will arrange follow up testing for DXA after that therapy If her kidney health is prohibitive of receiving a 3rd reclast dose, we may plan to suspend therapy and arrange for an earlier DXA study  Tyler Durán was reminded today of the pillars of osteoporosis, including adequate nutrition and activity and fall prevention  Other    Hyperlipidemia     Hyperlipidemia is uncontrolled  I recommended intensification of statin therapy to Tyler Durán  We will stop pravachol and start lipitor 40 mg daily  We will repeat her lipid panel in 4-months and consider further intensification of therapy depending on the outcome of that lab test           Relevant Medications    atorvastatin (LIPITOR) 40 mg tablet    Other Relevant Orders    Lipid panel Lab Collect Lab Collect    History of adrenal insufficiency     Tyler Durán is clinically stable   Her serum cortisol remains stable and her ACTH is mildly elevated  I explained to Sarita Hill that I would suggest monitoring her on clinical grounds at this point in time  Other Visit Diagnoses     Stage 3b chronic kidney disease ([facility] )            CC: Diabetes, osteoporosis, history of chronic steroid use and secondary adrenal insufficiency    History of Present Illness     HPI:    Sarita Hill returns for follow up of diabetes, osteoporosis and history of secondary adrenal insufficiency  Sarita Hill denies any significant interval she has not been checking fingersticks recently and is requesting that script for new meter  She is up-to-date on her eye exam recently having been examined at [facility]  She denies any hypoglycemia or hyperglycemic symptoms  For hyperlipidemia, she takes pravachol 80 mg daily  For hypertension she takes lisinopril 10 mg daily  For osteoporosis, Sarita Hill recently received her second dose of reclast, which she tolerated without incident  She is expected to get her 3rd dose of reclast in a year  The reclast infusion was delivered more slowly on account of her CKD3b  For history of unilateral adrenalectomy and 2/2 AI, Sarita Hill has been stable off of hydrocortisone replacement  She denies any symptoms concerning for adrenal insufficiency  Review of Systems   Constitutional: Negative for appetite change and unexpected weight change  HENT: Negative for trouble swallowing and voice change  Eyes: Negative for photophobia and visual disturbance  Respiratory: Negative for shortness of breath  Cardiovascular: Negative for chest pain and palpitations  Gastrointestinal: Negative for nausea and vomiting  Endocrine: Negative for polydipsia and polyuria  Skin: Negative  Neurological: Negative for tremors and weakness  Psychiatric/Behavioral: Negative for agitation and behavioral problems         Historical Information   Past Medical History:   Diagnosis Date    Adrenal insufficiency ([facility] )     Anxiety     Bacterial sepsis ([place] [address] ) last assessed: 06/13/16    Cushing's syndrome ([facility] )     last assessed: 05/29/16    Depression     last assessed: 01/02/18    Diabetes mellitus ([facility] )     Hyperlipidemia     Hypertension      Past Surgical History:   Procedure Laterality Date    ADRENALECTOMY  12/2002    CHOLECYSTECTOMY      HERNIA REPAIR      umbilical    OTHER SURGICAL HISTORY      Injection of trigger joint    DE COLONOSCOPY FLX DX W/COLLJ SPEC WHEN PFRMD N/A 7/10/2018    Procedure: COLONOSCOPY;  Surgeon: Jas Pickett MD;  Location: [facility];  Service: Gastroenterology    TONSILLECTOMY AND ADENOIDECTOMY      TUBAL LIGATION       Social History   Social History     Substance and Sexual Activity   Alcohol Use No     Social History     Substance and Sexual Activity   Drug Use No     Social History     Tobacco Use   Smoking Status Current Some Day Smoker    Packs/day: 0 50    Years: 50 00    Pack years: 25 00   Smokeless Tobacco Never Used     Family History:   Family History   Problem Relation Age of Onset    Breast cancer Mother     No Known Problems Sister     No Known Problems Daughter     No Known Problems Daughter     No Known Problems Paternal Aunt     No Known Problems Paternal Aunt     No Known Problems Paternal Aunt        Meds/Allergies   Current Outpatient Medications   Medication Sig Dispense Refill    CALCIUM PO Take by mouth Pt not sure of dose        glimepiride (AMARYL) 4 mg tablet Take 1 tablet by mouth twice daily 180 tablet 3    glucose blood test strip by In Vitro route 2 (two) times a day      lisinopril (ZESTRIL) 10 mg tablet Take 1 tablet by mouth once daily 90 tablet 3    metFORMIN (GLUCOPHAGE) 500 mg tablet Take 1 tablet by mouth once daily 90 tablet 3    pravastatin (PRAVACHOL) 80 mg tablet Take 1 tablet by mouth once daily 90 tablet 3    sertraline (ZOLOFT) 100 mg tablet TAKE 1 TABLET BY MOUTH ONCE DAILY AS DIRECTED 90 tablet 3    Vitamin D, Cholecalciferol, 25 MCG (1000 UT) TABS Take 3,000 Units by mouth daily Take 2 capsules (2,000 iu) daily   methocarbamol (Robaxin-750) 750 mg tablet Take 1 tablet (750 mg total) by mouth 3 (three) times a day as needed for muscle spasms (Patient not taking: No sig reported) 20 tablet 0    oxyCODONE (ROXICODONE) 5 immediate release tablet Take 5 mg by mouth (Patient not taking: No sig reported)      Promethazine-DM (PHENERGAN-DM) 6 25-15 mg/5 mL oral syrup Take 5 mL by mouth 4 (four) times a day as needed for cough (Patient not taking: Reported on 8/25/2022) 180 mL 1    traMADol (Ultram) 50 mg tablet Take 1 tablet (50 mg total) by mouth every 6 (six) hours as needed for moderate pain (Patient not taking: No sig reported) 20 tablet 0     No current facility-administered medications for this visit  Allergies   Allergen Reactions    Fosamax [Alendronate] Diarrhea    Paxil [Paroxetine Hcl] Rash       Objective   Vitals: Blood pressure 110/60, pulse 85, height 5' 1" (1 549 m), weight 68 9 kg (151 lb 12 8 oz)  Physical Exam  Vitals reviewed  Constitutional:       General: She is not in acute distress  HENT:      Head: Normocephalic and atraumatic  Eyes:      General: No scleral icterus  Conjunctiva/sclera: Conjunctivae normal    Cardiovascular:      Rate and Rhythm: Normal rate and regular rhythm  Pulmonary:      Effort: Pulmonary effort is normal  No respiratory distress  Abdominal:      Palpations: Abdomen is soft  Tenderness: There is no abdominal tenderness  Musculoskeletal:      Right lower leg: No edema  Left lower leg: No edema  Skin:     General: Skin is warm and dry  Neurological:      General: No focal deficit present  Mental Status: She is alert  Psychiatric:         Mood and Affect: Mood normal          Behavior: Behavior normal          The history was obtained from the review of the chart, patient      Lab Results:   Lab Results   Component Value Date/Time    Hemoglobin A1C 6 8 (H) 08/17/2022 06:41 AM Hemoglobin A1C 6 9 (H) 02/06/2022 08:36 AM    BUN 28 (H) 08/17/2022 06:41 AM    Potassium 4 9 08/17/2022 06:41 AM    Chloride 102 08/17/2022 06:41 AM    CO2 23 08/17/2022 06:41 AM    Creatinine 1 26 08/17/2022 06:41 AM    AST 6 08/17/2022 06:41 AM    ALT 16 08/17/2022 06:41 AM    Albumin 2 9 (L) 08/17/2022 06:41 AM    HDL, Direct 32 (L) 08/17/2022 06:41 AM    Triglycerides 281 (H) 08/17/2022 06:41 AM     Lab Results   Component Value Date    LDLCALC 141 (H) 08/17/2022       Component      Latest Ref Rng & Units 8/17/2022   Sodium      135 - 147 mmol/L 135   Potassium      3 5 - 5 3 mmol/L 4 9   Chloride      96 - 108 mmol/L 102   CO2      21 - 32 mmol/L 23   Anion Gap      4 - 13 mmol/L 10   BUN      5 - 25 mg/dL 28 (H)   Creatinine      0 60 - 1 30 mg/dL 1 26   GLUCOSE FASTING      65 - 99 mg/dL 116 (H)   Calcium      8 3 - 10 1 mg/dL 9 1   CORRECTED CALCIUM      8 3 - 10 1 mg/dL 10 0   AST      5 - 45 U/L 6   ALT      12 - 78 U/L 16   Alkaline Phosphatase      46 - 116 U/L 72   Total Protein      6 4 - 8 4 g/dL 9 1 (H)   Albumin      3 5 - 5 0 g/dL 2 9 (L)   TOTAL BILIRUBIN      0 20 - 1 00 mg/dL 0 22   eGFR      ml/min/1 73sq m 44   Cholesterol      See Comment mg/dL 229 (H)   Triglycerides      See Comment mg/dL 281 (H)   HDL      >=50 mg/dL 32 (L)   LDL Calculated      0 - 100 mg/dL 141 (H)   Non-HDL Cholesterol      mg/dl 197   Hemoglobin A1C      Normal 3 8-5 6%; PreDiabetic 5 7-6 4%; Diabetic >=6 5%; Glycemic control for adults with diabetes <7 0% % 6 8 (H)   eAG, EST AVG Glucose      mg/dl 148   ACTH      7 2 - 63 3 pg/mL 73 3 (H)   Cortisol - AM      4 2 - 22 4 ug/dL 18 9   Vit D, 25-Hydroxy      30 0 - 100 0 ng/mL 34 6         Imaging Studies: I have personally reviewed pertinent reports  Portions of the record may have been created with voice recognition software  Occasional wrong word or "sound a like" substitutions may have occurred due to the inherent limitations of voice recognition software  Read the chart carefully and recognize, using context, where substitutions have occurred

## 2022-08-25 NOTE — PATIENT INSTRUCTIONS
Stop Pravastatin, start lipitor 40 mg daily    Labs in 4-months    Check sugars daily    Best times to monitor are on waking, before meals or bedtime  Ok to alternate different times of day    Goal Blood Sugars:   Premeal , even better <110  2hr after a meal <180, even better <140  A1C <7%, even better <6 5%  Aim for 45g carbohydrates with meals  15-20g with snacks    Goal exercise is 30 minutes daily, most days of the week    Please have labs done before next visit    Bring your meter or logbook with you each visit    Return appointment 3 months      Information about Cholesterol (See more at heart org/cholesterol)    If your doctor has decided that you need to take medicine to reduce high cholesterol, its because youre at borderline to intermediate risk and have risk enhancing factors or are at high risk for heart disease or stroke  Usually the treatment combines healthy lifestyle changes including diet and physical activity, and medicine  Most heart disease and many strokes are caused by a buildup of fat, cholesterol and other substances called plaque in the inner walls of your arteries  The arteries can become clogged and narrowed, and blood flow is reduced  If a blood clot forms and blocks blood flow to your heart, it causes a heart attack  If a blood clot blocks an artery leading to or in the brain, a stroke results  By following your doctors advice, you can help prevent a heart attack or stroke  What type of medicine may I be prescribed? Various medications can lower blood cholesterol levels  Statins are recommended for most patients and have been directly associated with reducing risk for heart attack and stroke  Statins continue to provide the most effective lipid-lowering treatment in most cases  Statins (HMG-CoA reductase inhibitors) prevent the production of cholesterol in the liver  Their major effect is to lower LDL cholesterol    You should talk to your doctor about the risks and benefits of statin therapy if you fall into one of the following groups:    Adults with known cardiovascular disease, including stroke, caused by atherosclerosis    Adults, aged 43-69 years, with diabetes    Adults with LDL-cholesterol level of greater than or equal to 190 mg/dL    Adults, aged 36 - 76 years, with LDL-C level of  mg/dL and a 5% to 19 9% 10-year risk of developing CVD from atherosclerosis, with risk enhancing factors    Adults, aged 43-69 years, with LDL-C level of  mg/dL and a 20% or greater 10-year risk of developing CVD from atherosclerosis     Statins  This class of drugs, also known as HMG CoA reductase inhibitors, works in the liver to prevent cholesterol from forming  This reduces the amount of cholesterol circulating in the blood  Statins are most effective at lowering LDL (bad) cholesterol  They also help lower triglycerides (blood fats) and raise HDL (good) cholesterol  Talk to your doctor about the possible side effects before starting statins  Most side effects are mild and go away as your body adjusts  Muscle problems and liver abnormalities are rare, but your doctor may order regular liver function tests  Women who are pregnant or people who have active or chronic liver disease should not take statins  Statins available in the [place]  include: Atorvastatin (Lipitor®)   Fluvastatin (Lescol®)   Lovastatin (Mevacor®, Altoprev)   Pravastatin (Pravachol®)   Rosuvastatin Calcium (Crestor®)   Simvastatin (Zocor®)     If statins dont help you enough, or if you develop side effects, your doctor may recommend different medications  Please let us know if you develop any side effects and your doctor can prescribe a different statin,  reduce the dose or frequency of dosing, or change to a non-statin medication

## 2022-08-25 NOTE — ASSESSMENT & PLAN NOTE
Vasiliybryson Loaiza is s/p reclast x2  She is scheduled for her 3rd dose next year  Her CKD may become a barrier to her receiving this therapy  I advised that we will plan to administer 3 doses of reclast and that we will arrange follow up testing for DXA after that therapy If her kidney health is prohibitive of receiving a 3rd reclast dose, we may plan to suspend therapy and arrange for an earlier DXA study  Vasiliy Loaiza was reminded today of the pillars of osteoporosis, including adequate nutrition and activity and fall prevention

## 2022-08-25 NOTE — ASSESSMENT & PLAN NOTE
Diabetes remains in stable control on metformin 500 mg daily and glimepiride 4 mg bid  I provided Domingo Harvey a script for a new glucometer for her to monitor her blood sugars   We will plan to continue to monitor her diabetes on her present therapy

## 2022-08-25 NOTE — ASSESSMENT & PLAN NOTE
Elvis Muir is clinically stable  Her serum cortisol remains stable and her ACTH is mildly elevated  I explained to Elvis Muir that I would suggest monitoring her on clinical grounds at this point in time

## 2022-08-25 NOTE — ASSESSMENT & PLAN NOTE
Hyperlipidemia is uncontrolled  I recommended intensification of statin therapy to Lavelle Rahman  We will stop pravachol and start lipitor 40 mg daily   We will repeat her lipid panel in 4-months and consider further intensification of therapy depending on the outcome of that lab test

## 2022-09-12 ENCOUNTER — OFFICE VISIT (OUTPATIENT)
Dept: FAMILY MEDICINE CLINIC | Facility: CLINIC | Age: 67
End: 2022-09-12
Payer: COMMERCIAL

## 2022-09-12 VITALS
DIASTOLIC BLOOD PRESSURE: 78 MMHG | TEMPERATURE: 97.3 F | BODY MASS INDEX: 28.7 KG/M2 | SYSTOLIC BLOOD PRESSURE: 130 MMHG | WEIGHT: 152 LBS | HEART RATE: 76 BPM | HEIGHT: 61 IN

## 2022-09-12 DIAGNOSIS — E03.9 ACQUIRED HYPOTHYROIDISM: ICD-10-CM

## 2022-09-12 DIAGNOSIS — I10 PRIMARY HYPERTENSION: ICD-10-CM

## 2022-09-12 DIAGNOSIS — E11.65 TYPE 2 DIABETES MELLITUS WITH HYPERGLYCEMIA, WITHOUT LONG-TERM CURRENT USE OF INSULIN (HCC): ICD-10-CM

## 2022-09-12 DIAGNOSIS — N60.01 BREAST CYST, RIGHT: ICD-10-CM

## 2022-09-12 DIAGNOSIS — E16.2 HYPOGLYCEMIA: Primary | ICD-10-CM

## 2022-09-12 PROBLEM — R19.5 POSITIVE FIT (FECAL IMMUNOCHEMICAL TEST): Status: RESOLVED | Noted: 2018-06-22 | Resolved: 2022-09-12

## 2022-09-12 PROBLEM — M65.331 TRIGGER FINGER, RIGHT MIDDLE FINGER: Status: RESOLVED | Noted: 2022-03-07 | Resolved: 2022-09-12
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
## 2022-09-12 PROBLEM — R05.9 COUGH: Status: RESOLVED | Noted: 2022-07-19 | Resolved: 2022-09-12

## 2022-09-12 PROBLEM — S20.212S RIB CONTUSION, LEFT, SEQUELA: Status: RESOLVED | Noted: 2022-06-14 | Resolved: 2022-09-12

## 2022-09-12 PROBLEM — J40 BRONCHITIS: Status: RESOLVED | Noted: 2022-07-19 | Resolved: 2022-09-12

## 2022-09-12 PROCEDURE — 3078F DIAST BP <80 MM HG: CPT | Performed by: INTERNAL MEDICINE

## 2022-09-12 PROCEDURE — 1160F RVW MEDS BY RX/DR IN RCRD: CPT | Performed by: INTERNAL MEDICINE

## 2022-09-12 PROCEDURE — 99214 OFFICE O/P EST MOD 30 MIN: CPT | Performed by: INTERNAL MEDICINE

## 2022-09-12 PROCEDURE — G0008 ADMIN INFLUENZA VIRUS VAC: HCPCS | Performed by: INTERNAL MEDICINE

## 2022-09-12 PROCEDURE — 90662 IIV NO PRSV INCREASED AG IM: CPT | Performed by: INTERNAL MEDICINE

## 2022-09-12 PROCEDURE — 3075F SYST BP GE 130 - 139MM HG: CPT | Performed by: INTERNAL MEDICINE

## 2022-09-12 RX ORDER — CLINDAMYCIN HYDROCHLORIDE 300 MG/1
300 CAPSULE ORAL 3 TIMES DAILY
Qty: 21 CAPSULE | Refills: 0 | Status: SHIPPED | OUTPATIENT
Start: 2022-09-12 | End: 2022-09-19

## 2022-09-12 NOTE — PROGRESS NOTES
Assessment/Plan:         Diagnoses and all orders for this visit:    Hypoglycemia  Pt will reach out to endocrine but told her to take half tab Amaryl at HS and eat several small meals /day     Type 2 diabetes mellitus with hyperglycemia, without long-term current use of insulin (HCC)  Pt has labs ordered for endocrine and will call this wek with low blood sugar reading last PM     Primary hypertension  Bp stable Increase hydration    Acquired hypothyroidism  -     TSH, 3rd generation; Future  Added to upcoming labs       Breast cyst, infected Cleocin ordered Diagnostic mammo may need surgery followup for I and d if does not resolve   Pt will schedule routine Mammo once this is resolved/assssed better to see if further intervention needed       Rto 6 months      Patient ID: Raul Hernandez is a 73 y o  female  HPI  Pt still has ongoing house issues which cause great stress Her BS last Pm was symptomatically low which is unusual for her Her eating habits are not ideal and lots of stress recently She does follow with Dr Liz Adamson but not reporting BS regularly there No chest pain or sob Still smoking She feels tired as she was up checking her sugars thru the night since it was low at bedtime This am was int he 70s range She takes her meds as prescribed She has red raised warm area right breast that drained pus earlier   She is overdue for mammogram and aware       Review of Systems   Constitutional: Negative for chills and fever  HENT: Negative  Eyes: Negative for visual disturbance  Respiratory: Negative for cough and shortness of breath  Cardiovascular: Negative for chest pain, palpitations and leg swelling  Gastrointestinal: Negative for abdominal distention and abdominal pain  Genitourinary: Negative  Musculoskeletal: Positive for arthralgias  Neurological: Negative for dizziness, light-headedness and headaches  Psychiatric/Behavioral: Negative for sleep disturbance   The patient is not nervous/anxious  Past Medical History:   Diagnosis Date    Adrenal insufficiency ([facility] )     Anxiety     Bacterial sepsis ([place] [address] )     last assessed: 06/13/16    Cushing's syndrome ([place] [address] )     last assessed: 05/29/16    Depression     last assessed: 01/02/18    Diabetes mellitus ([place] [address] )     Hyperlipidemia     Hypertension     Positive FIT (fecal immunochemical test) 6/22/2018    Added automatically from request for surgery [id number]    Rib contusion, left, sequela 6/14/2022    Trigger finger, right middle finger 3/7/2022     Past Surgical History:   Procedure Laterality Date    ADRENALECTOMY  12/2002    CHOLECYSTECTOMY      HERNIA REPAIR      umbilical    OTHER SURGICAL HISTORY      Injection of trigger joint    MA COLONOSCOPY FLX DX W/COLLJ SPEC WHEN PFRMD N/A 7/10/2018    Procedure: COLONOSCOPY;  Surgeon: Chaim Williamson MD;  Location: [facility];  Service: Gastroenterology    TONSILLECTOMY AND ADENOIDECTOMY      TUBAL LIGATION       Social History     Socioeconomic History    Marital status:       Spouse name: Not on file    Number of children: Not on file    Years of education: Not on file    Highest education level: Not on file   Occupational History    Not on file   Tobacco Use    Smoking status: Current Some Day Smoker     Packs/day: 0 25     Years: 50 00     Pack years: 12 50    Smokeless tobacco: Never Used   Vaping Use    Vaping Use: Never used   Substance and Sexual Activity    Alcohol use: No    Drug use: No    Sexual activity: Not on file   Other Topics Concern    Not on file   Social History Narrative    Always uses seat belt    Always uses sunscreen    Caffeine use    Dental care, regularly    No living will    Uses safety equipment - seatbelts     Social Determinants of Health     Financial Resource Strain: Not on file   Food Insecurity: Not on file   Transportation Needs: Not on file   Physical Activity: Not on file   Stress: Not on file   Social Connections: Not on file Intimate Partner Violence: Not on file   Housing Stability: Not on file     Allergies   Allergen Reactions    Fosamax [Alendronate] Diarrhea    Paxil [Paroxetine Hcl] Rash       Tobacco Cessation Counseling: Tobacco cessation counseling was provided  The patient is sincerely urged to quit consumption of tobacco  She is not ready to quit tobacco  Medication options not discussed  /78   Pulse 76   Temp (!) 97 3 °F (36 3 °C)   Ht 5' 1" (1 549 m)   Wt 68 9 kg (152 lb)   BMI 28 72 kg/m²          Physical Exam  Vitals reviewed  Constitutional:       General: She is not in acute distress  Appearance: Normal appearance  She is not ill-appearing, toxic-appearing or diaphoretic  HENT:      Head: Normocephalic and atraumatic  Right Ear: External ear normal       Left Ear: External ear normal       Nose: Nose normal       Mouth/Throat:      Mouth: Mucous membranes are dry  Eyes:      General: No scleral icterus  Extraocular Movements: Extraocular movements intact  Conjunctiva/sclera: Conjunctivae normal       Pupils: Pupils are equal, round, and reactive to light  Cardiovascular:      Rate and Rhythm: Normal rate and regular rhythm  Pulses: Normal pulses  Pulmonary:      Effort: Pulmonary effort is normal  No respiratory distress  Breath sounds: Normal breath sounds  No wheezing  Abdominal:      General: Bowel sounds are normal  There is no distension  Palpations: Abdomen is soft  There is no mass  Musculoskeletal:      Cervical back: Normal range of motion and neck supple  No rigidity  Right lower leg: Edema present  Left lower leg: No edema  Lymphadenopathy:      Cervical: No cervical adenopathy  Skin:     General: Skin is dry  Coloration: Skin is not jaundiced or pale  Neurological:      General: No focal deficit present  Mental Status: She is alert and oriented to person, place, and time  Mental status is at baseline  Cranial Nerves: No cranial nerve deficit  Sensory: No sensory deficit  Psychiatric:         Mood and Affect: Mood normal          Behavior: Behavior normal          Thought Content:  Thought content normal          Judgment: Judgment normal

## 2022-09-20 ENCOUNTER — TELEPHONE (OUTPATIENT)
Dept: ENDOCRINOLOGY | Facility: CLINIC | Age: 67
End: 2022-09-20

## 2022-09-20 NOTE — TELEPHONE ENCOUNTER
Spoke to pt and she is aware to stop evening dose of glimepiride and take 1/2 pill in am, to call if she continues to get lows and to send blood sugars ins one week

## 2022-09-20 NOTE — TELEPHONE ENCOUNTER
Pt called and she is having low sugars she is taking glimepiride 4 mg tablets 1 am 1 pm,   Blood sugar    09/12-6 am 36, at 6:20 was 76  09/13-6:30 am 121  09/14- 6 am 90  09/ in am  09/19- 430 ate dinner checked at 6:45 pm and was 28      She saw dr Savanna Pedroza she asked her to only take 2 mg at night but she was afraid to do that as its dropping so low

## 2022-11-08 LAB
LEFT EYE DIABETIC RETINOPATHY: NORMAL
RIGHT EYE DIABETIC RETINOPATHY: NORMAL

## 2022-11-11 ENCOUNTER — TELEPHONE (OUTPATIENT)
Dept: ADMINISTRATIVE | Facility: OTHER | Age: 67
End: 2022-11-11

## 2022-11-11 NOTE — TELEPHONE ENCOUNTER
Upon review of the In Basket request we have found this is a duplicate request and no further action is needed  This request was completed upon initial request, the patient chart is up to date, and this message will now be closed  Any additional questions or concerns should be emailed to the Practice Liaisons via the appropriate education email address, please do not reply via In Basket      Thank you  Anay Villarreal

## 2022-11-11 NOTE — TELEPHONE ENCOUNTER
----- Message from Heydi Singh sent at 11/10/2022  4:52 PM EST -----  Regarding: DM Eye Exam  11/10/22 4:52 PM    Hello, our patient Candido Pena has had Diabetic Eye Exam completed/performed  Please assist in updating the patient chart by pulling the document from the Media Tab  The date of service is 11/8/22 (document was scanned into chart today, 11/10/22)       Thank you,  Carolina Rouse   [facility]

## 2023-01-07 DIAGNOSIS — E78.5 HYPERLIPIDEMIA, UNSPECIFIED HYPERLIPIDEMIA TYPE: ICD-10-CM

## 2023-01-09 RX ORDER — ATORVASTATIN CALCIUM 40 MG/1
TABLET, FILM COATED ORAL
Qty: 90 TABLET | Refills: 0 | Status: SHIPPED | OUTPATIENT
Start: 2023-01-09

## 2023-01-10 ENCOUNTER — APPOINTMENT (OUTPATIENT)
Dept: LAB | Facility: HOSPITAL | Age: 68
End: 2023-01-10
Attending: STUDENT IN AN ORGANIZED HEALTH CARE EDUCATION/TRAINING PROGRAM

## 2023-01-10 DIAGNOSIS — E78.5 HYPERLIPIDEMIA, UNSPECIFIED HYPERLIPIDEMIA TYPE: ICD-10-CM

## 2023-01-10 DIAGNOSIS — E11.65 TYPE 2 DIABETES MELLITUS WITH HYPERGLYCEMIA, WITHOUT LONG-TERM CURRENT USE OF INSULIN (HCC): ICD-10-CM

## 2023-01-10 DIAGNOSIS — E03.9 ACQUIRED HYPOTHYROIDISM: ICD-10-CM

## 2023-01-10 LAB
ANION GAP SERPL CALCULATED.3IONS-SCNC: 9 MMOL/L (ref 4–13)
BUN SERPL-MCNC: 32 MG/DL (ref 5–25)
CALCIUM SERPL-MCNC: 9.5 MG/DL (ref 8.3–10.1)
CHLORIDE SERPL-SCNC: 102 MMOL/L (ref 96–108)
CHOLEST SERPL-MCNC: 177 MG/DL
CO2 SERPL-SCNC: 24 MMOL/L (ref 21–32)
CREAT SERPL-MCNC: 1.54 MG/DL (ref 0.6–1.3)
EST. AVERAGE GLUCOSE BLD GHB EST-MCNC: 148 MG/DL
GFR SERPL CREATININE-BSD FRML MDRD: 34 ML/MIN/1.73SQ M
GLUCOSE P FAST SERPL-MCNC: 136 MG/DL (ref 65–99)
HBA1C MFR BLD: 6.8 %
HDLC SERPL-MCNC: 40 MG/DL
LDLC SERPL CALC-MCNC: 114 MG/DL (ref 0–100)
NONHDLC SERPL-MCNC: 137 MG/DL
POTASSIUM SERPL-SCNC: 5.1 MMOL/L (ref 3.5–5.3)
SODIUM SERPL-SCNC: 135 MMOL/L (ref 135–147)
TRIGL SERPL-MCNC: 116 MG/DL
TSH SERPL DL<=0.05 MIU/L-ACNC: 2.67 UIU/ML (ref 0.45–4.5)

## 2023-01-17 ENCOUNTER — OFFICE VISIT (OUTPATIENT)
Dept: ENDOCRINOLOGY | Facility: CLINIC | Age: 68
End: 2023-01-17

## 2023-01-17 VITALS
SYSTOLIC BLOOD PRESSURE: 118 MMHG | BODY MASS INDEX: 28.4 KG/M2 | HEIGHT: 61 IN | HEART RATE: 74 BPM | DIASTOLIC BLOOD PRESSURE: 68 MMHG | WEIGHT: 150.4 LBS

## 2023-01-17 DIAGNOSIS — N18.32 STAGE 3B CHRONIC KIDNEY DISEASE (HCC): ICD-10-CM

## 2023-01-17 DIAGNOSIS — E11.9 TYPE 2 DIABETES MELLITUS WITHOUT COMPLICATION, WITHOUT LONG-TERM CURRENT USE OF INSULIN (HCC): Primary | ICD-10-CM

## 2023-01-17 DIAGNOSIS — E78.5 HYPERLIPIDEMIA, UNSPECIFIED HYPERLIPIDEMIA TYPE: ICD-10-CM

## 2023-01-17 DIAGNOSIS — Z86.39 HISTORY OF ADRENAL INSUFFICIENCY: ICD-10-CM

## 2023-01-17 DIAGNOSIS — M81.0 OSTEOPOROSIS, UNSPECIFIED OSTEOPOROSIS TYPE, UNSPECIFIED PATHOLOGICAL FRACTURE PRESENCE: ICD-10-CM

## 2023-01-17 DIAGNOSIS — E11.65 TYPE 2 DIABETES MELLITUS WITH HYPERGLYCEMIA, WITHOUT LONG-TERM CURRENT USE OF INSULIN (HCC): ICD-10-CM

## 2023-01-17 DIAGNOSIS — I10 PRIMARY HYPERTENSION: ICD-10-CM

## 2023-01-17 RX ORDER — GLIMEPIRIDE 2 MG/1
2 TABLET ORAL 2 TIMES DAILY
Qty: 90 TABLET | Refills: 1
Start: 2023-01-17

## 2023-01-17 NOTE — ASSESSMENT & PLAN NOTE
Diabetes remains in fair control  Will continue metformin 500 mg daily, reduce glimepiride 2 mg daily on account of daytime hypoglycemia  Recommend daily fingersticks scattering times  Goal to maintain blood sugars within  range throughout day  In future, would like to titrate off GONG as possible  Consider alternatives such as DDPIV (e g , tradjenta), SGLT2i, GLP1 if necessary  Repeat labs prior to next visit, call sooner with concerns

## 2023-01-17 NOTE — ASSESSMENT & PLAN NOTE
History of adrenal Cushing's s/p adrenalectomy with prolonged steroid taper  Now off steroid replacement therapy x2 years  Clinically stable   Last labs indicate recovery of HPA

## 2023-01-17 NOTE — PROGRESS NOTES
Lul Alvarado 78 y o  female MRN: [id number]    Encounter: [id number]      Assessment/Plan     Problem List Items Addressed This Visit        Endocrine    Type 2 diabetes mellitus with hyperglycemia, without long-term current use of insulin ([facility] [address] )     Diabetes remains in fair control  Will continue metformin 500 mg daily, reduce glimepiride 2 mg daily on account of daytime hypoglycemia  Recommend daily fingersticks scattering times  Goal to maintain blood sugars within  range throughout day  In future, would like to titrate off GONG as possible  Consider alternatives such as DDPIV (e g , tradjenta), SGLT2i, GLP1 if necessary  Repeat labs prior to next visit, call sooner with concerns  Relevant Medications    glimepiride (AMARYL) 2 mg tablet       Cardiovascular and Mediastinum    Hypertension     Well controlled  Continue present therapy            Musculoskeletal and Integument    Osteoporosis     Fam Ross has received 2 rounds of reclast and is scheduled for next infusion in August  I anticipate having a repeat DXA study following that treatment  Will address at upcoming visit  Relevant Orders    Vitamin D 25 hydroxy Lab Collect       Other    Hyperlipidemia     Lipids improving on lipitor 40 mg daily  Will plan to follow lipids again at near future visit  History of adrenal insufficiency     History of adrenal Cushing's s/p adrenalectomy with prolonged steroid taper  Now off steroid replacement therapy x2 years  Clinically stable   Last labs indicate recovery of HPA        Other Visit Diagnoses     Type 2 diabetes mellitus without complication, without long-term current use of insulin ([facility])    -  Primary    Relevant Medications    glimepiride (AMARYL) 2 mg tablet    Other Relevant Orders    Basic metabolic panel Lab Collect    HEMOGLOBIN A1C W/ EAG ESTIMATION Lab Collect    Microalbumin / creatinine urine ratio Lab Collect    Stage 3b chronic kidney disease (HCC)            CC: Diabetes, osteoporosis, history of chronic steroid use and secondary adrenal insufficiency    History of Present Illness     HPI:    Josse Ladd returns for follow up of diabetes, osteoporosis and history of secondary adrenal insufficiency  Josse Ladd reports no acute concerns  She is presently taking metformin 500 mg daily, glimepiride 4 mg daily  She did have an episode of symptomatic hypoglycemia into 50s during daytime  She has not had any more recent events  She comes today with BG log showing fasting values between   She does report polydipsia and polyuria  She has been losing weight steadily over course of past 2-years, deliberately  For hyperlipidemia, she takes lipitor 40 mg daily  For hypertension she takes lisinopril 10 mg daily  For osteoporosis, Josse Ladd received her second dose of reclast in August 2022  She tolerated treatment without incident  She is expected to get her 3rd dose of reclast next august  The reclast infusion was delivered more slowly on account of her CKD3b  For history of unilateral adrenalectomy and 2/2 AI, Josse Ladd has been stable off of steroid replacement therapy for almost 2 years  She denies any symptoms concerning for adrenal insufficiency  Review of Systems   Constitutional: Negative for appetite change and unexpected weight change  HENT: Negative for trouble swallowing and voice change  Eyes: Negative for photophobia and visual disturbance  Respiratory: Negative for shortness of breath  Cardiovascular: Negative for chest pain and palpitations  Gastrointestinal: Negative for nausea and vomiting  Endocrine: Negative for polydipsia and polyuria  Skin: Negative  Neurological: Negative for tremors and weakness  Psychiatric/Behavioral: Negative for agitation and behavioral problems         Historical Information   Past Medical History:   Diagnosis Date   • Adrenal insufficiency ([facility] )    • Anxiety    • Bacterial sepsis ([facility] )     last assessed: 06/13/16   • Cushing's syndrome ([facility] )     last assessed: 05/29/16   • Depression     last assessed: 01/02/18   • Diabetes mellitus ([facility] )    • Hyperlipidemia    • Hypertension    • Positive FIT (fecal immunochemical test) 6/22/2018    Added automatically from request for surgery [id number]   • Rib contusion, left, sequela 6/14/2022   • Trigger finger, right middle finger 3/7/2022     Past Surgical History:   Procedure Laterality Date   • ADRENALECTOMY  12/2002   • CHOLECYSTECTOMY     • HERNIA REPAIR      umbilical   • OTHER SURGICAL HISTORY      Injection of trigger joint   • RI COLONOSCOPY FLX DX W/COLLJ SPEC WHEN PFRMD N/A 7/10/2018    Procedure: COLONOSCOPY;  Surgeon: Rod Mcdonough MD;  Location: [facility];  Service: Gastroenterology   • TONSILLECTOMY AND ADENOIDECTOMY     • TUBAL LIGATION       Social History   Social History     Substance and Sexual Activity   Alcohol Use No     Social History     Substance and Sexual Activity   Drug Use No     Social History     Tobacco Use   Smoking Status Some Days   • Packs/day: 0 25   • Years: 50 00   • Pack years: 12 50   • Types: Cigarettes   Smokeless Tobacco Never     Family History:   Family History   Problem Relation Age of Onset   • Breast cancer Mother    • No Known Problems Sister    • No Known Problems Daughter    • No Known Problems Daughter    • No Known Problems Paternal Aunt    • No Known Problems Paternal Aunt    • No Known Problems Paternal Aunt        Meds/Allergies   Current Outpatient Medications   Medication Sig Dispense Refill   • atorvastatin (LIPITOR) 40 mg tablet TAKE 1 TABLET BY MOUTH ONCE DAILY IN THE EVENING 90 tablet 0   • Blood Glucose Monitoring Suppl (OneTouch Verio Reflect) w/Device KIT Check blood sugars once daily  Please substitute with appropriate alternative as covered by patient's insurance   Dx: E11 65 1 kit 0   • CALCIUM PO Take by mouth Pt not sure of dose       • glimepiride (AMARYL) 2 mg tablet Take 1 tablet (2 mg total) by mouth 2 (two) times a day 90 tablet 1   • glucose blood (OneTouch Verio) test strip Check blood sugars once daily  Please substitute with appropriate alternative as covered by patient's insurance  Dx: E11 65 100 each 3   • metFORMIN (GLUCOPHAGE) 500 mg tablet Take 1 tablet by mouth once daily 90 tablet 3   • OneTouch Delica Lancets 18U MISC Check blood sugars once daily  Please substitute with appropriate alternative as covered by patient's insurance  Dx: E11 65 100 each 3   • sertraline (ZOLOFT) 100 mg tablet TAKE 1 TABLET BY MOUTH ONCE DAILY AS DIRECTED (Patient taking differently: 50 mg) 90 tablet 3   • Vitamin D, Cholecalciferol, 25 MCG (1000 UT) TABS Take 3,000 Units by mouth daily Take 2 capsules (2,000 iu) daily  No current facility-administered medications for this visit  Allergies   Allergen Reactions   • Fosamax [Alendronate] Diarrhea   • Paxil [Paroxetine Hcl] Rash       Objective   Vitals: Blood pressure 118/68, pulse 74, height 5' 1" (1 549 m), weight 68 2 kg (150 lb 6 4 oz)  Physical Exam  Vitals reviewed  Constitutional:       General: She is not in acute distress  HENT:      Head: Normocephalic and atraumatic  Eyes:      General: No scleral icterus  Conjunctiva/sclera: Conjunctivae normal    Cardiovascular:      Rate and Rhythm: Normal rate and regular rhythm  Pulmonary:      Effort: Pulmonary effort is normal  No respiratory distress  Abdominal:      Palpations: Abdomen is soft  Tenderness: There is no abdominal tenderness  Musculoskeletal:      Right lower leg: No edema  Left lower leg: No edema  Skin:     General: Skin is warm and dry  Neurological:      General: No focal deficit present  Mental Status: She is alert  Psychiatric:         Mood and Affect: Mood normal          Behavior: Behavior normal          The history was obtained from the review of the chart, patient      Lab Results:   Lab Results   Component Value Date/Time    Hemoglobin A1C 6 8 (H) 01/10/2023 07:29 AM    Hemoglobin A1C 6 8 (H) 08/17/2022 06:41 AM    Hemoglobin A1C 6 9 (H) 02/06/2022 08:36 AM    BUN 32 (H) 01/10/2023 07:29 AM    BUN 28 (H) 08/17/2022 06:41 AM    Potassium 5 1 01/10/2023 07:29 AM    Potassium 4 9 08/17/2022 06:41 AM    Chloride 102 01/10/2023 07:29 AM    Chloride 102 08/17/2022 06:41 AM    CO2 24 01/10/2023 07:29 AM    CO2 23 08/17/2022 06:41 AM    Creatinine 1 54 (H) 01/10/2023 07:29 AM    Creatinine 1 26 08/17/2022 06:41 AM    AST 6 08/17/2022 06:41 AM    ALT 16 08/17/2022 06:41 AM    Albumin 2 9 (L) 08/17/2022 06:41 AM    HDL, Direct 40 (L) 01/10/2023 07:29 AM    HDL, Direct 32 (L) 08/17/2022 06:41 AM    Triglycerides 116 01/10/2023 07:29 AM    Triglycerides 281 (H) 08/17/2022 06:41 AM     Lab Results   Component Value Date    LDLCALC 114 (H) 01/10/2023       Component      Latest Ref Rng & Units 8/17/2022   Sodium      135 - 147 mmol/L 135   Potassium      3 5 - 5 3 mmol/L 4 9   Chloride      96 - 108 mmol/L 102   CO2      21 - 32 mmol/L 23   Anion Gap      4 - 13 mmol/L 10   BUN      5 - 25 mg/dL 28 (H)   Creatinine      0 60 - 1 30 mg/dL 1 26   GLUCOSE FASTING      65 - 99 mg/dL 116 (H)   Calcium      8 3 - 10 1 mg/dL 9 1   CORRECTED CALCIUM      8 3 - 10 1 mg/dL 10 0   AST      5 - 45 U/L 6   ALT      12 - 78 U/L 16   Alkaline Phosphatase      46 - 116 U/L 72   Total Protein      6 4 - 8 4 g/dL 9 1 (H)   Albumin      3 5 - 5 0 g/dL 2 9 (L)   TOTAL BILIRUBIN      0 20 - 1 00 mg/dL 0 22   eGFR      ml/min/1 73sq m 44   Cholesterol      See Comment mg/dL 229 (H)   Triglycerides      See Comment mg/dL 281 (H)   HDL      >=50 mg/dL 32 (L)   LDL Calculated      0 - 100 mg/dL 141 (H)   Non-HDL Cholesterol      mg/dl 197   Hemoglobin A1C      Normal 3 8-5 6%; PreDiabetic 5 7-6 4%;  Diabetic >=6 5%; Glycemic control for adults with diabetes <7 0% % 6 8 (H)   eAG, EST AVG Glucose      mg/dl 148   ACTH      7 2 - 63 3 pg/mL 73 3 (H)   Cortisol - AM      4 2 - 22 4 ug/dL 18 9   Vit D, 25-Hydroxy 30 0 - 100 0 ng/mL 34 6         Imaging Studies: I have personally reviewed pertinent reports  Portions of the record may have been created with voice recognition software  Occasional wrong word or "sound a like" substitutions may have occurred due to the inherent limitations of voice recognition software  Read the chart carefully and recognize, using context, where substitutions have occurred

## 2023-01-17 NOTE — ASSESSMENT & PLAN NOTE
Ceasar Bosworth has received 2 rounds of reclast and is scheduled for next infusion in August  I anticipate having a repeat DXA study following that treatment  Will address at upcoming visit

## 2023-02-23 ENCOUNTER — TELEPHONE (OUTPATIENT)
Dept: FAMILY MEDICINE CLINIC | Facility: CLINIC | Age: 68
End: 2023-02-23

## 2023-02-23 DIAGNOSIS — M10.00 ACUTE IDIOPATHIC GOUT, UNSPECIFIED SITE: Primary | ICD-10-CM

## 2023-02-23 RX ORDER — PREDNISONE 20 MG/1
20 TABLET ORAL DAILY
Qty: 5 TABLET | Refills: 0 | Status: SHIPPED | OUTPATIENT
Start: 2023-02-23

## 2023-03-08 ENCOUNTER — RA CDI HCC (OUTPATIENT)
Dept: OTHER | Facility: HOSPITAL | Age: 68
End: 2023-03-08

## 2023-03-08 NOTE — PROGRESS NOTES
[address] [address]  coding opportunities     E11 22     Chart Reviewed number of suggestions sent to Provider: 1     Patients Insurance     Medicare Insurance: [address] [place] [address]

## 2023-03-15 DIAGNOSIS — I10 ESSENTIAL HYPERTENSION: ICD-10-CM

## 2023-03-15 DIAGNOSIS — F32.A DEPRESSION, UNSPECIFIED DEPRESSION TYPE: ICD-10-CM

## 2023-03-15 RX ORDER — LISINOPRIL 10 MG/1
TABLET ORAL
Qty: 90 TABLET | Refills: 0 | Status: SHIPPED | OUTPATIENT
Start: 2023-03-15

## 2023-03-15 RX ORDER — SERTRALINE HYDROCHLORIDE 100 MG/1
TABLET, FILM COATED ORAL
Qty: 90 TABLET | Refills: 0 | Status: SHIPPED | OUTPATIENT
Start: 2023-03-15

## 2023-05-16 ENCOUNTER — APPOINTMENT (OUTPATIENT)
Dept: LAB | Facility: HOSPITAL | Age: 68
End: 2023-05-16
Attending: STUDENT IN AN ORGANIZED HEALTH CARE EDUCATION/TRAINING PROGRAM

## 2023-05-16 ENCOUNTER — CONSULT (OUTPATIENT)
Dept: FAMILY MEDICINE CLINIC | Facility: CLINIC | Age: 68
End: 2023-05-16

## 2023-05-16 VITALS
HEART RATE: 72 BPM | SYSTOLIC BLOOD PRESSURE: 134 MMHG | DIASTOLIC BLOOD PRESSURE: 80 MMHG | BODY MASS INDEX: 28.51 KG/M2 | WEIGHT: 151 LBS | HEIGHT: 61 IN | TEMPERATURE: 97.1 F | RESPIRATION RATE: 18 BRPM

## 2023-05-16 DIAGNOSIS — Z00.00 MEDICARE ANNUAL WELLNESS VISIT, SUBSEQUENT: Primary | ICD-10-CM

## 2023-05-16 DIAGNOSIS — M81.0 OSTEOPOROSIS, UNSPECIFIED OSTEOPOROSIS TYPE, UNSPECIFIED PATHOLOGICAL FRACTURE PRESENCE: ICD-10-CM

## 2023-05-16 DIAGNOSIS — E11.9 TYPE 2 DIABETES MELLITUS WITHOUT COMPLICATION, WITHOUT LONG-TERM CURRENT USE OF INSULIN (HCC): ICD-10-CM

## 2023-05-16 LAB
25(OH)D3 SERPL-MCNC: 35.5 NG/ML (ref 30–100)
ANION GAP SERPL CALCULATED.3IONS-SCNC: 7 MMOL/L (ref 4–13)
BUN SERPL-MCNC: 15 MG/DL (ref 5–25)
CALCIUM SERPL-MCNC: 9.7 MG/DL (ref 8.4–10.2)
CHLORIDE SERPL-SCNC: 108 MMOL/L (ref 96–108)
CO2 SERPL-SCNC: 22 MMOL/L (ref 21–32)
CREAT SERPL-MCNC: 1.18 MG/DL (ref 0.6–1.3)
CREAT UR-MCNC: 78.7 MG/DL
GFR SERPL CREATININE-BSD FRML MDRD: 48 ML/MIN/1.73SQ M
GLUCOSE P FAST SERPL-MCNC: 107 MG/DL (ref 65–99)
MICROALBUMIN UR-MCNC: 308 MG/L (ref 0–20)
MICROALBUMIN/CREAT 24H UR: 391 MG/G CREATININE (ref 0–30)
POTASSIUM SERPL-SCNC: 4.6 MMOL/L (ref 3.5–5.3)
SODIUM SERPL-SCNC: 137 MMOL/L (ref 135–147)

## 2023-05-16 RX ORDER — OFLOXACIN 3 MG/ML
SOLUTION/ DROPS OPHTHALMIC
COMMUNITY
Start: 2023-05-15

## 2023-05-16 RX ORDER — PREDNISOLONE ACETATE 10 MG/ML
SUSPENSION/ DROPS OPHTHALMIC
COMMUNITY
Start: 2023-05-15

## 2023-05-16 NOTE — PROGRESS NOTES
Assessment and Plan:     Problem List Items Addressed This Visit        Other    Medicare annual wellness visit, subsequent - Primary   Pt going for labs now since has endo appt this week BS log have been fairly stable  Stay hydrated  Encouraged pt to schedule Mammogram  Pt has POA and Ad and she will get copy for our chart - she thought she had already brought to us but we do not have in the chart   Rto 4 months   BMI Counseling: Body mass index is 28 53 kg/m²  The BMI is above normal  Nutrition recommendations include consuming healthier snacks  Exercise recommendations include strength training exercises  Rationale for BMI follow-up plan is due to patient being overweight or obese  Preventive health issues were discussed with patient, and age appropriate screening tests were ordered as noted in patient's After Visit Summary  Personalized health advice and appropriate referrals for health education or preventive services given if needed, as noted in patient's After Visit Summary  History of Present Illness:     Patient presents for a Medicare Wellness Visit    HPI   Pt doing ok BS more stable since meds decreased Still smoking at times Seeing ophtho for cataracts - has had decrease focus on the computer at times   Patient Care Team:  Edwina Terry DO as PCP - General  Kay Lunsford MD as PCP - [address] [place] [address] (RTE)  Edw[facility] Terry [facility] as [facility]CP [facility]  Bri Magana DO as PCP - Endocrinology (Endocrinology)  DO Denis Mcbride MD as Endoscopist     Review of Systems:     Review of Systems   Constitutional: Negative for chills and fever  HENT: Negative  Eyes: Negative for visual disturbance  Respiratory: Negative for cough and shortness of breath  Cardiovascular: Negative for chest pain, palpitations and leg swelling  Gastrointestinal: Negative for abdominal distention and abdominal pain  Genitourinary: Negative      Musculoskeletal: Positive for arthralgias  Neurological: Negative for dizziness, light-headedness and headaches  Psychiatric/Behavioral: Negative for sleep disturbance  The patient is not nervous/anxious  Diabetic Foot Exam    Patient's shoes and socks removed  Right Foot/Ankle   Right Foot Inspection  Skin Exam: skin normal and skin intact  No dry skin, no warmth, no callus, no erythema, no maceration, no abnormal color, no pre-ulcer, no ulcer and no callus  Toe Exam: ROM and strength within normal limits  Sensory   Vibration: intact  Monofilament testing: intact    Vascular  The right DP pulse is 2+  The right PT pulse is 1+  Left Foot/Ankle  Left Foot Inspection  Skin Exam: skin normal and skin intact  No dry skin, no warmth, no erythema, no maceration, normal color, no pre-ulcer, no ulcer and no callus  Toe Exam: ROM and strength within normal limits  Sensory   Vibration: intact      Vascular  The left DP pulse is 2+  The left PT pulse is 1+       Assign Risk Category  No deformity present  Loss of protective sensation  No weak pulses  Risk: 1     Problem List:     Patient Active Problem List   Diagnosis   • Hypertension   • Type 2 diabetes mellitus with hyperglycemia (HCC)   • Tobacco abuse   • Non-ST elevation myocardial infarction (NSTEMI), type 2   • Vitamin D deficiency   • Depression   • Type 2 diabetes mellitus with hyperglycemia, without long-term current use of insulin ([facility])   • Hyperlipidemia   • Hypothyroidism   • Anxiety   • Medicare annual wellness visit, subsequent   • Osteoporosis   • History of adrenal insufficiency   • Hypoglycemia      Past Medical and Surgical History:     Past Medical History:   Diagnosis Date   • Adrenal insufficiency ([facility] )    • Anxiety    • Bacterial sepsis ([facility] )     last assessed: 06/13/16   • Cushing's syndrome ([facility] )     last assessed: 05/29/16   • Depression     last assessed: 01/02/18   • Diabetes mellitus ([facility] )    • Hyperlipidemia    • Hypertension    • Positive FIT (fecal immunochemical test) 6/22/2018    Added automatically from request for surgery [id number]   • Rib contusion, left, sequela 6/14/2022   • Trigger finger, right middle finger 3/7/2022     Past Surgical History:   Procedure Laterality Date   • ADRENALECTOMY  12/2002   • CHOLECYSTECTOMY     • HERNIA REPAIR      umbilical   • OTHER SURGICAL HISTORY      Injection of trigger joint   • GA COLONOSCOPY FLX DX W/COLLJ SPEC WHEN PFRMD N/A 7/10/2018    Procedure: COLONOSCOPY;  Surgeon: Lawanda Garnica MD;  Location: [facility];  Service: Gastroenterology   • TONSILLECTOMY AND ADENOIDECTOMY     • TUBAL LIGATION        Family History:     Family History   Problem Relation Age of Onset   • Breast cancer Mother    • No Known Problems Sister    • No Known Problems Daughter    • No Known Problems Daughter    • No Known Problems Paternal Aunt    • No Known Problems Paternal Aunt    • No Known Problems Paternal Aunt       Social History:     Social History     Socioeconomic History   • Marital status:      Spouse name: None   • Number of children: None   • Years of education: None   • Highest education level: None   Occupational History   • None   Tobacco Use   • Smoking status: Some Days     Packs/day: 0 25     Years: 50 00     Pack years: 12 50     Types: Cigarettes   • Smokeless tobacco: Never   Vaping Use   • Vaping Use: Never used   Substance and Sexual Activity   • Alcohol use: No   • Drug use: No   • Sexual activity: None   Other Topics Concern   • None   Social History Narrative    Always uses seat belt    Always uses sunscreen    Caffeine use    Dental care, regularly    No living will    Uses safety equipment - seatbelts     Social Determinants of Health     Financial Resource Strain: Low Risk    • Difficulty of Paying Living Expenses: Not very hard   Food Insecurity: Not on file   Transportation Needs: No Transportation Needs   • Lack of Transportation (Medical):  No   • Lack of Transportation (Non-Medical): No   Physical Activity: Not on file   Stress: Not on file   Social Connections: Not on file   Intimate Partner Violence: Not on file   Housing Stability: Not on file      Medications and Allergies:     Current Outpatient Medications   Medication Sig Dispense Refill   • atorvastatin (LIPITOR) 40 mg tablet TAKE 1 TABLET BY MOUTH ONCE DAILY IN THE EVENING 90 tablet 0   • Blood Glucose Monitoring Suppl (OneTouch Verio Reflect) w/Device KIT Check blood sugars once daily  Please substitute with appropriate alternative as covered by patient's insurance  Dx: E11 65 1 kit 0   • CALCIUM PO Take by mouth Pt not sure of dose       • glimepiride (AMARYL) 2 mg tablet Take 1 tablet (2 mg total) by mouth 2 (two) times a day (Patient taking differently: Take 2 mg by mouth in the morning) 90 tablet 1   • glucose blood (OneTouch Verio) test strip Check blood sugars once daily  Please substitute with appropriate alternative as covered by patient's insurance  Dx: E11 65 100 each 3   • lisinopril (ZESTRIL) 10 mg tablet Take 1 tablet by mouth once daily 90 tablet 0   • metFORMIN (GLUCOPHAGE) 500 mg tablet Take 1 tablet by mouth once daily 90 tablet 3   • OneTouch Delica Lancets 99F MISC Check blood sugars once daily  Please substitute with appropriate alternative as covered by patient's insurance  Dx: E11 65 100 each 3   • sertraline (ZOLOFT) 100 mg tablet TAKE 1 TABLET BY MOUTH ONCE DAILY AS DIRECTED 90 tablet 0   • Vitamin D, Cholecalciferol, 25 MCG (1000 UT) TABS Take 3,000 Units by mouth daily Take 2 capsules (2,000 iu) daily  • ofloxacin (OCUFLOX) 0 3 % ophthalmic solution  (Patient not taking: Reported on 5/16/2023)     • prednisoLONE acetate (PRED FORTE) 1 % ophthalmic suspension  (Patient not taking: Reported on 5/16/2023)     • predniSONE 20 mg tablet Take 1 tablet (20 mg total) by mouth daily (Patient not taking: Reported on 5/16/2023) 5 tablet 0     No current facility-administered medications for this visit  Allergies   Allergen Reactions   • Fosamax [Alendronate] Diarrhea   • Paxil [Paroxetine Hcl] Rash      Immunizations:     Immunization History   Administered Date(s) Administered   • COVID-19 MODERNA VACC 0 5 ML IM 03/17/2021, 04/15/2021   • Influenza Quadrivalent, 6-35 Months IM 09/13/2016   • Influenza, high dose seasonal 0 7 mL 09/12/2022   • Pneumococcal Conjugate 13-Valent 01/27/2021   • Pneumococcal Conjugate PCV 7 05/19/2016   • Tdap 06/04/2022      Health Maintenance:         Topic Date Due   • Cervical Cancer Screening  Never done   • Breast Cancer Screening: Mammogram  09/04/2021   • Colorectal Cancer Screening  10/10/2024   • Hepatitis C Screening  Completed         Topic Date Due   • COVID-19 Vaccine (3 - Booster for Moderna series) 06/10/2021   • Pneumococcal Vaccine: 65+ Years (2 - PPSV23 if available, else PCV20) 01/27/2022      Medicare Screening Tests and Risk Assessments:     Glen Roberts is here for her Subsequent Wellness visit  Last Medicare Wellness visit information reviewed, patient interviewed, no change since last AWV  Health Risk Assessment:   Patient rates overall health as good  Patient feels that their physical health rating is slightly better  Patient is satisfied with their life  Eyesight was rated as slightly worse  Hearing was rated as same  Patient feels that their emotional and mental health rating is same  Patients states they are sometimes angry  Patient states they are sometimes unusually tired/fatigued  Pain experienced in the last 7 days has been some  Patient's pain rating has been 2/10  Patient states that she has experienced no weight loss or gain in last 6 months  Depression Screening:   PHQ-9 Score: 5      Fall Risk Screening: In the past year, patient has experienced: no history of falling in past year      Urinary Incontinence Screening:   Patient has leaked urine accidently in the last six months       Home Safety:  Patient does not have trouble with stairs inside or outside of their home  Patient has working smoke alarms and has working carbon monoxide detector  Home safety hazards include: none  Nutrition:   Current diet is Low Carb  Medications:   Patient is currently taking over-the-counter supplements  OTC medications include: see medication list  Patient is able to manage medications  Activities of Daily Living (ADLs)/Instrumental Activities of Daily Living (IADLs):   Walk and transfer into and out of bed and chair?: Yes  Dress and groom yourself?: Yes    Bathe or shower yourself?: Yes    Feed yourself? Yes  Do your laundry/housekeeping?: Yes  Manage your money, pay your bills and track your expenses?: Yes  Make your own meals?: Yes    Do your own shopping?: Yes    Advance Care Planning:   Living will: Yes    Durable POA for healthcare: Yes    Advanced directive: Yes    End of Life Decisions reviewed with patient: Yes    Provider agrees with end of life decisions: Yes      Cognitive Screening:   Provider or family/friend/caregiver concerned regarding cognition?: No    PREVENTIVE SCREENINGS      Cardiovascular Screening:    General: Screening Not Indicated and History Lipid Disorder      Diabetes Screening:     General: Screening Not Indicated and History Diabetes      Colorectal Cancer Screening:     General: Screening Current      Cervical Cancer Screening:    General: Screening Not Indicated      Osteoporosis Screening:    General: Screening Not Indicated and History Osteoporosis      Lung Cancer Screening:     General: Screening Not Indicated      Hepatitis C Screening:    General: Screening Current    Screening, Brief Intervention, and Referral to Treatment (SBIRT)    Screening  Typical number of drinks in a day: 0  Typical number of drinks in a week: 0  Interpretation: Low risk drinking behavior      AUDIT-C Screenin) How often did you have a drink containing alcohol in the past year? never  2) How many drinks did you have on a typical day "when you were drinking in the past year? 0  3) How often did you have 6 or more drinks on one occasion in the past year? never    AUDIT-C Score: 0  Interpretation: Score 0-2 (female): Negative screen for alcohol misuse    Single Item Drug Screening:  How often have you used an illegal drug (including marijuana) or a prescription medication for non-medical reasons in the past year? never    Single Item Drug Screen Score: 0  Interpretation: Negative screen for possible drug use disorder    No results found  Physical Exam:     /80   Pulse 72   Temp (!) 97 1 °F (36 2 °C) (Temporal)   Resp 18   Ht 5' 1\" (1 549 m)   Wt 68 5 kg (151 lb)   BMI 28 53 kg/m²     Physical Exam  Vitals reviewed  Constitutional:       General: She is not in acute distress  Appearance: Normal appearance  She is not ill-appearing, toxic-appearing or diaphoretic  HENT:      Head: Normocephalic and atraumatic  Right Ear: External ear normal       Left Ear: External ear normal       Nose: Nose normal       Mouth/Throat:      Mouth: Mucous membranes are moist    Eyes:      General: No scleral icterus  Extraocular Movements: Extraocular movements intact  Conjunctiva/sclera: Conjunctivae normal       Pupils: Pupils are equal, round, and reactive to light  Cardiovascular:      Rate and Rhythm: Normal rate and regular rhythm  Pulses: no weak pulses          Dorsalis pedis pulses are 2+ on the right side and 2+ on the left side  Posterior tibial pulses are 1+ on the right side and 1+ on the left side  Heart sounds: Normal heart sounds  No murmur heard  Pulmonary:      Effort: Pulmonary effort is normal  No respiratory distress  Breath sounds: Normal breath sounds  No wheezing  Abdominal:      General: Bowel sounds are normal  There is no distension  Palpations: Abdomen is soft  Tenderness: There is no abdominal tenderness     Musculoskeletal:      Cervical back: Normal range of motion " and neck supple  Right lower leg: No edema  Left lower leg: No edema  Feet:      Right foot:      Skin integrity: No ulcer, skin breakdown, erythema, warmth, callus or dry skin  Left foot:      Skin integrity: No ulcer, skin breakdown, erythema, warmth, callus or dry skin  Lymphadenopathy:      Cervical: No cervical adenopathy  Skin:     General: Skin is warm and dry  Coloration: Skin is not jaundiced or pale  Neurological:      General: No focal deficit present  Mental Status: She is alert and oriented to person, place, and time  Mental status is at baseline  Cranial Nerves: No cranial nerve deficit  Sensory: No sensory deficit  Psychiatric:         Mood and Affect: Mood normal          Behavior: Behavior normal          Thought Content:  Thought content normal          Judgment: Judgment normal           Evan Jessica DO

## 2023-05-17 LAB
EST. AVERAGE GLUCOSE BLD GHB EST-MCNC: 154 MG/DL
HBA1C MFR BLD: 7 %

## 2023-05-18 ENCOUNTER — OFFICE VISIT (OUTPATIENT)
Dept: ENDOCRINOLOGY | Facility: CLINIC | Age: 68
End: 2023-05-18

## 2023-05-18 VITALS
HEART RATE: 89 BPM | BODY MASS INDEX: 28.25 KG/M2 | DIASTOLIC BLOOD PRESSURE: 62 MMHG | HEIGHT: 61 IN | WEIGHT: 149.6 LBS | SYSTOLIC BLOOD PRESSURE: 130 MMHG

## 2023-05-18 DIAGNOSIS — I10 ESSENTIAL HYPERTENSION: ICD-10-CM

## 2023-05-18 DIAGNOSIS — E78.5 HYPERLIPIDEMIA, UNSPECIFIED HYPERLIPIDEMIA TYPE: ICD-10-CM

## 2023-05-18 DIAGNOSIS — E11.9 TYPE 2 DIABETES MELLITUS WITHOUT COMPLICATION, WITHOUT LONG-TERM CURRENT USE OF INSULIN (HCC): Primary | ICD-10-CM

## 2023-05-18 DIAGNOSIS — M81.0 OSTEOPOROSIS, UNSPECIFIED OSTEOPOROSIS TYPE, UNSPECIFIED PATHOLOGICAL FRACTURE PRESENCE: ICD-10-CM

## 2023-05-18 RX ORDER — LISINOPRIL 20 MG/1
20 TABLET ORAL DAILY
Qty: 90 TABLET | Refills: 1 | Status: SHIPPED | OUTPATIENT
Start: 2023-05-18

## 2023-05-18 RX ORDER — GLIMEPIRIDE 2 MG/1
1 TABLET ORAL
Qty: 90 TABLET | Refills: 1 | Status: SHIPPED | OUTPATIENT
Start: 2023-05-18

## 2023-05-18 NOTE — PROGRESS NOTES
Remy Garcia 78 y o  female MRN: [id number]    Encounter: [id number]      Assessment/Plan     1  Type 2 diabetes c/b CKD3 with nephropathy - Slight worsening by a1c  Recommend decrease in glimepiride to 1 mg daily due to hypoglycemia  Continue metformin 500 mg daily, renally dosed  Increase lisinopril 20 mg daily due to albuminuria  Discussed SGLT2i for renal protection  Reviewed pro/cons  Provided resources for jardiance  Patient will check with insurance and review therapy  We may discuss at next visit, earlier PRN  Will repeat labs prior to next visit  2  Hypertension - stable  Plan as above    3  Hyperlipidemia - continue statin Rx    4  Osteoporosis - will be completing 3 years of reclast this August  I have asked for an updated DXA study to be completed prior to next encounter in 4-mo    5  History of adrenal Cushing's s/p adrenalectomy - stable    Problem List Items Addressed This Visit        Musculoskeletal and Integument    Osteoporosis    Relevant Orders    DXA bone density spine hip and pelvis       Other    Hyperlipidemia   Other Visit Diagnoses     Type 2 diabetes mellitus without complication, without long-term current use of insulin (HCC)    -  Primary    Relevant Medications    glimepiride (AMARYL) 2 mg tablet    Other Relevant Orders    Basic metabolic panel Clinic Collect    HEMOGLOBIN A1C W/ EAG ESTIMATION Clinic Collect    Albumin / creatinine urine ratio    Lipid Panel with Direct LDL reflex Lab Collect    Essential hypertension        Relevant Medications    lisinopril (ZESTRIL) 20 mg tablet        CC: Diabetes, osteoporosis    History of Present Illness     HPI:    Savanna Blackburn returns for follow up of diabetes, osteoporosis and history of secondary adrenal insufficiency  Savanna Blackburn reports no acute concerns  She is presently taking metformin 500 mg daily, glimepiride 2 mg bid  She did have an episode of symptomatic hypoglycemia into 50s during daytime  She has not had any more recent events   She comes today with BG log showing scattered values, variable range 62 - 139  No polyuria or polydipsia  Is reporting RUE pain, radicular in nature from neck to lower arm  This pain is infrequent  For hyperlipidemia, she takes lipitor 40 mg daily  For hypertension she takes lisinopril 10 mg daily  For osteoporosis, Savanna Blackburn will be receiving her third treatment of Reclast in August 2023  She has tolerated therapy without AEs  Last DXA 2020  For history of unilateral adrenalectomy and 2/2 AI, Savanna Blackburn has been stable off of steroid replacement therapy for multiple years  This is table  Review of Systems   Constitutional: Negative for appetite change and unexpected weight change  HENT: Negative for trouble swallowing and voice change  Eyes: Negative for photophobia and visual disturbance  Respiratory: Negative for shortness of breath  Cardiovascular: Negative for chest pain and palpitations  Gastrointestinal: Negative for nausea and vomiting  Endocrine: Negative for polydipsia and polyuria  Skin: Negative  Neurological: Negative for tremors and weakness  Psychiatric/Behavioral: Negative for agitation and behavioral problems         Historical Information   Past Medical History:   Diagnosis Date   • Adrenal insufficiency ([facility] )    • Anxiety    • Bacterial sepsis ([facility] [address] )     last assessed: 06/13/16   • Cushing's syndrome ([facility] [address] )     last assessed: 05/29/16   • Depression     last assessed: 01/02/18   • Diabetes mellitus ([facility] [address] )    • Hyperlipidemia    • Hypertension    • Positive FIT (fecal immunochemical test) 6/22/2018    Added automatically from request for surgery [id number]   • Rib contusion, left, sequela 6/14/2022   • Trigger finger, right middle finger 3/7/2022     Past Surgical History:   Procedure Laterality Date   • ADRENALECTOMY  12/2002   • CHOLECYSTECTOMY     • HERNIA REPAIR      umbilical   • OTHER SURGICAL HISTORY      Injection of trigger joint   • MS COLONOSCOPY FLX DX W/COLLJ SPEC WHEN PFRMD N/A 7/10/2018    Procedure: COLONOSCOPY;  Surgeon: Bethanie Ballard MD;  Location: [facility];  Service: Gastroenterology   • TONSILLECTOMY AND ADENOIDECTOMY     • TUBAL LIGATION       Social History   Social History     Substance and Sexual Activity   Alcohol Use No     Social History     Substance and Sexual Activity   Drug Use No     Social History     Tobacco Use   Smoking Status Some Days   • Packs/day: 0 25   • Years: 50 00   • Pack years: 12 50   • Types: Cigarettes   Smokeless Tobacco Never     Family History:   Family History   Problem Relation Age of Onset   • Breast cancer Mother    • No Known Problems Sister    • No Known Problems Daughter    • No Known Problems Daughter    • No Known Problems Paternal Aunt    • No Known Problems Paternal Aunt    • No Known Problems Paternal Aunt        Meds/Allergies   Current Outpatient Medications   Medication Sig Dispense Refill   • atorvastatin (LIPITOR) 40 mg tablet TAKE 1 TABLET BY MOUTH ONCE DAILY IN THE EVENING 90 tablet 0   • Blood Glucose Monitoring Suppl (OneTouch Verio Reflect) w/Device KIT Check blood sugars once daily  Please substitute with appropriate alternative as covered by patient's insurance  Dx: E11 65 1 kit 0   • CALCIUM PO Take by mouth Pt not sure of dose       • glimepiride (AMARYL) 2 mg tablet Take 0 5 tablets (1 mg total) by mouth daily with breakfast 90 tablet 1   • glucose blood (OneTouch Verio) test strip Check blood sugars once daily  Please substitute with appropriate alternative as covered by patient's insurance  Dx: E11 65 100 each 3   • lisinopril (ZESTRIL) 20 mg tablet Take 1 tablet (20 mg total) by mouth daily 90 tablet 1   • metFORMIN (GLUCOPHAGE) 500 mg tablet Take 1 tablet by mouth once daily 90 tablet 3   • ofloxacin (OCUFLOX) 0 3 % ophthalmic solution      • OneTouch Delica Lancets 68R MISC Check blood sugars once daily  Please substitute with appropriate alternative as covered by patient's insurance   Dx: E11 65 100 "each 3   • prednisoLONE acetate (PRED FORTE) 1 % ophthalmic suspension      • predniSONE 20 mg tablet Take 1 tablet (20 mg total) by mouth daily 5 tablet 0   • sertraline (ZOLOFT) 100 mg tablet TAKE 1 TABLET BY MOUTH ONCE DAILY AS DIRECTED 90 tablet 0   • Vitamin D, Cholecalciferol, 25 MCG (1000 UT) TABS Take 2,000 Units by mouth daily Take 2 capsules (2,000 iu) daily  No current facility-administered medications for this visit  Allergies   Allergen Reactions   • Fosamax [Alendronate] Diarrhea   • Paxil [Paroxetine Hcl] Rash       Objective   Vitals: Blood pressure 130/62, pulse 89, height 5' 1\" (1 549 m), weight 67 9 kg (149 lb 9 6 oz)  Physical Exam  Vitals reviewed  Constitutional:       General: She is not in acute distress  HENT:      Head: Normocephalic and atraumatic  Eyes:      General: No scleral icterus  Conjunctiva/sclera: Conjunctivae normal    Cardiovascular:      Rate and Rhythm: Normal rate and regular rhythm  Pulmonary:      Effort: Pulmonary effort is normal  No respiratory distress  Abdominal:      Palpations: Abdomen is soft  Tenderness: There is no abdominal tenderness  Musculoskeletal:      Right lower leg: No edema  Left lower leg: No edema  Skin:     General: Skin is warm and dry  Neurological:      General: No focal deficit present  Mental Status: She is alert  Psychiatric:         Mood and Affect: Mood normal          Behavior: Behavior normal          The history was obtained from the review of the chart, patient      Lab Results:   Lab Results   Component Value Date/Time    Hemoglobin A1C 7 0 (H) 05/16/2023 10:49 AM    Hemoglobin A1C 6 8 (H) 01/10/2023 07:29 AM    Hemoglobin A1C 6 8 (H) 08/17/2022 06:41 AM    BUN 15 05/16/2023 10:49 AM    BUN 32 (H) 01/10/2023 07:29 AM    BUN 28 (H) 08/17/2022 06:41 AM    Potassium 4 6 05/16/2023 10:49 AM    Potassium 5 1 01/10/2023 07:29 AM    Potassium 4 9 08/17/2022 06:41 AM    Chloride 108 05/16/2023 " 10:49 AM    Chloride 102 01/10/2023 07:29 AM    Chloride 102 08/17/2022 06:41 AM    CO2 22 05/16/2023 10:49 AM    CO2 24 01/10/2023 07:29 AM    CO2 23 08/17/2022 06:41 AM    Creatinine 1 18 05/16/2023 10:49 AM    Creatinine 1 54 (H) 01/10/2023 07:29 AM    Creatinine 1 26 08/17/2022 06:41 AM    AST 6 08/17/2022 06:41 AM    ALT 16 08/17/2022 06:41 AM    Albumin 2 9 (L) 08/17/2022 06:41 AM    HDL, Direct 40 (L) 01/10/2023 07:29 AM    HDL, Direct 32 (L) 08/17/2022 06:41 AM    Triglycerides 116 01/10/2023 07:29 AM    Triglycerides 281 (H) 08/17/2022 06:41 AM     Lab Results   Component Value Date    LDLCALC 114 (H) 01/10/2023       Component      Latest Ref Rng & Units 8/17/2022   Sodium      135 - 147 mmol/L 135   Potassium      3 5 - 5 3 mmol/L 4 9   Chloride      96 - 108 mmol/L 102   CO2      21 - 32 mmol/L 23   Anion Gap      4 - 13 mmol/L 10   BUN      5 - 25 mg/dL 28 (H)   Creatinine      0 60 - 1 30 mg/dL 1 26   GLUCOSE FASTING      65 - 99 mg/dL 116 (H)   Calcium      8 3 - 10 1 mg/dL 9 1   CORRECTED CALCIUM      8 3 - 10 1 mg/dL 10 0   AST      5 - 45 U/L 6   ALT      12 - 78 U/L 16   Alkaline Phosphatase      46 - 116 U/L 72   Total Protein      6 4 - 8 4 g/dL 9 1 (H)   Albumin      3 5 - 5 0 g/dL 2 9 (L)   TOTAL BILIRUBIN      0 20 - 1 00 mg/dL 0 22   eGFR      ml/min/1 73sq m 44   Cholesterol      See Comment mg/dL 229 (H)   Triglycerides      See Comment mg/dL 281 (H)   HDL      >=50 mg/dL 32 (L)   LDL Calculated      0 - 100 mg/dL 141 (H)   Non-HDL Cholesterol      mg/dl 197   Hemoglobin A1C      Normal 3 8-5 6%; PreDiabetic 5 7-6 4%; Diabetic >=6 5%; Glycemic control for adults with diabetes <7 0% % 6 8 (H)   eAG, EST AVG Glucose      mg/dl 148   ACTH      7 2 - 63 3 pg/mL 73 3 (H)   Cortisol - AM      4 2 - 22 4 ug/dL 18 9   Vit D, 25-Hydroxy      30 0 - 100 0 ng/mL 34 6         Imaging Studies: I have personally reviewed pertinent reports        Portions of the record may have been created with voice "recognition software  Occasional wrong word or \"sound a like\" substitutions may have occurred due to the inherent limitations of voice recognition software  Read the chart carefully and recognize, using context, where substitutions have occurred    "

## 2023-07-06 ENCOUNTER — TELEPHONE (OUTPATIENT)
Dept: FAMILY MEDICINE CLINIC | Facility: CLINIC | Age: 68
End: 2023-07-06

## 2023-07-06 ENCOUNTER — HOSPITAL ENCOUNTER (OUTPATIENT)
Dept: ULTRASOUND IMAGING | Facility: HOSPITAL | Age: 68
Discharge: HOME | End: 2023-07-06
Attending: INTERNAL MEDICINE
Payer: COMMERCIAL

## 2023-07-06 DIAGNOSIS — N60.09 CYST OF BREAST, UNSPECIFIED LATERALITY: Primary | ICD-10-CM

## 2023-07-06 DIAGNOSIS — N60.01 BREAST CYST, RIGHT: ICD-10-CM

## 2023-07-06 PROCEDURE — 76642 ULTRASOUND BREAST LIMITED: CPT

## 2023-07-06 NOTE — TELEPHONE ENCOUNTER
She wants to know to due about breast cyst.  There is thick brown drainage .   She just had a diagnostic mammogram and it was normal

## 2023-07-15 PROBLEM — Z00.00 MEDICARE ANNUAL WELLNESS VISIT, SUBSEQUENT: Status: RESOLVED | Noted: 2020-03-12 | Resolved: 2023-07-15

## 2023-07-19 ENCOUNTER — TELEPHONE (OUTPATIENT)
Dept: FAMILY MEDICINE CLINIC | Facility: CLINIC | Age: 68
End: 2023-07-19

## 2023-07-19 NOTE — TELEPHONE ENCOUNTER
LVM for pt that she is overdue for mammo, asked for return call so I can place order and get her scheduled.

## 2023-07-29 DIAGNOSIS — E11.9 TYPE 2 DIABETES MELLITUS WITHOUT COMPLICATION, WITHOUT LONG-TERM CURRENT USE OF INSULIN (HCC): ICD-10-CM

## 2023-07-29 DIAGNOSIS — E78.5 HYPERLIPIDEMIA, UNSPECIFIED HYPERLIPIDEMIA TYPE: ICD-10-CM

## 2023-07-30 RX ORDER — GLIMEPIRIDE 4 MG/1
TABLET ORAL
Qty: 180 TABLET | Refills: 0 | OUTPATIENT
Start: 2023-07-30

## 2023-07-31 RX ORDER — ATORVASTATIN CALCIUM 40 MG/1
TABLET, FILM COATED ORAL
Qty: 90 TABLET | Refills: 0 | Status: SHIPPED | OUTPATIENT
Start: 2023-07-31

## 2023-08-02 ENCOUNTER — CONSULT (OUTPATIENT)
Dept: SURGERY | Facility: CLINIC | Age: 68
End: 2023-08-02
Payer: COMMERCIAL

## 2023-08-02 VITALS
BODY MASS INDEX: 28.43 KG/M2 | HEART RATE: 86 BPM | SYSTOLIC BLOOD PRESSURE: 122 MMHG | DIASTOLIC BLOOD PRESSURE: 60 MMHG | WEIGHT: 150.6 LBS | RESPIRATION RATE: 16 BRPM | HEIGHT: 61 IN | TEMPERATURE: 97.8 F | OXYGEN SATURATION: 98 %

## 2023-08-02 DIAGNOSIS — L73.2 HIDRADENITIS SUPPURATIVA: Primary | ICD-10-CM

## 2023-08-02 DIAGNOSIS — N60.81 CYST OF SKIN OF RIGHT BREAST: ICD-10-CM

## 2023-08-02 DIAGNOSIS — Z12.31 SCREENING MAMMOGRAM FOR BREAST CANCER: ICD-10-CM

## 2023-08-02 PROCEDURE — 99204 OFFICE O/P NEW MOD 45 MIN: CPT | Performed by: SURGERY

## 2023-08-02 NOTE — LETTER
August 11, 2023     Felix Mcnair DO  [address] [place] [address]    Patient: Truman Palomares   YOB: 1955   Date of Visit: 8/2/2023       Dear Dr. Junior Comp: Thank you for referring Ross Alvarez to me for evaluation. Below are my notes for this consultation. If you have questions, please do not hesitate to call me. I look forward to following your patient along with you. Sincerely,        Hoda Mayen MD        CC: No Recipients    Hoda Mayen MD  8/11/2023  8:59 PM  Sign when Signing Visit  [address] 78 y.o. 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 Physical exam findings consistent with a diagnosis of hidradenitis suppurativa. It appears as if she has had a recent flare that has now responded to conservative management with antibiotics but is still present and appears chronically inflamed but no longer acutely infected. We discussed the management of hidradenitis at length. We discussed the importance of establishing care with dermatologist for diagnostic confirmation and medical management which can at times involve longstanding suppressive antibiotics and immune modulating medications as well as various skin care and skin hygiene regimens. Refractory cases can at times require wide excision of all of the disease and soft tissue flap coverage with a plastic surgeon. Otherwise, surgical intervention is usually reserved for acute management of flares with incision and drainage as needed. We will help her to get an appointment with dermatology for further evaluation.  At this point, there is no acute indication for further antibiotics or incision and drainage today. Photos taken for the chart. Of note, patient is behind on her mammograms, last one in 2020 was normal. Her mother had a history of breast cancer. Bilateral diagnostic mammogram ordered today and we will help her to schedule. With the subacute/chronic inflammatory changes of the skin of both breasts, I would like to have the breast center perform diagnostic mammograms to catch her up on her screening and to allow for a more detailed view in light of this active pathology on the breast skin. We will follow up on her mammogram and her derm visit and see her on an as needed basis. History of Present Illness   Chief Complaint   Patient presents with   • Consult     Patient with right breast pain and soreness, lesion at right inferomedial breast appears to be resolving from a flare of an acute on chronic inflammatory event. Multiple lesions of both inferior breasts within the skin fold appear similar but at different stages. No acute cellulitis or abscess. Patient explains for a few years these have become more of an issue, they will fill in and become swollen lumps and then express and drain brownish fluid. She took a course of antibiotics in June that helped somewhat. No prior dermatology visits, excisions, biopsies, skin cancers. Patient had prior benign functioning adrenal tumor removed with right adrenalectomy, subsequent 25 year cortef steroid use, has been off for 2 years, had adrenal insufficiency for years, wonders if steroid use has anything to do with the development of this skin condition of hers. This is the first time she is being formally seen about it. Right breast ultrasound from 7/6 reviewed showing 22 x 3 x 19 mm cyst in the skin of the right breast at 5 oclock, 8cm from the nipple. Some chronic inflammatory skin changes seen. No breast pathology identified. Last mammogram in 2020, overdue for routine screening. Menarche - unknown  Pregnancies - 3 live births (3 miscarriages)  Age at youngest pregnancy - 24  OCP - none  Menopause - 43's  HRT - no  Personal - no prior abnormal mammograms or breast biopsies, history of benign functional R adrenal tumor s/p resection  Family - mother had breast cancer      Review of Systems   Constitutional: Negative. Skin: Positive for color change and wound. Chronic changes of the bilateral inferior breasts and inferior abdominal wall pannus consistent with hidradenitis    Dominant cyst in the skin measured on recent breast ultrasound at the right inferomedial breast, likely a flare of the hidradenitis at the time based on exam   Hematological: Negative. All other systems reviewed and are negative.       Historical Information   Past Medical History:   Diagnosis Date   • Adrenal insufficiency ([address])    • Anxiety    • Bacterial sepsis ([address])     last assessed: 06/13/16   • Cushing's syndrome ([address])     last assessed: 05/29/16   • Depression     last assessed: 01/02/18   • Diabetes mellitus ([address])    • Hyperlipidemia    • Hypertension    • Positive FIT (fecal immunochemical test) 6/22/2018    Added automatically from request for surgery [id number]   • Rib contusion, left, sequela 6/14/2022   • Trigger finger, right middle finger 3/7/2022     Past Surgical History:   Procedure Laterality Date   • ADRENALECTOMY  12/2002   • CHOLECYSTECTOMY     • HERNIA REPAIR      umbilical   • OTHER SURGICAL HISTORY      Injection of trigger joint   • VT COLONOSCOPY FLX DX W/COLLJ SPEC WHEN PFRMD N/A 7/10/2018    Procedure: COLONOSCOPY;  Surgeon: Cristofer Merida MD;  Location: [facility];  Service: Gastroenterology   • TONSILLECTOMY AND ADENOIDECTOMY     • TUBAL LIGATION       Social History   Social History     Substance and Sexual Activity   Alcohol Use No     Social History     Substance and Sexual Activity   Drug Use No     Social History     Tobacco Use   Smoking Status Some Days   • Packs/day: 0.25 • Years: 50.00   • Total pack years: 12.50   • Types: Cigarettes   Smokeless Tobacco Never     Family History   Problem Relation Age of Onset   • Breast cancer Mother    • No Known Problems Sister    • No Known Problems Daughter    • No Known Problems Daughter    • No Known Problems Paternal Aunt    • No Known Problems Paternal Aunt    • No Known Problems Paternal Aunt        Meds/Allergies     Current Outpatient Medications:   •  atorvastatin (LIPITOR) 40 mg tablet, TAKE 1 TABLET BY MOUTH ONCE DAILY IN THE EVENING, Disp: 90 tablet, Rfl: 0  •  Blood Glucose Monitoring Suppl (OneTouch Verio Reflect) w/Device KIT, Check blood sugars once daily. Please substitute with appropriate alternative as covered by patient's insurance. Dx: E11.65, Disp: 1 kit, Rfl: 0  •  CALCIUM PO, Take by mouth Pt not sure of dose  , Disp: , Rfl:   •  glimepiride (AMARYL) 2 mg tablet, Take 0.5 tablets (1 mg total) by mouth daily with breakfast, Disp: 90 tablet, Rfl: 1  •  glucose blood (OneTouch Verio) test strip, Check blood sugars once daily. Please substitute with appropriate alternative as covered by patient's insurance. Dx: E11.65, Disp: 100 each, Rfl: 3  •  lisinopril (ZESTRIL) 20 mg tablet, Take 1 tablet (20 mg total) by mouth daily, Disp: 90 tablet, Rfl: 1  •  metFORMIN (GLUCOPHAGE) 500 mg tablet, Take 1 tablet by mouth once daily, Disp: 90 tablet, Rfl: 3  •  OneTouch Delica Lancets 68U MISC, Check blood sugars once daily. Please substitute with appropriate alternative as covered by patient's insurance. Dx: E11.65, Disp: 100 each, Rfl: 3  •  sertraline (ZOLOFT) 100 mg tablet, TAKE 1 TABLET BY MOUTH ONCE DAILY AS DIRECTED, Disp: 90 tablet, Rfl: 0  •  Vitamin D, Cholecalciferol, 25 MCG (1000 UT) TABS, Take 2,000 Units by mouth daily Take 2 capsules (2,000 iu) daily. , Disp: , Rfl:   •  ofloxacin (OCUFLOX) 0.3 % ophthalmic solution, , Disp: , Rfl:   •  prednisoLONE acetate (PRED FORTE) 1 % ophthalmic suspension, , Disp: , Rfl: Allergies   Allergen Reactions   • Fosamax [Alendronate] Diarrhea   • Paxil [Paroxetine Hcl] Rash       The following portions of the patient's history were reviewed and updated as appropriate: allergies, current medications, past family history, past medical history, past social history, past surgical history and problem list.    Objective   Current Vitals:   Blood pressure 122/60, pulse 86, temperature 97.8 °F (36.6 °C), temperature source Temporal, resp. rate 16, height 5' 1" (1.549 m), weight 68.3 kg (150 lb 9.6 oz), SpO2 98 %. Physical Exam  Vitals reviewed. Constitutional:       General: She is not in acute distress. HENT:      Head: Normocephalic. Mouth/Throat:      Mouth: Mucous membranes are moist.   Eyes:      Pupils: Pupils are equal, round, and reactive to light. Cardiovascular:      Rate and Rhythm: Normal rate. Pulmonary:      Effort: Pulmonary effort is normal. No respiratory distress. Abdominal:      Palpations: Abdomen is soft. Musculoskeletal:         General: Normal range of motion. Cervical back: Normal range of motion and neck supple. Lymphadenopathy:      Cervical: No cervical adenopathy. Skin:     General: Skin is warm and dry. Capillary Refill: Capillary refill takes less than 2 seconds. Comments: Evidence of right more than left inferior breast hidradenitis with a site of recent flare in the right inferiomedial breast with some purple discoloration, no acute cellulitis or abscess, area is now nontender, some evidence of chronic skin changes and inflammation    Similar pits and lesions seen along inferior pannus just underneath the pannus in the suprapubic region    No significant lesions within the axillae or inguinal/gluteal regions   Neurological:      General: No focal deficit present. Mental Status: She is alert.    Psychiatric:         Mood and Affect: Mood normal.     The patient has no palpable cervical, supraclavicular, or axillary lymphadenopathy bilaterally. The breasts are symmetric. There are no dominant lumps, masses, or nipple retraction bilaterally. There are chronic changes of the inferior breast skin with chronic skin thickening and pits as above. Signature:   Eloy Hernandez MD  Date: 8/11/2023 Time: 8:35 PM

## 2023-08-11 PROBLEM — N60.09 CYST OF BREAST: Status: ACTIVE | Noted: 2023-08-11

## 2023-08-11 PROBLEM — L73.2 HIDRADENITIS SUPPURATIVA: Status: ACTIVE | Noted: 2023-08-11

## 2023-08-11 PROBLEM — N60.81 CYST OF SKIN OF RIGHT BREAST: Status: ACTIVE | Noted: 2023-08-11

## 2023-08-11 PROBLEM — N60.09 CYST OF BREAST: Status: RESOLVED | Noted: 2023-08-11 | Resolved: 2023-08-11

## 2023-08-11 PROBLEM — Z12.31 SCREENING MAMMOGRAM FOR BREAST CANCER: Status: ACTIVE | Noted: 2023-08-11

## 2023-08-12 NOTE — PROGRESS NOTES
Consult - General Surgery   [facility] Decree 78 y.o. 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 Physical exam findings consistent with a diagnosis of hidradenitis suppurativa. It appears as if she has had a recent flare that has now responded to conservative management with antibiotics but is still present and appears chronically inflamed but no longer acutely infected. We discussed the management of hidradenitis at length. We discussed the importance of establishing care with dermatologist for diagnostic confirmation and medical management which can at times involve longstanding suppressive antibiotics and immune modulating medications as well as various skin care and skin hygiene regimens. Refractory cases can at times require wide excision of all of the disease and soft tissue flap coverage with a plastic surgeon. Otherwise, surgical intervention is usually reserved for acute management of flares with incision and drainage as needed. We will help her to get an appointment with dermatology for further evaluation. At this point, there is no acute indication for further antibiotics or incision and drainage today. Photos taken for the chart. Of note, patient is behind on her mammograms, last one in 2020 was normal. Her mother had a history of breast cancer. Bilateral diagnostic mammogram ordered today and we will help her to schedule.  With the subacute/chronic inflammatory changes of the skin of both breasts, I would like to have the breast center perform diagnostic mammograms to catch her up on her screening and to allow for a more detailed view in light of this active pathology on the breast skin. We will follow up on her mammogram and her derm visit and see her on an as needed basis. History of Present Illness   Chief Complaint   Patient presents with   • Consult     Patient with right breast pain and soreness, lesion at right inferomedial breast appears to be resolving from a flare of an acute on chronic inflammatory event. Multiple lesions of both inferior breasts within the skin fold appear similar but at different stages. No acute cellulitis or abscess. Patient explains for a few years these have become more of an issue, they will fill in and become swollen lumps and then express and drain brownish fluid. She took a course of antibiotics in June that helped somewhat. No prior dermatology visits, excisions, biopsies, skin cancers. Patient had prior benign functioning adrenal tumor removed with right adrenalectomy, subsequent 25 year cortef steroid use, has been off for 2 years, had adrenal insufficiency for years, wonders if steroid use has anything to do with the development of this skin condition of hers. This is the first time she is being formally seen about it. Right breast ultrasound from 7/6 reviewed showing 22 x 3 x 19 mm cyst in the skin of the right breast at 5 oclock, 8cm from the nipple. Some chronic inflammatory skin changes seen. No breast pathology identified. Last mammogram in 2020, overdue for routine screening. Menarche - unknown  Pregnancies - 3 live births (3 miscarriages)  Age at youngest pregnancy - 24  OCP - none  Menopause - 43's  HRT - no  Personal - no prior abnormal mammograms or breast biopsies, history of benign functional R adrenal tumor s/p resection  Family - mother had breast cancer      Review of Systems   Constitutional: Negative. Skin: Positive for color change and wound.         Chronic changes of the bilateral inferior breasts and inferior abdominal wall pannus consistent with hidradenitis    Dominant cyst in the skin measured on recent breast ultrasound at the right inferomedial breast, likely a flare of the hidradenitis at the time based on exam   Hematological: Negative. All other systems reviewed and are negative.       Historical Information   Past Medical History:   Diagnosis Date   • Adrenal insufficiency ([address])    • Anxiety    • Bacterial sepsis ([address])     last assessed: 06/13/16   • Cushing's syndrome ([address])     last assessed: 05/29/16   • Depression     last assessed: 01/02/18   • Diabetes mellitus ([address])    • Hyperlipidemia    • Hypertension    • Positive FIT (fecal immunochemical test) 6/22/2018    Added automatically from request for surgery [id number]   • Rib contusion, left, sequela 6/14/2022   • Trigger finger, right middle finger 3/7/2022     Past Surgical History:   Procedure Laterality Date   • ADRENALECTOMY  12/2002   • CHOLECYSTECTOMY     • HERNIA REPAIR      umbilical   • OTHER SURGICAL HISTORY      Injection of trigger joint   • TN COLONOSCOPY FLX DX W/COLLJ SPEC WHEN PFRMD N/A 7/10/2018    Procedure: COLONOSCOPY;  Surgeon: Radha Washburn MD;  Location: [facility];  Service: Gastroenterology   • TONSILLECTOMY AND ADENOIDECTOMY     • TUBAL LIGATION       Social History   Social History     Substance and Sexual Activity   Alcohol Use No     Social History     Substance and Sexual Activity   Drug Use No     Social History     Tobacco Use   Smoking Status Some Days   • Packs/day: 0.25   • Years: 50.00   • Total pack years: 12.50   • Types: Cigarettes   Smokeless Tobacco Never     Family History   Problem Relation Age of Onset   • Breast cancer Mother    • No Known Problems Sister    • No Known Problems Daughter    • No Known Problems Daughter    • No Known Problems Paternal Aunt    • No Known Problems Paternal Aunt    • No Known Problems Paternal Aunt        Meds/Allergies     Current Outpatient Medications:   •  atorvastatin (LIPITOR) 40 mg tablet, TAKE 1 TABLET BY MOUTH ONCE DAILY IN THE EVENING, Disp: 90 tablet, Rfl: 0  •  Blood Glucose Monitoring Suppl (OneTouch Verio Reflect) w/Device KIT, Check blood sugars once daily. Please substitute with appropriate alternative as covered by patient's insurance. Dx: E11.65, Disp: 1 kit, Rfl: 0  •  CALCIUM PO, Take by mouth Pt not sure of dose  , Disp: , Rfl:   •  glimepiride (AMARYL) 2 mg tablet, Take 0.5 tablets (1 mg total) by mouth daily with breakfast, Disp: 90 tablet, Rfl: 1  •  glucose blood (OneTouch Verio) test strip, Check blood sugars once daily. Please substitute with appropriate alternative as covered by patient's insurance. Dx: E11.65, Disp: 100 each, Rfl: 3  •  lisinopril (ZESTRIL) 20 mg tablet, Take 1 tablet (20 mg total) by mouth daily, Disp: 90 tablet, Rfl: 1  •  metFORMIN (GLUCOPHAGE) 500 mg tablet, Take 1 tablet by mouth once daily, Disp: 90 tablet, Rfl: 3  •  OneTouch Delica Lancets 26Y MISC, Check blood sugars once daily. Please substitute with appropriate alternative as covered by patient's insurance. Dx: E11.65, Disp: 100 each, Rfl: 3  •  sertraline (ZOLOFT) 100 mg tablet, TAKE 1 TABLET BY MOUTH ONCE DAILY AS DIRECTED, Disp: 90 tablet, Rfl: 0  •  Vitamin D, Cholecalciferol, 25 MCG (1000 UT) TABS, Take 2,000 Units by mouth daily Take 2 capsules (2,000 iu) daily. , Disp: , Rfl:   •  ofloxacin (OCUFLOX) 0.3 % ophthalmic solution, , Disp: , Rfl:   •  prednisoLONE acetate (PRED FORTE) 1 % ophthalmic suspension, , Disp: , Rfl:   Allergies   Allergen Reactions   • Fosamax [Alendronate] Diarrhea   • Paxil [Paroxetine Hcl] Rash       The following portions of the patient's history were reviewed and updated as appropriate: allergies, current medications, past family history, past medical history, past social history, past surgical history and problem list.    Objective   Current Vitals:   Blood pressure 122/60, pulse 86, temperature 97.8 °F (36.6 °C), temperature source Temporal, resp. rate 16, height 5' 1" (1.549 m), weight 68.3 kg (150 lb 9.6 oz), SpO2 98 %.     Physical Exam  Vitals reviewed. Constitutional:       General: She is not in acute distress. HENT:      Head: Normocephalic. Mouth/Throat:      Mouth: Mucous membranes are moist.   Eyes:      Pupils: Pupils are equal, round, and reactive to light. Cardiovascular:      Rate and Rhythm: Normal rate. Pulmonary:      Effort: Pulmonary effort is normal. No respiratory distress. Abdominal:      Palpations: Abdomen is soft. Musculoskeletal:         General: Normal range of motion. Cervical back: Normal range of motion and neck supple. Lymphadenopathy:      Cervical: No cervical adenopathy. Skin:     General: Skin is warm and dry. Capillary Refill: Capillary refill takes less than 2 seconds. Comments: Evidence of right more than left inferior breast hidradenitis with a site of recent flare in the right inferiomedial breast with some purple discoloration, no acute cellulitis or abscess, area is now nontender, some evidence of chronic skin changes and inflammation    Similar pits and lesions seen along inferior pannus just underneath the pannus in the suprapubic region    No significant lesions within the axillae or inguinal/gluteal regions   Neurological:      General: No focal deficit present. Mental Status: She is alert. Psychiatric:         Mood and Affect: Mood normal.     The patient has no palpable cervical, supraclavicular, or axillary lymphadenopathy bilaterally. The breasts are symmetric. There are no dominant lumps, masses, or nipple retraction bilaterally. There are chronic changes of the inferior breast skin with chronic skin thickening and pits as above. Signature:   Katherine Arrington MD  Date: 8/11/2023 Time: 8:35 PM

## 2023-08-16 ENCOUNTER — HOSPITAL ENCOUNTER (OUTPATIENT)
Dept: BONE DENSITY | Facility: HOSPITAL | Age: 68
Discharge: HOME | End: 2023-08-16
Attending: STUDENT IN AN ORGANIZED HEALTH CARE EDUCATION/TRAINING PROGRAM
Payer: COMMERCIAL

## 2023-08-16 ENCOUNTER — APPOINTMENT (OUTPATIENT)
Dept: LAB | Facility: HOSPITAL | Age: 68
End: 2023-08-16
Payer: COMMERCIAL

## 2023-08-16 VITALS — BODY MASS INDEX: 29.45 KG/M2 | HEIGHT: 60 IN | WEIGHT: 150 LBS

## 2023-08-16 DIAGNOSIS — E11.9 TYPE 2 DIABETES MELLITUS WITHOUT COMPLICATION, WITHOUT LONG-TERM CURRENT USE OF INSULIN (HCC): ICD-10-CM

## 2023-08-16 DIAGNOSIS — M81.0 OSTEOPOROSIS, UNSPECIFIED OSTEOPOROSIS TYPE, UNSPECIFIED PATHOLOGICAL FRACTURE PRESENCE: ICD-10-CM

## 2023-08-16 LAB
ANION GAP SERPL CALCULATED.3IONS-SCNC: 7 MMOL/L
BUN SERPL-MCNC: 22 MG/DL (ref 5–25)
CALCIUM SERPL-MCNC: 9.6 MG/DL (ref 8.4–10.2)
CHLORIDE SERPL-SCNC: 105 MMOL/L (ref 96–108)
CHOLEST SERPL-MCNC: 210 MG/DL
CO2 SERPL-SCNC: 21 MMOL/L (ref 21–32)
CREAT SERPL-MCNC: 1.13 MG/DL (ref 0.6–1.3)
CREAT UR-MCNC: 31 MG/DL
EST. AVERAGE GLUCOSE BLD GHB EST-MCNC: 169 MG/DL
GFR SERPL CREATININE-BSD FRML MDRD: 50 ML/MIN/1.73SQ M
GLUCOSE P FAST SERPL-MCNC: 165 MG/DL (ref 65–99)
HBA1C MFR BLD: 7.5 %
HDLC SERPL-MCNC: 37 MG/DL
LDLC SERPL CALC-MCNC: 135 MG/DL (ref 0–100)
MICROALBUMIN UR-MCNC: 217 MG/L (ref 0–20)
MICROALBUMIN/CREAT 24H UR: 700 MG/G CREATININE (ref 0–30)
POTASSIUM SERPL-SCNC: 4.8 MMOL/L (ref 3.5–5.3)
SODIUM SERPL-SCNC: 133 MMOL/L (ref 135–147)
TRIGL SERPL-MCNC: 191 MG/DL

## 2023-08-16 PROCEDURE — 83036 HEMOGLOBIN GLYCOSYLATED A1C: CPT

## 2023-08-16 PROCEDURE — 82043 UR ALBUMIN QUANTITATIVE: CPT

## 2023-08-16 PROCEDURE — 77080 DXA BONE DENSITY AXIAL: CPT

## 2023-08-16 PROCEDURE — 36415 COLL VENOUS BLD VENIPUNCTURE: CPT

## 2023-08-16 PROCEDURE — 80048 BASIC METABOLIC PNL TOTAL CA: CPT

## 2023-08-16 PROCEDURE — 82570 ASSAY OF URINE CREATININE: CPT

## 2023-08-16 PROCEDURE — 80061 LIPID PANEL: CPT

## 2023-08-18 ENCOUNTER — HOSPITAL ENCOUNTER (OUTPATIENT)
Dept: INFUSION CENTER | Facility: HOSPITAL | Age: 68
End: 2023-08-18
Attending: INTERNAL MEDICINE
Payer: COMMERCIAL

## 2023-08-18 VITALS
DIASTOLIC BLOOD PRESSURE: 73 MMHG | SYSTOLIC BLOOD PRESSURE: 120 MMHG | RESPIRATION RATE: 18 BRPM | TEMPERATURE: 96.9 F | HEART RATE: 76 BPM

## 2023-08-18 DIAGNOSIS — M81.0 OSTEOPOROSIS, UNSPECIFIED OSTEOPOROSIS TYPE, UNSPECIFIED PATHOLOGICAL FRACTURE PRESENCE: Primary | ICD-10-CM

## 2023-08-18 PROCEDURE — 96374 THER/PROPH/DIAG INJ IV PUSH: CPT

## 2023-08-18 RX ORDER — SODIUM CHLORIDE 9 MG/ML
20 INJECTION, SOLUTION INTRAVENOUS ONCE
Status: COMPLETED | OUTPATIENT
Start: 2023-08-18 | End: 2023-08-18

## 2023-08-18 RX ORDER — ZOLEDRONIC ACID 5 MG/100ML
5 INJECTION, SOLUTION INTRAVENOUS ONCE
Status: COMPLETED | OUTPATIENT
Start: 2023-08-18 | End: 2023-08-18

## 2023-08-18 RX ORDER — ZOLEDRONIC ACID 5 MG/100ML
5 INJECTION, SOLUTION INTRAVENOUS ONCE
OUTPATIENT
Start: 2024-08-16

## 2023-08-18 RX ORDER — SODIUM CHLORIDE 9 MG/ML
20 INJECTION, SOLUTION INTRAVENOUS ONCE
OUTPATIENT
Start: 2024-08-16

## 2023-08-18 RX ADMIN — SODIUM CHLORIDE 20 ML/HR: 0.9 INJECTION, SOLUTION INTRAVENOUS at 10:03

## 2023-08-18 RX ADMIN — ZOLEDRONIC ACID 5 MG: 5 INJECTION, SOLUTION INTRAVENOUS at 10:04

## 2023-08-18 NOTE — PLAN OF CARE
Problem: INFECTION - ADULT  Goal: Absence or prevention of progression during hospitalization  Description: INTERVENTIONS:  - Assess and monitor for signs and symptoms of infection  - Monitor lab/diagnostic results  - Monitor all insertion sites, i.e. indwelling lines, tubes, and drains  - Monitor endotracheal if appropriate and nasal secretions for changes in amount and color  - [place] appropriate cooling/warming therapies per order  - Administer medications as ordered  - Instruct and encourage patient and family to use good hand hygiene technique  - Identify and instruct in appropriate isolation precautions for identified infection/condition  Outcome: Progressing     Problem: Knowledge Deficit  Goal: Patient/family/caregiver demonstrates understanding of disease process, treatment plan, medications, and discharge instructions  Description: Complete learning assessment and assess knowledge base.   Interventions:  - Provide teaching at level of understanding  - Provide teaching via preferred learning methods  Outcome: Progressing     Problem: DISCHARGE PLANNING  Goal: Discharge to home or other facility with appropriate resources  Description: INTERVENTIONS:  - Identify barriers to discharge w/patient and caregiver  - Arrange for needed discharge resources and transportation as appropriate  - Identify discharge learning needs (meds, wound care, etc.)  - Arrange for interpretive services to assist at discharge as needed  - Refer to Case Management Department for coordinating discharge planning if the patient needs post-hospital services based on physician/advanced practitioner order or complex needs related to functional status, cognitive ability, or social support system  Outcome: Progressing

## 2023-08-29 DIAGNOSIS — E11.9 TYPE 2 DIABETES MELLITUS WITHOUT COMPLICATION, WITHOUT LONG-TERM CURRENT USE OF INSULIN (HCC): ICD-10-CM

## 2023-08-29 RX ORDER — GLIMEPIRIDE 4 MG/1
TABLET ORAL
Qty: 180 TABLET | Refills: 0 | OUTPATIENT
Start: 2023-08-29

## 2023-08-29 NOTE — TELEPHONE ENCOUNTER
Spoke with pt, endocrine is trying to get pt off of this med so she is down to only taking 1mg tabs. Has enough until she sees them again in September.

## 2023-09-03 DIAGNOSIS — E11.9 TYPE 2 DIABETES MELLITUS WITHOUT COMPLICATION, WITHOUT LONG-TERM CURRENT USE OF INSULIN (HCC): ICD-10-CM

## 2023-09-04 RX ORDER — GLIMEPIRIDE 4 MG/1
TABLET ORAL
Qty: 180 TABLET | Refills: 0 | OUTPATIENT
Start: 2023-09-04

## 2023-09-05 NOTE — TELEPHONE ENCOUNTER
I spoke with pt in the past about this rx, I'm not sure why request still keeps coming through. She said endocrine decreased her dose for right now and is working on getting her completely off of this rx, but she told me she has enough until she sees them this month.

## 2023-09-14 ENCOUNTER — RA CDI HCC (OUTPATIENT)
Dept: OTHER | Facility: HOSPITAL | Age: 68
End: 2023-09-14

## 2023-09-14 NOTE — PROGRESS NOTES
[address] [facility] coding opportunities     E11.22     Chart Reviewed number of suggestions sent to Provider: 1     Patients Insurance     Medicare Insurance: [id number] [facility]

## 2023-09-17 ENCOUNTER — APPOINTMENT (OUTPATIENT)
Dept: LAB | Facility: HOSPITAL | Age: 68
End: 2023-09-17
Payer: COMMERCIAL

## 2023-09-17 DIAGNOSIS — M81.0 OSTEOPOROSIS, UNSPECIFIED OSTEOPOROSIS TYPE, UNSPECIFIED PATHOLOGICAL FRACTURE PRESENCE: ICD-10-CM

## 2023-09-17 LAB
ALBUMIN SERPL BCP-MCNC: 3.6 G/DL (ref 3.5–5)
ALP SERPL-CCNC: 59 U/L (ref 34–104)
ALT SERPL W P-5'-P-CCNC: 7 U/L (ref 7–52)
ANION GAP SERPL CALCULATED.3IONS-SCNC: 6 MMOL/L
AST SERPL W P-5'-P-CCNC: 7 U/L (ref 13–39)
BILIRUB SERPL-MCNC: 0.32 MG/DL (ref 0.2–1)
BUN SERPL-MCNC: 23 MG/DL (ref 5–25)
CALCIUM SERPL-MCNC: 9.3 MG/DL (ref 8.4–10.2)
CHLORIDE SERPL-SCNC: 105 MMOL/L (ref 96–108)
CO2 SERPL-SCNC: 23 MMOL/L (ref 21–32)
CREAT SERPL-MCNC: 1.49 MG/DL (ref 0.6–1.3)
GFR SERPL CREATININE-BSD FRML MDRD: 36 ML/MIN/1.73SQ M
GLUCOSE P FAST SERPL-MCNC: 148 MG/DL (ref 65–99)
POTASSIUM SERPL-SCNC: 5 MMOL/L (ref 3.5–5.3)
PROT SERPL-MCNC: 9.3 G/DL (ref 6.4–8.4)
SODIUM SERPL-SCNC: 134 MMOL/L (ref 135–147)

## 2023-09-17 PROCEDURE — 80053 COMPREHEN METABOLIC PANEL: CPT

## 2023-09-17 PROCEDURE — 36415 COLL VENOUS BLD VENIPUNCTURE: CPT

## 2023-09-20 ENCOUNTER — OFFICE VISIT (OUTPATIENT)
Dept: FAMILY MEDICINE CLINIC | Facility: CLINIC | Age: 68
End: 2023-09-20
Payer: COMMERCIAL

## 2023-09-20 ENCOUNTER — APPOINTMENT (OUTPATIENT)
Dept: LAB | Facility: MEDICAL CENTER | Age: 68
End: 2023-09-20
Payer: COMMERCIAL

## 2023-09-20 ENCOUNTER — APPOINTMENT (OUTPATIENT)
Dept: RADIOLOGY | Facility: MEDICAL CENTER | Age: 68
End: 2023-09-20
Payer: COMMERCIAL

## 2023-09-20 VITALS
RESPIRATION RATE: 18 BRPM | HEART RATE: 78 BPM | SYSTOLIC BLOOD PRESSURE: 126 MMHG | HEIGHT: 60 IN | TEMPERATURE: 98.1 F | WEIGHT: 150 LBS | DIASTOLIC BLOOD PRESSURE: 80 MMHG | BODY MASS INDEX: 29.45 KG/M2

## 2023-09-20 DIAGNOSIS — M25.512 BILATERAL SHOULDER PAIN, UNSPECIFIED CHRONICITY: ICD-10-CM

## 2023-09-20 DIAGNOSIS — E11.65 TYPE 2 DIABETES MELLITUS WITH HYPERGLYCEMIA, WITHOUT LONG-TERM CURRENT USE OF INSULIN (HCC): ICD-10-CM

## 2023-09-20 DIAGNOSIS — M25.511 BILATERAL SHOULDER PAIN, UNSPECIFIED CHRONICITY: ICD-10-CM

## 2023-09-20 DIAGNOSIS — Z86.39 HISTORY OF ADRENAL INSUFFICIENCY: ICD-10-CM

## 2023-09-20 DIAGNOSIS — I10 PRIMARY HYPERTENSION: ICD-10-CM

## 2023-09-20 DIAGNOSIS — L73.2 HIDRADENITIS SUPPURATIVA: Primary | ICD-10-CM

## 2023-09-20 PROBLEM — N60.81 CYST OF SKIN OF RIGHT BREAST: Status: RESOLVED | Noted: 2023-08-11 | Resolved: 2023-09-20

## 2023-09-20 LAB
CRP SERPL QL: 4.6 MG/L
ERYTHROCYTE [SEDIMENTATION RATE] IN BLOOD: 84 MM/HOUR (ref 0–29)

## 2023-09-20 PROCEDURE — 86140 C-REACTIVE PROTEIN: CPT

## 2023-09-20 PROCEDURE — 99214 OFFICE O/P EST MOD 30 MIN: CPT | Performed by: INTERNAL MEDICINE

## 2023-09-20 PROCEDURE — 85652 RBC SED RATE AUTOMATED: CPT

## 2023-09-20 PROCEDURE — 73030 X-RAY EXAM OF SHOULDER: CPT

## 2023-09-20 PROCEDURE — 36415 COLL VENOUS BLD VENIPUNCTURE: CPT

## 2023-09-20 RX ORDER — RIBOFLAVIN (VITAMIN B2) 100 MG
100 TABLET ORAL DAILY
COMMUNITY

## 2023-09-20 RX ORDER — PREDNISONE 20 MG/1
20 TABLET ORAL DAILY
Qty: 5 TABLET | Refills: 0 | Status: SHIPPED | OUTPATIENT
Start: 2023-09-20

## 2023-09-20 RX ORDER — DOXYCYCLINE HYCLATE 100 MG
TABLET ORAL
COMMUNITY
Start: 2023-09-13

## 2023-09-20 RX ORDER — CLINDAMYCIN PHOSPHATE 10 UG/ML
LOTION TOPICAL 2 TIMES DAILY
COMMUNITY
Start: 2023-09-13

## 2023-09-20 NOTE — PROGRESS NOTES
Name: Parmjit Dominguez      : 1955      MRN: [id number]  Encounter Provider: Chio Mahajan DO  Encounter Date: 2023   Encounter department: [address]     1. Hidradenitis suppurativa  Saw Derm and tx is helping and lesions drying   Has derm followup in October    2. Primary hypertension  Continue current rx  Stable Stay hydrated  3. Type 2 diabetes mellitus with hyperglycemia, without long-term current use of insulin ([facility])  A1c 7.5 in August Encouraged pt to monitor BS     4. History of adrenal insufficiency  Has endocrine appt in October     5. Bilateral shoulder pain, unspecified chronicity  -     XR shoulder 2+ vw left; Future; Expected date: 2023  -     XR shoulder 2+ vw right; Future; Expected date: 2023  -     C-reactive protein; Future  -     Sedimentation rate, automated; Future; Expected date: 2023  -     predniSONE 20 mg tablet; Take 1 tablet (20 mg total) by mouth daily  ? PMR short course prednisone and markers today to determine further tx If sed negative Ortho eval        Rto 3 months     Subjective      HPI   Pt saw derm and is being treated with topical meds for hidradenitis Sxs are improving with tx She is scheduled for repeat Mammo in October but will see how sxs trend as it is tender still right breast No fever or chills, less drainage She has pain in both upper arms and limited range of her shoulder level No trauma No swelling She is not checking BS recently but had A1c in August and has endo appt in October   Review of Systems   Constitutional: Negative for chills and fever. HENT: Negative. Eyes: Negative for visual disturbance. Respiratory: Negative for cough and shortness of breath. Cardiovascular: Negative for chest pain. Gastrointestinal: Negative. Genitourinary: Negative. Musculoskeletal: Positive for arthralgias, back pain and myalgias. Neurological: Negative for dizziness, light-headedness and headaches. Psychiatric/Behavioral: Negative for sleep disturbance. The patient is not nervous/anxious. Current Outpatient Medications on File Prior to Visit   Medication Sig   • Ascorbic Acid (vitamin C) 100 MG tablet Take 100 mg by mouth daily   • atorvastatin (LIPITOR) 40 mg tablet TAKE 1 TABLET BY MOUTH ONCE DAILY IN THE EVENING   • CALCIUM PO Take by mouth Pt not sure of dose     • clindamycin (CLEOCIN T) 1 % lotion Apply topically 2 (two) times a day To affected areas   • doxycycline hyclate (VIBRA-TABS) 100 mg tablet TAKE 1 TABLET BY MOUTH TWICE DAILY WITH FULL MEALS FOR 2 WEEKS   • glimepiride (AMARYL) 2 mg tablet Take 0.5 tablets (1 mg total) by mouth daily with breakfast   • lisinopril (ZESTRIL) 20 mg tablet Take 1 tablet (20 mg total) by mouth daily   • metFORMIN (GLUCOPHAGE) 500 mg tablet Take 1 tablet by mouth once daily   • sertraline (ZOLOFT) 100 mg tablet TAKE 1 TABLET BY MOUTH ONCE DAILY AS DIRECTED   • Vitamin D, Cholecalciferol, 25 MCG (1000 UT) TABS Take 2,000 Units by mouth daily Take 2 capsules (2,000 iu) daily. • Blood Glucose Monitoring Suppl (OneTouch Verio Reflect) w/Device KIT Check blood sugars once daily. Please substitute with appropriate alternative as covered by patient's insurance. Dx: E11.65 (Patient not taking: Reported on 9/20/2023)   • glucose blood (OneTouch Verio) test strip Check blood sugars once daily. Please substitute with appropriate alternative as covered by patient's insurance. Dx: E11.65 (Patient not taking: Reported on 9/20/2023)   • OneTouch Delica Lancets 67R MISC Check blood sugars once daily. Please substitute with appropriate alternative as covered by patient's insurance.  Dx: E11.65 (Patient not taking: Reported on 9/20/2023)   • [DISCONTINUED] ofloxacin (OCUFLOX) 0.3 % ophthalmic solution  (Patient not taking: Reported on 8/2/2023)   • [DISCONTINUED] prednisoLONE acetate (PRED FORTE) 1 % ophthalmic suspension  (Patient not taking: Reported on 8/2/2023) Objective     /80   Pulse 78   Temp 98.1 °F (36.7 °C) (Temporal)   Resp 18   Ht 5' (1.524 m)   Wt 68 kg (150 lb)   BMI 29.29 kg/m²     Physical Exam  Vitals reviewed. Constitutional:       General: She is not in acute distress. Appearance: Normal appearance. She is not ill-appearing, toxic-appearing or diaphoretic. HENT:      Head: Normocephalic and atraumatic. Right Ear: External ear normal.      Left Ear: External ear normal.      Nose: Nose normal.      Mouth/Throat:      Mouth: Mucous membranes are moist.   Eyes:      General: No scleral icterus. Extraocular Movements: Extraocular movements intact. Conjunctiva/sclera: Conjunctivae normal.      Pupils: Pupils are equal, round, and reactive to light. Cardiovascular:      Rate and Rhythm: Normal rate and regular rhythm. Pulses: Normal pulses. Heart sounds: Normal heart sounds. Pulmonary:      Effort: Pulmonary effort is normal. No respiratory distress. Breath sounds: Normal breath sounds. No wheezing. Abdominal:      General: Bowel sounds are normal. There is no distension. Palpations: Abdomen is soft. Tenderness: There is no abdominal tenderness. Musculoskeletal:      Cervical back: Normal range of motion and neck supple. Right lower leg: No edema. Left lower leg: No edema. Lymphadenopathy:      Cervical: No cervical adenopathy. Skin:     General: Skin is warm and dry. Findings: Lesion present. Comments: One area still slight drainage and erythema Other lesions dry    Neurological:      General: No focal deficit present. Mental Status: She is alert and oriented to person, place, and time. Mental status is at baseline. Cranial Nerves: No cranial nerve deficit. Sensory: No sensory deficit. Psychiatric:         Mood and Affect: Mood normal.         Behavior: Behavior normal.         Thought Content:  Thought content normal.         Judgment: Judgment normal.     DIFFICULTY LIFTING ARMS above shoulder level and biceps tenderness with testing Handgrip 3/5 b/l   Zoe Rudolph, DO

## 2023-09-21 DIAGNOSIS — M35.3 PMR (POLYMYALGIA RHEUMATICA) (HCC): Primary | ICD-10-CM

## 2023-09-21 RX ORDER — PREDNISONE 10 MG/1
TABLET ORAL
Qty: 30 TABLET | Refills: 1 | Status: SHIPPED | OUTPATIENT
Start: 2023-09-21

## 2023-10-05 ENCOUNTER — TELEPHONE (OUTPATIENT)
Dept: ENDOCRINOLOGY | Facility: CLINIC | Age: 68
End: 2023-10-05

## 2023-10-05 ENCOUNTER — OFFICE VISIT (OUTPATIENT)
Dept: ENDOCRINOLOGY | Facility: CLINIC | Age: 68
End: 2023-10-05
Payer: COMMERCIAL

## 2023-10-05 VITALS
WEIGHT: 145.4 LBS | BODY MASS INDEX: 28.54 KG/M2 | SYSTOLIC BLOOD PRESSURE: 122 MMHG | HEIGHT: 60 IN | OXYGEN SATURATION: 98 % | DIASTOLIC BLOOD PRESSURE: 84 MMHG | HEART RATE: 76 BPM

## 2023-10-05 DIAGNOSIS — M81.0 OSTEOPOROSIS, UNSPECIFIED OSTEOPOROSIS TYPE, UNSPECIFIED PATHOLOGICAL FRACTURE PRESENCE: ICD-10-CM

## 2023-10-05 DIAGNOSIS — E78.5 HYPERLIPIDEMIA, UNSPECIFIED HYPERLIPIDEMIA TYPE: ICD-10-CM

## 2023-10-05 DIAGNOSIS — N18.32 STAGE 3B CHRONIC KIDNEY DISEASE (HCC): ICD-10-CM

## 2023-10-05 DIAGNOSIS — E11.9 TYPE 2 DIABETES MELLITUS WITHOUT COMPLICATION, WITHOUT LONG-TERM CURRENT USE OF INSULIN (HCC): Primary | ICD-10-CM

## 2023-10-05 PROCEDURE — 99214 OFFICE O/P EST MOD 30 MIN: CPT | Performed by: STUDENT IN AN ORGANIZED HEALTH CARE EDUCATION/TRAINING PROGRAM

## 2023-10-05 RX ORDER — ATORVASTATIN CALCIUM 80 MG/1
80 TABLET, FILM COATED ORAL DAILY
Qty: 90 TABLET | Refills: 1 | Status: SHIPPED | OUTPATIENT
Start: 2023-10-05

## 2023-10-05 NOTE — TELEPHONE ENCOUNTER
Patient called and left message that insurance will not cover jardiance, she is questioning next step?

## 2023-10-05 NOTE — PROGRESS NOTES
Parmjit Dominguez 78 y.o. female MRN: [id number]    Encounter: [id number]      Assessment/Plan     1. Type 2 diabetes c/b CKD3 with nephropathy - A1c worsening. Recommend continue metformin, continue glimepiride, start jardiance 10 mg daily. Reviewed pro/cons, including  mycotic infections. Continue SMBG monitoring. Will arrange 3-mo follow up with labs. Call sooner with concerns. 2. Hypertension - at goal. Continue present Rx ACEi    3. Hyperlipidemia - LDL is elevated. May increase lipitor 80 mg daily. Will arrange for follow up lipid panel in near future    4. Osteoporosis - s/p reclast x3 years. BMD stable on DXA. Recommend bisphosphonate holiday. Will plan for repeat DXA 8/2023 for monitoring. Contact provider earlier if sustains fracture    5. History of adrenal Cushing's s/p adrenalectomy - stable    Problem List Items Addressed This Visit        Musculoskeletal and Integument    Osteoporosis       Other    Hyperlipidemia    Relevant Medications    atorvastatin (LIPITOR) 80 mg tablet   Other Visit Diagnoses     Type 2 diabetes mellitus without complication, without long-term current use of insulin (HCC)    -  Primary    Relevant Medications    Empagliflozin (Jardiance) 10 MG TABS tablet    Other Relevant Orders    Basic metabolic panel Lab Collect    HEMOGLOBIN A1C W/ EAG ESTIMATION Lab Collect    Albumin / creatinine urine ratio    Stage 3b chronic kidney disease (HCC)            RTC 3-mo    ADDENDUM   10.5.23    Jardiance non-formulary, cost prohibitive. Will try brenzavvy     CC: Diabetes, osteoporosis    History of Present Illness     HPI:    Iris Roe returns for follow up of diabetes, osteoporosis and history of secondary adrenal insufficiency. Iris Roe states recent health events including surgery for cataracts and steroid taper for arthritis, which has affected BGs. She comes with BG log showing BG range 90 - 180, although FBG this morning 180. No hyperglycemic symptoms, no hypoglycemia.      She is presently taking metformin 500 mg daily, glimepiride 1 mg daily. For hyperlipidemia, she takes lipitor 40 mg daily. For hypertension she takes lisinopril 20 mg daily. For osteoporosis, Milagro Kwan received her third treatment of Reclast in August 2023. She has tolerated therapy without AEs. Last DXA 2023, which was stable/improved. She has history of ankle >25 years ago. For history of unilateral adrenalectomy and 2/2 AI, Milagro Kwan has been stable off of steroid replacement therapy for multiple years. This is stable. Review of Systems   Constitutional: Negative for appetite change and unexpected weight change. HENT: Negative for trouble swallowing and voice change. Eyes: Negative for photophobia and visual disturbance. Respiratory: Negative for shortness of breath. Cardiovascular: Negative for chest pain and palpitations. Gastrointestinal: Negative for nausea and vomiting. Endocrine: Negative for polydipsia and polyuria. Skin: Negative. Neurological: Negative for tremors and weakness. Psychiatric/Behavioral: Negative for agitation and behavioral problems.        Historical Information   Past Medical History:   Diagnosis Date   • Adrenal insufficiency ([address])    • Anxiety    • Bacterial sepsis ([address])     last assessed: 06/13/16   • Cushing's syndrome ([address])     last assessed: 05/29/16   • Depression     last assessed: 01/02/18   • Diabetes mellitus ([address])    • Hyperlipidemia    • Hypertension    • Positive FIT (fecal immunochemical test) 6/22/2018    Added automatically from request for surgery [id number]   • Rib contusion, left, sequela 6/14/2022   • Trigger finger, right middle finger 3/7/2022     Past Surgical History:   Procedure Laterality Date   • ADRENALECTOMY  12/2002   • CHOLECYSTECTOMY     • HERNIA REPAIR      umbilical   • OTHER SURGICAL HISTORY      Injection of trigger joint   • [place] COLONOSCOPY FLX DX W/COLLJ SPEC WHEN PFRMD N/A 7/10/2018    Procedure: COLONOSCOPY;  Surgeon: Latrice Thompson Kalina South MD;  Location: [facility];  Service: Gastroenterology   • TONSILLECTOMY AND ADENOIDECTOMY     • TUBAL LIGATION       Social History   Social History     Substance and Sexual Activity   Alcohol Use No     Social History     Substance and Sexual Activity   Drug Use No     Social History     Tobacco Use   Smoking Status Every Day   • Packs/day: 0.25   • Years: 50.00   • Total pack years: 12.50   • Types: Cigarettes   Smokeless Tobacco Never     Family History:   Family History   Problem Relation Age of Onset   • Breast cancer Mother    • No Known Problems Sister    • No Known Problems Daughter    • No Known Problems Daughter    • No Known Problems Paternal Aunt    • No Known Problems Paternal Aunt    • No Known Problems Paternal Aunt        Meds/Allergies   Current Outpatient Medications   Medication Sig Dispense Refill   • Ascorbic Acid (vitamin C) 100 MG tablet Take 100 mg by mouth daily     • atorvastatin (LIPITOR) 80 mg tablet Take 1 tablet (80 mg total) by mouth daily 90 tablet 1   • Blood Glucose Monitoring Suppl (OneTouch Verio Reflect) w/Device KIT Check blood sugars once daily. Please substitute with appropriate alternative as covered by patient's insurance. Dx: E11.65 1 kit 0   • CALCIUM PO Take by mouth Pt not sure of dose       • clindamycin (CLEOCIN T) 1 % lotion Apply topically 2 (two) times a day To affected areas     • doxycycline hyclate (VIBRA-TABS) 100 mg tablet TAKE 1 TABLET BY MOUTH TWICE DAILY WITH FULL MEALS FOR 2 WEEKS     • Empagliflozin (Jardiance) 10 MG TABS tablet Take 1 tablet (10 mg total) by mouth every morning 30 tablet 3   • glimepiride (AMARYL) 2 mg tablet Take 0.5 tablets (1 mg total) by mouth daily with breakfast 90 tablet 1   • glucose blood (OneTouch Verio) test strip Check blood sugars once daily. Please substitute with appropriate alternative as covered by patient's insurance.  Dx: E11.65 100 each 3   • lisinopril (ZESTRIL) 20 mg tablet Take 1 tablet (20 mg total) by mouth daily 90 tablet 1   • metFORMIN (GLUCOPHAGE) 500 mg tablet Take 1 tablet by mouth once daily 90 tablet 3   • OneTouch Delica Lancets 08V MISC Check blood sugars once daily. Please substitute with appropriate alternative as covered by patient's insurance. Dx: E11.65 100 each 3   • predniSONE 10 mg tablet Take 2 tablets for 10days then one tablet daily for 10 days 30 tablet 1   • predniSONE 20 mg tablet Take 1 tablet (20 mg total) by mouth daily 5 tablet 0   • sertraline (ZOLOFT) 100 mg tablet TAKE 1 TABLET BY MOUTH ONCE DAILY AS DIRECTED 90 tablet 0   • Vitamin D, Cholecalciferol, 25 MCG (1000 UT) TABS Take 2,000 Units by mouth daily Take 2 capsules (2,000 iu) daily. No current facility-administered medications for this visit. Allergies   Allergen Reactions   • Fosamax [Alendronate] Diarrhea   • Paxil [Paroxetine Hcl] Rash       Objective   Vitals: Blood pressure 122/84, pulse 76, height 5' (1.524 m), weight 66 kg (145 lb 6.4 oz), SpO2 98 %. Physical Exam  Vitals reviewed. Constitutional:       General: She is not in acute distress. HENT:      Head: Normocephalic and atraumatic. Eyes:      General: No scleral icterus. Conjunctiva/sclera: Conjunctivae normal.   Cardiovascular:      Rate and Rhythm: Normal rate and regular rhythm. Pulmonary:      Effort: Pulmonary effort is normal. No respiratory distress. Abdominal:      Palpations: Abdomen is soft. Tenderness: There is no abdominal tenderness. Musculoskeletal:      Right lower leg: No edema. Left lower leg: No edema. Skin:     General: Skin is warm and dry. Neurological:      General: No focal deficit present. Mental Status: She is alert. Psychiatric:         Mood and Affect: Mood normal.         Behavior: Behavior normal.         The history was obtained from the review of the chart, patient.     Lab Results:   Lab Results   Component Value Date/Time    Hemoglobin A1C 7.5 (H) 08/16/2023 08:48 AM    Hemoglobin A1C 7.0 (H) 05/16/2023 10:49 AM    Hemoglobin A1C 6.8 (H) 01/10/2023 07:29 AM    BUN 23 09/17/2023 08:42 AM    BUN 22 08/16/2023 08:48 AM    BUN 15 05/16/2023 10:49 AM    Potassium 5.0 09/17/2023 08:42 AM    Potassium 4.8 08/16/2023 08:48 AM    Potassium 4.6 05/16/2023 10:49 AM    Chloride 105 09/17/2023 08:42 AM    Chloride 105 08/16/2023 08:48 AM    Chloride 108 05/16/2023 10:49 AM    CO2 23 09/17/2023 08:42 AM    CO2 21 08/16/2023 08:48 AM    CO2 22 05/16/2023 10:49 AM    Creatinine 1.49 (H) 09/17/2023 08:42 AM    Creatinine 1.13 08/16/2023 08:48 AM    Creatinine 1.18 05/16/2023 10:49 AM    AST 7 (L) 09/17/2023 08:42 AM    ALT 7 09/17/2023 08:42 AM    Total Protein 9.3 (H) 09/17/2023 08:42 AM    Albumin 3.6 09/17/2023 08:42 AM    HDL, Direct 37 (L) 08/16/2023 08:48 AM    HDL, Direct 40 (L) 01/10/2023 07:29 AM    Triglycerides 191 (H) 08/16/2023 08:48 AM    Triglycerides 116 01/10/2023 07:29 AM     Lab Results   Component Value Date    LDLCALC 135 (H) 08/16/2023       Component      Latest Ref Rng & Units 8/17/2022   Sodium      135 - 147 mmol/L 135   Potassium      3.5 - 5.3 mmol/L 4.9   Chloride      96 - 108 mmol/L 102   CO2      21 - 32 mmol/L 23   Anion Gap      4 - 13 mmol/L 10   BUN      5 - 25 mg/dL 28 (H)   Creatinine      0.60 - 1.30 mg/dL 1.26   GLUCOSE FASTING      65 - 99 mg/dL 116 (H)   Calcium      8.3 - 10.1 mg/dL 9.1   CORRECTED CALCIUM      8.3 - 10.1 mg/dL 10.0   AST      5 - 45 U/L 6   ALT      12 - 78 U/L 16   Alkaline Phosphatase      46 - 116 U/L 72   Total Protein      6.4 - 8.4 g/dL 9.1 (H)   Albumin      3.5 - 5.0 g/dL 2.9 (L)   TOTAL BILIRUBIN      0.20 - 1.00 mg/dL 0.22   eGFR      ml/min/1.73sq m 44   Cholesterol      See Comment mg/dL 229 (H)   Triglycerides      See Comment mg/dL 281 (H)   HDL      >=50 mg/dL 32 (L)   LDL Calculated      0 - 100 mg/dL 141 (H)   Non-HDL Cholesterol      mg/dl 197   Hemoglobin A1C      Normal 3.8-5.6%; PreDiabetic 5.7-6.4%;  Diabetic >=6.5%; Glycemic control for adults with diabetes <7.0% % 6.8 (H)   eAG, EST AVG Glucose      mg/dl 148   ACTH      7.2 - 63.3 pg/mL 73.3 (H)   Cortisol - AM      4.2 - 22.4 ug/dL 18.9   Vit D, 25-Hydroxy      30.0 - 100.0 ng/mL 34.6         Imaging Studies: I have personally reviewed pertinent reports. [phone]    DXA SCAN     CLINICAL HISTORY: 78 years postmenopausal female. OTHER RISK FACTORS: Prior fracture as a result of minor injury, tobacco use, glucocorticoid therapy, SSRI therapy.     PHARMACOLOGIC THERAPY FOR OSTEOPOROSIS: None currently.     TECHNIQUE: Bone densitometry was performed using a Hologic Discovery A bone densitometer. Regions of interest appear properly placed.     COMPARISON: 9/4/2020.     RESULTS:     LUMBAR SPINE  Level: L1-L4 :  BMD: 0.870 gm/cm2  T-score: -1.6        LEFT  TOTAL HIP:  BMD: 0.870 gm/cm2  T-score: -0.6     LEFT  FEMORAL NECK:  BMD: 0.692 gm/cm2  T score: -1.4        IMPRESSION:     1. Low bone mass (osteopenia).     2. Since a DXA study from 9/4/2020, there has been:  A  STATISTICALLY SIGNIFICANT INCREASE in bone mineral density of 0.035 g/cm2 (4.2%) in the left total hip.           3. The 10 year risk of hip fracture is 4.7% with the 10 year risk of major osteoporotic fracture being 23% as calculated by the [facility] fracture risk assessment tool (FRAX, which is based on data generated by the [facility]   for Metabolic Bone Diseases).     4. The current NOF guidelines recommend treating patients with a T-score of -2.5 or less in the lumbar spine or hips, or in post-menopausal women and men over the age of 48 with low bone mass (osteopenia) and a FRAX 10 year risk score of >3% for hip   fracture and/or >20% for major osteoporotic fracture.     5. The NOF recommends follow-up DXA in 1-2 years after initiating therapy for osteoporosis and every 2 years thereafter.  More frequent evaluation is appropriate for patients with conditions associated with rapid bone loss, such as glucocorticoid   therapy. The interval between DXA screenings may be longer for individuals without major risk factors and initial T-score in the normal or upper low bone mass range.     The FRAX algorithm has certain limitations:  -FRAX has not been validated in patients currently or previously treated with pharmacotherapy for osteoporosis. In such patients, clinical judgment must be exercised in interpreting FRAX scores. -Prior hip, vertebral and humeral fragility fractures appear to confer greater risk of subsequent fracture than fractures at other sites (this is especially true for individuals with severe vertebral fractures), but quantification of this incremental   risk is not possible with FRAX. -FRAX underestimates fracture risk in patients with history of multiple fragility fractures. -FRAX may underestimate fracture risk in patients with history of frequent falls.  -It is not appropriate to use FRAX to monitor treatment response. Portions of the record may have been created with voice recognition software. Occasional wrong word or "sound a like" substitutions may have occurred due to the inherent limitations of voice recognition software. Read the chart carefully and recognize, using context, where substitutions have occurred.

## 2023-10-05 NOTE — PATIENT INSTRUCTIONS
Start Jardiance 10 mg daily    Call clinic if having urine or yeast infections on this therapy    Continue metformin 500 mg daily, amaryl 1 mg daily    Continue blood glucose monitoring    Goal calcium intake 1200 mg daily, usually divided throughout the day and includes diet intake    Dietary sources of calcium and the amount of calcium (in milligrams) provided per serving are listed below. The actual amount of calcium may differ slightly depending upon the brand of the food.      Dairy:   1 cup of skim milk (302 mg)  1 cup of 2 percent milk (297 mg)  1 cup of whole milk (291 mg)  1 cup of buttermilk (285 mg)  1 cup of lactose reduced milk (285-302 mg)  1/3 cup of powdered nonfat milk (283 mg)  1 cup of lowfat plain yogurt (415 mg)  1 cup of lowfat fruit yogurt (245-384 mg)  1/2 cup ice cream (100 mg)  1 cup sour cream, cultured (250 mg)  1 cup of cottage cheese made with one percent milk fat (138 mg)  1.5 ounces of part skim mozzarella cheese (275 mg)  1.0 ounces of hard cheese (cheddar or salud) (200 mg)  1 ounce Brie cheese (50 mg)  1 Tablespoon Parmesan Cheese (70 mg)  1 ounce Swiss or Gruyere cheese (270 mg)    Dairy Alternatives:   1 cup Soy milk, calcium fortified (200 to 400 mg)  1 cup [place] milk, calcium fortified (450 mg)      Fruits and Vegetables:   1 cup of calcium-fortified orange juice (300 mg)  1 cup of orange juice from concentrate (20 mg)  1 cup acorn squash, cooked (90 mg)  1 cup raw arugula (125 mg)  1 cup raw bok danielle (40 mg)  1 cup chard or okra, cooked (100 mg)  1 cup spinach, cooked (240 mg)  1/2 cup of cooked carloz greens (220 mg)  1/2 cup of cooked turnip greens (99 mg)  1/2 cup of steamed broccoli (47 mg)  1/2 cup of cooked kale (45 mg)  1 cup, dried, uncooked figs (300 mg)  1 cup raw kiwi (50 mg)    Legumes:  1 cup garbanzo beans (80 mg)  1/2 cup Legumes, general, cooked (15-50 mg)  1 cup melo beans (75)  1/2 cup boiled soy beans (100 mg)  1/2 cup tempeh (75 mg)  4 oz Tofu, firm, calcium set (250-750 mg)  4 oz Tofu, soft regular (120-390 mg)  1/2 cup White beans, cooked (70 mg)    Grains:  1/2 to 1 cup Cereals (calcium fortified) (250-1000 mg)  1/2 cup amaranth (167 mg)  1 slice bread, calcium fortified (150-200 mg)  1 cup brown rice, long grain, raw (50 mg)  1 package oatmeal (100-150 mg)  2 corn tortillas 85 mg)    Seafood:   4 canned sardines, with bones (242 mg)  3 ounces of cooked crab (50 mg)  3 ounces of [place], canned with bones (170 to 210 mg)  3 ounces of canned mackerel (250 mg)    Nuts and Seeds:   1 ounce almonds, toasted unblanched (80 mg)  1 ounce sesame seeds, whole roasted (280 mg)  1 ounce (2 Tbsp) Sesame tahini (130 mg)  1 ounce dried sunflower seeds (50 mg)    Desserts:   1/2 cup of frozen yogurt (103 mg)  1/2 cup of vanilla ice cream (85 mg)    Other:  1 tbsp Blackstrap Molasses (135 mg)    What are some ways to add extra calcium to the foods I eat? You can increase your calcium intake by adding foods that contain calcium to the foods you normally eat. For example, non-fat powdered dry milk has about 52 mg of calcium in one tablespoon. You can add powdered milk to several different foods. The following are some ideas for adding extra calcium to your foods:   Add 3 tablespoons of powdered milk to each cup of milk in puddings, cocoa, or custard  Add 4 tablespoons of powdered milk to each cup of hot cereal before cooking  Sift two tablespoons of powdered milk into each cup of flour in cakes, cookies, or breads  Use lowfat or fat free milk instead of water in pancake mix, mashed potatoes, pudding, and hot breakfast cereals  Add lowfat or fat free cheese to salad, soup, or pasta  Add tofu with added calcium to vegetable stir hernandez

## 2023-10-19 ENCOUNTER — HOSPITAL ENCOUNTER (OUTPATIENT)
Dept: MAMMOGRAPHY | Facility: HOSPITAL | Age: 68
Discharge: HOME | End: 2023-10-19
Attending: SURGERY
Payer: COMMERCIAL

## 2023-10-19 VITALS — BODY MASS INDEX: 28.47 KG/M2 | WEIGHT: 145 LBS | HEIGHT: 60 IN

## 2023-10-19 DIAGNOSIS — N60.81 CYST OF SKIN OF RIGHT BREAST: ICD-10-CM

## 2023-10-19 DIAGNOSIS — L73.2 HIDRADENITIS SUPPURATIVA: ICD-10-CM

## 2023-10-19 DIAGNOSIS — Z12.31 SCREENING MAMMOGRAM FOR BREAST CANCER: ICD-10-CM

## 2023-10-19 PROCEDURE — G0279 TOMOSYNTHESIS, MAMMO: HCPCS

## 2023-10-19 PROCEDURE — 77066 DX MAMMO INCL CAD BI: CPT

## 2023-12-12 ENCOUNTER — VBI (OUTPATIENT)
Dept: ADMINISTRATIVE | Facility: OTHER | Age: 68
End: 2023-12-12

## 2023-12-20 ENCOUNTER — OFFICE VISIT (OUTPATIENT)
Dept: FAMILY MEDICINE CLINIC | Facility: CLINIC | Age: 68
End: 2023-12-20
Payer: COMMERCIAL

## 2023-12-20 VITALS
DIASTOLIC BLOOD PRESSURE: 70 MMHG | RESPIRATION RATE: 18 BRPM | HEIGHT: 60 IN | TEMPERATURE: 97.5 F | BODY MASS INDEX: 28.98 KG/M2 | WEIGHT: 147.6 LBS | HEART RATE: 77 BPM | SYSTOLIC BLOOD PRESSURE: 118 MMHG | OXYGEN SATURATION: 99 %

## 2023-12-20 DIAGNOSIS — E11.65 TYPE 2 DIABETES MELLITUS WITH HYPERGLYCEMIA, WITHOUT LONG-TERM CURRENT USE OF INSULIN (HCC): Primary | ICD-10-CM

## 2023-12-20 DIAGNOSIS — I10 PRIMARY HYPERTENSION: ICD-10-CM

## 2023-12-20 DIAGNOSIS — Z72.0 TOBACCO ABUSE: ICD-10-CM

## 2023-12-20 PROBLEM — E16.2 HYPOGLYCEMIA: Status: RESOLVED | Noted: 2022-09-12 | Resolved: 2023-12-20

## 2023-12-20 PROCEDURE — 99214 OFFICE O/P EST MOD 30 MIN: CPT | Performed by: INTERNAL MEDICINE

## 2023-12-20 RX ORDER — TRIAMCINOLONE ACETONIDE 1 MG/G
CREAM TOPICAL
COMMUNITY
Start: 2023-10-18

## 2023-12-20 NOTE — PROGRESS NOTES
Name: Nidia Hamlin      : 1955      MRN: [id number]  Encounter Provider: Tasha Palmer DO  Encounter Date: 2023   Encounter department: [facility]    Assessment & Plan     1. Type 2 diabetes mellitus with hyperglycemia, without long-term current use of insulin (HCC)  Pt has endocrine appt in January and will get labs next week She has not had any hypoglycemia   Stay hydrated Lo carb diet     2. Primary hypertension  No added salt diet Continue current rx     3. Tobacco abuse  Still smoking She is not ready to quit at this time   Did  on cessation        Tobacco Cessation Counseling: Tobacco cessation counseling was provided. The patient is sincerely urged to quit consumption of tobacco. She is not ready to quit tobacco.       Rto 6 months   Subjective      HPI  Pt dong ok She is not checking BS but feels stable Has shoulder pain and increased at night No chest pain or sob No acute illness No vision changes No bowel changes Trying to stay hydrated   Review of Systems   Constitutional:  Negative for chills and fever.   HENT: Negative.     Eyes:  Negative for visual disturbance.   Respiratory:  Negative for cough and shortness of breath.    Cardiovascular: Negative.  Negative for chest pain, palpitations and leg swelling.   Gastrointestinal:  Negative for abdominal distention and abdominal pain.   Genitourinary: Negative.    Musculoskeletal:  Positive for arthralgias.        Shoulder pain     Neurological:  Negative for dizziness, light-headedness and numbness.   Psychiatric/Behavioral:  Negative for sleep disturbance. The patient is not nervous/anxious.        Current Outpatient Medications on File Prior to Visit   Medication Sig    Ascorbic Acid (vitamin C) 100 MG tablet Take 100 mg by mouth daily    atorvastatin (LIPITOR) 80 mg tablet Take 1 tablet (80 mg total) by mouth daily    Blood Glucose Monitoring Suppl (OneTouch Verio Reflect) w/Device KIT Check blood sugars once daily.  Please substitute with appropriate alternative as covered by patient's insurance. Dx: E11.65    CALCIUM PO Take by mouth Pt not sure of dose      clindamycin (CLEOCIN T) 1 % lotion Apply topically 2 (two) times a day To affected areas    glimepiride (AMARYL) 2 mg tablet Take 0.5 tablets (1 mg total) by mouth daily with breakfast    lisinopril (ZESTRIL) 20 mg tablet Take 1 tablet (20 mg total) by mouth daily    metFORMIN (GLUCOPHAGE) 500 mg tablet Take 1 tablet by mouth once daily    sertraline (ZOLOFT) 100 mg tablet TAKE 1 TABLET BY MOUTH ONCE DAILY AS DIRECTED    triamcinolone (KENALOG) 0.1 % cream     Vitamin D, Cholecalciferol, 25 MCG (1000 UT) TABS Take 2,000 Units by mouth daily Take 2 capsules (2,000 iu) daily.    Bexagliflozin 20 MG TABS Take 20 mg by mouth in the morning (Patient not taking: Reported on 12/20/2023)    doxycycline hyclate (VIBRA-TABS) 100 mg tablet TAKE 1 TABLET BY MOUTH TWICE DAILY WITH FULL MEALS FOR 2 WEEKS (Patient not taking: Reported on 12/20/2023)    glucose blood (OneTouch Verio) test strip Check blood sugars once daily. Please substitute with appropriate alternative as covered by patient's insurance. Dx: E11.65 (Patient not taking: Reported on 12/20/2023)    OneTouch Delica Lancets 33G MISC Check blood sugars once daily. Please substitute with appropriate alternative as covered by patient's insurance. Dx: E11.65 (Patient not taking: Reported on 12/20/2023)    predniSONE 10 mg tablet Take 2 tablets for 10days then one tablet daily for 10 days (Patient not taking: Reported on 12/20/2023)    predniSONE 20 mg tablet Take 1 tablet (20 mg total) by mouth daily (Patient not taking: Reported on 12/20/2023)       Objective     /70   Pulse 77   Temp 97.5 °F (36.4 °C) (Temporal)   Resp 18   Ht 5' (1.524 m)   Wt 67 kg (147 lb 9.6 oz)   SpO2 99%   BMI 28.83 kg/m²     Physical Exam  Vitals and nursing note reviewed.   Constitutional:       General: She is not in acute distress.      Appearance: Normal appearance. She is not ill-appearing, toxic-appearing or diaphoretic.   HENT:      Head: Normocephalic and atraumatic.      Right Ear: External ear normal.      Left Ear: External ear normal.      Nose: Nose normal.      Mouth/Throat:      Mouth: Mucous membranes are dry.   Eyes:      General: No scleral icterus.  Cardiovascular:      Rate and Rhythm: Normal rate and regular rhythm.      Pulses: Normal pulses.      Heart sounds: Normal heart sounds.   Pulmonary:      Effort: Pulmonary effort is normal. No respiratory distress.      Breath sounds: Normal breath sounds. No wheezing.   Abdominal:      General: Bowel sounds are normal. There is no distension.      Palpations: Abdomen is soft.      Tenderness: There is no abdominal tenderness.   Musculoskeletal:      Cervical back: Normal range of motion and neck supple.      Right lower leg: No edema.      Left lower leg: No edema.   Lymphadenopathy:      Cervical: No cervical adenopathy.   Skin:     General: Skin is warm and dry.      Coloration: Skin is not jaundiced or pale.   Neurological:      General: No focal deficit present.      Mental Status: She is alert and oriented to person, place, and time. Mental status is at baseline.      Cranial Nerves: No cranial nerve deficit.   Psychiatric:         Mood and Affect: Mood normal.         Behavior: Behavior normal.         Thought Content: Thought content normal.         Judgment: Judgment normal.       Tasha Palmer DO

## 2024-04-04 DIAGNOSIS — F32.A DEPRESSION, UNSPECIFIED DEPRESSION TYPE: ICD-10-CM

## 2024-04-04 DIAGNOSIS — E11.9 TYPE 2 DIABETES MELLITUS WITHOUT COMPLICATION, WITHOUT LONG-TERM CURRENT USE OF INSULIN (HCC): ICD-10-CM

## 2024-04-04 DIAGNOSIS — I10 ESSENTIAL HYPERTENSION: ICD-10-CM

## 2024-04-04 RX ORDER — SERTRALINE HYDROCHLORIDE 100 MG/1
TABLET, FILM COATED ORAL
Qty: 90 TABLET | Refills: 0 | Status: SHIPPED | OUTPATIENT
Start: 2024-04-04

## 2024-04-04 RX ORDER — LISINOPRIL 20 MG/1
20 TABLET ORAL DAILY
Qty: 90 TABLET | Refills: 1 | Status: SHIPPED | OUTPATIENT
Start: 2024-04-04

## 2024-04-04 RX ORDER — GLIMEPIRIDE 4 MG/1
TABLET ORAL
Qty: 180 TABLET | Refills: 0 | Status: SHIPPED | OUTPATIENT
Start: 2024-04-04

## 2024-04-07 DIAGNOSIS — E78.5 HYPERLIPIDEMIA, UNSPECIFIED HYPERLIPIDEMIA TYPE: ICD-10-CM

## 2024-04-08 RX ORDER — ATORVASTATIN CALCIUM 80 MG/1
80 TABLET, FILM COATED ORAL DAILY
Qty: 90 TABLET | Refills: 1 | Status: SHIPPED | OUTPATIENT
Start: 2024-04-08

## 2024-05-09 ENCOUNTER — OFFICE VISIT (OUTPATIENT)
Dept: FAMILY MEDICINE CLINIC | Facility: CLINIC | Age: 69
End: 2024-05-09
Payer: COMMERCIAL

## 2024-05-09 VITALS
HEART RATE: 79 BPM | BODY MASS INDEX: 28.94 KG/M2 | OXYGEN SATURATION: 98 % | HEIGHT: 60 IN | WEIGHT: 147.4 LBS | SYSTOLIC BLOOD PRESSURE: 122 MMHG | DIASTOLIC BLOOD PRESSURE: 66 MMHG

## 2024-05-09 DIAGNOSIS — N18.32 CHRONIC KIDNEY DISEASE, STAGE 3B (HCC): ICD-10-CM

## 2024-05-09 DIAGNOSIS — M25.511 CHRONIC PAIN OF BOTH SHOULDERS: Primary | ICD-10-CM

## 2024-05-09 DIAGNOSIS — G89.29 CHRONIC PAIN OF BOTH SHOULDERS: Primary | ICD-10-CM

## 2024-05-09 DIAGNOSIS — M25.512 CHRONIC PAIN OF BOTH SHOULDERS: Primary | ICD-10-CM

## 2024-05-09 DIAGNOSIS — E11.65 TYPE 2 DIABETES MELLITUS WITH HYPERGLYCEMIA, WITHOUT LONG-TERM CURRENT USE OF INSULIN (HCC): ICD-10-CM

## 2024-05-09 PROCEDURE — 99214 OFFICE O/P EST MOD 30 MIN: CPT | Performed by: PHYSICIAN ASSISTANT

## 2024-05-09 PROCEDURE — G2211 COMPLEX E/M VISIT ADD ON: HCPCS | Performed by: PHYSICIAN ASSISTANT

## 2024-05-09 NOTE — ASSESSMENT & PLAN NOTE
Recommended that patient avoid nephrotoxic agents.  Encouraged her to get repeat labs completed to evaluate kidney function.  Encouraged her to continue good blood pressure control.  Continue lisinopril 20 mg daily.     Lab Results   Component Value Date    EGFR 36 09/17/2023    EGFR 50 08/16/2023    EGFR 48 05/16/2023    CREATININE 1.49 (H) 09/17/2023    CREATININE 1.13 08/16/2023    CREATININE 1.18 05/16/2023

## 2024-05-09 NOTE — ASSESSMENT & PLAN NOTE
Referral placed to orthopedics and physical therapy.  Recommended the patient take Tylenol as needed for pain.  She may continue topical Voltaren gel.

## 2024-05-09 NOTE — PROGRESS NOTES
Name: Nidia Hamlin      : 1955      MRN: [id number]  Encounter Provider: Smiley Alston PA-C  Encounter Date: 2024   Encounter department: [facility]    Assessment & Plan     1. Chronic pain of both shoulders  Assessment & Plan:  Referral placed to orthopedics and physical therapy.  Recommended the patient take Tylenol as needed for pain.  She may continue topical Voltaren gel.    Orders:  -     Ambulatory Referral to Orthopedic Surgery; Future  -     Ambulatory Referral to Physical Therapy; Future    2. Chronic kidney disease, stage 3b (HCC)  Assessment & Plan:  Recommended that patient avoid nephrotoxic agents.  Encouraged her to get repeat labs completed to evaluate kidney function.  Encouraged her to continue good blood pressure control.  Continue lisinopril 20 mg daily.     Lab Results   Component Value Date    EGFR 36 2023    EGFR 50 2023    EGFR 48 2023    CREATININE 1.49 (H) 2023    CREATININE 1.13 2023    CREATININE 1.18 2023       3. Type 2 diabetes mellitus with hyperglycemia, without long-term current use of insulin (HCC)  Assessment & Plan:  Recommended that patient complete blood work and schedule a follow-up with endocrinology.    Lab Results   Component Value Date    HGBA1C 7.5 (H) 2023              Subjective      Nidia is a very pleasant 68-year-old female who is here today complaining of bilateral shoulder pain.  She admits that she has had shoulder pain that is ongoing for the past year, but has gotten worse over the past few months.  She has been using Tylenol, Voltaren gel, IcyHot, and heating pad.  She does get some relief from the Voltaren gel.  She denies any recent trauma.  She did have x-rays on her shoulders in September.  She states that she was supposed to be referred to orthopedics, but never received a phone call.      Review of Systems   Constitutional:  Negative for chills, diaphoresis, fatigue and fever.   HENT:   Negative for congestion, ear pain, postnasal drip, rhinorrhea, sneezing, sore throat and trouble swallowing.    Eyes:  Negative for pain and visual disturbance.   Respiratory:  Negative for apnea, cough, shortness of breath and wheezing.    Cardiovascular:  Negative for chest pain and palpitations.   Gastrointestinal:  Negative for abdominal pain, constipation, diarrhea, nausea and vomiting.   Genitourinary:  Negative for dysuria and hematuria.   Musculoskeletal:  Positive for arthralgias (Bilateral shoulder pain). Negative for gait problem and myalgias.   Neurological:  Negative for dizziness, syncope, weakness, light-headedness, numbness and headaches.   Psychiatric/Behavioral:  Negative for suicidal ideas. The patient is not nervous/anxious.        Current Outpatient Medications on File Prior to Visit   Medication Sig   • Ascorbic Acid (vitamin C) 100 MG tablet Take 100 mg by mouth daily   • atorvastatin (LIPITOR) 80 mg tablet Take 1 tablet by mouth once daily   • Blood Glucose Monitoring Suppl (OneTouch Verio Reflect) w/Device KIT Check blood sugars once daily. Please substitute with appropriate alternative as covered by patient's insurance. Dx: E11.65   • CALCIUM PO Take by mouth Pt not sure of dose     • clindamycin (CLEOCIN T) 1 % lotion Apply topically 2 (two) times a day To affected areas   • glimepiride (AMARYL) 2 mg tablet Take 0.5 tablets (1 mg total) by mouth daily with breakfast   • glucose blood (OneTouch Verio) test strip Check blood sugars once daily. Please substitute with appropriate alternative as covered by patient's insurance. Dx: E11.65   • lisinopril (ZESTRIL) 20 mg tablet Take 1 tablet by mouth once daily   • metFORMIN (GLUCOPHAGE) 500 mg tablet Take 1 tablet by mouth once daily   • OneTouch Delica Lancets 33G MISC Check blood sugars once daily. Please substitute with appropriate alternative as covered by patient's insurance. Dx: E11.65   • sertraline (ZOLOFT) 100 mg tablet TAKE 1 TABLET BY  MOUTH ONCE DAILY AS DIRECTED   • triamcinolone (KENALOG) 0.1 % cream    • Vitamin D, Cholecalciferol, 25 MCG (1000 UT) TABS Take 2,000 Units by mouth daily Take 2 capsules (2,000 iu) daily.   • Bexagliflozin 20 MG TABS Take 20 mg by mouth in the morning (Patient not taking: Reported on 12/20/2023)   • doxycycline hyclate (VIBRA-TABS) 100 mg tablet TAKE 1 TABLET BY MOUTH TWICE DAILY WITH FULL MEALS FOR 2 WEEKS (Patient not taking: Reported on 12/20/2023)   • glimepiride (AMARYL) 4 mg tablet Take 1 tablet by mouth twice daily (Patient not taking: Reported on 5/9/2024)   • predniSONE 10 mg tablet Take 2 tablets for 10days then one tablet daily for 10 days (Patient not taking: Reported on 12/20/2023)   • predniSONE 20 mg tablet Take 1 tablet (20 mg total) by mouth daily (Patient not taking: Reported on 12/20/2023)       Objective     /66   Pulse 79   Ht 5' (1.524 m)   Wt 66.9 kg (147 lb 6.4 oz)   SpO2 98%   BMI 28.79 kg/m²     Physical Exam  Vitals and nursing note reviewed.   Constitutional:       Appearance: She is well-developed.   HENT:      Head: Normocephalic and atraumatic.      Right Ear: External ear normal.      Left Ear: External ear normal.      Nose: Nose normal.   Eyes:      Extraocular Movements: Extraocular movements intact.   Cardiovascular:      Rate and Rhythm: Normal rate and regular rhythm.      Heart sounds: Normal heart sounds. No murmur heard.     No friction rub. No gallop.   Pulmonary:      Effort: Pulmonary effort is normal. No respiratory distress.      Breath sounds: Normal breath sounds. No wheezing or rales.   Musculoskeletal:      Right shoulder: Tenderness present. Decreased range of motion. Decreased strength.      Left shoulder: Tenderness present. Decreased range of motion. Decreased strength.      Cervical back: Normal range of motion and neck supple.   Skin:     General: Skin is warm and dry.   Neurological:      Mental Status: She is alert and oriented to person, place,  and time.   Psychiatric:         Behavior: Behavior normal.         Thought Content: Thought content normal.         Judgment: Judgment normal.       Smiley Alston PA-C

## 2024-05-09 NOTE — ASSESSMENT & PLAN NOTE
Recommended that patient complete blood work and schedule a follow-up with endocrinology.    Lab Results   Component Value Date    HGBA1C 7.5 (H) 08/16/2023

## 2024-05-17 ENCOUNTER — APPOINTMENT (OUTPATIENT)
Dept: RADIOLOGY | Facility: MEDICAL CENTER | Age: 69
End: 2024-05-17
Payer: COMMERCIAL

## 2024-05-17 ENCOUNTER — OFFICE VISIT (OUTPATIENT)
Dept: OBGYN CLINIC | Facility: CLINIC | Age: 69
End: 2024-05-17
Payer: COMMERCIAL

## 2024-05-17 VITALS
BODY MASS INDEX: 28.9 KG/M2 | OXYGEN SATURATION: 99 % | DIASTOLIC BLOOD PRESSURE: 62 MMHG | HEIGHT: 60 IN | HEART RATE: 81 BPM | WEIGHT: 147.2 LBS | SYSTOLIC BLOOD PRESSURE: 140 MMHG | TEMPERATURE: 97.1 F

## 2024-05-17 DIAGNOSIS — M54.12 RADICULOPATHY, CERVICAL REGION: Primary | ICD-10-CM

## 2024-05-17 DIAGNOSIS — M54.12 RADICULOPATHY, CERVICAL REGION: ICD-10-CM

## 2024-05-17 DIAGNOSIS — M75.82 TENDONITIS OF BOTH ROTATOR CUFFS: ICD-10-CM

## 2024-05-17 DIAGNOSIS — M75.81 TENDONITIS OF BOTH ROTATOR CUFFS: ICD-10-CM

## 2024-05-17 PROCEDURE — 72040 X-RAY EXAM NECK SPINE 2-3 VW: CPT

## 2024-05-17 PROCEDURE — 99204 OFFICE O/P NEW MOD 45 MIN: CPT | Performed by: FAMILY MEDICINE

## 2024-05-17 NOTE — PROGRESS NOTES
Subjective:  Chief Complaint   Patient presents with    Right Shoulder - Pain    Left Shoulder - Pain       Nidia KARINE Yakelin is a 68 y.o. female who is presenting today for initial evaluation of bilateral shoulder pain and upper extremity radicular symptoms.  Patient states that symptoms have been ongoing for several years however now has been worsening.  The patient denies traumatic injury with the onset of symptoms. Patient states that pain is experienced in bilateral shoulder and experiences numbness and tingling in both arm radiating down to the hands. She states that she has been having difficulty with overhead activity with shoulders and feels that her shoulders feel weak and heavy.       The following portions of the patient's history were reviewed and updated as appropriate:   Medical history  Family history  Medication   PSH  Allergies      Occupation:         Objective:  /62 (BP Location: Left arm, Patient Position: Sitting, Cuff Size: Standard)   Pulse 81   Temp (!) 97.1 °F (36.2 °C)   Ht 5' (1.524 m)   Wt 66.8 kg (147 lb 3.2 oz)   SpO2 99%   BMI 28.75 kg/m²     Skin: no rashes, lesions, skin discolorations, lacerations  Vasculature: normal radial and ulnar pulse,  normal skin color, normal capillary refill in extremity, no upper extremity edema  Neurologic:  Weakness with shoulder abudction c3/4 myotomal   5/5 strength with C5-T1 myotomal evaluation   wake, alert, and oriented x3, no apparent distress.   Musculoskeletal:   bilateral SHOULDER EXAM    General: no gross deformity, no skin changes redness or warmth noted, no sign of infection    ROM:   FF (180): 60 with AROM, 180 PROm  ER (90): 70, 90 with PROM  IR : lumbar      Supraspinatus strength testin/5  Infraspinatus strength testin/5  Subscapularis 5/5  Drop arm test is negative      Belly press: negative      Empty can: positive  [place]: +  Neers +  Speed -  Biceps TTP: -        Tenderness to palpation of AC joint:  negative  Pain with cross-body adduction: negative      Neck ROM WNL flexion, extension RR and LR  Negative spurlings       Imaging:    No results found.     Assessment/Plan:     1. Radiculopathy, cervical region    - XR spine cervical 2 or 3 vw injury; Future  - MRI cervical spine wo contrast; Future    2. Tendonitis of both rotator cuffs    - Ambulatory Referral to Orthopedic Surgery    68-year-old female patient is presenting today for initial evaluation of chronic bilateral shoulder and upper extremity pain.  Patient reports radicular symptoms of the upper extremities numbness and laying along with associated neck pain.  His examination reveals weakness in the C3-C4 myotomal distribution along with sensory deficits to touch in the C5-C6 distribution on the left.  Additionally her lamination reveals pain reproduction with impingement testing and limitations with forward flexion and adduction with active range of motion.  My clinical impression is the patient is most likely experiencing symptoms arising from discogenic cervical etiology however also has demonstration of component of bilateral rotator cuff tendinitis.  Strength evaluation of the rotator cuff exhibits 5 out of 5 of strength.  Radiographs of the cervical spine were obtained and reviewed. Mild degenerative changes noted  I am recommending an MRI of the cervical spine.  Discussed consideration for possible corticosteroid injection for shoulder however would first and MRI of the cervical spine. Follow up once mri results completed.

## 2024-05-28 ENCOUNTER — HOSPITAL ENCOUNTER (OUTPATIENT)
Dept: MRI IMAGING | Facility: HOSPITAL | Age: 69
Discharge: HOME | End: 2024-05-28
Attending: FAMILY MEDICINE
Payer: COMMERCIAL

## 2024-05-28 DIAGNOSIS — M54.12 RADICULOPATHY, CERVICAL REGION: ICD-10-CM

## 2024-05-28 PROCEDURE — 72141 MRI NECK SPINE W/O DYE: CPT

## 2024-06-05 ENCOUNTER — OFFICE VISIT (OUTPATIENT)
Dept: OBGYN CLINIC | Facility: CLINIC | Age: 69
End: 2024-06-05
Payer: COMMERCIAL

## 2024-06-05 VITALS
BODY MASS INDEX: 28.86 KG/M2 | DIASTOLIC BLOOD PRESSURE: 71 MMHG | HEART RATE: 88 BPM | SYSTOLIC BLOOD PRESSURE: 133 MMHG | WEIGHT: 147 LBS | RESPIRATION RATE: 16 BRPM | OXYGEN SATURATION: 98 % | HEIGHT: 60 IN

## 2024-06-05 DIAGNOSIS — M75.82 TENDONITIS OF BOTH ROTATOR CUFFS: ICD-10-CM

## 2024-06-05 DIAGNOSIS — S16.1XXA STRAIN OF NECK MUSCLE, INITIAL ENCOUNTER: Primary | ICD-10-CM

## 2024-06-05 DIAGNOSIS — M75.81 TENDONITIS OF BOTH ROTATOR CUFFS: ICD-10-CM

## 2024-06-05 PROCEDURE — 99213 OFFICE O/P EST LOW 20 MIN: CPT | Performed by: FAMILY MEDICINE

## 2024-06-05 NOTE — PROGRESS NOTES
Subjective:  Chief Complaint   Patient presents with    Spine - Follow-up       Nidia KARINE Hamlin is a 68 y.o. female is presenting today for follow-up evaluation for bilateral shoulder and neck pain.  Patient was referred for an MRI for suspected discogenic cervical etiology.  The MRI results revealed straightening of the cervical lordosis consistent with muscle spasm, no nerve impingement or degenerative changes were appreciated.  Patient localizes her pain again to the cervical spine and upper trapezius muscles.  Occasionally she does state the pain radiates to the lateral aspect of the shoulders bilaterally.        The following portions of the patient's history were reviewed and updated as appropriate:   Medical history  Family history  Medication   PSH  Allergies      Occupation:         Objective:  /71   Pulse 88   Resp 16   Ht 5' (1.524 m)   Wt 66.7 kg (147 lb)   SpO2 98%   BMI 28.71 kg/m²     Skin: no rashes, lesions, skin discolorations, lacerations  Vasculature: normal radial and ulnar pulse,  normal skin color, normal capillary refill in extremity, no upper extremity edema  Neurologic:  Weakness with shoulder abudction c3/4 myotomal   5/5 strength with C5-T1 myotomal evaluation   wake, alert, and oriented x3, no apparent distress.   Musculoskeletal:   bilateral SHOULDER EXAM    General: no gross deformity, no skin changes redness or warmth noted, no sign of infection    ROM:   FF (180): 60 with AROM, 180 PROm  ER (90): 70, 90 with PROM  IR : lumbar      Supraspinatus strength testin/5  Infraspinatus strength testin/5  Subscapularis 5/5          Tenderness to palpation of AC joint: negative  Pain with cross-body adduction: negative      Neck ROM WNL flexion, extension RR and LR  Negative spurlings   +TTP trapeiuz muscle      Imaging:    No results found.     Assessment/Plan:   1. Strain of neck muscle, initial encounter    - Ambulatory Referral to Physical Therapy; Future    2.  Tendonitis of both rotator cuffs    - Ambulatory Referral to Physical Therapy; Future    MRI results reviewed with the patient revealing reversal of cervical lordosis consistent with muscle spasm.  Patient's examination reveals hypertonicity and tenderness palpation over the upper trapezius.  She additionally demonstrates pain with impingement testing of the bilateral shoulders.  Clinical impression is the patient is experiencing primary pain arising from cervical neck strain and secondary pain arising from rotator cuff tendinitis.  Would recommend patient begin with physical therapy for the next 6 weeks and return for reevaluation at that time.  We discussed the role for corticosteroid injection for shoulder tendinitis however would revisit this injection therapy if no improvement with PT follow-up.

## 2024-06-10 DIAGNOSIS — I10 ESSENTIAL HYPERTENSION: ICD-10-CM

## 2024-06-10 RX ORDER — LISINOPRIL 20 MG/1
20 TABLET ORAL DAILY
Qty: 100 TABLET | Refills: 1 | Status: SHIPPED | OUTPATIENT
Start: 2024-06-10

## 2024-06-24 ENCOUNTER — OFFICE VISIT (OUTPATIENT)
Dept: FAMILY MEDICINE CLINIC | Facility: CLINIC | Age: 69
End: 2024-06-24
Payer: COMMERCIAL

## 2024-06-24 VITALS
HEIGHT: 60 IN | BODY MASS INDEX: 29.02 KG/M2 | WEIGHT: 147.8 LBS | HEART RATE: 80 BPM | DIASTOLIC BLOOD PRESSURE: 70 MMHG | SYSTOLIC BLOOD PRESSURE: 128 MMHG | OXYGEN SATURATION: 96 % | RESPIRATION RATE: 18 BRPM | TEMPERATURE: 98 F

## 2024-06-24 DIAGNOSIS — M54.12 CERVICAL RADICULOPATHY: ICD-10-CM

## 2024-06-24 DIAGNOSIS — Z00.00 MEDICARE ANNUAL WELLNESS VISIT, SUBSEQUENT: Primary | ICD-10-CM

## 2024-06-24 DIAGNOSIS — E11.65 TYPE 2 DIABETES MELLITUS WITH HYPERGLYCEMIA, WITHOUT LONG-TERM CURRENT USE OF INSULIN (HCC): ICD-10-CM

## 2024-06-24 PROCEDURE — G0439 PPPS, SUBSEQ VISIT: HCPCS | Performed by: INTERNAL MEDICINE

## 2024-06-24 RX ORDER — GABAPENTIN 100 MG/1
100 CAPSULE ORAL 2 TIMES DAILY
Qty: 60 CAPSULE | Refills: 0 | Status: SHIPPED | OUTPATIENT
Start: 2024-06-24

## 2024-06-24 NOTE — PATIENT INSTRUCTIONS
Medicare Preventive Visit Patient Instructions  Thank you for completing your Welcome to Medicare Visit or Medicare Annual Wellness Visit today. Your next wellness visit will be due in one year (6/25/2025).  The screening/preventive services that you may require over the next 5-10 years are detailed below. Some tests may not apply to you based off risk factors and/or age. Screening tests ordered at today's visit but not completed yet may show as past due. Also, please note that scanned in results may not display below.  Preventive Screenings:  Service Recommendations Previous Testing/Comments   Colorectal Cancer Screening  * Colonoscopy    * Fecal Occult Blood Test (FOBT)/Fecal Immunochemical Test (FIT)  * Fecal DNA/Cologuard Test  * Flexible Sigmoidoscopy Age: 45-75 years old   Colonoscopy: every 10 years (may be performed more frequently if at higher risk)  OR  FOBT/FIT: every 1 year  OR  Cologuard: every 3 years  OR  Sigmoidoscopy: every 5 years  Screening may be recommended earlier than age 45 if at higher risk for colorectal cancer. Also, an individualized decision between you and your healthcare provider will decide whether screening between the ages of 76-85 would be appropriate. Colonoscopy: 10/11/2021  FOBT/FIT: 09/28/2021  Cologuard: Not on file  Sigmoidoscopy: Not on file          Breast Cancer Screening Age: 40+ years old  Frequency: every 1-2 years  Not required if history of left and right mastectomy Mammogram: 10/19/2023        Cervical Cancer Screening Between the ages of 21-29, pap smear recommended once every 3 years.   Between the ages of 30-65, can perform pap smear with HPV co-testing every 5 years.   Recommendations may differ for women with a history of total hysterectomy, cervical cancer, or abnormal pap smears in past. Pap Smear: Not on file        Hepatitis C Screening Once for adults born between 1945 and 1965  More frequently in patients at high risk for Hepatitis C Hep C Antibody:  05/29/2014        Diabetes Screening 1-2 times per year if you're at risk for diabetes or have pre-diabetes Fasting glucose: 148 mg/dL (9/17/2023)  A1C: 7.5 % (8/16/2023)      Cholesterol Screening Once every 5 years if you don't have a lipid disorder. May order more often based on risk factors. Lipid panel: 08/16/2023          Other Preventive Screenings Covered by Medicare:  Abdominal Aortic Aneurysm (AAA) Screening: covered once if your at risk. You're considered to be at risk if you have a family history of AAA.  Lung Cancer Screening: covers low dose CT scan once per year if you meet all of the following conditions: (1) Age 55-77; (2) No signs or symptoms of lung cancer; (3) Current smoker or have quit smoking within the last 15 years; (4) You have a tobacco smoking history of at least 20 pack years (packs per day multiplied by number of years you smoked); (5) You get a written order from a healthcare provider.  Glaucoma Screening: covered annually if you're considered high risk: (1) You have diabetes OR (2) Family history of glaucoma OR (3)  aged 50 and older OR (4)  American aged 65 and older  Osteoporosis Screening: covered every 2 years if you meet one of the following conditions: (1) You're estrogen deficient and at risk for osteoporosis based off medical history and other findings; (2) Have a vertebral abnormality; (3) On glucocorticoid therapy for more than 3 months; (4) Have primary hyperparathyroidism; (5) On osteoporosis medications and need to assess response to drug therapy.   Last bone density test (DXA Scan): 08/16/2023.  HIV Screening: covered annually if you're between the age of 15-65. Also covered annually if you are younger than 15 and older than 65 with risk factors for HIV infection. For pregnant patients, it is covered up to 3 times per pregnancy.    Immunizations:  Immunization Recommendations   Influenza Vaccine Annual influenza vaccination during flu season is  recommended for all persons aged >= 6 months who do not have contraindications   Pneumococcal Vaccine   * Pneumococcal conjugate vaccine = PCV13 (Prevnar 13), PCV15 (Vaxneuvance), PCV20 (Prevnar 20)  * Pneumococcal polysaccharide vaccine = PPSV23 (Pneumovax) Adults 19-63 yo with certain risk factors or if 65+ yo  If never received any pneumonia vaccine: recommend Prevnar 20 (PCV20)  Give PCV20 if previously received 1 dose of PCV13 or PPSV23   Hepatitis B Vaccine 3 dose series if at intermediate or high risk (ex: diabetes, end stage renal disease, liver disease)   Respiratory syncytial virus (RSV) Vaccine - COVERED BY MEDICARE PART D  * RSVPreF3 (Arexvy) CDC recommends that adults 60 years of age and older may receive a single dose of RSV vaccine using shared clinical decision-making (SCDM)   Tetanus (Td) Vaccine - COST NOT COVERED BY MEDICARE PART B Following completion of primary series, a booster dose should be given every 10 years to maintain immunity against tetanus. Td may also be given as tetanus wound prophylaxis.   Tdap Vaccine - COST NOT COVERED BY MEDICARE PART B Recommended at least once for all adults. For pregnant patients, recommended with each pregnancy.   Shingles Vaccine (Shingrix) - COST NOT COVERED BY MEDICARE PART B  2 shot series recommended in those 19 years and older who have or will have weakened immune systems or those 50 years and older     Health Maintenance Due:      Topic Date Due   • Cervical Cancer Screening  Never done   • Colorectal Cancer Screening  10/10/2024   • Breast Cancer Screening: Mammogram  10/19/2024   • Hepatitis C Screening  Completed     Immunizations Due:      Topic Date Due   • COVID-19 Vaccine (3 - 2023-24 season) 09/01/2023   • Influenza Vaccine (Season Ended) 09/01/2024     Advance Directives   What are advance directives?  Advance directives are legal documents that state your wishes and plans for medical care. These plans are made ahead of time in case you lose  your ability to make decisions for yourself. Advance directives can apply to any medical decision, such as the treatments you want, and if you want to donate organs.   What are the types of advance directives?  There are many types of advance directives, and each state has rules about how to use them. You may choose a combination of any of the following:  Living will:  This is a written record of the treatment you want. You can also choose which treatments you do not want, which to limit, and which to stop at a certain time. This includes surgery, medicine, IV fluid, and tube feedings.   Durable power of  for healthcare (DPAHC):  This is a written record that states who you want to make healthcare choices for you when you are unable to make them for yourself. This person, called a proxy, is usually a family member or a friend. You may choose more than 1 proxy.  Do not resuscitate (DNR) order:  A DNR order is used in case your heart stops beating or you stop breathing. It is a request not to have certain forms of treatment, such as CPR. A DNR order may be included in other types of advance directives.  Medical directive:  This covers the care that you want if you are in a coma, near death, or unable to make decisions for yourself. You can list the treatments you want for each condition. Treatment may include pain medicine, surgery, blood transfusions, dialysis, IV or tube feedings, and a ventilator (breathing machine).  Values history:  This document has questions about your views, beliefs, and how you feel and think about life. This information can help others choose the care that you would choose.  Why are advance directives important?  An advance directive helps you control your care. Although spoken wishes may be used, it is better to have your wishes written down. Spoken wishes can be misunderstood, or not followed. Treatments may be given even if you do not want them. An advance directive may make it  easier for your family to make difficult choices about your care.   Urinary Incontinence   Urinary incontinence (UI)  is when you lose control of your bladder. UI develops because your bladder cannot store or empty urine properly. The 3 most common types of UI are stress incontinence, urge incontinence, or both.  Medicines:   May be given to help strengthen your bladder control. Report any side effects of medication to your healthcare provider.  Do pelvic muscle exercises often:  Your pelvic muscles help you stop urinating. Squeeze these muscles tight for 5 seconds, then relax for 5 seconds. Gradually work up to squeezing for 10 seconds. Do 3 sets of 15 repetitions a day, or as directed. This will help strengthen your pelvic muscles and improve bladder control.  Train your bladder:  Go to the bathroom at set times, such as every 2 hours, even if you do not feel the urge to go. You can also try to hold your urine when you feel the urge to go. For example, hold your urine for 5 minutes when you feel the urge to go. As that becomes easier, hold your urine for 10 minutes.   Self-care:   Keep a UI record.  Write down how often you leak urine and how much you leak. Make a note of what you were doing when you leaked urine.  Drink liquids as directed. You may need to limit the amount of liquid you drink to help control your urine leakage. Do not drink any liquid right before you go to bed. Limit or do not have drinks that contain caffeine or alcohol.   Prevent constipation.  Eat a variety of high-fiber foods. Good examples are high-fiber cereals, beans, vegetables, and whole-grain breads. Walking is the best way to trigger your intestines to have a bowel movement.  Exercise regularly and maintain a healthy weight.  Weight loss and exercise will decrease pressure on your bladder and help you control your leakage.   Use a catheter as directed  to help empty your bladder. A catheter is a tiny, plastic tube that is put into  your bladder to drain your urine.   Go to behavior therapy as directed.  Behavior therapy may be used to help you learn to control your urge to urinate.    Cigarette Smoking and Your Health   Risks to your health if you smoke:  Nicotine and other chemicals found in tobacco damage every cell in your body. Even if you are a light smoker, you have an increased risk for cancer, heart disease, and lung disease. If you are pregnant or have diabetes, smoking increases your risk for complications.   Benefits to your health if you stop smoking:   You decrease respiratory symptoms such as coughing, wheezing, and shortness of breath.   You reduce your risk for cancers of the lung, mouth, throat, kidney, bladder, pancreas, stomach, and cervix. If you already have cancer, you increase the benefits of chemotherapy. You also reduce your risk for cancer returning or a second cancer from developing.   You reduce your risk for heart disease, blood clots, heart attack, and stroke.   You reduce your risk for lung infections, and diseases such as pneumonia, asthma, chronic bronchitis, and emphysema.  Your circulation improves. More oxygen can be delivered to your body. If you have diabetes, you lower your risk for complications, such as kidney, artery, and eye diseases. You also lower your risk for nerve damage. Nerve damage can lead to amputations, poor vision, and blindness.  You improve your body's ability to heal and to fight infections.  For more information and support to stop smoking:   [employer]  Phone: 7- 689 - 981-6220  Web Address: www.[employer]  Weight Management   Why it is important to manage your weight:  Being overweight increases your risk of health conditions such as heart disease, high blood pressure, type 2 diabetes, and certain types of cancer. It can also increase your risk for osteoarthritis, sleep apnea, and other respiratory problems. Aim for a slow, steady weight loss. Even a small amount of weight loss  can lower your risk of health problems.  How to lose weight safely:  A safe and healthy way to lose weight is to eat fewer calories and get regular exercise. You can lose up about 1 pound a week by decreasing the number of calories you eat by 500 calories each day.   Healthy meal plan for weight management:  A healthy meal plan includes a variety of foods, contains fewer calories, and helps you stay healthy. A healthy meal plan includes the following:  Eat whole-grain foods more often.  A healthy meal plan should contain fiber. Fiber is the part of grains, fruits, and vegetables that is not broken down by your body. Whole-grain foods are healthy and provide extra fiber in your diet. Some examples of whole-grain foods are whole-wheat breads and pastas, oatmeal, brown rice, and bulgur.  Eat a variety of vegetables every day.  Include dark, leafy greens such as spinach, kale, carloz greens, and mustard greens. Eat yellow and orange vegetables such as carrots, sweet potatoes, and winter squash.   Eat a variety of fruits every day.  Choose fresh or canned fruit (canned in its own juice or light syrup) instead of juice. Fruit juice has very little or no fiber.  Eat low-fat dairy foods.  Drink fat-free (skim) milk or 1% milk. Eat fat-free yogurt and low-fat cottage cheese. Try low-fat cheeses such as mozzarella and other reduced-fat cheeses.  Choose meat and other protein foods that are low in fat.  Choose beans or other legumes such as split peas or lentils. Choose fish, skinless poultry (chicken or turkey), or lean cuts of red meat (beef or pork). Before you cook meat or poultry, cut off any visible fat.   Use less fat and oil.  Try baking foods instead of frying them. Add less fat, such as margarine, sour cream, regular salad dressing and mayonnaise to foods. Eat fewer high-fat foods. Some examples of high-fat foods include french fries, doughnuts, ice cream, and cakes.  Eat fewer sweets.  Limit foods and drinks that  are high in sugar. This includes candy, cookies, regular soda, and sweetened drinks.  Exercise:  Exercise at least 30 minutes per day on most days of the week. Some examples of exercise include walking, biking, dancing, and swimming. You can also fit in more physical activity by taking the stairs instead of the elevator or parking farther away from stores. Ask your healthcare provider about the best exercise plan for you.      © Copyright [employer] 2018 Information is for End User's use only and may not be sold, redistributed or otherwise used for commercial purposes. All illustrations and images included in CareNotes® are the copyrighted property of [employer] or [employer]

## 2024-06-24 NOTE — PROGRESS NOTES
Ambulatory Visit  Name: Nidia Hamlin      : 1955      MRN: [id number]  Encounter Provider: Tasha Palmer DO  Encounter Date: 2024   Encounter department: [facility]    Assessment & Plan   1. Medicare annual wellness visit, subsequent  She will go for labs/urine upcoming and we will call with the results  Call Dr Page   2. Type 2 diabetes mellitus with hyperglycemia, without long-term current use of insulin (HCC)  A1c/urine upcoming  Ete exam utd has followup    3. Cervical radiculopathy  -     gabapentin (NEURONTIN) 100 mg capsule; Take 1 capsule (100 mg total) by mouth 2 (two) times a day  Pt seeing Ortho has PT eval and considering pain mgmt has f/u    Rto 4 months/flu shot     Depression Screening and Follow-up Plan: Patient's depression screening was positive with a PHQ-9 score of 12. Patient assessed for underlying major depression. Brief counseling provided and recommend additional follow-up/re-evaluation next office visit.     Preventive health issues were discussed with patient, and age appropriate screening tests were ordered as noted in patient's After Visit Summary. Personalized health advice and appropriate referrals for health education or preventive services given if needed, as noted in patient's After Visit Summary.  Rto 6 months   History of Present Illness     HPI   Pt ok but has left cervical pain and seeing Dr chao Starting PT  She is limited with activity due to pain and not sleeping well She did not get labs yet and has to r/s appt with Dr Page   Patient Care Team:  Tasha Palmer DO as PCP - General  Al Degroot MD as PCP - PCP-[facility] ([facility])  Tasha Palmer DO as PCP - PCP-[facility] ([facility])  Sebastián Page DO as PCP - Endocrinology (Endocrinology)  DO Pako Cantor MD as Endoscopist  Carmen Dejesus MD (General Surgery)    Review of Systems   Constitutional:  Negative for chills and fever.   HENT: Negative.      Eyes:  Negative for visual disturbance.   Respiratory:  Negative for cough and shortness of breath.    Cardiovascular:  Negative for chest pain, palpitations and leg swelling.   Genitourinary: Negative.    Musculoskeletal:  Positive for neck pain and neck stiffness.   Skin:  Negative for color change.   Neurological:  Positive for numbness. Negative for dizziness, light-headedness and headaches.   Psychiatric/Behavioral:  Negative for sleep disturbance. The patient is not nervous/anxious.    Diabetic Foot Exam    Patient's shoes and socks removed.    Right Foot/Ankle   Right Foot Inspection  Skin Exam: skin normal and skin intact. No dry skin, no warmth, no callus, no erythema, no maceration, no abnormal color, no pre-ulcer, no ulcer and no callus.     Toe Exam: ROM and strength within normal limits.     Sensory   Vibration: intact  Monofilament testing: intact    Vascular  The right DP pulse is 2+. The right PT pulse is 2+.     Left Foot/Ankle  Left Foot Inspection  Skin Exam: skin normal and skin intact. No dry skin, no warmth, no erythema, no maceration, normal color, no pre-ulcer, no ulcer and no callus.     Toe Exam: ROM and strength within normal limits.     Sensory   Vibration: intact  Monofilament testing: intact    Vascular  The left DP pulse is 2+. The left PT pulse is 2+.     Assign Risk Category  No deformity present  No loss of protective sensation  No weak pulses  Risk: 0    Medical History Reviewed by provider this encounter:  Tobacco  Allergies  Meds  Problems  Med Hx  Surg Hx  Fam Hx       Annual Wellness Visit Questionnaire   Nidia is here for her Subsequent Wellness visit. Last Medicare Wellness visit information reviewed, patient interviewed, no change since last AWV.     Health Risk Assessment:   Patient rates overall health as fair. Patient feels that their physical health rating is slightly worse. Patient is satisfied with their life. Eyesight was rated as same. Hearing was rated as  same. Patient feels that their emotional and mental health rating is same. Patients states they are never, rarely angry. Patient states they are always unusually tired/fatigued. Pain experienced in the last 7 days has been a lot. Patient's pain rating has been 10/10. Patient states that she has experienced no weight loss or gain in last 6 months.     Depression Screening:   PHQ-9 Score: 12      Fall Risk Screening:   In the past year, patient has experienced: history of falling in past year    Number of falls: 2 or more  Injured during fall?: No    Feels unsteady when standing or walking?: Yes    Worried about falling?: Yes      Urinary Incontinence Screening:   Patient has not leaked urine accidently in the last six months.     Home Safety:  Patient has trouble with stairs inside or outside of their home. Patient has working smoke alarms and has working carbon monoxide detector. Home safety hazards include: none.     Nutrition:   Current diet is Regular.     Medications:   Patient is currently taking over-the-counter supplements. OTC medications include: see medication list. Patient is able to manage medications.     Activities of Daily Living (ADLs)/Instrumental Activities of Daily Living (IADLs):   Walk and transfer into and out of bed and chair?: Yes  Dress and groom yourself?: Yes    Bathe or shower yourself?: Yes    Feed yourself? Yes  Do your laundry/housekeeping?: Yes  Manage your money, pay your bills and track your expenses?: Yes  Make your own meals?: Yes    Do your own shopping?: Yes    Previous Hospitalizations:   Any hospitalizations or ED visits within the last 12 months?: No      Advance Care Planning:   Living will: Yes    Durable POA for healthcare: Yes    Advanced directive: Yes    End of Life Decisions reviewed with patient: Yes    Provider agrees with end of life decisions: Yes      Cognitive Screening:   Provider or family/friend/caregiver concerned regarding cognition?: No    PREVENTIVE  SCREENINGS      Cardiovascular Screening:    General: History Lipid Disorder and Screening Current      Diabetes Screening:     General: Screening Not Indicated and History Diabetes      Colorectal Cancer Screening:     General: Screening Current      Breast Cancer Screening:     General: Screening Current      Cervical Cancer Screening:    General: Screening Not Indicated      Osteoporosis Screening:    General: Screening Not Indicated and History Osteoporosis      Abdominal Aortic Aneurysm (AAA) Screening:        General: Screening Current      Lung Cancer Screening:     General: Screening Not Indicated      Hepatitis C Screening:    General: Screening Current    Screening, Brief Intervention, and Referral to Treatment (SBIRT)    Screening  Typical number of drinks in a day: 0  Typical number of drinks in a week: 0  Interpretation: Low risk drinking behavior.    AUDIT-C Screenin) How often did you have a drink containing alcohol in the past year? never  2) How many drinks did you have on a typical day when you were drinking in the past year? 0  3) How often did you have 6 or more drinks on one occasion in the past year? never    AUDIT-C Score: 0  Interpretation: Score 0-2 (female): Negative screen for alcohol misuse    Single Item Drug Screening:  How often have you used an illegal drug (including marijuana) or a prescription medication for non-medical reasons in the past year? never    Single Item Drug Screen Score: 0  Interpretation: Negative screen for possible drug use disorder    Brief Intervention  Alcohol & drug use screenings were reviewed. No concerns regarding substance use disorder identified.     Other Counseling Topics:   Regular weightbearing exercise and calcium and vitamin D intake.     Social Determinants of Health     Financial Resource Strain: Low Risk  (2023)    Overall Financial Resource Strain (CARDIA)     Difficulty of Paying Living Expenses: Not very hard   Food Insecurity: No  Food Insecurity (6/24/2024)    Hunger Vital Sign     Worried About Running Out of Food in the Last Year: Never true     Ran Out of Food in the Last Year: Never true   Transportation Needs: No Transportation Needs (6/24/2024)    PRAPARE - Transportation     Lack of Transportation (Medical): No     Lack of Transportation (Non-Medical): No   Housing Stability: Low Risk  (6/24/2024)    Housing Stability Vital Sign     Unable to Pay for Housing in the Last Year: No     Number of Times Moved in the Last Year: 1     Homeless in the Last Year: No   Utilities: Not At Risk (6/24/2024)    [facility] Utilities     Threatened with loss of utilities: No     No results found.    Objective     /70   Pulse 80   Temp 98 °F (36.7 °C) (Temporal)   Resp 18   Ht 5' (1.524 m)   Wt 67 kg (147 lb 12.8 oz)   SpO2 96%   BMI 28.87 kg/m²     Physical Exam  Vitals and nursing note reviewed.   Constitutional:       General: She is not in acute distress.     Appearance: Normal appearance. She is not ill-appearing, toxic-appearing or diaphoretic.   HENT:      Head: Normocephalic and atraumatic.      Right Ear: External ear normal. There is no impacted cerumen.      Left Ear: External ear normal. There is no impacted cerumen.      Nose: Nose normal.      Mouth/Throat:      Mouth: Mucous membranes are moist.   Eyes:      General: No scleral icterus.     Extraocular Movements: Extraocular movements intact.      Conjunctiva/sclera: Conjunctivae normal.      Pupils: Pupils are equal, round, and reactive to light.   Cardiovascular:      Rate and Rhythm: Normal rate and regular rhythm.      Pulses: Normal pulses. no weak pulses.           Dorsalis pedis pulses are 2+ on the right side and 2+ on the left side.        Posterior tibial pulses are 2+ on the right side and 2+ on the left side.      Heart sounds: Normal heart sounds. No murmur heard.  Pulmonary:      Effort: Pulmonary effort is normal. No respiratory distress.      Breath sounds: Normal  breath sounds. No wheezing.   Abdominal:      General: Bowel sounds are normal. There is no distension.      Palpations: Abdomen is soft.      Tenderness: There is no abdominal tenderness.   Musculoskeletal:         General: Tenderness and deformity present.      Cervical back: Normal range of motion and neck supple.      Right lower leg: No edema.      Left lower leg: No edema.   Feet:      Right foot:      Skin integrity: No ulcer, skin breakdown, erythema, warmth, callus or dry skin.      Left foot:      Skin integrity: No ulcer, skin breakdown, erythema, warmth, callus or dry skin.   Lymphadenopathy:      Cervical: No cervical adenopathy.   Skin:     General: Skin is warm and dry.      Coloration: Skin is not jaundiced or pale.   Neurological:      General: No focal deficit present.      Mental Status: She is alert and oriented to person, place, and time. Mental status is at baseline.      Cranial Nerves: No cranial nerve deficit.      Coordination: Coordination abnormal.   Psychiatric:         Mood and Affect: Mood normal.         Behavior: Behavior normal.         Thought Content: Thought content normal.         Judgment: Judgment normal.       Administrative Statements

## 2024-06-26 ENCOUNTER — APPOINTMENT (OUTPATIENT)
Dept: LAB | Facility: HOSPITAL | Age: 69
End: 2024-06-26
Attending: STUDENT IN AN ORGANIZED HEALTH CARE EDUCATION/TRAINING PROGRAM
Payer: COMMERCIAL

## 2024-06-26 DIAGNOSIS — E11.65 TYPE 2 DIABETES MELLITUS WITH HYPERGLYCEMIA, WITHOUT LONG-TERM CURRENT USE OF INSULIN (HCC): ICD-10-CM

## 2024-06-26 DIAGNOSIS — E11.9 TYPE 2 DIABETES MELLITUS WITHOUT COMPLICATION, WITHOUT LONG-TERM CURRENT USE OF INSULIN (HCC): ICD-10-CM

## 2024-06-26 LAB
ANION GAP SERPL CALCULATED.3IONS-SCNC: 8 MMOL/L (ref 4–13)
BUN SERPL-MCNC: 34 MG/DL (ref 5–25)
CALCIUM SERPL-MCNC: 9.8 MG/DL (ref 8.4–10.2)
CHLORIDE SERPL-SCNC: 103 MMOL/L (ref 96–108)
CHOLEST SERPL-MCNC: 158 MG/DL
CO2 SERPL-SCNC: 22 MMOL/L (ref 21–32)
CREAT SERPL-MCNC: 1.36 MG/DL (ref 0.6–1.3)
EST. AVERAGE GLUCOSE BLD GHB EST-MCNC: 160 MG/DL
GFR SERPL CREATININE-BSD FRML MDRD: 40 ML/MIN/1.73SQ M
GLUCOSE P FAST SERPL-MCNC: 144 MG/DL (ref 65–99)
HBA1C MFR BLD: 7.2 %
HDLC SERPL-MCNC: 34 MG/DL
LDLC SERPL CALC-MCNC: 92 MG/DL (ref 0–100)
POTASSIUM SERPL-SCNC: 4.9 MMOL/L (ref 3.5–5.3)
SODIUM SERPL-SCNC: 133 MMOL/L (ref 135–147)
TRIGL SERPL-MCNC: 162 MG/DL

## 2024-06-26 PROCEDURE — 36415 COLL VENOUS BLD VENIPUNCTURE: CPT

## 2024-06-26 PROCEDURE — 83036 HEMOGLOBIN GLYCOSYLATED A1C: CPT

## 2024-06-26 PROCEDURE — 80048 BASIC METABOLIC PNL TOTAL CA: CPT

## 2024-06-26 PROCEDURE — 3051F HG A1C>EQUAL 7.0%<8.0%: CPT | Performed by: INTERNAL MEDICINE

## 2024-06-26 PROCEDURE — 80061 LIPID PANEL: CPT

## 2024-06-27 ENCOUNTER — APPOINTMENT (OUTPATIENT)
Dept: LAB | Facility: HOSPITAL | Age: 69
End: 2024-06-27
Attending: STUDENT IN AN ORGANIZED HEALTH CARE EDUCATION/TRAINING PROGRAM
Payer: COMMERCIAL

## 2024-06-27 LAB
CREAT UR-MCNC: 69.9 MG/DL
MICROALBUMIN UR-MCNC: 94.6 MG/L
MICROALBUMIN/CREAT 24H UR: 135 MG/G CREATININE (ref 0–30)

## 2024-06-27 PROCEDURE — 82043 UR ALBUMIN QUANTITATIVE: CPT

## 2024-06-27 PROCEDURE — 82570 ASSAY OF URINE CREATININE: CPT

## 2024-07-03 DIAGNOSIS — E11.9 TYPE 2 DIABETES MELLITUS WITHOUT COMPLICATION, WITHOUT LONG-TERM CURRENT USE OF INSULIN (HCC): ICD-10-CM

## 2024-07-05 DIAGNOSIS — E11.9 TYPE 2 DIABETES MELLITUS WITHOUT COMPLICATION, WITHOUT LONG-TERM CURRENT USE OF INSULIN (HCC): ICD-10-CM

## 2024-07-05 DIAGNOSIS — F32.A DEPRESSION, UNSPECIFIED DEPRESSION TYPE: ICD-10-CM

## 2024-07-05 RX ORDER — SERTRALINE HYDROCHLORIDE 100 MG/1
TABLET, FILM COATED ORAL
Qty: 90 TABLET | Refills: 1 | Status: SHIPPED | OUTPATIENT
Start: 2024-07-05

## 2024-07-05 RX ORDER — GLIMEPIRIDE 2 MG/1
1 TABLET ORAL
Qty: 90 TABLET | Refills: 0 | Status: SHIPPED | OUTPATIENT
Start: 2024-07-05

## 2024-07-05 NOTE — TELEPHONE ENCOUNTER
Reason for call:   [x] Refill   [] Prior Auth  [] Other:     Office:   [] PCP/Provider -   [x] Specialty/Provider - Sebastián Page, DO-[facility]     Medication:     glimepiride (AMARYL) 2 mg tablet   Take 0.5 tablets (1 mg total) by mouth daily with breakfast         Pharmacy: [facility] [place]     Does the patient have enough for 3 days?   [x] Yes   [] No - Send as HP to POD

## 2024-07-08 ENCOUNTER — EVALUATION (OUTPATIENT)
Dept: PHYSICAL THERAPY | Facility: HOME HEALTHCARE | Age: 69
End: 2024-07-08
Payer: COMMERCIAL

## 2024-07-08 DIAGNOSIS — M75.81 TENDONITIS OF BOTH ROTATOR CUFFS: ICD-10-CM

## 2024-07-08 DIAGNOSIS — S16.1XXA STRAIN OF NECK MUSCLE, INITIAL ENCOUNTER: ICD-10-CM

## 2024-07-08 DIAGNOSIS — M75.82 TENDONITIS OF BOTH ROTATOR CUFFS: ICD-10-CM

## 2024-07-08 PROCEDURE — 97530 THERAPEUTIC ACTIVITIES: CPT | Performed by: PHYSICAL THERAPIST

## 2024-07-08 PROCEDURE — 97112 NEUROMUSCULAR REEDUCATION: CPT | Performed by: PHYSICAL THERAPIST

## 2024-07-08 PROCEDURE — 97162 PT EVAL MOD COMPLEX 30 MIN: CPT | Performed by: PHYSICAL THERAPIST

## 2024-07-12 ENCOUNTER — TELEPHONE (OUTPATIENT)
Age: 69
End: 2024-07-12

## 2024-07-12 NOTE — TELEPHONE ENCOUNTER
Caller: Patient     Doctor: Nick    Reason for call: Patient is asking for a PT script for the pain she is having in her buttocks.     Call back#: [phone]

## 2024-07-17 ENCOUNTER — OFFICE VISIT (OUTPATIENT)
Dept: PHYSICAL THERAPY | Facility: HOME HEALTHCARE | Age: 69
End: 2024-07-17
Payer: COMMERCIAL

## 2024-07-17 DIAGNOSIS — M75.81 TENDONITIS OF BOTH ROTATOR CUFFS: ICD-10-CM

## 2024-07-17 DIAGNOSIS — M75.82 TENDONITIS OF BOTH ROTATOR CUFFS: ICD-10-CM

## 2024-07-17 DIAGNOSIS — S16.1XXA STRAIN OF NECK MUSCLE, INITIAL ENCOUNTER: Primary | ICD-10-CM

## 2024-07-17 PROCEDURE — 97110 THERAPEUTIC EXERCISES: CPT

## 2024-07-17 NOTE — PROGRESS NOTES
"Daily Note     Today's date: 2024  Patient name: Nidia Hamlin  : 1955  MRN: [id number]  Referring provider: Jeremiah Romero DO  Dx:   Encounter Diagnosis     ICD-10-CM    1. Strain of neck muscle, initial encounter  S16.1XXA       2. Tendonitis of both rotator cuffs  M75.81     M75.82                      Subjective: Pt reports she has pain in her shoulders and neck. Pt reports she is having a lot of pain in her R buttock.       Objective: See treatment diary below      Assessment: Tolerated treatment fair. Verbal cues and tactile cues needed t/o exercises to perform correctly. Pt reports pain in her neck and shoulders t/o exercises.  Educated Pt on HEP and reviewed HEP with Pt. Pt demonstrates good understanding of HEP. Patient would benefit from continued PT      Plan: Continue per plan of care.      Re-eval Date: 24    Precautions: chronic pain        Manuals       B shoulder   If tolerable                                 Neuro Re-Ed                                                 Postural ed   HZ       Ther Ex          PRINT OUT HEP    HEP completed and reviewed       Scapular retractions   3\" 1x10       Post. shld rolls   1x10       C/S ROM  1x10 ea dir       UT stretch   15\"x 3 Berry      Levator stretch   15\"x3 Berry      C/S isometrics         C/S retractions   3\" 1 x 10      Standing FF/Abd        SA punches         Supine ABC's   x1      Doorway stretch   20\"x 3  Low arms                              Ther Activity         TENS home set up                Gait Training                        Modalities                                "

## 2024-07-22 ENCOUNTER — TELEPHONE (OUTPATIENT)
Age: 69
End: 2024-07-22

## 2024-07-22 NOTE — TELEPHONE ENCOUNTER
Caller: Nidia    Doctor: Nick    Reason for call: Patient would like to know if she can get a PT RX for the pain on the right side of back just above the buttocks. Patient has numbness tingling in her leg down to her toes.  Please advise    Call back#: [phone]

## 2024-07-23 ENCOUNTER — APPOINTMENT (OUTPATIENT)
Dept: PHYSICAL THERAPY | Facility: HOME HEALTHCARE | Age: 69
End: 2024-07-23
Payer: COMMERCIAL

## 2024-07-24 ENCOUNTER — TELEPHONE (OUTPATIENT)
Age: 69
End: 2024-07-24

## 2024-07-24 PROBLEM — Z00.00 MEDICARE ANNUAL WELLNESS VISIT, SUBSEQUENT: Status: RESOLVED | Noted: 2020-03-12 | Resolved: 2024-07-24

## 2024-07-24 NOTE — TELEPHONE ENCOUNTER
Patient called in to request recent MRI and X ray results to be faxed to a Dr. Wall.    Patient stated she will return a call to office with fax number.     Please advise.  Thank you

## 2024-09-18 ENCOUNTER — OFFICE VISIT (OUTPATIENT)
Dept: ENDOCRINOLOGY | Facility: CLINIC | Age: 69
End: 2024-09-18
Payer: COMMERCIAL

## 2024-09-18 VITALS
HEART RATE: 78 BPM | HEIGHT: 60 IN | WEIGHT: 149.8 LBS | BODY MASS INDEX: 29.41 KG/M2 | SYSTOLIC BLOOD PRESSURE: 138 MMHG | TEMPERATURE: 97.4 F | DIASTOLIC BLOOD PRESSURE: 64 MMHG

## 2024-09-18 DIAGNOSIS — I10 ESSENTIAL HYPERTENSION: ICD-10-CM

## 2024-09-18 DIAGNOSIS — N18.32 TYPE 2 DIABETES MELLITUS WITH STAGE 3B CHRONIC KIDNEY DISEASE, WITHOUT LONG-TERM CURRENT USE OF INSULIN (HCC): Primary | ICD-10-CM

## 2024-09-18 DIAGNOSIS — E11.22 TYPE 2 DIABETES MELLITUS WITH STAGE 3B CHRONIC KIDNEY DISEASE, WITHOUT LONG-TERM CURRENT USE OF INSULIN (HCC): Primary | ICD-10-CM

## 2024-09-18 DIAGNOSIS — M81.0 OSTEOPOROSIS, UNSPECIFIED OSTEOPOROSIS TYPE, UNSPECIFIED PATHOLOGICAL FRACTURE PRESENCE: ICD-10-CM

## 2024-09-18 DIAGNOSIS — E78.5 HYPERLIPIDEMIA, UNSPECIFIED HYPERLIPIDEMIA TYPE: ICD-10-CM

## 2024-09-18 PROCEDURE — G2211 COMPLEX E/M VISIT ADD ON: HCPCS | Performed by: STUDENT IN AN ORGANIZED HEALTH CARE EDUCATION/TRAINING PROGRAM

## 2024-09-18 PROCEDURE — 99214 OFFICE O/P EST MOD 30 MIN: CPT | Performed by: STUDENT IN AN ORGANIZED HEALTH CARE EDUCATION/TRAINING PROGRAM

## 2024-09-18 NOTE — PROGRESS NOTES
Ambulatory Visit  Name: Nidia Hamlin      : 1955      MRN: [id number]  Encounter Provider: Sebastián Page DO  Encounter Date: 2024   Encounter department: [facility]    Assessment & Plan  Type 2 diabetes mellitus with stage 3b chronic kidney disease, without long-term current use of insulin (HCC)  Fair control. Unable to afford sglt2i. No changes in Rx today. Will reassess in 3-mo  Lab Results   Component Value Date    HGBA1C 7.2 (H) 2024       Orders:    Comprehensive metabolic panel; Future    Hemoglobin A1C; Future    Lipid Panel with Direct LDL reflex; Future    Essential hypertension  Fair control on ACEi.        Hyperlipidemia, unspecified hyperlipidemia type  Will hold lipitor for now given myalgias and weakness and assess for resolution. Repeat lipid panel at next visit, consider alternative plan.   Orders:    Lipid Panel with Direct LDL reflex; Future    Osteoporosis, unspecified osteoporosis type, unspecified pathological fracture presence  Reclast x3 years, last .. Last BMD stable.   Orders:    DXA bone density spine hip and pelvis; Future    C-Telopeptide; Future    PTH, intact; Future    Vitamin D 25 hydroxy; Future      History of Present Illness     Nidia Hamlin is a 68 y.o. female who presents in follow up of T2D and osteoporosis.     She is unwell. She notes COVID in December, and since she has had a poor recovery, feeling weak and tired. She mentions pain also coincided with increasing lipitor to 80 mg daily. She has recently been taking 40 mg daily. She has been having a frustrated experience with providers addressing MSK issues.     No hyperglycemic sx, no hypogylcemia. Due to limited finances, cannot afford sglt2i. Presently on on metformin 500 mg daily, and glimepiride 1 mg daily with breakfast.       Review of Systems   Constitutional:  Positive for fatigue. Negative for unexpected weight change.   Respiratory:   Positive for cough.    Musculoskeletal:  Positive for arthralgias.   Neurological:  Positive for numbness (tingling in fingers).   Psychiatric/Behavioral:  Positive for sleep disturbance.    All other systems reviewed and are negative.          Objective     /64   Pulse 78   Temp (!) 97.4 °F (36.3 °C)   Ht 5' (1.524 m)   Wt 67.9 kg (149 lb 12.8 oz)   BMI 29.26 kg/m²     Physical Exam  Vitals reviewed.   Constitutional:       General: She is not in acute distress.     Appearance: Normal appearance.   HENT:      Head: Normocephalic and atraumatic.      Comments: Wearing face mask  Eyes:      General: No scleral icterus.     Conjunctiva/sclera: Conjunctivae normal.   Pulmonary:      Effort: Pulmonary effort is normal. No respiratory distress.   Musculoskeletal:         General: No deformity.      Cervical back: Normal range of motion.   Neurological:      General: No focal deficit present.      Mental Status: She is alert.   Psychiatric:         Mood and Affect: Affect is tearful.         Behavior: Behavior normal.         Component      Latest Ref Rng 6/26/2024 6/27/2024   Sodium      135 - 147 mmol/L 133 (L)     Potassium      3.5 - 5.3 mmol/L 4.9     Chloride      96 - 108 mmol/L 103     Carbon Dioxide      21 - 32 mmol/L 22     ANION GAP      4 - 13 mmol/L 8     BUN      5 - 25 mg/dL 34 (H)     Creatinine      0.60 - 1.30 mg/dL 1.36 (H)     GLUCOSE, FASTING      65 - 99 mg/dL 144 (H)     Calcium      8.4 - 10.2 mg/dL 9.8     GFR, Calculated      ml/min/1.73sq m 40     Cholesterol      See Comment mg/dL 158     Triglycerides      See Comment mg/dL 162 (H)     HDL      >=50 mg/dL 34 (L)     LDL Calculated      0 - 100 mg/dL 92     EXT Creatinine Urine      Reference range not established. mg/dL  69.9    Albumin,U,Random      <20.0 mg/L  94.6 (H)    Albumin Creat Ratio      0 - 30 mg/g creatinine  135 (H)    Hemoglobin A1C      Normal 4.0-5.6%; PreDiabetic 5.7-6.4%; Diabetic >=6.5%; Glycemic control for  adults with diabetes <7.0% % 7.2 (H)     eAG, EST AVG Glucose      mg/dl 160        Legend:  (L) Low  (H) High    8.16.23  DXA SCAN     CLINICAL HISTORY: 67 years postmenopausal female.  OTHER RISK FACTORS: Prior fracture as a result of minor injury, tobacco use, glucocorticoid therapy, SSRI therapy.     PHARMACOLOGIC THERAPY FOR OSTEOPOROSIS: None currently.     TECHNIQUE: Bone densitometry was performed using a H[employer] Discovery A bone densitometer.  Regions of interest appear properly placed.     COMPARISON: 9/4/2020.     RESULTS:     LUMBAR SPINE  Level: L1-L4 :  BMD: 0.870 gm/cm2  T-score: -1.6        LEFT  TOTAL HIP:  BMD: 0.870 gm/cm2  T-score: -0.6     LEFT  FEMORAL NECK:  BMD: 0.692 gm/cm2  T score: -1.4        IMPRESSION:     1. Low bone mass (osteopenia).     2.  Since a DXA study from 9/4/2020, there has been:  A  STATISTICALLY SIGNIFICANT INCREASE in bone mineral density of 0.035 g/cm2 (4.2%) in the left total hip.           3.  The 10 year risk of hip fracture is 4.7% with the 10 year risk of major osteoporotic fracture being 23% as calculated by the [employer] fracture risk assessment tool (FRAX, which is based on data generated by the WHO Collaborating [place]   for Metabolic Bone Diseases).     4.  The current NOF guidelines recommend treating patients with a T-score of -2.5 or less in the lumbar spine or hips, or in post-menopausal women and men over the age of 50 with low bone mass (osteopenia) and a FRAX 10 year risk score of >3% for hip   fracture and/or >20% for major osteoporotic fracture.     5.  The NOF recommends follow-up DXA in 1-2 years after initiating therapy for osteoporosis and every 2 years thereafter. More frequent evaluation is appropriate for patients with conditions associated with rapid bone loss, such as glucocorticoid   therapy. The interval between DXA screenings may be longer for individuals without major risk factors and initial T-score in the normal or upper  low bone mass range.

## 2024-09-18 NOTE — ASSESSMENT & PLAN NOTE
Fair control. Unable to afford sglt2i. No changes in Rx today. Will reassess in 3-mo  Lab Results   Component Value Date    HGBA1C 7.2 (H) 06/26/2024       Orders:    Comprehensive metabolic panel; Future    Hemoglobin A1C; Future    Lipid Panel with Direct LDL reflex; Future

## 2024-09-18 NOTE — ASSESSMENT & PLAN NOTE
Reclast x3 years, last 8.18.23. Last BMD stable.   Orders:    DXA bone density spine hip and pelvis; Future    C-Telopeptide; Future    PTH, intact; Future    Vitamin D 25 hydroxy; Future

## 2024-09-18 NOTE — ASSESSMENT & PLAN NOTE
Will hold lipitor for now given myalgias and weakness and assess for resolution. Repeat lipid panel at next visit, consider alternative plan.   Orders:    Lipid Panel with Direct LDL reflex; Future

## 2024-10-08 ENCOUNTER — TELEPHONE (OUTPATIENT)
Age: 69
End: 2024-10-08

## 2024-10-08 NOTE — TELEPHONE ENCOUNTER
Patient was last in on 09/18/2024 she is calling to let Dr. Page know that her arms are still in pain, both are tingling.  Shoulder is still in pain. Left buttocks still hurts, she has a hard time walking. She can only walk a little and has to take breaks.  Right leg is still in pain and tingling.  Overall patient believes she is doing a littler better and will schedule her bone density and blood work before her nest appointment.

## 2024-10-23 ENCOUNTER — OFFICE VISIT (OUTPATIENT)
Dept: FAMILY MEDICINE CLINIC | Facility: CLINIC | Age: 69
End: 2024-10-23
Payer: COMMERCIAL

## 2024-10-23 VITALS
BODY MASS INDEX: 29.13 KG/M2 | SYSTOLIC BLOOD PRESSURE: 124 MMHG | TEMPERATURE: 97.7 F | HEIGHT: 60 IN | HEART RATE: 85 BPM | OXYGEN SATURATION: 98 % | WEIGHT: 148.4 LBS | DIASTOLIC BLOOD PRESSURE: 78 MMHG | RESPIRATION RATE: 18 BRPM

## 2024-10-23 DIAGNOSIS — F33.9 DEPRESSION, RECURRENT (HCC): ICD-10-CM

## 2024-10-23 DIAGNOSIS — N18.32 CHRONIC KIDNEY DISEASE, STAGE 3B (HCC): ICD-10-CM

## 2024-10-23 DIAGNOSIS — M54.12 CERVICAL RADICULOPATHY: ICD-10-CM

## 2024-10-23 DIAGNOSIS — Z12.31 ENCOUNTER FOR SCREENING MAMMOGRAM FOR MALIGNANT NEOPLASM OF BREAST: ICD-10-CM

## 2024-10-23 DIAGNOSIS — E11.65 TYPE 2 DIABETES MELLITUS WITH HYPERGLYCEMIA, WITHOUT LONG-TERM CURRENT USE OF INSULIN (HCC): Primary | ICD-10-CM

## 2024-10-23 DIAGNOSIS — Z12.11 COLON CANCER SCREENING: ICD-10-CM

## 2024-10-23 PROCEDURE — 92250 FUNDUS PHOTOGRAPHY W/I&R: CPT | Performed by: INTERNAL MEDICINE

## 2024-10-23 PROCEDURE — 99214 OFFICE O/P EST MOD 30 MIN: CPT | Performed by: INTERNAL MEDICINE

## 2024-10-23 RX ORDER — GABAPENTIN 100 MG/1
100 CAPSULE ORAL 2 TIMES DAILY
Qty: 180 CAPSULE | Refills: 3 | Status: SHIPPED | OUTPATIENT
Start: 2024-10-23

## 2024-10-23 NOTE — PROGRESS NOTES
Ambulatory Visit  Name: Nidia Hamlin      : 1955      MRN: [id number]  Encounter Provider: Tasha Palmer DO  Encounter Date: 10/23/2024   Encounter department: [facility]    Assessment & Plan  Cervical radiculopathy    Orders:  •  gabapentin (NEURONTIN) 100 mg capsule; Take 1 capsule (100 mg total) by mouth 2 (two) times a day  Resume gabapentin at least bid may increase to tid if needed Can use Tylenol 2 tablets TID  Encounter for screening mammogram for malignant neoplasm of breast    Orders:  •  Mammo screening bilateral w 3d and cad; Future  Pt will schedule Mammo with dexa ordered   Depression, recurrent (HCC)  Depression Screening Follow-up Plan: Patient's depression screening was positive with a PHQ-9 score of 8. Patient assessed for underlying major depression. They have no active suicidal ideations. Brief counseling provided and recommend additional follow-up/re-evaluation next office visit.         Type 2 diabetes mellitus with hyperglycemia, without long-term current use of insulin ([facility])    Lab Results   Component Value Date    HGBA1C 7.2 (H) 2024   A1c stable range and she is following with endocrine and lo carb diet     Orders:  •  IRIS Diabetic eye exam    Chronic kidney disease, stage 3b (HCC)  Lab Results   Component Value Date    EGFR 40 2024    EGFR 36 2023    EGFR 50 2023    CREATININE 1.36 (H) 2024    CREATININE 1.49 (H) 2023    CREATININE 1.13 2023   Avoid nsaids Stay hydrated          Colon cancer screening    Orders:  •  Ambulatory Referral to Gastroenterology; Future  Pt aware due for repeat screen and will coordinate at [facility]     Depression Screening and Follow-up Plan: Patient's depression screening was positive with a PHQ-9 score of 8. Patient assessed for underlying major depression. Brief counseling provided and recommend additional follow-up/re-evaluation next office visit.     History of Present Illness     HPI  Pt  doing ok but has hand stiffness and tingling She did not get refill of neurontin for unclear reason but that did help She is more limited due to joint pain despite stopping the statin which she had discussed with endocrine No falls but more unsteady at times She is smoking but less since had to go outside to smoke No dizziness No headache No chest pain Dry cough at times Appetite ok and BS stable     Review of Systems   Constitutional:  Positive for activity change and fatigue. Negative for chills and fever.   HENT: Negative.     Eyes:  Negative for visual disturbance.   Respiratory:  Negative for cough and shortness of breath.    Cardiovascular: Negative.    Gastrointestinal: Negative.    Genitourinary: Negative.    Musculoskeletal:  Positive for arthralgias, back pain and gait problem.   Neurological:  Negative for dizziness, light-headedness and headaches.   Psychiatric/Behavioral:  Negative for sleep disturbance. The patient is not nervous/anxious.            Objective     /78   Pulse 85   Temp 97.7 °F (36.5 °C) (Temporal)   Resp 18   Ht 5' (1.524 m)   Wt 67.3 kg (148 lb 6.4 oz)   SpO2 98%   BMI 28.98 kg/m²     Physical Exam  Vitals and nursing note reviewed.   Constitutional:       General: She is not in acute distress.     Appearance: Normal appearance. She is not ill-appearing, toxic-appearing or diaphoretic.   HENT:      Head: Normocephalic and atraumatic.      Right Ear: External ear normal.      Left Ear: External ear normal.      Nose: Nose normal.      Mouth/Throat:      Mouth: Mucous membranes are moist.   Eyes:      General: No scleral icterus.     Extraocular Movements: Extraocular movements intact.      Conjunctiva/sclera: Conjunctivae normal.      Pupils: Pupils are equal, round, and reactive to light.   Cardiovascular:      Rate and Rhythm: Normal rate and regular rhythm.      Pulses: Normal pulses.      Heart sounds: Normal heart sounds.   Pulmonary:      Effort: Pulmonary effort is  normal. No respiratory distress.      Breath sounds: Normal breath sounds. No wheezing.   Abdominal:      General: Bowel sounds are normal. There is no distension.      Palpations: Abdomen is soft.      Tenderness: There is no abdominal tenderness.   Musculoskeletal:      Cervical back: Normal range of motion and neck supple.      Right lower leg: No edema.      Left lower leg: No edema.   Lymphadenopathy:      Cervical: No cervical adenopathy.   Skin:     General: Skin is warm and dry.      Coloration: Skin is not jaundiced or pale.   Neurological:      General: No focal deficit present.      Mental Status: She is alert and oriented to person, place, and time. Mental status is at baseline.      Cranial Nerves: No cranial nerve deficit.      Motor: No weakness.   Psychiatric:         Mood and Affect: Mood normal.         Behavior: Behavior normal.         Thought Content: Thought content normal.         Judgment: Judgment normal.

## 2024-10-23 NOTE — ASSESSMENT & PLAN NOTE
Depression Screening Follow-up Plan: Patient's depression screening was positive with a PHQ-9 score of 8. Patient assessed for underlying major depression. They have no active suicidal ideations. Brief counseling provided and recommend additional follow-up/re-evaluation next office visit.

## 2024-10-23 NOTE — ASSESSMENT & PLAN NOTE
Lab Results   Component Value Date    EGFR 40 06/26/2024    EGFR 36 09/17/2023    EGFR 50 08/16/2023    CREATININE 1.36 (H) 06/26/2024    CREATININE 1.49 (H) 09/17/2023    CREATININE 1.13 08/16/2023   Avoid nsaids Stay hydrated

## 2024-10-23 NOTE — ASSESSMENT & PLAN NOTE
Lab Results   Component Value Date    HGBA1C 7.2 (H) 06/26/2024   A1c stable range and she is following with endocrine and lo carb diet     Orders:    IRIS Diabetic eye exam

## 2024-10-23 NOTE — ASSESSMENT & PLAN NOTE
Orders:    gabapentin (NEURONTIN) 100 mg capsule; Take 1 capsule (100 mg total) by mouth 2 (two) times a day  Resume gabapentin at least bid may increase to tid if needed Can use Tylenol 2 tablets TID

## 2024-10-25 ENCOUNTER — TELEPHONE (OUTPATIENT)
Age: 69
End: 2024-10-25

## 2024-10-31 ENCOUNTER — TELEPHONE (OUTPATIENT)
Age: 69
End: 2024-10-31

## 2024-11-01 ENCOUNTER — HOSPITAL ENCOUNTER (OUTPATIENT)
Dept: MAMMOGRAPHY | Facility: HOSPITAL | Age: 69
Discharge: HOME | End: 2024-11-01
Attending: INTERNAL MEDICINE
Payer: COMMERCIAL

## 2024-11-01 VITALS — BODY MASS INDEX: 29.06 KG/M2 | HEIGHT: 60 IN | WEIGHT: 148 LBS

## 2024-11-01 DIAGNOSIS — Z12.31 ENCOUNTER FOR SCREENING MAMMOGRAM FOR MALIGNANT NEOPLASM OF BREAST: ICD-10-CM

## 2024-11-01 PROCEDURE — 77063 BREAST TOMOSYNTHESIS BI: CPT

## 2024-11-01 PROCEDURE — 77067 SCR MAMMO BI INCL CAD: CPT

## 2024-11-11 ENCOUNTER — NURSE TRIAGE (OUTPATIENT)
Age: 69
End: 2024-11-11

## 2024-11-11 ENCOUNTER — TELEPHONE (OUTPATIENT)
Dept: FAMILY MEDICINE CLINIC | Facility: CLINIC | Age: 69
End: 2024-11-11

## 2024-11-11 DIAGNOSIS — R20.2 NUMBNESS AND TINGLING OF HAND: Primary | ICD-10-CM

## 2024-11-11 DIAGNOSIS — R20.0 NUMBNESS AND TINGLING OF HAND: Primary | ICD-10-CM

## 2024-11-11 NOTE — TELEPHONE ENCOUNTER
If sxs are numbness tingling of hand/foreram would recommend eval with Dr Whalen who is orthopedic hand specialist

## 2024-11-11 NOTE — TELEPHONE ENCOUNTER
"Pt reports her hand was weird and felt a shock in her arm and wrist. Pt denies chest pain, SOB, and no neck/jaw pain. Triage nurse started assessing the pt and then through further research in the encounters,   Nurse can see this is a chronic issue. Dr. Palmer has assessed and tx pt in regards's to this issue and her eccrinology team.    Triage nurse called the office for further assistance on this matter.  Office staff reports PCP will review this information and call pt back to further advise. Triage nurse educated pt if any of these symptoms worsen or if pt exp chest pain, SOB or the pain goes to her neck or jaw to call 911 since pt doesn't have a car. Pt verbalized understanding.  PCP will follow up with pt.      Reason for Disposition   Numbness (i.e., loss of sensation) in hand or fingers   Weakness (i.e., loss of strength) of new-onset in hand or fingers    Answer Assessment - Initial Assessment Questions  1. ONSET: \"When did the pain start?\"      2 days ago  2. LOCATION: \"Where is the pain located?\"      From fingers to arm near elbow  3. PAIN: \"How bad is the pain?\" (Scale 1-10; or mild, moderate, severe)      9  4. WORK OR EXERCISE: \"Has there been any recent work or exercise that involved this part (i.e., hand or wrist) of the body?\"      Pt states she only does so much  5. CAUSE: \"What do you think is causing the pain?\"      Unsure   6. AGGRAVATING FACTORS: \"What makes the pain worse?\" (e.g., using computer)      Dishes and cooking  7. OTHER SYMPTOMS: \"Do you have any other symptoms?\" (e.g., fever, neck pain, numbness or tingling, rash, swelling)      Tingling and only finger tips get numb  8. PREGNANCY: \"Is there any chance you are pregnant?\" \"When was your last menstrual period?\"      N/a    Protocols used: Wrist Pain-Adult-[place]    "

## 2024-11-11 NOTE — TELEPHONE ENCOUNTER
"Pt called pods, states the gabapentin is no longer helping her and is c/o \"electrical shocks\" through both of her hands. Please advise.   "

## 2024-11-20 ENCOUNTER — APPOINTMENT (OUTPATIENT)
Dept: RADIOLOGY | Facility: MEDICAL CENTER | Age: 69
End: 2024-11-20
Payer: COMMERCIAL

## 2024-11-20 ENCOUNTER — OFFICE VISIT (OUTPATIENT)
Dept: OBGYN CLINIC | Facility: CLINIC | Age: 69
End: 2024-11-20
Payer: COMMERCIAL

## 2024-11-20 VITALS
WEIGHT: 148.2 LBS | BODY MASS INDEX: 29.09 KG/M2 | HEIGHT: 60 IN | OXYGEN SATURATION: 98 % | RESPIRATION RATE: 16 BRPM | SYSTOLIC BLOOD PRESSURE: 120 MMHG | HEART RATE: 73 BPM | TEMPERATURE: 97.8 F | DIASTOLIC BLOOD PRESSURE: 73 MMHG

## 2024-11-20 DIAGNOSIS — R20.0 NUMBNESS AND TINGLING OF HAND: ICD-10-CM

## 2024-11-20 DIAGNOSIS — R20.2 NUMBNESS AND TINGLING OF HAND: ICD-10-CM

## 2024-11-20 DIAGNOSIS — G56.01 CARPAL TUNNEL SYNDROME ON RIGHT: Primary | ICD-10-CM

## 2024-11-20 DIAGNOSIS — G60.3 IDIOPATHIC PROGRESSIVE NEUROPATHY: ICD-10-CM

## 2024-11-20 PROCEDURE — 73130 X-RAY EXAM OF HAND: CPT

## 2024-11-20 PROCEDURE — 99213 OFFICE O/P EST LOW 20 MIN: CPT | Performed by: STUDENT IN AN ORGANIZED HEALTH CARE EDUCATION/TRAINING PROGRAM

## 2024-11-20 NOTE — PROGRESS NOTES
ASSESSMENT/PLAN:    Bilateral hand carpal tunnel syndrome. The anatomy and physiology of carpal tunnel syndrome was discussed with the patient today.  Increase pressure localized under the transverse carpal ligament can cause pain, numbness, tingling, or dysesthesias within the median nerve distribution as well as feelings of fatigue, clumsiness, or awkwardness.  These symptoms typically occur at night and worse in the morning upon waking.  Eventually, untreated carpal tunnel syndrome can result in weakness and permanent loss of muscle within the thenar compartment of the hand.  Treatment options were discussed with the patient.  Conservative treatment includes nocturnal resting splints to keep the nerve in a neutral position, ergonomic changes within the work or home environment, activity modification, and tendon gliding exercises. Vitamin B6 one tablet daily over the counter may helpful to reduce symptoms.   Steroid injections within the carpal canal can help a majority of patients, however this is often self-limited in a majority of patients.  Surgical intervention to divide the transverse carpal ligament typically results in a long-lasting relief of the patient's complaints, with the recurrence rate of less than 1%.   Patient elected to proceed with non operative management.  After a thorough discussion of risks, benefits, and alternatives, patient is indicated for non-operative treatment.  Reviewed previous A1C.  I would like to obtain an EMG to evaluate the median nerve function.   She is placed in bilateral cock-up removable wrist splint to wear at night.   Begin hand therapy for nerve gliding exercises.   OTC NSAIDs/tylenol for pain associated  Follow up 1 week after EMG or if symptoms significantly worsen  If the symptoms do not improve, they may require surgical intervention in form of carpal tunnel release.    Patient/Guardian verbalizes understanding and consent to treatment plan. All questions  answered.      _____________________________________________________  CHIEF COMPLAINT:  CC: Bilateral hand pain, numbness and tingling.       SUBJECTIVE:  Nidia Hamlin is a 69 y.o. right handed  female who presents today for evaluation of Bilateral hand. She reports that she has had symptoms for over 1 year.  She has numbness and tingling in both hands which she feels affects the entirety of both hands.  The numbness wakes her up at night most nights she has to shake to alleviate the symptoms.  She reports daytime symptoms with activities requiring wrist flexion and says the numbness is present most times during the day.  Notes affecting their ADLs. She complains of pain radiating up her arm into her shoulder.     Location of the pain: bilateral hand  Quality of  pain: numb and tingling.   Severity of the pain: 8/10.   Aggravating symptoms:driving.   Night symptoms: yes  Tingling: yes  Constant numbness: yes  Dropping objects: no  Increase in symptoms with increase use : yes    The treatment so far includes: nothing specific    Occupation: Retired      PAST MEDICAL HISTORY:  Past Medical History:   Diagnosis Date    Adrenal insufficiency (HCC)     Anxiety     Bacterial sepsis (HCC)     last assessed: 06/13/16    Cushing's syndrome (HCC)     last assessed: 05/29/16    Depression     last assessed: 01/02/18    Diabetes mellitus (HCC)     Hydradenitis     Hyperlipidemia     Hypertension     Positive FIT (fecal immunochemical test) 06/22/2018    Added automatically from request for surgery [id number]    Rib contusion, left, sequela 06/14/2022    Trigger finger, right middle finger 03/07/2022       PAST SURGICAL HISTORY:  Past Surgical History:   Procedure Laterality Date    ADRENALECTOMY  12/2002    CHOLECYSTECTOMY      HERNIA REPAIR      umbilical    OTHER SURGICAL HISTORY      Injection of trigger joint    OK COLONOSCOPY FLX DX W/COLLJ SPEC WHEN PFRMD N/A 7/10/2018    Procedure: COLONOSCOPY;  Surgeon: Pako Nieves MD;   Location: [facility];  Service: Gastroenterology    TONSILLECTOMY AND ADENOIDECTOMY      TUBAL LIGATION         FAMILY HISTORY:  Family History   Problem Relation Age of Onset    Breast cancer Mother         unknown age    No Known Problems Sister     No Known Problems Daughter     No Known Problems Daughter     No Known Problems Maternal Grandmother     No Known Problems Maternal Grandfather     No Known Problems Paternal Grandmother     No Known Problems Paternal Grandfather     No Known Problems Paternal Aunt     No Known Problems Paternal Aunt     No Known Problems Paternal Aunt     No Known Problems Half-Sister     No Known Problems Half-Sister     No Known Problems Half-Sister     No Known Problems Half-Sister     No Known Problems Half-Sister        SOCIAL HISTORY:  Social History     Tobacco Use    Smoking status: Every Day     Current packs/day: 0.25     Average packs/day: 0.3 packs/day for 50.0 years (12.5 ttl pk-yrs)     Types: Cigarettes    Smokeless tobacco: Never   Vaping Use    Vaping status: Never Used   Substance Use Topics    Alcohol use: No    Drug use: No       MEDICATIONS:    Current Outpatient Medications:     clindamycin (CLEOCIN T) 1 % lotion, Apply topically 2 (two) times a day To affected areas, Disp: , Rfl:     gabapentin (NEURONTIN) 100 mg capsule, Take 1 capsule (100 mg total) by mouth 2 (two) times a day, Disp: 180 capsule, Rfl: 3    glimepiride (AMARYL) 2 mg tablet, Take 0.5 tablets (1 mg total) by mouth daily with breakfast, Disp: 90 tablet, Rfl: 0    lisinopril (ZESTRIL) 20 mg tablet, Take 1 tablet (20 mg total) by mouth daily, Disp: 100 tablet, Rfl: 1    metFORMIN (GLUCOPHAGE) 500 mg tablet, Take 1 tablet by mouth once daily, Disp: 100 tablet, Rfl: 1    sertraline (ZOLOFT) 100 mg tablet, TAKE 1 TABLET BY MOUTH ONCE DAILY AS DIRECTED, Disp: 90 tablet, Rfl: 1    Bexagliflozin 20 MG TABS, Take 20 mg by mouth in the morning (Patient not taking: Reported on 12/20/2023), Disp: 90 tablet,  Rfl: 1    ALLERGIES:  Allergies   Allergen Reactions    Fosamax [Alendronate] Diarrhea    Paxil [Paroxetine Hcl] Rash       REVIEW OF SYSTEMS:  Pertinent items are noted in HPI.  A comprehensive review of systems was negative.    LABS:  HgA1c:   Lab Results   Component Value Date    HGBA1C 7.2 (H) 06/26/2024     BMP:   Lab Results   Component Value Date    GLUCOSE 148 (H) 12/28/2014    CALCIUM 9.8 06/26/2024     12/28/2014    K 4.9 06/26/2024    CO2 22 06/26/2024     06/26/2024    BUN 34 (H) 06/26/2024    CREATININE 1.36 (H) 06/26/2024         _____________________________________________________  PHYSICAL EXAMINATION:  Vital signs: /73   Pulse 73   Temp 97.8 °F (36.6 °C) (Temporal)   Resp 16   Ht 5' (1.524 m)   Wt 67.2 kg (148 lb 3.2 oz)   SpO2 98%   BMI 28.94 kg/m²   General: well developed and well nourished, alert, oriented times 3, and appears comfortable  Psychiatric: Normal  HEENT: Trachea Midline, No torticollis  Cardiovascular: No discernable arrhythmia  Pulmonary: No wheezing or stridor  Abdomen: No rebound or guarding  Extremities: No peripheral edema  Skin: No masses, erythema, lacerations, fluctation, ulcerations  Neurovascular: Sensation Intact to the Median, Ulnar, Radial Nerve, Motor Intact to the Median, Ulnar, Radial Nerve, and Pulses Intact    MUSCULOSKELETAL EXAMINATION:  Bilateral Hand  Digital motion is full    FDS/FDP/FPL/finger extensors intact     negative Finkelstein's  negative Triggering     Neurovascular:   Two point discrimination is 6 mm in the median and ulnar nerve distribution, bilaterally  positive Tinel at CT bilaterally  positive Phalen bilaterally  Durkan Positive on the right  There is no atrophy of the thenar muscles.  5/5 APB strength   5/5 FPL strength   5/5 IO strength   5/5 Abd-DQ strength   Positive tinel at the elbow bilaterally  Positive pain to palpation over the ulnar nerve  bilaterally  Wwp      _____________________________________________________  STUDIES REVIEWED:  I reviewed imaging in PACS from 11/20/2024 of the left and right hand xrays which demonstrates mild diffuse degenerative changes throughout without any specific fracture or dislocation      PROCEDURES PERFORMED:  Procedures None performed at today's visit        Scribe Attestation      I,:  Marycruz Giraldo am acting as a scribe while in the presence of the attending physician.:       I,:  Kenji Whalen MD personally performed the services described in this documentation    as scribed in my presence.:

## 2024-11-27 ENCOUNTER — HOSPITAL ENCOUNTER (EMERGENCY)
Facility: HOSPITAL | Age: 69
Discharge: HOME | End: 2024-11-27
Attending: EMERGENCY MEDICINE
Payer: COMMERCIAL

## 2024-11-27 VITALS
RESPIRATION RATE: 18 BRPM | WEIGHT: 148 LBS | DIASTOLIC BLOOD PRESSURE: 56 MMHG | OXYGEN SATURATION: 93 % | TEMPERATURE: 98.2 F | SYSTOLIC BLOOD PRESSURE: 117 MMHG | BODY MASS INDEX: 28.9 KG/M2 | HEART RATE: 66 BPM

## 2024-11-27 DIAGNOSIS — G62.9 NEUROPATHY: Primary | ICD-10-CM

## 2024-11-27 PROCEDURE — 99284 EMERGENCY DEPT VISIT MOD MDM: CPT | Performed by: EMERGENCY MEDICINE

## 2024-11-27 PROCEDURE — 99283 EMERGENCY DEPT VISIT LOW MDM: CPT

## 2024-11-27 RX ORDER — DIAZEPAM 5 MG/1
5 TABLET ORAL ONCE
Status: COMPLETED | OUTPATIENT
Start: 2024-11-27 | End: 2024-11-27

## 2024-11-27 RX ADMIN — DIAZEPAM 5 MG: 5 TABLET ORAL at 02:14

## 2024-11-27 NOTE — ED PROVIDER NOTES
Time reflects when diagnosis was documented in both MDM as applicable and the Disposition within this note       Time User Action Codes Description Comment    11/27/2024  3:01 AM Simeon Leonard Add [G62.9] Neuropathy           ED Disposition       ED Disposition   Discharge    Condition   Stable    Date/Time   Wed Nov 27, 2024  3:01 AM    Comment   Nidia MILTON Yakelin discharge to home/self care.                   Assessment & Plan       Medical Decision Making  I reviewed the patient's medical chart, PMHx, prior encounters, medications.    My DDx includes: Carpal tunnel syndrome, diabetic neuropathy, cervical radiculopathy, fibromyalgia    Given patient's chronic pain disease, she is a poor candidate for NSAIDs, given patient's significant discomfort, we will attempt 5 mg of Valium to see if there is improvement in neuropathy. I offered patient IV for electrolyte evaluation, however it was agreed we would hold off and attempt oral medications first. Will reassess.    After 1 dose of valium, patient had marked improvement. She felt significant pain relief and comfortable going home. Will dc with strict return precautions, recommend neurology follow up.    Risk  Prescription drug management.             Medications   diazepam (VALIUM) tablet 5 mg (5 mg Oral Given 11/27/24 0214)       ED Risk Strat Scores                           SBIRT 20yo+      Flowsheet Row Most Recent Value   Initial Alcohol Screen: US AUDIT-C     1. How often do you have a drink containing alcohol? 0 Filed at: 11/27/2024 0147   2. How many drinks containing alcohol do you have on a typical day you are drinking?  0 Filed at: 11/27/2024 0147   3a. Male UNDER 65: How often do you have five or more drinks on one occasion? 0 Filed at: 11/27/2024 0147   3b. FEMALE Any Age, or MALE 65+: How often do you have 4 or more drinks on one occassion? 0 Filed at: 11/27/2024 0147   Audit-C Score 0 Filed at: 11/27/2024 0147   ALEXSANDRA: How many times in the past year  have you...    Used an illegal drug or used a prescription medication for non-medical reasons? Never Filed at: 11/27/2024 0147                            History of Present Illness       Chief Complaint   Patient presents with    Medical Problem     Pt complains of pain from her neuropathy and has carpal tunnel pain        Past Medical History:   Diagnosis Date    Adrenal insufficiency (HCC)     Anxiety     Bacterial sepsis (HCC)     last assessed: 06/13/16    Cushing's syndrome (HCC)     last assessed: 05/29/16    Depression     last assessed: 01/02/18    Diabetes mellitus (HCC)     Hydradenitis     Hyperlipidemia     Hypertension     Positive FIT (fecal immunochemical test) 06/22/2018    Added automatically from request for surgery [id number]    Rib contusion, left, sequela 06/14/2022    Trigger finger, right middle finger 03/07/2022      Past Surgical History:   Procedure Laterality Date    ADRENALECTOMY  12/2002    CHOLECYSTECTOMY      HERNIA REPAIR      umbilical    OTHER SURGICAL HISTORY      Injection of trigger joint    LA COLONOSCOPY FLX DX W/COLLJ SPEC WHEN PFRMD N/A 7/10/2018    Procedure: COLONOSCOPY;  Surgeon: Pako Nieves MD;  Location: [facility];  Service: Gastroenterology    TONSILLECTOMY AND ADENOIDECTOMY      TUBAL LIGATION        Family History   Problem Relation Age of Onset    Breast cancer Mother         unknown age    No Known Problems Sister     No Known Problems Daughter     No Known Problems Daughter     No Known Problems Maternal Grandmother     No Known Problems Maternal Grandfather     No Known Problems Paternal Grandmother     No Known Problems Paternal Grandfather     No Known Problems Paternal Aunt     No Known Problems Paternal Aunt     No Known Problems Paternal Aunt     No Known Problems Half-Sister     No Known Problems Half-Sister     No Known Problems Half-Sister     No Known Problems Half-Sister     No Known Problems Half-Sister       Social History     Tobacco Use    Smoking  status: Every Day     Current packs/day: 0.25     Average packs/day: 0.3 packs/day for 50.0 years (12.5 ttl pk-yrs)     Types: Cigarettes    Smokeless tobacco: Never   Vaping Use    Vaping status: Never Used   Substance Use Topics    Alcohol use: No    Drug use: No      E-Cigarette/Vaping    E-Cigarette Use Never User       E-Cigarette/Vaping Substances    Nicotine No     THC No     CBD No     Flavoring No     Other No     Unknown No       I have reviewed and agree with the history as documented.     69-year-old female with a past medical history of chronic neuropathy, carpal tunnel syndrome, chronic shoulder pain, cervical radiculopathy, who presents for hand pain.  Patient reports bilateral pain in the hands, reports that is affecting every finger, including the thumb. She describes it as a neuropathic electric-like pain.  She describes that she also has some shoulder pain although she does have a history of this.  She denies pain in the neck per se.  She states that this has been a gradual process over the past year, however since 2 weeks or so it has significantly worsened.  She denies any weakness, just significant pain.  She does state that she feels some slight decrease sensation to the ulnar aspect of her left hand.  ROS otherwise negative        Review of Systems   Constitutional:  Negative for chills and fever.   HENT:  Negative for congestion, rhinorrhea and sore throat.    Respiratory:  Negative for cough and shortness of breath.    Cardiovascular:  Negative for chest pain and palpitations.   Gastrointestinal:  Negative for abdominal pain, constipation, diarrhea, nausea and vomiting.   Genitourinary:  Negative for difficulty urinating and flank pain.   Musculoskeletal:  Negative for arthralgias.   Neurological:  Negative for dizziness, weakness, light-headedness and headaches.   Psychiatric/Behavioral:  Negative for agitation, behavioral problems and confusion.    All other systems reviewed and are  "negative.          Objective       ED Triage Vitals   Temperature Pulse Blood Pressure Respirations SpO2 Patient Position - Orthostatic VS   11/27/24 0145 11/27/24 0145 11/27/24 0145 11/27/24 0145 11/27/24 0145 11/27/24 0145   98.2 °F (36.8 °C) 74 115/55 20 98 % Lying      Temp Source Heart Rate Source BP Location FiO2 (%) Pain Score    11/27/24 0145 11/27/24 0200 11/27/24 0145 -- 11/27/24 0145    Temporal Monitor Right arm  10 - Worst Possible Pain      Vitals      Date and Time Temp Pulse SpO2 Resp BP Pain Score FACES Pain Rating User   11/27/24 0300 -- 66 93 % -- 117/56 -- -- BB   11/27/24 0230 -- 71 93 % 18 121/58 -- -- BB   11/27/24 0200 -- 73 98 % 18 110/55 10 - Worst Possible Pain -- BB   11/27/24 0145 98.2 °F (36.8 °C) 74 98 % 20 115/55 10 - Worst Possible Pain -- RJP            Physical Exam  Constitutional:       Appearance: She is well-developed.   HENT:      Head: Normocephalic and atraumatic.   Cardiovascular:      Rate and Rhythm: Normal rate and regular rhythm.      Heart sounds: Normal heart sounds. No murmur heard.     No friction rub.   Pulmonary:      Effort: Pulmonary effort is normal. No respiratory distress.      Breath sounds: Normal breath sounds. No wheezing or rales.   Abdominal:      General: Bowel sounds are normal. There is no distension.      Palpations: Abdomen is soft.      Tenderness: There is no abdominal tenderness.   Musculoskeletal:         General: Normal range of motion.      Cervical back: Normal range of motion and neck supple.      Comments: 5/5  strength  5/5 wrist flexion/extension  Normal finger abduction against resistance  Normal \"OK\" sign  Normal thumbs up    No tenderness throughout the hand, wrist on palpation  No \"snuff box\"/scaphoid tenderness elicited      Skin:     General: Skin is warm.   Neurological:      Mental Status: She is alert and oriented to person, place, and time.      Coordination: Coordination normal.      Comments: Normal sensation throughout " the palmar and dorsal aspect of the  right hand, of the left hand she reports some mild decreased sensation to the ulnar aspect.   Psychiatric:         Behavior: Behavior normal.         Thought Content: Thought content normal.         Judgment: Judgment normal.         Results Reviewed       None            No orders to display       Procedures    ED Medication and Procedure Management   Prior to Admission Medications   Prescriptions Last Dose Informant Patient Reported? Taking?   Bexagliflozin 20 MG TABS  Self No No   Sig: Take 20 mg by mouth in the morning   Patient not taking: Reported on 12/20/2023   clindamycin (CLEOCIN T) 1 % lotion  Self Yes No   Sig: Apply topically 2 (two) times a day To affected areas   gabapentin (NEURONTIN) 100 mg capsule  Self No No   Sig: Take 1 capsule (100 mg total) by mouth 2 (two) times a day   glimepiride (AMARYL) 2 mg tablet  Self No No   Sig: Take 0.5 tablets (1 mg total) by mouth daily with breakfast   lisinopril (ZESTRIL) 20 mg tablet  Self No No   Sig: Take 1 tablet (20 mg total) by mouth daily   metFORMIN (GLUCOPHAGE) 500 mg tablet  Self No No   Sig: Take 1 tablet by mouth once daily   sertraline (ZOLOFT) 100 mg tablet  Self No No   Sig: TAKE 1 TABLET BY MOUTH ONCE DAILY AS DIRECTED      Facility-Administered Medications: None     Discharge Medication List as of 11/27/2024  3:02 AM        CONTINUE these medications which have NOT CHANGED    Details   Bexagliflozin 20 MG TABS Take 20 mg by mouth in the morning, Starting Thu 10/5/2023, Normal      clindamycin (CLEOCIN T) 1 % lotion Apply topically 2 (two) times a day To affected areas, Starting Wed 9/13/2023, Historical Med      gabapentin (NEURONTIN) 100 mg capsule Take 1 capsule (100 mg total) by mouth 2 (two) times a day, Starting Wed 10/23/2024, Normal      glimepiride (AMARYL) 2 mg tablet Take 0.5 tablets (1 mg total) by mouth daily with breakfast, Starting Fri 7/5/2024, Normal      lisinopril (ZESTRIL) 20 mg tablet Take  1 tablet (20 mg total) by mouth daily, Starting Mon 6/10/2024, Normal      metFORMIN (GLUCOPHAGE) 500 mg tablet Take 1 tablet by mouth once daily, Normal      sertraline (ZOLOFT) 100 mg tablet TAKE 1 TABLET BY MOUTH ONCE DAILY AS DIRECTED, Normal           No discharge procedures on file.  ED SEPSIS DOCUMENTATION   Time reflects when diagnosis was documented in both MDM as applicable and the Disposition within this note       Time User Action Codes Description Comment    11/27/2024  3:01 AM Simeon Leonard Add [G62.9] Neuropathy                  Simeon Leonard MD  11/28/24 0020

## 2024-11-29 ENCOUNTER — VBI (OUTPATIENT)
Dept: FAMILY MEDICINE CLINIC | Facility: CLINIC | Age: 69
End: 2024-11-29

## 2024-11-29 NOTE — TELEPHONE ENCOUNTER
11/29/24 12:12 PM    Patient contacted post ED visit, outreach attempt made but message could not be left. Additional outreach attempt will be made.     Thank you.  Sylvia Allen MA  [facility]

## 2024-12-02 NOTE — TELEPHONE ENCOUNTER
12/02/24 12:25 PM    Patient contacted post ED visit, [facility] department spoke with patient/caregiver and outreach was successful.    Thank you.  Sylvia Allen MA  [facility]

## 2024-12-02 NOTE — TELEPHONE ENCOUNTER
12/02/24 11:37 AM    Patient contacted post ED visit, second outreach attempt made. Message was left for patient to return a call to the [facility] Department at [facility]: Phone [phone].    Thank you.  Sylvia Allen MA  [facility]

## 2024-12-07 ENCOUNTER — APPOINTMENT (OUTPATIENT)
Dept: LAB | Facility: HOSPITAL | Age: 69
End: 2024-12-07
Payer: COMMERCIAL

## 2024-12-07 DIAGNOSIS — M81.0 OSTEOPOROSIS, UNSPECIFIED OSTEOPOROSIS TYPE, UNSPECIFIED PATHOLOGICAL FRACTURE PRESENCE: ICD-10-CM

## 2024-12-07 DIAGNOSIS — N18.32 TYPE 2 DIABETES MELLITUS WITH STAGE 3B CHRONIC KIDNEY DISEASE, WITHOUT LONG-TERM CURRENT USE OF INSULIN (HCC): ICD-10-CM

## 2024-12-07 DIAGNOSIS — E78.5 HYPERLIPIDEMIA, UNSPECIFIED HYPERLIPIDEMIA TYPE: ICD-10-CM

## 2024-12-07 DIAGNOSIS — E11.22 TYPE 2 DIABETES MELLITUS WITH STAGE 3B CHRONIC KIDNEY DISEASE, WITHOUT LONG-TERM CURRENT USE OF INSULIN (HCC): ICD-10-CM

## 2024-12-07 LAB
25(OH)D3 SERPL-MCNC: 13.2 NG/ML (ref 30–100)
ALBUMIN SERPL BCG-MCNC: 2.6 G/DL (ref 3.5–5)
ALP SERPL-CCNC: 46 U/L (ref 34–104)
ALT SERPL W P-5'-P-CCNC: 8 U/L (ref 7–52)
ANION GAP SERPL CALCULATED.3IONS-SCNC: 6 MMOL/L (ref 4–13)
AST SERPL W P-5'-P-CCNC: 8 U/L (ref 13–39)
BILIRUB SERPL-MCNC: 0.15 MG/DL (ref 0.2–1)
BUN SERPL-MCNC: 26 MG/DL (ref 5–25)
CALCIUM ALBUM COR SERPL-MCNC: 9.9 MG/DL (ref 8.3–10.1)
CALCIUM SERPL-MCNC: 8.8 MG/DL (ref 8.4–10.2)
CHLORIDE SERPL-SCNC: 105 MMOL/L (ref 96–108)
CHOLEST SERPL-MCNC: 209 MG/DL (ref ?–200)
CO2 SERPL-SCNC: 19 MMOL/L (ref 21–32)
CREAT SERPL-MCNC: 1.25 MG/DL (ref 0.6–1.3)
EST. AVERAGE GLUCOSE BLD GHB EST-MCNC: 154 MG/DL
GFR SERPL CREATININE-BSD FRML MDRD: 44 ML/MIN/1.73SQ M
GLUCOSE P FAST SERPL-MCNC: 110 MG/DL (ref 65–99)
HBA1C MFR BLD: 7 %
HDLC SERPL-MCNC: 33 MG/DL
LDLC SERPL CALC-MCNC: 145 MG/DL (ref 0–100)
POTASSIUM SERPL-SCNC: 5.1 MMOL/L (ref 3.5–5.3)
PROT SERPL-MCNC: 10.3 G/DL (ref 6.4–8.4)
PTH-INTACT SERPL-MCNC: 49.8 PG/ML (ref 12–88)
SODIUM SERPL-SCNC: 130 MMOL/L (ref 135–147)
TRIGL SERPL-MCNC: 155 MG/DL (ref ?–150)

## 2024-12-07 PROCEDURE — 82306 VITAMIN D 25 HYDROXY: CPT

## 2024-12-07 PROCEDURE — 83036 HEMOGLOBIN GLYCOSYLATED A1C: CPT

## 2024-12-07 PROCEDURE — 83970 ASSAY OF PARATHORMONE: CPT

## 2024-12-07 PROCEDURE — 80053 COMPREHEN METABOLIC PANEL: CPT

## 2024-12-07 PROCEDURE — 80061 LIPID PANEL: CPT

## 2024-12-07 PROCEDURE — 82523 COLLAGEN CROSSLINKS: CPT

## 2024-12-07 PROCEDURE — 36415 COLL VENOUS BLD VENIPUNCTURE: CPT

## 2024-12-08 ENCOUNTER — RA CDI HCC (OUTPATIENT)
Dept: OTHER | Facility: HOSPITAL | Age: 69
End: 2024-12-08

## 2024-12-09 ENCOUNTER — RESULTS FOLLOW-UP (OUTPATIENT)
Dept: ENDOCRINOLOGY | Facility: CLINIC | Age: 69
End: 2024-12-09

## 2024-12-09 DIAGNOSIS — E55.9 VITAMIN D DEFICIENCY: Primary | ICD-10-CM

## 2024-12-09 RX ORDER — ERGOCALCIFEROL 1.25 MG/1
50000 CAPSULE, LIQUID FILLED ORAL WEEKLY
Qty: 12 CAPSULE | Refills: 0 | Status: SHIPPED | OUTPATIENT
Start: 2024-12-09 | End: 2025-01-15 | Stop reason: ALTCHOICE

## 2024-12-13 LAB — COLLAGEN CTX SERPL-MCNC: 579 PG/ML

## 2024-12-17 ENCOUNTER — HOSPITAL ENCOUNTER (EMERGENCY)
Facility: HOSPITAL | Age: 69
Discharge: HOME | DRG: 683 | End: 2024-12-17
Payer: COMMERCIAL

## 2024-12-17 ENCOUNTER — OFFICE VISIT (OUTPATIENT)
Dept: FAMILY MEDICINE CLINIC | Facility: CLINIC | Age: 69
End: 2024-12-17
Payer: COMMERCIAL

## 2024-12-17 VITALS
TEMPERATURE: 97.7 F | WEIGHT: 150.13 LBS | OXYGEN SATURATION: 98 % | BODY MASS INDEX: 29.32 KG/M2 | DIASTOLIC BLOOD PRESSURE: 54 MMHG | SYSTOLIC BLOOD PRESSURE: 112 MMHG | HEART RATE: 70 BPM | RESPIRATION RATE: 20 BRPM

## 2024-12-17 VITALS
TEMPERATURE: 97.2 F | RESPIRATION RATE: 18 BRPM | DIASTOLIC BLOOD PRESSURE: 80 MMHG | OXYGEN SATURATION: 99 % | HEIGHT: 60 IN | HEART RATE: 74 BPM | SYSTOLIC BLOOD PRESSURE: 130 MMHG | BODY MASS INDEX: 29.37 KG/M2 | WEIGHT: 149.6 LBS

## 2024-12-17 DIAGNOSIS — G62.9 NEUROPATHY: Primary | ICD-10-CM

## 2024-12-17 DIAGNOSIS — G56.02 CARPAL TUNNEL SYNDROME OF LEFT WRIST: Primary | ICD-10-CM

## 2024-12-17 DIAGNOSIS — G56.92 NEUROPATHY OF HAND, LEFT: ICD-10-CM

## 2024-12-17 PROCEDURE — G2211 COMPLEX E/M VISIT ADD ON: HCPCS | Performed by: INTERNAL MEDICINE

## 2024-12-17 PROCEDURE — 99284 EMERGENCY DEPT VISIT MOD MDM: CPT

## 2024-12-17 PROCEDURE — 99214 OFFICE O/P EST MOD 30 MIN: CPT | Performed by: INTERNAL MEDICINE

## 2024-12-17 PROCEDURE — 99283 EMERGENCY DEPT VISIT LOW MDM: CPT

## 2024-12-17 RX ORDER — GABAPENTIN 300 MG/1
300 CAPSULE ORAL ONCE
Status: COMPLETED | OUTPATIENT
Start: 2024-12-17 | End: 2024-12-17

## 2024-12-17 RX ORDER — TIZANIDINE HYDROCHLORIDE 2 MG/1
2 CAPSULE, GELATIN COATED ORAL 3 TIMES DAILY PRN
Qty: 30 CAPSULE | Refills: 0 | Status: SHIPPED | OUTPATIENT
Start: 2024-12-17

## 2024-12-17 RX ORDER — PREGABALIN 100 MG/1
100 CAPSULE ORAL 2 TIMES DAILY
Qty: 60 CAPSULE | Refills: 0 | Status: SHIPPED | OUTPATIENT
Start: 2024-12-17 | End: 2024-12-24

## 2024-12-17 RX ORDER — DIAZEPAM 5 MG/1
5 TABLET ORAL ONCE
Status: COMPLETED | OUTPATIENT
Start: 2024-12-17 | End: 2024-12-17

## 2024-12-17 RX ADMIN — GABAPENTIN 300 MG: 300 CAPSULE ORAL at 01:01

## 2024-12-17 RX ADMIN — DIAZEPAM 5 MG: 5 TABLET ORAL at 01:01

## 2024-12-17 NOTE — PROGRESS NOTES
Name: Nidia Hamlin      : 1955      MRN: [id number]  Encounter Provider: Tasha Palmer DO  Encounter Date: 2024   Encounter department: [facility]  :  Assessment & Plan  Neuropathy    Orders:  •  pregabalin (LYRICA) 100 mg capsule; Take 1 capsule (100 mg total) by mouth 2 (two) times a day  •  TiZANidine (ZANAFLEX) 2 MG capsule; Take 1 capsule (2 mg total) by mouth 3 (three) times a day as needed for muscle spasms  Pt awaiting EMG in January to rule out CTS Trial meds above since gabapentin not effective thus far   She cannot tolerate hand therapy Encouraged her to wear brace at least at HS   Followup after EMG Encouraged pt to call to see if any cancellations that she can go sooner         Depression Screening and Follow-up Plan: Patient's depression screening was positive with a PHQ-9 score of 7. Patient declines further evaluation by mental health professional and/or medications. Brief counseling provided. Will re-evaluate at next office visit.     History of Present Illness     HPI  Pt was in ER due to increasing pain mainly left hand Not sleeping well No significant relief with gabapentin She saw hand surgeon and has EMG in January She has pain with adls and feels her hand swells Pt could not tolerate hand therapy as pain increased with exercises she felt Her BS have been better Last A1c was lower at 7.2 and fasting sugar down below 120   Review of Systems   Constitutional:  Negative for chills and fever.   HENT: Negative.     Eyes:  Negative for visual disturbance.   Respiratory:  Negative for cough and shortness of breath.    Cardiovascular:  Negative for chest pain.   Genitourinary: Negative.    Musculoskeletal:  Positive for arthralgias and myalgias.   Neurological:  Positive for numbness. Negative for dizziness, light-headedness and headaches.   Psychiatric/Behavioral:  Negative for sleep disturbance. The patient is not nervous/anxious.      Past Medical History:   Diagnosis  Date   • Adrenal insufficiency (HCC)    • Anxiety    • Bacterial sepsis (HCC)     last assessed: 06/13/16   • Cushing's syndrome (HCC)     last assessed: 05/29/16   • Depression     last assessed: 01/02/18   • Diabetes mellitus (HCC)    • Hydradenitis    • Hyperlipidemia    • Hypertension    • Positive FIT (fecal immunochemical test) 06/22/2018    Added automatically from request for surgery [id number]   • Rib contusion, left, sequela 06/14/2022   • Trigger finger, right middle finger 03/07/2022     Past Surgical History:   Procedure Laterality Date   • ADRENALECTOMY  12/2002   • CHOLECYSTECTOMY     • HERNIA REPAIR      umbilical   • OTHER SURGICAL HISTORY      Injection of trigger joint   • IN COLONOSCOPY FLX DX W/COLLJ SPEC WHEN PFRMD N/A 7/10/2018    Procedure: COLONOSCOPY;  Surgeon: Pako Nieves MD;  Location: [facility];  Service: Gastroenterology   • TONSILLECTOMY AND ADENOIDECTOMY     • TUBAL LIGATION       Social History     Socioeconomic History   • Marital status:      Spouse name: Not on file   • Number of children: Not on file   • Years of education: Not on file   • Highest education level: Not on file   Occupational History   • Not on file   Tobacco Use   • Smoking status: Every Day     Current packs/day: 0.25     Average packs/day: 0.3 packs/day for 50.0 years (12.5 ttl pk-yrs)     Types: Cigarettes   • Smokeless tobacco: Never   Vaping Use   • Vaping status: Never Used   Substance and Sexual Activity   • Alcohol use: No   • Drug use: No   • Sexual activity: Not Currently   Other Topics Concern   • Not on file   Social History Narrative    Always uses seat belt    Always uses sunscreen    Caffeine use    Dental care, regularly    No living will    Uses safety equipment - seatbelts     Social Drivers of Health     Financial Resource Strain: Low Risk  (5/16/2023)    Overall Financial Resource Strain (CARDIA)    • Difficulty of Paying Living Expenses: Not very hard   Food Insecurity: No Food  Insecurity (6/24/2024)    Nursing - Inadequate Food Risk Classification    • Worried About Running Out of Food in the Last Year: Never true    • Ran Out of Food in the Last Year: Never true    • Ran Out of Food in the Last Year: Not on file   Transportation Needs: No Transportation Needs (6/24/2024)    PRAPARE - Transportation    • Lack of Transportation (Medical): No    • Lack of Transportation (Non-Medical): No   Physical Activity: Not on file   Stress: Not on file   Social Connections: Not on file   Intimate Partner Violence: Not on file   Housing Stability: Low Risk  (6/24/2024)    Housing Stability Vital Sign    • Unable to Pay for Housing in the Last Year: No    • Number of Times Moved in the Last Year: 1    • Homeless in the Last Year: No     Allergies   Allergen Reactions   • Fosamax [Alendronate] Diarrhea   • Paxil [Paroxetine Hcl] Rash       Objective   /80   Pulse 74   Temp (!) 97.2 °F (36.2 °C) (Temporal)   Resp 18   Ht 5' (1.524 m)   Wt 67.9 kg (149 lb 9.6 oz)   SpO2 99%   BMI 29.22 kg/m²      Physical Exam  Vitals and nursing note reviewed.   Constitutional:       Appearance: Normal appearance. She is ill-appearing.      Comments: Uncomfortable at rest holding left hand up   HENT:      Head: Normocephalic and atraumatic.      Mouth/Throat:      Mouth: Mucous membranes are moist.   Cardiovascular:      Rate and Rhythm: Normal rate.   Pulmonary:      Effort: Pulmonary effort is normal. No respiratory distress.      Breath sounds: No wheezing.   Musculoskeletal:         General: Swelling and deformity present.      Cervical back: Neck supple.      Comments: Unable to make fist left hand due to pain localized hand swelling Pulses palpable    Lymphadenopathy:      Cervical: No cervical adenopathy.   Skin:     General: Skin is warm and dry.      Coloration: Skin is not jaundiced.      Findings: No bruising or erythema.   Neurological:      General: No focal deficit present.      Mental Status:  She is alert and oriented to person, place, and time. Mental status is at baseline.      Cranial Nerves: No cranial nerve deficit.   Psychiatric:         Mood and Affect: Mood normal.         Behavior: Behavior normal.

## 2024-12-17 NOTE — ED PROVIDER NOTES
Time reflects when diagnosis was documented in both MDM as applicable and the Disposition within this note       Time User Action Codes Description Comment    12/17/2024 12:56 AM Simeon Montgomery Add [G56.02] Carpal tunnel syndrome of left wrist     12/17/2024 12:56 AM Simeon Montgomery Add [G56.92] Neuropathy of hand, left           ED Disposition       ED Disposition   Discharge    Condition   Stable    Date/Time   Tue Dec 17, 2024 12:56 AM    Comment   Nidia MILTON Yakelin discharge to home/self care.                   Assessment & Plan       Medical Decision Making  Medical complexity: 69-year-old female with known neuropathy and bilateral carpal tunnel presenting now with left hand pain in the area of the median nerve.  Suspicious for an exacerbation of her chronic carpal tunnel.  Patient is requesting symptomatic relief here in the emergency department and says that she has found relief in the past with a dose of Valium.  I let her know that I am not okay prescribing her a course of Valium at home given her comorbidities and the fact that this is not an excepted treatment for chronic pain however given that she is in acute acute exacerbation at this time, I did provide a single dose here in the ED.  Additionally, I did provide her a dose of gabapentin for the neuropathic component of her pain.  Otherwise, I have not much to offer this patient who is already seeing multiple specialists for her ongoing pain.  I will advise that she continues to follow-up with her orthopedic surgery team, her primary doctor, and to continue to pursue EMG testing for further workup and evaluation and treatment options.    Disposition: Patient was discharged after dose of Valium and gabapentin here in the emergency department.  She was having ongoing pain in the left hand when discharged, but was not in any acute distress or extremis.    Risk  Prescription drug management.             Medications   gabapentin (NEURONTIN) capsule 300 mg (300  mg Oral Given 12/17/24 0101)   diazepam (VALIUM) tablet 5 mg (5 mg Oral Given 12/17/24 0101)       ED Risk Strat Scores                          SBIRT 22yo+      Flowsheet Row Most Recent Value   Initial Alcohol Screen: US AUDIT-C     1. How often do you have a drink containing alcohol? 0 Filed at: 12/17/2024 0032   2. How many drinks containing alcohol do you have on a typical day you are drinking?  0 Filed at: 12/17/2024 0032   3a. Male UNDER 65: How often do you have five or more drinks on one occasion? 0 Filed at: 12/17/2024 0032   3b. FEMALE Any Age, or MALE 65+: How often do you have 4 or more drinks on one occassion? 0 Filed at: 12/17/2024 0032   Audit-C Score 0 Filed at: 12/17/2024 0032   ALEXSANDRA: How many times in the past year have you...    Used an illegal drug or used a prescription medication for non-medical reasons? Never Filed at: 12/17/2024 0032                            History of Present Illness       Chief Complaint   Patient presents with    Hand Pain     Pt has neuropathy in hands ada to see pcp tomorrow. Pt states her left hand hurts and  she couldn't wait        Past Medical History:   Diagnosis Date    Adrenal insufficiency (HCC)     Anxiety     Bacterial sepsis (HCC)     last assessed: 06/13/16    Cushing's syndrome (HCC)     last assessed: 05/29/16    Depression     last assessed: 01/02/18    Diabetes mellitus (HCC)     Hydradenitis     Hyperlipidemia     Hypertension     Positive FIT (fecal immunochemical test) 06/22/2018    Added automatically from request for surgery [id number]    Rib contusion, left, sequela 06/14/2022    Trigger finger, right middle finger 03/07/2022      Past Surgical History:   Procedure Laterality Date    ADRENALECTOMY  12/2002    CHOLECYSTECTOMY      HERNIA REPAIR      umbilical    OTHER SURGICAL HISTORY      Injection of trigger joint    MT COLONOSCOPY FLX DX W/COLLJ SPEC WHEN PFRMD N/A 7/10/2018    Procedure: COLONOSCOPY;  Surgeon: Pako Nieves MD;  Location: [facility]  OR;  Service: Gastroenterology    TONSILLECTOMY AND ADENOIDECTOMY      TUBAL LIGATION        Family History   Problem Relation Age of Onset    Breast cancer Mother         unknown age    No Known Problems Sister     No Known Problems Daughter     No Known Problems Daughter     No Known Problems Maternal Grandmother     No Known Problems Maternal Grandfather     No Known Problems Paternal Grandmother     No Known Problems Paternal Grandfather     No Known Problems Paternal Aunt     No Known Problems Paternal Aunt     No Known Problems Paternal Aunt     No Known Problems Half-Sister     No Known Problems Half-Sister     No Known Problems Half-Sister     No Known Problems Half-Sister     No Known Problems Half-Sister       Social History     Tobacco Use    Smoking status: Every Day     Current packs/day: 0.25     Average packs/day: 0.3 packs/day for 50.0 years (12.5 ttl pk-yrs)     Types: Cigarettes    Smokeless tobacco: Never   Vaping Use    Vaping status: Never Used   Substance Use Topics    Alcohol use: No    Drug use: No      E-Cigarette/Vaping    E-Cigarette Use Never User       E-Cigarette/Vaping Substances    Nicotine No     THC No     CBD No     Flavoring No     Other No     Unknown No       I have reviewed and agree with the history as documented.     This is a 69-year-old female who has a known history of poorly controlled diabetes, peripheral neuropathy, and bilateral carpal tunnel disorder who is presenting today with acute on chronic left hand pain.  Patient reports that she was cooking yesterday and felt throughout the evening like her chronic symptoms were getting worse than usual.  She states that she tried to take her normal gabapentin without much relief of her symptoms.  She feels tingling in the palmar surface of her hand and some tingling and numbness on her left third and fourth digits near the pads of her fingers.  She states that the pain was so severe that it was making it difficult for her to  sleep at night.  She states that the pain gets this way from time to time.  Patient reports that she does not wear her braces as prescribed at night as they cause her discomfort.  She is frustrated that the EMG that was ordered for her will take several months to complete.  Patient does have an appointment with her primary doctor in the morning.        Review of Systems   Constitutional:  Negative for chills and fever.   HENT:  Negative for ear pain and sore throat.    Eyes:  Negative for pain and visual disturbance.   Respiratory:  Negative for cough and shortness of breath.    Cardiovascular:  Negative for chest pain and palpitations.   Gastrointestinal:  Negative for abdominal pain and vomiting.   Genitourinary:  Negative for dysuria and hematuria.   Musculoskeletal:  Negative for arthralgias and back pain.        Hand pain     Skin:  Negative for color change and rash.   Neurological:  Negative for seizures and syncope.   Psychiatric/Behavioral:  Negative for suicidal ideas.    All other systems reviewed and are negative.          Objective       ED Triage Vitals [12/17/24 0029]   Temperature Pulse Blood Pressure Respirations SpO2 Patient Position - Orthostatic VS   97.7 °F (36.5 °C) 70 112/54 20 98 % Sitting      Temp Source Heart Rate Source BP Location FiO2 (%) Pain Score    Temporal Monitor Right arm -- 10 - Worst Possible Pain      Vitals      Date and Time Temp Pulse SpO2 Resp BP Pain Score FACES Pain Rating User   12/17/24 0029 97.7 °F (36.5 °C) 70 98 % 20 112/54 10 - Worst Possible Pain -- RJP            Physical Exam  Vitals and nursing note reviewed.   Constitutional:       General: She is not in acute distress.     Appearance: She is well-developed.   HENT:      Head: Normocephalic and atraumatic.      Right Ear: External ear normal.      Left Ear: External ear normal.      Nose: Nose normal. No congestion or rhinorrhea.      Mouth/Throat:      Mouth: Mucous membranes are moist.      Pharynx:  "Oropharynx is clear. No oropharyngeal exudate or posterior oropharyngeal erythema.   Eyes:      General: No scleral icterus.     Extraocular Movements: Extraocular movements intact.      Conjunctiva/sclera: Conjunctivae normal.      Pupils: Pupils are equal, round, and reactive to light.   Cardiovascular:      Rate and Rhythm: Normal rate and regular rhythm.      Pulses: Normal pulses.      Heart sounds: Normal heart sounds. No murmur heard.  Pulmonary:      Effort: Pulmonary effort is normal. No respiratory distress.      Breath sounds: Normal breath sounds. No wheezing or rhonchi.   Abdominal:      General: Abdomen is flat. There is no distension.      Palpations: Abdomen is soft.      Tenderness: There is no abdominal tenderness. There is no guarding.   Musculoskeletal:         General: No swelling.      Left forearm: No swelling, edema or bony tenderness.      Right wrist: Normal pulse.      Left wrist: No swelling, effusion or bony tenderness. Decreased range of motion. Normal pulse.      Left hand: Tenderness present. Decreased sensation of the median distribution. Normal pulse.        Hands:       Cervical back: Neck supple. No rigidity.      Right lower leg: No edema.      Left lower leg: No edema.      Comments: Area of decreased sensation/ \"pins and needles\"   Lymphadenopathy:      Cervical: No cervical adenopathy.   Skin:     General: Skin is warm and dry.      Capillary Refill: Capillary refill takes less than 2 seconds.      Coloration: Skin is not jaundiced.      Findings: No rash.   Neurological:      General: No focal deficit present.      Mental Status: She is alert and oriented to person, place, and time. Mental status is at baseline.   Psychiatric:         Mood and Affect: Mood normal.         Behavior: Behavior normal.         Results Reviewed       None            No orders to display       Procedures    ED Medication and Procedure Management   Prior to Admission Medications   Prescriptions Last " Dose Informant Patient Reported? Taking?   Bexagliflozin 20 MG TABS  Self No No   Sig: Take 20 mg by mouth in the morning   Patient not taking: Reported on 12/20/2023   Cholecalciferol (VITAMIN D3) 1,000 units tablet   No No   Sig: Take 2 tablets (2,000 Units total) by mouth daily   clindamycin (CLEOCIN T) 1 % lotion  Self Yes No   Sig: Apply topically 2 (two) times a day To affected areas   ergocalciferol (VITAMIN D2) 50,000 units   No No   Sig: Take 1 capsule (50,000 Units total) by mouth once a week   gabapentin (NEURONTIN) 100 mg capsule  Self No No   Sig: Take 1 capsule (100 mg total) by mouth 2 (two) times a day   glimepiride (AMARYL) 2 mg tablet  Self No No   Sig: Take 0.5 tablets (1 mg total) by mouth daily with breakfast   lisinopril (ZESTRIL) 20 mg tablet  Self No No   Sig: Take 1 tablet (20 mg total) by mouth daily   metFORMIN (GLUCOPHAGE) 500 mg tablet  Self No No   Sig: Take 1 tablet by mouth once daily   sertraline (ZOLOFT) 100 mg tablet  Self No No   Sig: TAKE 1 TABLET BY MOUTH ONCE DAILY AS DIRECTED      Facility-Administered Medications: None     Discharge Medication List as of 12/17/2024 12:57 AM        CONTINUE these medications which have NOT CHANGED    Details   Bexagliflozin 20 MG TABS Take 20 mg by mouth in the morning, Starting Thu 10/5/2023, Normal      Cholecalciferol (VITAMIN D3) 1,000 units tablet Take 2 tablets (2,000 Units total) by mouth daily, Starting Mon 12/9/2024, No Print      clindamycin (CLEOCIN T) 1 % lotion Apply topically 2 (two) times a day To affected areas, Starting Wed 9/13/2023, Historical Med      ergocalciferol (VITAMIN D2) 50,000 units Take 1 capsule (50,000 Units total) by mouth once a week, Starting Mon 12/9/2024, Normal      gabapentin (NEURONTIN) 100 mg capsule Take 1 capsule (100 mg total) by mouth 2 (two) times a day, Starting Wed 10/23/2024, Normal      glimepiride (AMARYL) 2 mg tablet Take 0.5 tablets (1 mg total) by mouth daily with breakfast, Starting Fri  7/5/2024, Normal      lisinopril (ZESTRIL) 20 mg tablet Take 1 tablet (20 mg total) by mouth daily, Starting Mon 6/10/2024, Normal      metFORMIN (GLUCOPHAGE) 500 mg tablet Take 1 tablet by mouth once daily, Normal      sertraline (ZOLOFT) 100 mg tablet TAKE 1 TABLET BY MOUTH ONCE DAILY AS DIRECTED, Normal           No discharge procedures on file.  ED SEPSIS DOCUMENTATION   Time reflects when diagnosis was documented in both MDM as applicable and the Disposition within this note       Time User Action Codes Description Comment    12/17/2024 12:56 AM Simeon Montgomery [G56.02] Carpal tunnel syndrome of left wrist     12/17/2024 12:56 AM Simeon Montgomery [G56.92] Neuropathy of hand, left                  Simeon Montgomery MD  12/17/24 0235

## 2024-12-18 ENCOUNTER — TELEPHONE (OUTPATIENT)
Age: 69
End: 2024-12-18

## 2024-12-18 NOTE — TELEPHONE ENCOUNTER
PA for pregabalin (LYRICA) 100 mg capsule  APPROVED     Date(s) approved January 1, 2024 to December 31, 2025     Case #[id number]       Patient advised by          []Brand Networkshart Message  []Phone call   []LMOM  []L/M to call office as no active Communication consent on file  [x]Unable to leave detailed message number is currently busy       Pharmacy advised by    [x]Fax  []Phone call    Approval letter scanned into Media Yes

## 2024-12-18 NOTE — TELEPHONE ENCOUNTER
PA for pregabalin (LYRICA) 100 mg capsule SUBMITTED to     via    []CMM-KEY:   [x]Surescripts-Case ID # [id number]   []Availity-Auth ID # NDC #   []Faxed to plan   []Other website   []Phone call Case ID #     [x]PA sent as URGENT    All office notes, labs and other pertaining documents and studies sent. Clinical questions answered. Awaiting determination from insurance company.     Turnaround time for your insurance to make a decision on your Prior Authorization can take 7-21 business days.

## 2024-12-19 ENCOUNTER — APPOINTMENT (EMERGENCY)
Dept: CT IMAGING | Facility: HOSPITAL | Age: 69
DRG: 683 | End: 2024-12-19
Payer: COMMERCIAL

## 2024-12-19 ENCOUNTER — HOSPITAL ENCOUNTER (INPATIENT)
Facility: HOSPITAL | Age: 69
LOS: 4 days | Discharge: HOME HEALTH | DRG: 683 | End: 2024-12-24
Attending: EMERGENCY MEDICINE | Admitting: INTERNAL MEDICINE
Payer: COMMERCIAL

## 2024-12-19 ENCOUNTER — NURSE TRIAGE (OUTPATIENT)
Age: 69
End: 2024-12-19

## 2024-12-19 ENCOUNTER — APPOINTMENT (EMERGENCY)
Dept: RADIOLOGY | Facility: HOSPITAL | Age: 69
DRG: 683 | End: 2024-12-19
Payer: COMMERCIAL

## 2024-12-19 DIAGNOSIS — N17.9 AKI (ACUTE KIDNEY INJURY) (HCC): ICD-10-CM

## 2024-12-19 DIAGNOSIS — E11.649: ICD-10-CM

## 2024-12-19 DIAGNOSIS — E87.1 HYPONATREMIA: ICD-10-CM

## 2024-12-19 DIAGNOSIS — E87.20 ACIDOSIS: ICD-10-CM

## 2024-12-19 DIAGNOSIS — R74.8 ELEVATED LIPASE: ICD-10-CM

## 2024-12-19 DIAGNOSIS — D64.9 ANEMIA: ICD-10-CM

## 2024-12-19 DIAGNOSIS — F33.9 DEPRESSION, RECURRENT (HCC): ICD-10-CM

## 2024-12-19 DIAGNOSIS — J10.1 INFLUENZA A (H1N1): ICD-10-CM

## 2024-12-19 DIAGNOSIS — C90.00 MULTIPLE MYELOMA, REMISSION STATUS UNSPECIFIED (HCC): ICD-10-CM

## 2024-12-19 DIAGNOSIS — E55.9 VITAMIN D DEFICIENCY: ICD-10-CM

## 2024-12-19 DIAGNOSIS — D72.819 LEUKOPENIA, UNSPECIFIED TYPE: ICD-10-CM

## 2024-12-19 DIAGNOSIS — G62.9 NEUROPATHY: ICD-10-CM

## 2024-12-19 DIAGNOSIS — E16.2 HYPOGLYCEMIA: ICD-10-CM

## 2024-12-19 DIAGNOSIS — N18.32 STAGE 3B CHRONIC KIDNEY DISEASE (HCC): ICD-10-CM

## 2024-12-19 DIAGNOSIS — R11.2 NAUSEA & VOMITING: Primary | ICD-10-CM

## 2024-12-19 LAB
ALBUMIN SERPL BCG-MCNC: 2.5 G/DL (ref 3.5–5)
ALP SERPL-CCNC: 46 U/L (ref 34–104)
ALT SERPL W P-5'-P-CCNC: 7 U/L (ref 7–52)
ANION GAP SERPL CALCULATED.3IONS-SCNC: 8 MMOL/L (ref 4–13)
ANION GAP SERPL CALCULATED.3IONS-SCNC: 9 MMOL/L (ref 4–13)
AST SERPL W P-5'-P-CCNC: 9 U/L (ref 13–39)
ATRIAL RATE: 65 BPM
ATRIAL RATE: 65 BPM
BACTERIA UR QL AUTO: NORMAL /HPF
BASOPHILS # BLD AUTO: 0.03 THOUSANDS/ÂΜL (ref 0–0.1)
BASOPHILS NFR BLD AUTO: 0 % (ref 0–1)
BILIRUB SERPL-MCNC: 0.18 MG/DL (ref 0.2–1)
BILIRUB UR QL STRIP: NEGATIVE
BUN SERPL-MCNC: 43 MG/DL (ref 5–25)
BUN SERPL-MCNC: 48 MG/DL (ref 5–25)
CALCIUM ALBUM COR SERPL-MCNC: 9.5 MG/DL (ref 8.3–10.1)
CALCIUM SERPL-MCNC: 8.3 MG/DL (ref 8.4–10.2)
CALCIUM SERPL-MCNC: 8.3 MG/DL (ref 8.4–10.2)
CARDIAC TROPONIN I PNL SERPL HS: <2 NG/L (ref ?–50)
CARDIAC TROPONIN I PNL SERPL HS: <2 NG/L (ref ?–50)
CHLORIDE SERPL-SCNC: 102 MMOL/L (ref 96–108)
CHLORIDE SERPL-SCNC: 103 MMOL/L (ref 96–108)
CLARITY UR: CLEAR
CO2 SERPL-SCNC: 17 MMOL/L (ref 21–32)
CO2 SERPL-SCNC: 20 MMOL/L (ref 21–32)
COLOR UR: COLORLESS
CREAT SERPL-MCNC: 1.42 MG/DL (ref 0.6–1.3)
CREAT SERPL-MCNC: 1.54 MG/DL (ref 0.6–1.3)
EOSINOPHIL # BLD AUTO: 0.18 THOUSAND/ÂΜL (ref 0–0.61)
EOSINOPHIL NFR BLD AUTO: 2 % (ref 0–6)
ERYTHROCYTE [DISTWIDTH] IN BLOOD BY AUTOMATED COUNT: 16.8 % (ref 11.6–15.1)
FLUAV AG UPPER RESP QL IA.RAPID: NEGATIVE
FLUBV AG UPPER RESP QL IA.RAPID: NEGATIVE
GFR SERPL CREATININE-BSD FRML MDRD: 34 ML/MIN/1.73SQ M
GFR SERPL CREATININE-BSD FRML MDRD: 37 ML/MIN/1.73SQ M
GLUCOSE SERPL-MCNC: 144 MG/DL (ref 65–140)
GLUCOSE SERPL-MCNC: 170 MG/DL (ref 65–140)
GLUCOSE SERPL-MCNC: 44 MG/DL (ref 65–140)
GLUCOSE SERPL-MCNC: 44 MG/DL (ref 65–140)
GLUCOSE SERPL-MCNC: 70 MG/DL (ref 65–140)
GLUCOSE UR STRIP-MCNC: NEGATIVE MG/DL
HCT VFR BLD AUTO: 24.7 % (ref 34.8–46.1)
HGB BLD-MCNC: 7.6 G/DL (ref 11.5–15.4)
HGB UR QL STRIP.AUTO: ABNORMAL
IMM GRANULOCYTES # BLD AUTO: 0.07 THOUSAND/UL (ref 0–0.2)
IMM GRANULOCYTES NFR BLD AUTO: 1 % (ref 0–2)
KETONES UR STRIP-MCNC: NEGATIVE MG/DL
LEUKOCYTE ESTERASE UR QL STRIP: NEGATIVE
LIPASE SERPL-CCNC: 425 U/L (ref 11–82)
LYMPHOCYTES # BLD AUTO: 3.39 THOUSANDS/ÂΜL (ref 0.6–4.47)
LYMPHOCYTES NFR BLD AUTO: 43 % (ref 14–44)
MAGNESIUM SERPL-MCNC: 1.9 MG/DL (ref 1.9–2.7)
MCH RBC QN AUTO: 31.3 PG (ref 26.8–34.3)
MCHC RBC AUTO-ENTMCNC: 30.8 G/DL (ref 31.4–37.4)
MCV RBC AUTO: 102 FL (ref 82–98)
MONOCYTES # BLD AUTO: 0.72 THOUSAND/ÂΜL (ref 0.17–1.22)
MONOCYTES NFR BLD AUTO: 9 % (ref 4–12)
NEUTROPHILS # BLD AUTO: 3.44 THOUSANDS/ÂΜL (ref 1.85–7.62)
NEUTS SEG NFR BLD AUTO: 45 % (ref 43–75)
NITRITE UR QL STRIP: NEGATIVE
NON-SQ EPI CELLS URNS QL MICRO: NORMAL /HPF
NRBC BLD AUTO-RTO: 0 /100 WBCS
P AXIS: 78 DEGREES
P AXIS: 83 DEGREES
PH UR STRIP.AUTO: 5.5 [PH]
PLATELET # BLD AUTO: 244 THOUSANDS/UL (ref 149–390)
PMV BLD AUTO: 8.9 FL (ref 8.9–12.7)
POTASSIUM SERPL-SCNC: 4.8 MMOL/L (ref 3.5–5.3)
POTASSIUM SERPL-SCNC: 5.3 MMOL/L (ref 3.5–5.3)
PR INTERVAL: 156 MS
PR INTERVAL: 162 MS
PROT SERPL-MCNC: 10.3 G/DL (ref 6.4–8.4)
PROT UR STRIP-MCNC: NEGATIVE MG/DL
QRS AXIS: 68 DEGREES
QRS AXIS: 74 DEGREES
QRSD INTERVAL: 82 MS
QRSD INTERVAL: 86 MS
QT INTERVAL: 392 MS
QT INTERVAL: 398 MS
QTC INTERVAL: 407 MS
QTC INTERVAL: 413 MS
RBC # BLD AUTO: 2.43 MILLION/UL (ref 3.81–5.12)
RBC #/AREA URNS AUTO: NORMAL /HPF
SARS-COV+SARS-COV-2 AG RESP QL IA.RAPID: NEGATIVE
SODIUM 24H UR-SCNC: 31 MOL/L
SODIUM SERPL-SCNC: 129 MMOL/L (ref 135–147)
SODIUM SERPL-SCNC: 130 MMOL/L (ref 135–147)
SP GR UR STRIP.AUTO: <1.005 (ref 1–1.03)
T WAVE AXIS: 74 DEGREES
T WAVE AXIS: 80 DEGREES
UROBILINOGEN UR STRIP-ACNC: <2 MG/DL
VENTRICULAR RATE: 65 BPM
VENTRICULAR RATE: 65 BPM
WBC # BLD AUTO: 7.83 THOUSAND/UL (ref 4.31–10.16)
WBC #/AREA URNS AUTO: NORMAL /HPF

## 2024-12-19 PROCEDURE — 82948 REAGENT STRIP/BLOOD GLUCOSE: CPT

## 2024-12-19 PROCEDURE — 93005 ELECTROCARDIOGRAM TRACING: CPT

## 2024-12-19 PROCEDURE — 71045 X-RAY EXAM CHEST 1 VIEW: CPT

## 2024-12-19 PROCEDURE — 80048 BASIC METABOLIC PNL TOTAL CA: CPT | Performed by: PHYSICIAN ASSISTANT

## 2024-12-19 PROCEDURE — 83690 ASSAY OF LIPASE: CPT | Performed by: PHYSICIAN ASSISTANT

## 2024-12-19 PROCEDURE — 83935 ASSAY OF URINE OSMOLALITY: CPT | Performed by: PHYSICIAN ASSISTANT

## 2024-12-19 PROCEDURE — 96365 THER/PROPH/DIAG IV INF INIT: CPT

## 2024-12-19 PROCEDURE — 87811 SARS-COV-2 COVID19 W/OPTIC: CPT | Performed by: PHYSICIAN ASSISTANT

## 2024-12-19 PROCEDURE — 80053 COMPREHEN METABOLIC PANEL: CPT | Performed by: PHYSICIAN ASSISTANT

## 2024-12-19 PROCEDURE — 96366 THER/PROPH/DIAG IV INF ADDON: CPT

## 2024-12-19 PROCEDURE — 81001 URINALYSIS AUTO W/SCOPE: CPT | Performed by: PHYSICIAN ASSISTANT

## 2024-12-19 PROCEDURE — 99291 CRITICAL CARE FIRST HOUR: CPT | Performed by: PHYSICIAN ASSISTANT

## 2024-12-19 PROCEDURE — 85025 COMPLETE CBC W/AUTO DIFF WBC: CPT | Performed by: PHYSICIAN ASSISTANT

## 2024-12-19 PROCEDURE — 84300 ASSAY OF URINE SODIUM: CPT | Performed by: PHYSICIAN ASSISTANT

## 2024-12-19 PROCEDURE — 99284 EMERGENCY DEPT VISIT MOD MDM: CPT

## 2024-12-19 PROCEDURE — 74176 CT ABD & PELVIS W/O CONTRAST: CPT

## 2024-12-19 PROCEDURE — 96375 TX/PRO/DX INJ NEW DRUG ADDON: CPT

## 2024-12-19 PROCEDURE — 87804 INFLUENZA ASSAY W/OPTIC: CPT | Performed by: PHYSICIAN ASSISTANT

## 2024-12-19 PROCEDURE — 84484 ASSAY OF TROPONIN QUANT: CPT | Performed by: PHYSICIAN ASSISTANT

## 2024-12-19 PROCEDURE — 83735 ASSAY OF MAGNESIUM: CPT | Performed by: PHYSICIAN ASSISTANT

## 2024-12-19 PROCEDURE — 99222 1ST HOSP IP/OBS MODERATE 55: CPT | Performed by: PHYSICIAN ASSISTANT

## 2024-12-19 PROCEDURE — 36415 COLL VENOUS BLD VENIPUNCTURE: CPT | Performed by: PHYSICIAN ASSISTANT

## 2024-12-19 PROCEDURE — 88185 FLOWCYTOMETRY/TC ADD-ON: CPT

## 2024-12-19 RX ORDER — SODIUM CHLORIDE, SODIUM GLUCONATE, SODIUM ACETATE, POTASSIUM CHLORIDE, MAGNESIUM CHLORIDE, SODIUM PHOSPHATE, DIBASIC, AND POTASSIUM PHOSPHATE .53; .5; .37; .037; .03; .012; .00082 G/100ML; G/100ML; G/100ML; G/100ML; G/100ML; G/100ML; G/100ML
1000 INJECTION, SOLUTION INTRAVENOUS ONCE
Status: COMPLETED | OUTPATIENT
Start: 2024-12-19 | End: 2024-12-19

## 2024-12-19 RX ORDER — FAMOTIDINE 10 MG/ML
20 INJECTION, SOLUTION INTRAVENOUS ONCE
Status: COMPLETED | OUTPATIENT
Start: 2024-12-19 | End: 2024-12-19

## 2024-12-19 RX ORDER — ACETAMINOPHEN 325 MG/1
650 TABLET ORAL EVERY 6 HOURS PRN
Status: DISCONTINUED | OUTPATIENT
Start: 2024-12-19 | End: 2024-12-24 | Stop reason: HOSPADM

## 2024-12-19 RX ORDER — ONDANSETRON 2 MG/ML
4 INJECTION INTRAMUSCULAR; INTRAVENOUS ONCE
Status: COMPLETED | OUTPATIENT
Start: 2024-12-19 | End: 2024-12-19

## 2024-12-19 RX ORDER — SODIUM CHLORIDE 9 MG/ML
75 INJECTION, SOLUTION INTRAVENOUS CONTINUOUS
Status: DISCONTINUED | OUTPATIENT
Start: 2024-12-19 | End: 2024-12-22

## 2024-12-19 RX ORDER — ONDANSETRON 2 MG/ML
4 INJECTION INTRAMUSCULAR; INTRAVENOUS EVERY 6 HOURS PRN
Status: DISCONTINUED | OUTPATIENT
Start: 2024-12-19 | End: 2024-12-24 | Stop reason: HOSPADM

## 2024-12-19 RX ORDER — TIZANIDINE 2 MG/1
2 TABLET ORAL 3 TIMES DAILY PRN
Status: DISCONTINUED | OUTPATIENT
Start: 2024-12-19 | End: 2024-12-24 | Stop reason: HOSPADM

## 2024-12-19 RX ORDER — SERTRALINE HYDROCHLORIDE 100 MG/1
100 TABLET, FILM COATED ORAL DAILY
Status: DISCONTINUED | OUTPATIENT
Start: 2024-12-20 | End: 2024-12-24 | Stop reason: HOSPADM

## 2024-12-19 RX ADMIN — SODIUM CHLORIDE 50 ML/HR: 0.9 INJECTION, SOLUTION INTRAVENOUS at 23:00

## 2024-12-19 RX ADMIN — DEXTROSE 250 ML: 10 SOLUTION INTRAVENOUS at 21:36

## 2024-12-19 RX ADMIN — ONDANSETRON 4 MG: 2 INJECTION INTRAMUSCULAR; INTRAVENOUS at 17:56

## 2024-12-19 RX ADMIN — DEXTROSE 250 ML: 10 SOLUTION INTRAVENOUS at 17:55

## 2024-12-19 RX ADMIN — SODIUM CHLORIDE, SODIUM GLUCONATE, SODIUM ACETATE, POTASSIUM CHLORIDE, MAGNESIUM CHLORIDE, SODIUM PHOSPHATE, DIBASIC, AND POTASSIUM PHOSPHATE 1000 ML: .53; .5; .37; .037; .03; .012; .00082 INJECTION, SOLUTION INTRAVENOUS at 18:03

## 2024-12-19 RX ADMIN — FAMOTIDINE 20 MG: 10 INJECTION, SOLUTION INTRAVENOUS at 17:55

## 2024-12-19 NOTE — ED PROVIDER NOTES
Time reflects when diagnosis was documented in both MDM as applicable and the Disposition within this note       Time User Action Codes Description Comment    12/19/2024  8:36 PM OlDanii barbour Add [R11.2] Nausea & vomiting     12/19/2024  8:36 PM OlKarissa barbourly Add [E16.2] Hypoglycemia     12/19/2024  8:37 PM Olangle, Danii Add [N17.9] JOSE ELIAS (acute kidney injury) (HCC)     12/19/2024  8:37 PM OlJeimy barbourberly Add [D64.9] Anemia     12/19/2024  8:37 PM Olangle, Danii Add [R74.8] Elevated lipase     12/19/2024  8:37 PM Olangle, Danii Add [E87.1] Hyponatremia           ED Disposition       ED Disposition   Admit    Condition   Stable    Date/Time   Thu Dec 19, 2024  8:36 PM    Comment   Case was discussed with Jairon Prescott PA-C and the patient's admission status was agreed to be Admission Status: observation status to the service of Dr. Ellsworth.               Assessment & Plan       Medical Decision Making  68 yo female presenting for nausea, vomiting and feeling unwell.  She has non focal neuro exam.  Abdomen soft, nontender.    Work up obtained as noted above.  EKG and serial troponins not c/w ACS.  CXR does not reveal pneumonia, pneumothorax, vascular congestion or pleural effusion.  No noted leukocytosis. No infectious concerns.  There is a new apparent anemia of unclear chronicity.  No s/sx to suggest active bleeding. She did have an episode of hypoglycemia which responded to therapy.  She has noted JOSE ELIAS, fluids given.  Her sodium appears slightly worse from baseline.  UA not suggestive of infection.  Covid/flu negative.  Elevated lipase.  CT abd/pelv without acute findings.    Pt remained afebrile, hemodynamically stable.  Discussed admission for further observation and management - pt agrees.  SLIM consulted for admission.    Please refer to above ER course for further details/discussion.        Problems Addressed:  JOSE ELIAS (acute kidney injury) (HCC): acute illness or injury  Anemia: acute illness or  injury  Elevated lipase: acute illness or injury  Hypoglycemia: acute illness or injury  Hyponatremia: acute illness or injury  Nausea & vomiting: acute illness or injury    Amount and/or Complexity of Data Reviewed  External Data Reviewed: labs, radiology and notes.  Labs: ordered. Decision-making details documented in ED Course.  Radiology: ordered and independent interpretation performed. Decision-making details documented in ED Course.  ECG/medicine tests: ordered and independent interpretation performed. Decision-making details documented in ED Course.  Discussion of management or test interpretation with external provider(s): SLIM    Risk  OTC drugs.  Prescription drug management.  Decision regarding hospitalization.        ED Course as of 12/19/24 2328   Thu Dec 19, 2024   1716 WBC: 7.83   1716 Hemoglobin(!): 7.6  No recent for comparison; however was previously normal 5 years ago   1716 Platelet Count: 244   1745 SARS COV Rapid Antigen: Negative   1745 Influenza A Rapid Antigen: Negative   1745 Influenza B Rapid Antigen: Negative   1745 MAGNESIUM: 1.9   1745 hs TnI 0hr: <2   1745 LIPASE(!): 425  elevated   1746 GLUCOSE(!!): 44  Will provide D10 250cc   1749 Creatinine(!): 1.54  Increased from 1.25 twelve days ago; fluids in process   1749 BUN(!): 48   1749 Sodium(!): 129  Value of 130 twelve days ago   1749 Potassium: 5.3   1749 Chloride: 103   1749 Carbon Dioxide(!): 17   1749 ANION GAP: 9   1749 CORRECTED CALCIUM: 9.5   1749 AST(!): 9   1749 ALT: 7   1749 ALK PHOS: 46   1749 Total Protein(!): 10.3   1749 Albumin(!): 2.5   1749 Total Bilirubin(!): 0.18   1749 GFR, Calculated: 34   1851 CT abdomen pelvis wo contrast  IMPRESSION:     1.  No identifiable acute abnormality to account for the patient's clinical presentation. Please refer to the report body for description of other incidental, chronic and/or benign findings.   1924 POC Glucose: 70   1954 Awaiting UA   1957 Pt feeling slightly improved.  She  states she is hungry and has not had anything to eat.  She notes while laying down she did have a sharp pain shoot across her right chest and into her right shoulder.  Recommended admission for further evaluation and monitoring and pt agreeable.   2003 Epic secure chat to on call SLIM to discuss admission.   2035 Reviewed with Jairon Prescott PA-C; accepts in obs.   2037 XR chest 1 view portable  Independently viewed and interpreted by me - no acute cardiopulmonary process; pending official read.   2101 hs TnI 2hr: <2  Not c/w ACS       Medications   ondansetron (ZOFRAN) injection 4 mg (4 mg Intravenous Given 12/19/24 1756)   multi-electrolyte (ISOLYTE-S PH 7.4) bolus 1,000 mL (0 mL Intravenous Stopped 12/19/24 2003)   Famotidine (PF) (PEPCID) injection 20 mg (20 mg Intravenous Given 12/19/24 1755)   dextrose (D10W) 10% bolus 250 mL (0 mL Intravenous Stopped 12/19/24 1928)       ED Risk Strat Scores   HEART Risk Score      Flowsheet Row Most Recent Value   Heart Score Risk Calculator    History 0 Filed at: 12/19/2024 1703   ECG 0 Filed at: 12/19/2024 1703   Age 2 Filed at: 12/19/2024 1703   Risk Factors 1 Filed at: 12/19/2024 1703   Troponin 0 Filed at: 12/19/2024 1703   HEART Score 3 Filed at: 12/19/2024 1703          HEART Risk Score      Flowsheet Row Most Recent Value   Heart Score Risk Calculator    History 0 Filed at: 12/19/2024 1703   ECG 0 Filed at: 12/19/2024 1703   Age 2 Filed at: 12/19/2024 1703   Risk Factors 1 Filed at: 12/19/2024 1703   Troponin 0 Filed at: 12/19/2024 1703   HEART Score 3 Filed at: 12/19/2024 1703                            SBIRT 20yo+      Flowsheet Row Most Recent Value   Initial Alcohol Screen: US AUDIT-C     1. How often do you have a drink containing alcohol? 0 Filed at: 12/19/2024 1638   2. How many drinks containing alcohol do you have on a typical day you are drinking?  0 Filed at: 12/19/2024 1638   3b. FEMALE Any Age, or MALE 65+: How often do you have 4 or more drinks on one  occassion? 0 Filed at: 12/19/2024 1638   Audit-C Score 0 Filed at: 12/19/2024 1638   ALEXSANDRA: How many times in the past year have you...    Used an illegal drug or used a prescription medication for non-medical reasons? Never Filed at: 12/19/2024 1638                            History of Present Illness       Chief Complaint   Patient presents with    Vomiting     Nausea and vomiting since last night, states that she started taking a new medication last night, and has been nauseous and vomiting since. Started feeling dizzy today. Denies any diarrhea        Past Medical History:   Diagnosis Date    Adrenal insufficiency (HCC)     Anxiety     Bacterial sepsis (HCC)     last assessed: 06/13/16    Cushing's syndrome (HCC)     last assessed: 05/29/16    Depression     last assessed: 01/02/18    Diabetes mellitus (HCC)     Hydradenitis     Hyperlipidemia     Hypertension     Positive FIT (fecal immunochemical test) 06/22/2018    Added automatically from request for surgery [id number]    Rib contusion, left, sequela 06/14/2022    Trigger finger, right middle finger 03/07/2022      Past Surgical History:   Procedure Laterality Date    ADRENALECTOMY  12/2002    CHOLECYSTECTOMY      HERNIA REPAIR      umbilical    OTHER SURGICAL HISTORY      Injection of trigger joint    NH COLONOSCOPY FLX DX W/COLLJ SPEC WHEN PFRMD N/A 7/10/2018    Procedure: COLONOSCOPY;  Surgeon: Pako Nieves MD;  Location: [facility];  Service: Gastroenterology    TONSILLECTOMY AND ADENOIDECTOMY      TUBAL LIGATION        Family History   Problem Relation Age of Onset    Breast cancer Mother         unknown age    No Known Problems Sister     No Known Problems Daughter     No Known Problems Daughter     No Known Problems Maternal Grandmother     No Known Problems Maternal Grandfather     No Known Problems Paternal Grandmother     No Known Problems Paternal Grandfather     No Known Problems Paternal Aunt     No Known Problems Paternal Aunt     No Known  Problems Paternal Aunt     No Known Problems Half-Sister     No Known Problems Half-Sister     No Known Problems Half-Sister     No Known Problems Half-Sister     No Known Problems Half-Sister       Social History     Tobacco Use    Smoking status: Every Day     Current packs/day: 0.25     Average packs/day: 0.3 packs/day for 50.0 years (12.5 ttl pk-yrs)     Types: Cigarettes    Smokeless tobacco: Never   Vaping Use    Vaping status: Never Used   Substance Use Topics    Alcohol use: Never    Drug use: No      E-Cigarette/Vaping    E-Cigarette Use Never User       E-Cigarette/Vaping Substances    Nicotine No     THC No     CBD No     Flavoring No     Other No     Unknown No       I have reviewed and agree with the history as documented.     69 year old female with PMH HTN, HLD, DM presenting for evaluation of vomiting.  Pt reports she doesn't feel well. She notes onset of symptoms last night.  She thought maybe it was from lyrica as she recently started this for her neuropathy and chronic pain.  She complains of having nausea and vomiting today.  She notes she's had a slight runny nose but otherwise doesn't feel like has respiratory symptoms.  Admits to fatigue.  Denies cough, congestion, headache.  Reports feeling dizzy and lightheaded at times.  No reported syncope or falls.  Denies chest pain, SOB, abdominal pain, diarrhea or constipation.  Denies leg pain or swelling.  No sick contacts.  No suspicious food intake.  No reported aggravating or alleviating factors. No specific treatments tried.      History provided by:  Medical records and patient   used: No    Vomiting  Recent urination:  Normal  Relieved by:  Nothing  Worsened by:  Nothing  Associated symptoms: arthralgias and myalgias    Associated symptoms: no abdominal pain, no chills, no cough, no diarrhea, no fever, no headaches, no sore throat and no URI    Risk factors: diabetes and prior abdominal surgery    Risk factors: no sick  contacts, no suspect food intake and no travel to endemic areas        Review of Systems   Constitutional:  Positive for fatigue. Negative for chills and fever.   HENT:  Positive for rhinorrhea. Negative for congestion and sore throat.    Eyes: Negative.  Negative for visual disturbance.   Respiratory: Negative.  Negative for cough, shortness of breath and wheezing.    Cardiovascular: Negative.  Negative for chest pain, palpitations and leg swelling.   Gastrointestinal:  Positive for nausea and vomiting. Negative for abdominal pain, constipation and diarrhea.   Genitourinary: Negative.  Negative for dysuria, flank pain, frequency and hematuria.   Musculoskeletal:  Positive for arthralgias and myalgias. Negative for back pain.   Skin: Negative.  Negative for rash.   Neurological:  Positive for dizziness and light-headedness. Negative for syncope, weakness, numbness and headaches.   Psychiatric/Behavioral: Negative.     All other systems reviewed and are negative.          Objective       ED Triage Vitals [12/19/24 1633]   Temperature Pulse Blood Pressure Respirations SpO2 Patient Position - Orthostatic VS   (!) 97.2 °F (36.2 °C) 71 109/50 18 96 % Sitting      Temp Source Heart Rate Source BP Location FiO2 (%) Pain Score    Temporal Monitor Left arm -- No Pain      Vitals      Date and Time Temp Pulse SpO2 Resp BP Pain Score FACES Pain Rating User   12/19/24 2120 -- -- -- 22 -- 7 -- JG   12/19/24 2120 97.9 °F (36.6 °C) 67 98 % -- 153/64 -- -- DII   12/19/24 1930 97.5 °F (36.4 °C) 67 97 % 37 141/64 3 -- MR   12/19/24 1837 97.5 °F (36.4 °C) 62 98 % 19 156/69 5 -- AB   12/19/24 1633 97.2 °F (36.2 °C) 71 96 % 18 109/50 No Pain -- KS            Physical Exam  Vitals and nursing note reviewed.   Constitutional:       General: She is awake. She is not in acute distress.     Appearance: She is well-developed. She is not toxic-appearing or diaphoretic.      Comments: Laying on exam litter with gown pulled up over her face    HENT:      Head: Normocephalic and atraumatic.      Right Ear: Hearing, tympanic membrane, ear canal and external ear normal.      Left Ear: Hearing, tympanic membrane, ear canal and external ear normal.      Nose: Nose normal.      Mouth/Throat:      Mouth: Mucous membranes are dry.      Tongue: Tongue does not deviate from midline.      Pharynx: Oropharynx is clear. Uvula midline.   Eyes:      General: Lids are normal. No scleral icterus.     Conjunctiva/sclera: Conjunctivae normal.      Pupils: Pupils are equal, round, and reactive to light.   Neck:      Trachea: Trachea and phonation normal. No tracheal deviation.   Cardiovascular:      Rate and Rhythm: Normal rate and regular rhythm.      Pulses: Normal pulses.      Heart sounds: Normal heart sounds, S1 normal and S2 normal. No murmur heard.  Pulmonary:      Effort: Pulmonary effort is normal. No tachypnea or respiratory distress.      Breath sounds: Normal breath sounds. No wheezing, rhonchi or rales.   Abdominal:      General: Bowel sounds are normal. There is no distension.      Palpations: Abdomen is soft.      Tenderness: There is no abdominal tenderness. There is no right CVA tenderness, left CVA tenderness, guarding or rebound.   Musculoskeletal:      Cervical back: Neck supple.      Right lower leg: No edema.      Left lower leg: No edema.   Skin:     General: Skin is warm and dry.      Capillary Refill: Capillary refill takes less than 2 seconds.      Findings: No rash.   Neurological:      General: No focal deficit present.      Mental Status: She is alert and oriented to person, place, and time.      GCS: GCS eye subscore is 4. GCS verbal subscore is 5. GCS motor subscore is 6.      Cranial Nerves: No cranial nerve deficit.      Motor: Motor function is intact.      Coordination: Coordination is intact.   Psychiatric:         Mood and Affect: Mood normal.         Speech: Speech normal.         Behavior: Behavior normal. Behavior is cooperative.          Results Reviewed       Procedure Component Value Units Date/Time    UA w Reflex to Microscopic w Reflex to Culture [[id number]]  (Abnormal) Collected: 12/19/24 2250    Lab Status: Final result Specimen: Urine, Clean Catch Updated: 12/19/24 2307     Color, UA Colorless     Clarity, UA Clear     Specific Gravity, UA <1.005     pH, UA 5.5     Leukocytes, UA Negative     Nitrite, UA Negative     Protein, UA Negative mg/dl      Glucose, UA Negative mg/dl      Ketones, UA Negative mg/dl      Urobilinogen, UA <2.0 mg/dl      Bilirubin, UA Negative     Occult Blood, UA Small    HS Troponin I 2hr [[id number]] Collected: 12/19/24 1922    Lab Status: Final result Specimen: Blood from Arm, Right Updated: 12/19/24 2057     hs TnI 2hr <2 ng/L      Delta 2hr hsTnI --    HS Troponin I 4hr [[id number]] Collected: 12/19/24 1922    Lab Status: No result Specimen: Blood from Arm, Right     Fingerstick Glucose (POCT) [[id number]]  (Normal) Collected: 12/19/24 1918    Lab Status: Final result Specimen: Blood Updated: 12/19/24 1919     POC Glucose 70 mg/dl     Comprehensive metabolic panel [[id number]]  (Abnormal) Collected: 12/19/24 1655    Lab Status: Final result Specimen: Blood from Arm, Right Updated: 12/19/24 1745     Sodium 129 mmol/L      Potassium 5.3 mmol/L      Chloride 103 mmol/L      CO2 17 mmol/L      ANION GAP 9 mmol/L      BUN 48 mg/dL      Creatinine 1.54 mg/dL      Glucose 44 mg/dL      Calcium 8.3 mg/dL      Corrected Calcium 9.5 mg/dL      AST 9 U/L      ALT 7 U/L      Alkaline Phosphatase 46 U/L      Total Protein 10.3 g/dL      Albumin 2.5 g/dL      Total Bilirubin 0.18 mg/dL      eGFR 34 ml/min/1.73sq m     Narrative:      [employer] Kid[employer] guidelines for Chronic Kidney Disease (CKD):     Stage 1 with normal or high GFR (GFR > 90 mL/min/1.73 square meters)    Stage 2 Mild CKD (GFR = 60-89 mL/min/1.73 square meters)    Stage 3A Moderate CKD (GFR = 45-59 mL/min/1.73 square meters)    Stage 3B  Moderate CKD (GFR = 30-44 mL/min/1.73 square meters)    Stage 4 Severe CKD (GFR = 15-29 mL/min/1.73 square meters)    Stage 5 End Stage CKD (GFR <15 mL/min/1.73 square meters)  Note: GFR calculation is accurate only with a steady state creatinine    HS Troponin 0hr (reflex protocol) [[id number]]  (Normal) Collected: 12/19/24 1655    Lab Status: Final result Specimen: Blood from Arm, Right Updated: 12/19/24 1744     hs TnI 0hr <2 ng/L     Lipase [[id number]]  (Abnormal) Collected: 12/19/24 1655    Lab Status: Final result Specimen: Blood from Arm, Right Updated: 12/19/24 1743     Lipase 425 u/L     Magnesium [[id number]]  (Normal) Collected: 12/19/24 1655    Lab Status: Final result Specimen: Blood from Arm, Right Updated: 12/19/24 1743     Magnesium 1.9 mg/dL     FLU/COVID Rapid Antigen (30 min. TAT) - Preferred screening test in ED [[id number]]  (Normal) Collected: 12/19/24 1655    Lab Status: Final result Specimen: Nares from Nose Updated: 12/19/24 1742     SARS COV Rapid Antigen Negative     Influenza A Rapid Antigen Negative     Influenza B Rapid Antigen Negative    Narrative:      This test has been performed using the [employer] Peggy 2 FLU+SARS Antigen test under the Emergency Use Authorization (EUA). This test has been validated by the  and verified by the performing laboratory. The Peggy uses lateral flow immunofluorescent sandwich assay to detect SARS-COV, Influenza A and Influenza B Antigen.     The Quidel Peggy 2 SARS Antigen test does not differentiate between SARS-CoV and SARS-CoV-2.     Negative results are presumptive and may be confirmed with a molecular assay, if necessary, for patient management. Negative results do not rule out SARS-CoV-2 or influenza infection and should not be used as the sole basis for treatment or patient management decisions. A negative test result may occur if the level of antigen in a sample is below the limit of detection of this test.     Positive results are  indicative of the presence of viral antigens, but do not rule out bacterial infection or co-infection with other viruses.     All test results should be used as an adjunct to clinical observations and other information available to the provider.    FOR PEDIATRIC PATIENTS - copy/paste COVID Guidelines URL to browser: [email or web address]    CBC and differential [[id number]]  (Abnormal) Collected: 12/19/24 1655    Lab Status: Final result Specimen: Blood from Arm, Right Updated: 12/19/24 1713     WBC 7.83 Thousand/uL      RBC 2.43 Million/uL      Hemoglobin 7.6 g/dL      Hematocrit 24.7 %       fL      MCH 31.3 pg      MCHC 30.8 g/dL      RDW 16.8 %      MPV 8.9 fL      Platelets 244 Thousands/uL      nRBC 0 /100 WBCs      Segmented % 45 %      Immature Grans % 1 %      Lymphocytes % 43 %      Monocytes % 9 %      Eosinophils Relative 2 %      Basophils Relative 0 %      Absolute Neutrophils 3.44 Thousands/µL      Absolute Immature Grans 0.07 Thousand/uL      Absolute Lymphocytes 3.39 Thousands/µL      Absolute Monocytes 0.72 Thousand/µL      Eosinophils Absolute 0.18 Thousand/µL      Basophils Absolute 0.03 Thousands/µL             CT abdomen pelvis wo contrast   Final Interpretation by Juan Lucia MD (12/19 1846)      1.  No identifiable acute abnormality to account for the patient's clinical presentation. Please refer to the report body for description of other incidental, chronic and/or benign findings.      Workstation performed: [id number]         XR chest 1 view portable    (Results Pending)       ECG 12 Lead Documentation Only    Date/Time: 12/19/2024 4:59 PM    Performed by: Danii Green PA-C  Authorized by: Danii Green PA-C    Indications / Diagnosis:  Dizziness, vomiting  ECG reviewed by me, the ED Provider: yes    Patient location:  ED  Previous ECG:     Comparison to cardiac monitor: Yes    Interpretation:      Interpretation: normal    Rate:     ECG rate:  65    ECG rate assessment: normal    Rhythm:     Rhythm: sinus rhythm    Ectopy:     Ectopy: none    QRS:     QRS axis:  Normal    QRS intervals:  Normal  Conduction:     Conduction: normal    ST segments:     ST segments:  Normal  T waves:     T waves: normal    Comments:      , QRS 86, QT//413; no acute ischemic changes.  CriticalCare Time    Date/Time: 12/19/2024 9:00 PM    Performed by: Danii Green PA-C  Authorized by: Danii Green PA-C    Critical care provider statement:     Critical care time (minutes):  30    Critical care time was exclusive of:  Teaching time and separately billable procedures and treating other patients    Critical care was necessary to treat or prevent imminent or life-threatening deterioration of the following conditions:  Dehydration and endocrine crisis    Critical care was time spent personally by me on the following activities:  Obtaining history from patient or surrogate, development of treatment plan with patient or surrogate, discussions with consultants, evaluation of patient's response to treatment, examination of patient, ordering and performing treatments and interventions, ordering and review of laboratory studies, ordering and review of radiographic studies, re-evaluation of patient's condition and review of old charts    I assumed direction of critical care for this patient from another provider in my specialty: no        ED Medication and Procedure Management   Prior to Admission Medications   Prescriptions Last Dose Informant Patient Reported? Taking?   Cholecalciferol (VITAMIN D3) 1,000 units tablet 12/19/2024  No Yes   Sig: Take 2 tablets (2,000 Units total) by mouth daily   TiZANidine (ZANAFLEX) 2 MG capsule   No No   Sig: Take 1 capsule (2 mg total) by mouth 3 (three) times a day as needed for muscle spasms   clindamycin (CLEOCIN T) 1 % lotion 12/19/2024 Self Yes Yes   Sig: Apply topically 2  (two) times a day To affected areas   ergocalciferol (VITAMIN D2) 50,000 units Past Week  No Yes   Sig: Take 1 capsule (50,000 Units total) by mouth once a week   glimepiride (AMARYL) 2 mg tablet 12/19/2024 Self No Yes   Sig: Take 0.5 tablets (1 mg total) by mouth daily with breakfast   lisinopril (ZESTRIL) 20 mg tablet 12/19/2024 Self No Yes   Sig: Take 1 tablet (20 mg total) by mouth daily   metFORMIN (GLUCOPHAGE) 500 mg tablet 12/19/2024 Self No Yes   Sig: Take 1 tablet by mouth once daily   pregabalin (LYRICA) 100 mg capsule 12/18/2024  No Yes   Sig: Take 1 capsule (100 mg total) by mouth 2 (two) times a day   sertraline (ZOLOFT) 100 mg tablet 12/19/2024 Self No Yes   Sig: TAKE 1 TABLET BY MOUTH ONCE DAILY AS DIRECTED      Facility-Administered Medications: None     Current Discharge Medication List        CONTINUE these medications which have NOT CHANGED    Details   Cholecalciferol (VITAMIN D3) 1,000 units tablet Take 2 tablets (2,000 Units total) by mouth daily    Associated Diagnoses: Vitamin D deficiency      clindamycin (CLEOCIN T) 1 % lotion Apply topically 2 (two) times a day To affected areas      ergocalciferol (VITAMIN D2) 50,000 units Take 1 capsule (50,000 Units total) by mouth once a week  Qty: 12 capsule, Refills: 0    Associated Diagnoses: Vitamin D deficiency      glimepiride (AMARYL) 2 mg tablet Take 0.5 tablets (1 mg total) by mouth daily with breakfast  Qty: 90 tablet, Refills: 0    Associated Diagnoses: Type 2 diabetes mellitus without complication, without long-term current use of insulin (HCC)      lisinopril (ZESTRIL) 20 mg tablet Take 1 tablet (20 mg total) by mouth daily  Qty: 100 tablet, Refills: 1    Associated Diagnoses: Essential hypertension      metFORMIN (GLUCOPHAGE) 500 mg tablet Take 1 tablet by mouth once daily  Qty: 100 tablet, Refills: 1    Associated Diagnoses: Type 2 diabetes mellitus without complication, without long-term current use of insulin (HCC)      pregabalin  (LYRICA) 100 mg capsule Take 1 capsule (100 mg total) by mouth 2 (two) times a day  Qty: 60 capsule, Refills: 0    Associated Diagnoses: Neuropathy      sertraline (ZOLOFT) 100 mg tablet TAKE 1 TABLET BY MOUTH ONCE DAILY AS DIRECTED  Qty: 90 tablet, Refills: 1    Associated Diagnoses: Depression, unspecified depression type      TiZANidine (ZANAFLEX) 2 MG capsule Take 1 capsule (2 mg total) by mouth 3 (three) times a day as needed for muscle spasms  Qty: 30 capsule, Refills: 0    Associated Diagnoses: Neuropathy           No discharge procedures on file.  ED SEPSIS DOCUMENTATION   Time reflects when diagnosis was documented in both MDM as applicable and the Disposition within this note       Time User Action Codes Description Comment    12/19/2024  8:36 PM Danii Green [R11.2] Nausea & vomiting     12/19/2024  8:36 PM Danii Green [E16.2] Hypoglycemia     12/19/2024  8:37 PM Danii Green [N17.9] JOSE ELIAS (acute kidney injury) (HCC)     12/19/2024  8:37 PM Danii Green [D64.9] Anemia     12/19/2024  8:37 PM Danii Green [R74.8] Elevated lipase     12/19/2024  8:37 PM Danii Green [E87.1] Hyponatremia                  Danii Green PA-C  12/19/24 [id number]

## 2024-12-19 NOTE — TELEPHONE ENCOUNTER
"Pt warm transferred from [facility]. Pt is sitting in chair stating she just started lyrica last night 100mg. She took 2 doses the last one being this morning at 10am. Pt states she is nauseated and extremely dizzy. She states when she gets up it is hard to walk and she feels like she is going to fall over. She carito any Chest pain or SOB. She states that every time she opens her eyes it is blurry and the room begings to spin. Pt also stating she has dry mouth and having muscle tremors in her arms. Advised patient that she needs to be seen urgently for this, offered to call EMS to take patient. She states that her grandson is with her and will transfer her to the ED.     Reason for Disposition   SEVERE dizziness (e.g., unable to stand, requires support to walk, feels like passing out now)    Answer Assessment - Initial Assessment Questions  1. DESCRIPTION: \"Describe your dizziness.\"      Very dizzy when she is siting  2. LIGHTHEADED: \"Do you feel lightheaded?\" (e.g., somewhat faint, woozy, weak upon standing)      Feels like she is going to faint when she walks  3. VERTIGO: \"Do you feel like either you or the room is spinning or tilting?\" (i.e., vertigo)      No   4. SEVERITY: \"How bad is it?\"  \"Do you feel like you are going to faint?\" \"Can you stand and walk?\"      severe  5. ONSET:  \"When did the dizziness begin?\"      When she took her Lyrica 10am  6. AGGRAVATING FACTORS: \"Does anything make it worse?\" (e.g., standing, change in head position)      Having trouble changing her position  7. HEART RATE: \"Can you tell me your heart rate?\" \"How many beats in 15 seconds?\"  (Note: Not all patients can do this.)        Cannot check this  8. CAUSE: \"What do you think is causing the dizziness?\" (e.g., decreased fluids or food, diarrhea, emotional distress, heat exposure, new medicine, sudden standing, vomiting; unknown)      carito  9. RECURRENT SYMPTOM: \"Have you had dizziness before?\" If Yes, ask: \"When was the last " "time?\" \"What happened that time?\"      Felt like this last night when she took lyrica again  10. OTHER SYMPTOMS: \"Do you have any other symptoms?\" (e.g., fever, chest pain, vomiting, diarrhea, bleeding)        nausea  11. PREGNANCY: \"Is there any chance you are pregnant?\" \"When was your last menstrual period?\"        no    Protocols used: Dizziness-Adult-OH    "

## 2024-12-20 PROBLEM — G62.9 NEUROPATHY: Status: ACTIVE | Noted: 2024-11-20

## 2024-12-20 PROBLEM — R74.8 ELEVATED LIPASE: Status: ACTIVE | Noted: 2024-12-20

## 2024-12-20 LAB
ALBUMIN SERPL BCG-MCNC: 2.4 G/DL (ref 3.5–5)
ALP SERPL-CCNC: 43 U/L (ref 34–104)
ALT SERPL W P-5'-P-CCNC: 8 U/L (ref 7–52)
ANION GAP SERPL CALCULATED.3IONS-SCNC: 7 MMOL/L (ref 4–13)
ANION GAP SERPL CALCULATED.3IONS-SCNC: 7 MMOL/L (ref 4–13)
APTT PPP: 25 SECONDS (ref 23–34)
AST SERPL W P-5'-P-CCNC: 7 U/L (ref 13–39)
BASOPHILS # BLD AUTO: 0.02 THOUSANDS/ÂΜL (ref 0–0.1)
BASOPHILS NFR BLD AUTO: 0 % (ref 0–1)
BILIRUB SERPL-MCNC: 0.15 MG/DL (ref 0.2–1)
BUN SERPL-MCNC: 35 MG/DL (ref 5–25)
BUN SERPL-MCNC: 38 MG/DL (ref 5–25)
CALCIUM ALBUM COR SERPL-MCNC: 9.6 MG/DL (ref 8.3–10.1)
CALCIUM SERPL-MCNC: 8.3 MG/DL (ref 8.4–10.2)
CALCIUM SERPL-MCNC: 8.7 MG/DL (ref 8.4–10.2)
CHLORIDE SERPL-SCNC: 104 MMOL/L (ref 96–108)
CHLORIDE SERPL-SCNC: 107 MMOL/L (ref 96–108)
CO2 SERPL-SCNC: 22 MMOL/L (ref 21–32)
CO2 SERPL-SCNC: 23 MMOL/L (ref 21–32)
CREAT SERPL-MCNC: 1.39 MG/DL (ref 0.6–1.3)
CREAT SERPL-MCNC: 1.59 MG/DL (ref 0.6–1.3)
EOSINOPHIL # BLD AUTO: 0.13 THOUSAND/ÂΜL (ref 0–0.61)
EOSINOPHIL NFR BLD AUTO: 2 % (ref 0–6)
ERYTHROCYTE [DISTWIDTH] IN BLOOD BY AUTOMATED COUNT: 16.8 % (ref 11.6–15.1)
FERRITIN SERPL-MCNC: 154 NG/ML (ref 11–307)
FOLATE SERPL-MCNC: 9.2 NG/ML
GFR SERPL CREATININE-BSD FRML MDRD: 33 ML/MIN/1.73SQ M
GFR SERPL CREATININE-BSD FRML MDRD: 38 ML/MIN/1.73SQ M
GLUCOSE P FAST SERPL-MCNC: 46 MG/DL (ref 65–99)
GLUCOSE SERPL-MCNC: 102 MG/DL (ref 65–140)
GLUCOSE SERPL-MCNC: 102 MG/DL (ref 65–140)
GLUCOSE SERPL-MCNC: 118 MG/DL (ref 65–140)
GLUCOSE SERPL-MCNC: 122 MG/DL (ref 65–140)
GLUCOSE SERPL-MCNC: 46 MG/DL (ref 65–140)
GLUCOSE SERPL-MCNC: 61 MG/DL (ref 65–140)
GLUCOSE SERPL-MCNC: 87 MG/DL (ref 65–140)
GLUCOSE SERPL-MCNC: 88 MG/DL (ref 65–140)
HCT VFR BLD AUTO: 24.7 % (ref 34.8–46.1)
HGB BLD-MCNC: 7.5 G/DL (ref 11.5–15.4)
IMM GRANULOCYTES # BLD AUTO: 0.03 THOUSAND/UL (ref 0–0.2)
IMM GRANULOCYTES NFR BLD AUTO: 1 % (ref 0–2)
INR PPP: 1.16 (ref 0.85–1.19)
IRON SATN MFR SERPL: 41 % (ref 15–50)
IRON SERPL-MCNC: 131 UG/DL (ref 50–212)
LIPASE SERPL-CCNC: 59 U/L (ref 11–82)
LYMPHOCYTES # BLD AUTO: 2.61 THOUSANDS/ÂΜL (ref 0.6–4.47)
LYMPHOCYTES NFR BLD AUTO: 44 % (ref 14–44)
MAGNESIUM SERPL-MCNC: 2.1 MG/DL (ref 1.9–2.7)
MCH RBC QN AUTO: 30.2 PG (ref 26.8–34.3)
MCHC RBC AUTO-ENTMCNC: 30.4 G/DL (ref 31.4–37.4)
MCV RBC AUTO: 100 FL (ref 82–98)
MONOCYTES # BLD AUTO: 0.5 THOUSAND/ÂΜL (ref 0.17–1.22)
MONOCYTES NFR BLD AUTO: 8 % (ref 4–12)
NEUTROPHILS # BLD AUTO: 2.66 THOUSANDS/ÂΜL (ref 1.85–7.62)
NEUTS SEG NFR BLD AUTO: 45 % (ref 43–75)
NRBC BLD AUTO-RTO: 0 /100 WBCS
OSMOLALITY UR: 233 MMOL/KG (ref 250–900)
PHOSPHATE SERPL-MCNC: 4.2 MG/DL (ref 2.3–4.1)
PLATELET # BLD AUTO: 243 THOUSANDS/UL (ref 149–390)
PMV BLD AUTO: 9 FL (ref 8.9–12.7)
POTASSIUM SERPL-SCNC: 4.9 MMOL/L (ref 3.5–5.3)
POTASSIUM SERPL-SCNC: 5.1 MMOL/L (ref 3.5–5.3)
PROT SERPL-MCNC: 9.6 G/DL (ref 6.4–8.4)
PROTHROMBIN TIME: 15.3 SECONDS (ref 12.3–15)
RBC # BLD AUTO: 2.48 MILLION/UL (ref 3.81–5.12)
SODIUM SERPL-SCNC: 134 MMOL/L (ref 135–147)
SODIUM SERPL-SCNC: 136 MMOL/L (ref 135–147)
TIBC SERPL-MCNC: 319.2 UG/DL (ref 250–450)
TRANSFERRIN SERPL-MCNC: 228 MG/DL (ref 203–362)
UIBC SERPL-MCNC: 188 UG/DL (ref 155–355)
VIT B12 SERPL-MCNC: 199 PG/ML (ref 180–914)
WBC # BLD AUTO: 5.95 THOUSAND/UL (ref 4.31–10.16)

## 2024-12-20 PROCEDURE — 84166 PROTEIN E-PHORESIS/URINE/CSF: CPT

## 2024-12-20 PROCEDURE — 86335 IMMUNFIX E-PHORSIS/URINE/CSF: CPT

## 2024-12-20 PROCEDURE — 83690 ASSAY OF LIPASE: CPT | Performed by: PHYSICIAN ASSISTANT

## 2024-12-20 PROCEDURE — 85025 COMPLETE CBC W/AUTO DIFF WBC: CPT | Performed by: PHYSICIAN ASSISTANT

## 2024-12-20 PROCEDURE — 99233 SBSQ HOSP IP/OBS HIGH 50: CPT | Performed by: PHYSICIAN ASSISTANT

## 2024-12-20 PROCEDURE — 83540 ASSAY OF IRON: CPT | Performed by: PHYSICIAN ASSISTANT

## 2024-12-20 PROCEDURE — 84100 ASSAY OF PHOSPHORUS: CPT | Performed by: PHYSICIAN ASSISTANT

## 2024-12-20 PROCEDURE — 82607 VITAMIN B-12: CPT | Performed by: PHYSICIAN ASSISTANT

## 2024-12-20 PROCEDURE — 85610 PROTHROMBIN TIME: CPT | Performed by: PHYSICIAN ASSISTANT

## 2024-12-20 PROCEDURE — 83735 ASSAY OF MAGNESIUM: CPT | Performed by: PHYSICIAN ASSISTANT

## 2024-12-20 PROCEDURE — 82728 ASSAY OF FERRITIN: CPT | Performed by: PHYSICIAN ASSISTANT

## 2024-12-20 PROCEDURE — 82948 REAGENT STRIP/BLOOD GLUCOSE: CPT

## 2024-12-20 PROCEDURE — 83550 IRON BINDING TEST: CPT | Performed by: PHYSICIAN ASSISTANT

## 2024-12-20 PROCEDURE — 85730 THROMBOPLASTIN TIME PARTIAL: CPT | Performed by: PHYSICIAN ASSISTANT

## 2024-12-20 PROCEDURE — 80048 BASIC METABOLIC PNL TOTAL CA: CPT

## 2024-12-20 PROCEDURE — 84443 ASSAY THYROID STIM HORMONE: CPT | Performed by: PHYSICIAN ASSISTANT

## 2024-12-20 PROCEDURE — 82746 ASSAY OF FOLIC ACID SERUM: CPT | Performed by: PHYSICIAN ASSISTANT

## 2024-12-20 PROCEDURE — 80053 COMPREHEN METABOLIC PANEL: CPT | Performed by: PHYSICIAN ASSISTANT

## 2024-12-20 RX ORDER — MIRTAZAPINE 15 MG/1
7.5 TABLET, FILM COATED ORAL
Status: DISCONTINUED | OUTPATIENT
Start: 2024-12-20 | End: 2024-12-24 | Stop reason: HOSPADM

## 2024-12-20 RX ORDER — DIAZEPAM 10 MG/2ML
2.5 INJECTION, SOLUTION INTRAMUSCULAR; INTRAVENOUS ONCE
Status: COMPLETED | OUTPATIENT
Start: 2024-12-20 | End: 2024-12-20

## 2024-12-20 RX ORDER — LIDOCAINE 50 MG/G
2 PATCH TOPICAL DAILY
Status: DISCONTINUED | OUTPATIENT
Start: 2024-12-20 | End: 2024-12-24 | Stop reason: HOSPADM

## 2024-12-20 RX ADMIN — MIRTAZAPINE 7.5 MG: 15 TABLET, FILM COATED ORAL at 22:18

## 2024-12-20 RX ADMIN — LIDOCAINE 2 PATCH: 50 PATCH TOPICAL at 12:05

## 2024-12-20 RX ADMIN — Medication 2000 UNITS: at 08:59

## 2024-12-20 RX ADMIN — SERTRALINE 100 MG: 100 TABLET, FILM COATED ORAL at 08:59

## 2024-12-20 RX ADMIN — DIAZEPAM 2.5 MG: 5 INJECTION INTRAMUSCULAR; INTRAVENOUS at 01:57

## 2024-12-20 RX ADMIN — NICOTINE 1 PATCH: 7 PATCH, EXTENDED RELEASE TRANSDERMAL at 08:59

## 2024-12-20 NOTE — ASSESSMENT & PLAN NOTE
Patient complains of neuropathy in her fingers bilaterally and her right foot  Notes this is progressively been worsening over the last year  MRI C-spine from May 2024 unremarkable for any significant stenosis but does show very mild nerve impingement between C5 and C6  Declined any steroid injections for fear that it might make her diabetes worse  Was told that she has bilateral carpal tunnel and recommended for surgery  She initially declined further imaging inpatient for this but stated later that if we did not find anything else she might consider repeat images

## 2024-12-20 NOTE — H&P
H&P - Hospitalist   Name: Nidia Hamlin 69 y.o. female I MRN: [id number]  Unit/Bed#: 417-01 I Date of Admission: 12/19/2024   Date of Service: 12/20/2024 I Hospital Day: 0     Assessment & Plan  JOSE ELIAS (acute kidney injury) (HCC)  Creatinine levels at 1.54  Baseline appears to be between 0.9 and 1.2  Received IV fluids in the ER   --Avoid nephrotoxic agents  --Continue gentle IV fluids  --Monitor urinary output, creatinine levels, electrolytes  --Check a.m. CMP  --Nephrology consult    Type 2 diabetes mellitus with hypoglycemia, without long-term current use of insulin (HCC)    Lab Results   Component Value Date    HGBA1C 7.0 (H) 12/07/2024   Initial blood glucose level at 44  Received D10 250 ml in the ER  --Will hold oral diabetic medication  --Fingerstick glucose 4 times daily AC/HS  --Sliding scale insulin  --Monitor while admitted   Hypertension  Blood pressure is stable  --Will hold lisinopril in Setting of JOSE ELIAS  -- Will utilized alternative agent to manage blood pressure as needed  --Monitor blood pressure while admitted   Hyperlipidemia  Stable  --Not currently on medication regimen  --Will check Lipid panel  Anemia  Hemoglobin levels at 7.6  No evidence of acute bleeding   --Will check Iron panel  --Check B12   --Check occult blood 1-3 stool  --Monitor hemoglobin levels   --Consider transfusion with hemoglobin < 7  --Consider GI consult   --Check AM CBC  Hyponatremia  Sodium level at 129  Likely hypovolemic hyponatremia  --Will check sodium urine random, osmolality urine  --Monitor sodium levels  --Repeat BMP  --Nephrology consult ordered     Elevated lipase  Lipase level at 425  Patient without abdominal pain  CT negative for evidence of acute pancreatitis  Will trend Lipase levels  Continue IV fluids  Monitor for new onset abdominal pain      VTE Pharmacologic Prophylaxis:   Moderate Risk (Score 3-4) - Pharmacological DVT Prophylaxis Contraindicated. Sequential Compression Devices Ordered.  Code Status: Level 1  - Full Code   Discussion with family: Patient declined call to .     Anticipated Length of Stay: Patient will be admitted on an observation basis with an anticipated length of stay of less than 2 midnights secondary to acute kidney injury, hyponatremia, acute anemia, elevated lipase level.    History of Present Illness   Chief Complaint: nausea and vomiting    Nidia Hamlin is a 69 y.o. female with a PMH of diabetes, hypertension, hyperlipidemia who presents to the emergency room for evaluation of complaint of nausea and vomiting.  Patient reports that she generally does not feel well that her symptoms started last night.  Patient states that she initially attributed her symptoms to her starting Lyrica for the first time..  Patient also reports feeling fatigued.  She also states that she felt a bit dizzy and lightheaded however the lightheadedness and dizziness has since resolved.  Patient denies having any chest pain any shortness of breath any cough any fever, chills, diarrhea, constipation, abdominal pain, urinary symptoms..    Workup in the emergency room included labs significant for sodium of 129, carbon dioxide of 17, BUN of 48, creatinine of 1.54, initial glucose of 44, lipase of 425, hemoglobin of 7.6.  COVID-19/flu/RSV negative.  Chest x-ray completed official report pending.  CT abdomen and pelvis completed no acute findings.  While in the emergency room patient treated with dextrose 10% follows 250 mL, Pepcid 20 mg IV, Isolyte 1 L, Zofran 4 mg IV.     Patient is being admitted on observation status [facility] care for further workup and management of acute kidney injury, hyponatremia, acute anemia, elevated lipase level.    Review of Systems   Constitutional:  Positive for activity change and appetite change. Negative for chills, fatigue and fever.   Eyes:  Negative for photophobia and visual disturbance.   Respiratory:  Negative for cough, chest tightness, shortness of breath and  wheezing.    Cardiovascular:  Negative for chest pain, palpitations and leg swelling.   Gastrointestinal:  Positive for nausea and vomiting. Negative for abdominal pain, blood in stool and diarrhea.   Genitourinary:  Negative for difficulty urinating, dysuria, flank pain and hematuria.   Musculoskeletal:  Negative for back pain, gait problem, myalgias, neck pain and neck stiffness.   Skin:  Negative for rash and wound.   Neurological:  Positive for dizziness and headaches. Negative for tremors, syncope, weakness and light-headedness.   Psychiatric/Behavioral:  Negative for agitation and confusion. The patient is not nervous/anxious.        Historical Information   Past Medical History:   Diagnosis Date    Adrenal insufficiency (HCC)     Anxiety     Bacterial sepsis (HCC)     last assessed: 06/13/16    Cushing's syndrome (HCC)     last assessed: 05/29/16    Depression     last assessed: 01/02/18    Diabetes mellitus (HCC)     Hydradenitis     Hyperlipidemia     Hypertension     Positive FIT (fecal immunochemical test) 06/22/2018    Added automatically from request for surgery [id number]    Rib contusion, left, sequela 06/14/2022    Trigger finger, right middle finger 03/07/2022     Past Surgical History:   Procedure Laterality Date    ADRENALECTOMY  12/2002    CHOLECYSTECTOMY      HERNIA REPAIR      umbilical    OTHER SURGICAL HISTORY      Injection of trigger joint    TX COLONOSCOPY FLX DX W/COLLJ SPEC WHEN PFRMD N/A 7/10/2018    Procedure: COLONOSCOPY;  Surgeon: Pako Nieves MD;  Location: [facility];  Service: Gastroenterology    TONSILLECTOMY AND ADENOIDECTOMY      TUBAL LIGATION       Social History     Tobacco Use    Smoking status: Every Day     Current packs/day: 0.25     Average packs/day: 0.3 packs/day for 50.0 years (12.5 ttl pk-yrs)     Types: Cigarettes    Smokeless tobacco: Never   Vaping Use    Vaping status: Never Used   Substance and Sexual Activity    Alcohol use: Never    Drug use: No    Sexual  activity: Not Currently     E-Cigarette/Vaping    E-Cigarette Use Never User      E-Cigarette/Vaping Substances    Nicotine No     THC No     CBD No     Flavoring No     Other No     Unknown No      Family History   Problem Relation Age of Onset    Breast cancer Mother         unknown age    No Known Problems Sister     No Known Problems Daughter     No Known Problems Daughter     No Known Problems Maternal Grandmother     No Known Problems Maternal Grandfather     No Known Problems Paternal Grandmother     No Known Problems Paternal Grandfather     No Known Problems Paternal Aunt     No Known Problems Paternal Aunt     No Known Problems Paternal Aunt     No Known Problems Half-Sister     No Known Problems Half-Sister     No Known Problems Half-Sister     No Known Problems Half-Sister     No Known Problems Half-Sister      Social History:  Marital Status:    Occupation:   Patient Pre-hospital Living Situation: Home  Patient Pre-hospital Level of Mobility: walks  Patient Pre-hospital Diet Restrictions: none reported     Meds/Allergies   I have reviewed home medications using recent Epic encounter.  Prior to Admission medications    Medication Sig Start Date End Date Taking? Authorizing Provider   Cholecalciferol (VITAMIN D3) 1,000 units tablet Take 2 tablets (2,000 Units total) by mouth daily 12/9/24   Sebastián Page DO   clindamycin (CLEOCIN T) 1 % lotion Apply topically 2 (two) times a day To affected areas 9/13/23   Historical Provider, MD   ergocalciferol (VITAMIN D2) 50,000 units Take 1 capsule (50,000 Units total) by mouth once a week 12/9/24   Sebastián Page DO   glimepiride (AMARYL) 2 mg tablet Take 0.5 tablets (1 mg total) by mouth daily with breakfast 7/5/24   Avni Howell PA-C   lisinopril (ZESTRIL) 20 mg tablet Take 1 tablet (20 mg total) by mouth daily 6/10/24   Sebastián Page DO   metFORMIN (GLUCOPHAGE) 500 mg tablet Take 1 tablet by mouth once daily 7/3/24   Tasha Palmer DO    pregabalin (LYRICA) 100 mg capsule Take 1 capsule (100 mg total) by mouth 2 (two) times a day 12/17/24   Tasha Palmer DO   sertraline (ZOLOFT) 100 mg tablet TAKE 1 TABLET BY MOUTH ONCE DAILY AS DIRECTED 7/5/24   Tasha Palmer DO   TiZANidine (ZANAFLEX) 2 MG capsule Take 1 capsule (2 mg total) by mouth 3 (three) times a day as needed for muscle spasms 12/17/24   Tasha Palmer DO     Allergies   Allergen Reactions    Fosamax [Alendronate] Diarrhea    Paxil [Paroxetine Hcl] Rash       Objective :  Temp:  [97.2 °F (36.2 °C)-97.9 °F (36.6 °C)] 97.9 °F (36.6 °C)  HR:  [62-71] 67  BP: (109-156)/(50-69) 153/64  Resp:  [18-37] 22  SpO2:  [96 %-98 %] 98 %  O2 Device: None (Room air)    Physical Exam  Constitutional:       General: She is not in acute distress.     Appearance: She is ill-appearing.   HENT:      Head: Normocephalic and atraumatic.      Mouth/Throat:      Mouth: Mucous membranes are dry.      Pharynx: Oropharynx is clear.   Eyes:      General:         Right eye: No discharge.         Left eye: No discharge.      Pupils: Pupils are equal, round, and reactive to light.   Cardiovascular:      Rate and Rhythm: Normal rate and regular rhythm.      Pulses: Normal pulses.   Pulmonary:      Effort: No respiratory distress.      Breath sounds: No stridor. No wheezing, rhonchi or rales.   Abdominal:      General: There is no distension.      Tenderness: There is no abdominal tenderness.   Musculoskeletal:      Cervical back: Neck supple.      Right lower leg: No edema.      Left lower leg: No edema.   Skin:     General: Skin is warm and dry.      Capillary Refill: Capillary refill takes less than 2 seconds.      Coloration: Skin is not jaundiced or pale.      Findings: No erythema, lesion or rash.   Neurological:      Mental Status: She is alert and oriented to person, place, and time.   Psychiatric:         Mood and Affect: Mood normal.          Lines/Drains:            Lab Results: I have reviewed the  following results:  Results from last 7 days   Lab Units 12/19/24  1655   WBC Thousand/uL 7.83   HEMOGLOBIN g/dL 7.6*   HEMATOCRIT % 24.7*   PLATELETS Thousands/uL 244   SEGS PCT % 45   LYMPHO PCT % 43   MONO PCT % 9   EOS PCT % 2     Results from last 7 days   Lab Units 12/19/24  2259 12/19/24  1655   SODIUM mmol/L 130* 129*   POTASSIUM mmol/L 4.8 5.3   CHLORIDE mmol/L 102 103   CO2 mmol/L 20* 17*   BUN mg/dL 43* 48*   CREATININE mg/dL 1.42* 1.54*   ANION GAP mmol/L 8 9   CALCIUM mg/dL 8.3* 8.3*   ALBUMIN g/dL  --  2.5*   TOTAL BILIRUBIN mg/dL  --  0.18*   ALK PHOS U/L  --  46   ALT U/L  --  7   AST U/L  --  9*   GLUCOSE RANDOM mg/dL 144* 44*         Results from last 7 days   Lab Units 12/19/24  2247 12/19/24  2130 12/19/24  1918   POC GLUCOSE mg/dl 170* 44* 70     Lab Results   Component Value Date    HGBA1C 7.0 (H) 12/07/2024    HGBA1C 7.2 (H) 06/26/2024    HGBA1C 7.5 (H) 08/16/2023           Imaging Results Review: I reviewed radiology reports from this admission including: chest xray and CT abdomen/pelvis.  Other Study Results Review: EKG was reviewed.     Administrative Statements   I have spent a total time of 40 minutes in caring for this patient on the day of the visit/encounter including Documenting in the medical record, Reviewing / ordering tests, medicine, procedures  , Obtaining or reviewing history  , and Communicating with other healthcare professionals .    ** Please Note: This note has been constructed using a voice recognition system. **

## 2024-12-20 NOTE — ASSESSMENT & PLAN NOTE
Lab Results   Component Value Date    HGBA1C 7.0 (H) 12/07/2024     Initial blood glucose level at 44  Received D10 250 ml in the ER  Holding oral diabetic medications  Suspect this is secondary to her Amaryl and poor dietary intake  Would consider discontinuing Amaryl on discharge pending glucose levels during admission

## 2024-12-20 NOTE — ASSESSMENT & PLAN NOTE
Blood pressure is stable  --Will hold lisinopril in Setting of JOSE ELIAS  -- Will utilized alternative agent to manage blood pressure as needed  --Monitor blood pressure while admitted

## 2024-12-20 NOTE — ASSESSMENT & PLAN NOTE
Hemoglobin levels at 7.6  No evidence of acute bleeding   --Will check Iron panel  --Check B12   --Check occult blood 1-3 stool  --Monitor hemoglobin levels   --Consider transfusion with hemoglobin < 7  --Consider GI consult   --Check AM CBC

## 2024-12-20 NOTE — PLAN OF CARE
Problem: PAIN - ADULT  Goal: Verbalizes/displays adequate comfort level or baseline comfort level  Description: Interventions:  - Encourage patient to monitor pain and request assistance  - Assess pain using appropriate pain scale  - Administer analgesics based on type and severity of pain and evaluate response  - Implement non-pharmacological measures as appropriate and evaluate response  - Consider cultural and social influences on pain and pain management  - Notify physician/advanced practitioner if interventions unsuccessful or patient reports new pain  Outcome: Progressing     Problem: INFECTION - ADULT  Goal: Absence or prevention of progression during hospitalization  Description: INTERVENTIONS:  - Assess and monitor for signs and symptoms of infection  - Monitor lab/diagnostic results  - Monitor all insertion sites, i.e. indwelling lines, tubes, and drains  - Monitor endotracheal if appropriate and nasal secretions for changes in amount and color  - [place] appropriate cooling/warming therapies per order  - Administer medications as ordered  - Instruct and encourage patient and family to use good hand hygiene technique  - Identify and instruct in appropriate isolation precautions for identified infection/condition  Outcome: Progressing  Goal: Absence of fever/infection during neutropenic period  Description: INTERVENTIONS:  - Monitor WBC    Outcome: Progressing     Problem: SAFETY ADULT  Goal: Patient will remain free of falls  Description: INTERVENTIONS:  - Educate patient/family on patient safety including physical limitations  - Instruct patient to call for assistance with activity   - Consult OT/PT to assist with strengthening/mobility   - Keep Call bell within reach  - Keep bed low and locked with side rails adjusted as appropriate  - Keep care items and personal belongings within reach  - Initiate and maintain comfort rounds  - Make Fall Risk Sign visible to staff  - Offer Toileting every 2 Hours,  in advance of need  - Initiate/Maintain bed alarm  - Obtain necessary fall risk management equipment: nonskid socks  - Apply yellow socks and bracelet for high fall risk patients  - Consider moving patient to room near nurses station  Outcome: Progressing  Goal: Maintain or return to baseline ADL function  Description: INTERVENTIONS:  -  Assess patient's ability to carry out ADLs; assess patient's baseline for ADL function and identify physical deficits which impact ability to perform ADLs (bathing, care of mouth/teeth, toileting, grooming, dressing, etc.)  - Assess/evaluate cause of self-care deficits   - Assess range of motion  - Assess patient's mobility; develop plan if impaired  - Assess patient's need for assistive devices and provide as appropriate  - Encourage maximum independence but intervene and supervise when necessary  - Involve family in performance of ADLs  - Assess for home care needs following discharge   - Consider OT consult to assist with ADL evaluation and planning for discharge  - Provide patient education as appropriate  Outcome: Progressing  Goal: Maintains/Returns to pre admission functional level  Description: INTERVENTIONS:  - Perform AM-PAC 6 Click Basic Mobility/ Daily Activity assessment daily.  - Set and communicate daily mobility goal to care team and patient/family/caregiver.   - Collaborate with rehabilitation services on mobility goals if consulted  - Perform Range of Motion 2 times a day.  - Reposition patient every 2 hours.  - Dangle patient 2 times a day  - Stand patient 2 times a day  - Ambulate patient 2 times a day  - Out of bed to chair 2 times a day   - Out of bed for meals 2 times a day  - Out of bed for toileting  - Record patient progress and toleration of activity level   Outcome: Progressing     Problem: DISCHARGE PLANNING  Goal: Discharge to home or other facility with appropriate resources  Description: INTERVENTIONS:  - Identify barriers to discharge w/patient and  caregiver  - Arrange for needed discharge resources and transportation as appropriate  - Identify discharge learning needs (meds, wound care, etc.)  - Arrange for interpretive services to assist at discharge as needed  - Refer to Case Management Department for coordinating discharge planning if the patient needs post-hospital services based on physician/advanced practitioner order or complex needs related to functional status, cognitive ability, or social support system  Outcome: Progressing     Problem: Knowledge Deficit  Goal: Patient/family/caregiver demonstrates understanding of disease process, treatment plan, medications, and discharge instructions  Description: Complete learning assessment and assess knowledge base.  Interventions:  - Provide teaching at level of understanding  - Provide teaching via preferred learning methods  Outcome: Progressing     Problem: METABOLIC, FLUID AND ELECTROLYTES - ADULT  Goal: Electrolytes maintained within normal limits  Description: INTERVENTIONS:  - Monitor labs and assess patient for signs and symptoms of electrolyte imbalances  - Administer electrolyte replacement as ordered  - Monitor response to electrolyte replacements, including repeat lab results as appropriate  - Instruct patient on fluid and nutrition as appropriate  Outcome: Progressing  Goal: Fluid balance maintained  Description: INTERVENTIONS:  - Monitor labs   - Monitor I/O and WT  - Instruct patient on fluid and nutrition as appropriate  - Assess for signs & symptoms of volume excess or deficit  Outcome: Progressing  Goal: Glucose maintained within target range  Description: INTERVENTIONS:  - Monitor Blood Glucose as ordered  - Assess for signs and symptoms of hyperglycemia and hypoglycemia  - Administer ordered medications to maintain glucose within target range  - Assess nutritional intake and initiate nutrition service referral as needed  Outcome: Progressing

## 2024-12-20 NOTE — ASSESSMENT & PLAN NOTE
Lipase level at 425  Patient without abdominal pain  CT negative for evidence of acute pancreatitis  Will trend Lipase levels  Continue IV fluids  Monitor for new onset abdominal pain

## 2024-12-20 NOTE — UTILIZATION REVIEW
Initial Clinical Review  OBS 12-19-24 @ 2038 CONVERTED TO INPATIENT  12-20-24 @ 1527 FOR CONTINUATION OF CARE FOR JOSE ELIAS.    Admission: Date/Time/Statement:   Admission Orders (From admission, onward)       Ordered        12/19/24 2038  Place in Observation  Once                          Orders Placed This Encounter   Procedures    Place in Observation     Standing Status:   Standing     Number of Occurrences:   1     Level of Care:   Med Surg [16]     ED Arrival Information       Expected   -    Arrival   12/19/2024 16:06    Acuity   Urgent              Means of arrival   Walk-In    Escorted by   Family Member    Service   Hospitalist    Admission type   Emergency              Arrival complaint   vomiting/nausea             Chief Complaint   Patient presents with    Vomiting     Nausea and vomiting since last night, states that she started taking a new medication last night, and has been nauseous and vomiting since. Started feeling dizzy today. Denies any diarrhea        Initial Presentation: 69 y.o. female presented to ED as observation for JOSE ELIAS, due to nausea and vomiting. PMH of diabetes, hypertension, hyperlipidemia. Patient reports that she generally does not feel well that her symptoms started last night. Patient states that she initially attributed her symptoms to her starting Lyrica for the first time.. Patient also reports feeling fatigued. She also states that she felt a bit dizzy and lightheaded however the lightheadedness and dizziness has since resolved. On exam ill appearing dry MM. Plan IVF Monitor urinary output, creatinine levels, electrolytes consult nephrology, accu checks, hold lisinopril in Setting of JOSE ELIAS PT/OT, and supportive care    Anticipated Length of Stay/Certification Statement:  Patient will be admitted on an observation basis with an anticipated length of stay of less than 2 midnights secondary to acute kidney injury, hyponatremia, acute anemia, elevated lipase level.       Date:  12-20-24   Day 2:  Continue monitoring renal functions test BUN slowly decreasing  43--> 38 --> 35 Creat  1.42--> 1.39 --> with increase 1.59 will continue IVF Holding lisinopril  HGB 5 years ago 12 on admission 7.6 patient denies active bleeding, bleeding dark stools Patient reports that she feels tired and depressed and has continued numbness and tingling in her bilateral hands and right leg.On exam ill appearing tired, appears depressed. Awaiting PT/OT evaluation       Certification Statement: The patient will continue to require additional inpatient hospital stay due to monitoring blood counts and renal function       ED Treatment-Medication Administration from 12/19/2024 1606 to 12/19/2024 2111         Date/Time Order Dose Route Action     12/19/2024 1756 ondansetron (ZOFRAN) injection 4 mg 4 mg Intravenous Given     12/19/2024 1803 multi-electrolyte (ISOLYTE-S PH 7.4) bolus 1,000 mL 1,000 mL Intravenous New Bag     12/19/2024 1755 Famotidine (PF) (PEPCID) injection 20 mg 20 mg Intravenous Given     12/19/2024 1755 dextrose (D10W) 10% bolus 250 mL 250 mL Intravenous New Bag            Scheduled Medications:  Cholecalciferol, 2,000 Units, Oral, Daily  nicotine, 1 patch, Transdermal, Daily  sertraline, 100 mg, Oral, Daily      Continuous IV Infusions:  sodium chloride, 50 mL/hr, Intravenous, Continuous      PRN Meds:  acetaminophen, 650 mg, Oral, Q6H PRN  ondansetron, 4 mg, Intravenous, Q6H PRN  tiZANidine, 2 mg, Oral, TID PRN      ED Triage Vitals [12/19/24 1633]   Temperature Pulse Respirations Blood Pressure SpO2 Pain Score   (!) 97.2 °F (36.2 °C) 71 18 109/50 96 % No Pain     Weight (last 2 days)       Date/Time Weight    12/20/24 0600 68.6 (151.24)    12/19/24 21:20:57 68.6 (151.24)            Vital Signs (last 3 days)       Date/Time Temp Pulse Resp BP MAP (mmHg) SpO2 O2 Device Patient Position - Orthostatic VS Fults Coma Scale Score Pain    12/20/24 06:34:43 97.3 °F (36.3 °C) 62 16 129/51 77 96 % -- -- -- --     12/19/24 21:20:57 97.9 °F (36.6 °C) 67 22 153/64 94 98 % -- -- 15 7    12/19/24 1930 97.5 °F (36.4 °C) 67 37 141/64 92 97 % None (Room air) Sitting -- 3    12/19/24 1837 97.5 °F (36.4 °C) 62 19 156/69 -- 98 % None (Room air) Sitting -- 5    12/19/24 1645 -- -- -- -- -- -- -- -- 15 --    12/19/24 1633 97.2 °F (36.2 °C) 71 18 109/50 72 96 % None (Room air) Sitting -- No Pain              Pertinent Labs/Diagnostic Test Results:   Radiology:  CT abdomen pelvis wo contrast   Final Interpretation by Juan Lucia MD (12/19 1846)      1.  No identifiable acute abnormality to account for the patient's clinical presentation. Please refer to the report body for description of other incidental, chronic and/or benign findings.      Workstation performed: [id number]         XR chest 1 view portable    (Results Pending)     Cardiology:  ECG 12 lead    by Interface, Ris Results In (12/19 1754)      ECG 12 lead    by Interface, Ris Results In (12/19 1656)        GI:  No orders to display           Results from last 7 days   Lab Units 12/20/24  0501 12/19/24  1655   WBC Thousand/uL 5.95 7.83   HEMOGLOBIN g/dL 7.5* 7.6*   HEMATOCRIT % 24.7* 24.7*   PLATELETS Thousands/uL 243 244   TOTAL NEUT ABS Thousands/µL 2.66 3.44         Results from last 7 days   Lab Units 12/20/24  0501 12/19/24  2259 12/19/24  1655   SODIUM mmol/L 136 130* 129*   POTASSIUM mmol/L 4.9 4.8 5.3   CHLORIDE mmol/L 107 102 103   CO2 mmol/L 22 20* 17*   ANION GAP mmol/L 7 8 9   BUN mg/dL 38* 43* 48*   CREATININE mg/dL 1.39* 1.42* 1.54*   EGFR ml/min/1.73sq m 38 37 34   CALCIUM mg/dL 8.3* 8.3* 8.3*   MAGNESIUM mg/dL 2.1  --  1.9   PHOSPHORUS mg/dL 4.2*  --   --      Results from last 7 days   Lab Units 12/20/24  0501 12/19/24  1655   AST U/L 7* 9*   ALT U/L 8 7   ALK PHOS U/L 43 46   TOTAL PROTEIN g/dL 9.6* 10.3*   ALBUMIN g/dL 2.4* 2.5*   TOTAL BILIRUBIN mg/dL 0.15* 0.18*     Results from last 7 days   Lab Units 12/20/24  0653 12/20/24  0635 12/20/24  0148  12/19/24 2247 12/19/24  2130 12/19/24  1918   POC GLUCOSE mg/dl 88 61* 118 170* 44* 70     Results from last 7 days   Lab Units 12/20/24  0501 12/19/24  2259 12/19/24  1655   GLUCOSE RANDOM mg/dL 46* 144* 44*     Results from last 7 days   Lab Units 12/19/24  1922 12/19/24  1655   HS TNI 0HR ng/L  --  <2   HS TNI 2HR ng/L <2  --          Results from last 7 days   Lab Units 12/20/24  0501   PROTIME seconds 15.3*   INR  1.16   PTT seconds 25         Results from last 7 days   Lab Units 12/20/24  0501 12/19/24  1655   LIPASE u/L 59 425*             Results from last 7 days   Lab Units 12/19/24  2251   OSMO UR mmol/*     Results from last 7 days   Lab Units 12/19/24  2251 12/19/24  2250   CLARITY UA   --  Clear   COLOR UA   --  Colorless   SPEC GRAV UA   --  <1.005*   PH UA   --  5.5   GLUCOSE UA mg/dl  --  Negative   KETONES UA mg/dl  --  Negative   BLOOD UA   --  Small*   PROTEIN UA mg/dl  --  Negative   NITRITE UA   --  Negative   BILIRUBIN UA   --  Negative   UROBILINOGEN UA (BE) mg/dl  --  <2.0   LEUKOCYTES UA   --  Negative   WBC UA /hpf  --  None Seen   RBC UA /hpf  --  0-1   BACTERIA UA /hpf  --  None Seen   EPITHELIAL CELLS WET PREP /hpf  --  Occasional   SODIUM UR  31  --          Past Medical History:   Diagnosis Date    Adrenal insufficiency ([facility])     Anxiety     Bacterial sepsis ([facility])     last assessed: 06/13/16    Cushing's syndrome ([facility])     last assessed: 05/29/16    Depression     last assessed: 01/02/18    Diabetes mellitus ([facility])     Hydradenitis     Hyperlipidemia     Hypertension     Positive FIT (fecal immunochemical test) 06/22/2018    Added automatically from request for surgery [id number]    Rib contusion, left, sequela 06/14/2022    Trigger finger, right middle finger 03/07/2022     Present on Admission:   JOSE ELIAS (acute kidney injury) ([facility])   Hypertension   Hyperlipidemia   Anemia   Hyponatremia   Type 2 diabetes mellitus with hypoglycemia, without long-term current use of insulin ([facility])    Hypothyroidism   Elevated lipase      Admitting Diagnosis: Hyponatremia [E87.1]  Vomiting [R11.10]  Anemia [D64.9]  Hypoglycemia [E16.2]  Nausea & vomiting [R11.2]  JOSE ELIAS (acute kidney injury) (HCC) [N17.9]  Elevated lipase [R74.8]  Age/Sex: 69 y.o. female    Network Utilization Review Department  ATTENTION: Please call with any questions or concerns to [phone] and carefully listen to the prompts so that you are directed to the right person. All voicemails are confidential.   For Discharge needs, contact Care Management DC Support Team at [phone] opt. 2  Send all requests for admission clinical reviews, approved or denied determinations and any other requests to dedicated fax number below belonging to the campus where the patient is receiving treatment. List of dedicated fax numbers for the Facilities:  FACILITY NAME UR FAX NUMBER   ADMISSION DENIALS (Administrative/Medical Necessity) [phone]   DISCHARGE SUPPORT TEAM (NETWORK) [phone]   PARENT CHILD HEALTH (Maternity/NICU/Pediatrics) [phone]   [facility] [phone]   [facility] [phone]   [facility] [phone]   [facility] [phone]   [facility] [phone]   [facility] [phone]   [facility] [phone]   [facility] [phone]   [facility] [phone]   [facility] [phone]   [facility] [phone]   [facility] [phone]

## 2024-12-20 NOTE — PLAN OF CARE
Problem: PAIN - ADULT  Goal: Verbalizes/displays adequate comfort level or baseline comfort level  Description: Interventions:  - Encourage patient to monitor pain and request assistance  - Assess pain using appropriate pain scale  - Administer analgesics based on type and severity of pain and evaluate response  - Implement non-pharmacological measures as appropriate and evaluate response  - Consider cultural and social influences on pain and pain management  - Notify physician/advanced practitioner if interventions unsuccessful or patient reports new pain  Outcome: Progressing     Problem: INFECTION - ADULT  Goal: Absence or prevention of progression during hospitalization  Description: INTERVENTIONS:  - Assess and monitor for signs and symptoms of infection  - Monitor lab/diagnostic results  - Monitor all insertion sites, i.e. indwelling lines, tubes, and drains  - Monitor endotracheal if appropriate and nasal secretions for changes in amount and color  - [place] appropriate cooling/warming therapies per order  - Administer medications as ordered  - Instruct and encourage patient and family to use good hand hygiene technique  - Identify and instruct in appropriate isolation precautions for identified infection/condition  Outcome: Progressing  Goal: Absence of fever/infection during neutropenic period  Description: INTERVENTIONS:  - Monitor WBC    Outcome: Progressing     Problem: DISCHARGE PLANNING  Goal: Discharge to home or other facility with appropriate resources  Description: INTERVENTIONS:  - Identify barriers to discharge w/patient and caregiver  - Arrange for needed discharge resources and transportation as appropriate  - Identify discharge learning needs (meds, wound care, etc.)  - Arrange for interpretive services to assist at discharge as needed  - Refer to Case Management Department for coordinating discharge planning if the patient needs post-hospital services based on physician/advanced  practitioner order or complex needs related to functional status, cognitive ability, or social support system  Outcome: Progressing     Problem: Knowledge Deficit  Goal: Patient/family/caregiver demonstrates understanding of disease process, treatment plan, medications, and discharge instructions  Description: Complete learning assessment and assess knowledge base.  Interventions:  - Provide teaching at level of understanding  - Provide teaching via preferred learning methods  Outcome: Progressing     Problem: METABOLIC, FLUID AND ELECTROLYTES - ADULT  Goal: Electrolytes maintained within normal limits  Description: INTERVENTIONS:  - Monitor labs and assess patient for signs and symptoms of electrolyte imbalances  - Administer electrolyte replacement as ordered  - Monitor response to electrolyte replacements, including repeat lab results as appropriate  - Instruct patient on fluid and nutrition as appropriate  Outcome: Progressing  Goal: Fluid balance maintained  Description: INTERVENTIONS:  - Monitor labs   - Monitor I/O and WT  - Instruct patient on fluid and nutrition as appropriate  - Assess for signs & symptoms of volume excess or deficit  Outcome: Progressing  Goal: Glucose maintained within target range  Description: INTERVENTIONS:  - Monitor Blood Glucose as ordered  - Assess for signs and symptoms of hyperglycemia and hypoglycemia  - Administer ordered medications to maintain glucose within target range  - Assess nutritional intake and initiate nutrition service referral as needed  Outcome: Progressing

## 2024-12-20 NOTE — ASSESSMENT & PLAN NOTE
Lab Results   Component Value Date    HGBA1C 7.0 (H) 12/07/2024   Initial blood glucose level at 44  Received D10 250 ml in the ER  --Will hold oral diabetic medication  --Fingerstick glucose 4 times daily AC/HS  --Sliding scale insulin  --Monitor while admitted

## 2024-12-20 NOTE — PROGRESS NOTES
Progress Note - Hospitalist   Name: Nidia Hamlin 69 y.o. female I MRN: [id number]  Unit/Bed#: 417-01 I Date of Admission: 2024   Date of Service: 2024 I Hospital Day: 0    Assessment & Plan  JOSE ELIAS (acute kidney injury) ([facility])  Creatinine levels at 1.54 on admission  Baseline around 1.25   Improved to 1.39 with fluids, continue fluids  Check urine and serum protein electrophoresis  Urinary retention protocol  Imaging does show bilateral multiple renal cysts which may be contributing to a progression of chronic renal disease  Type 2 diabetes mellitus with hypoglycemia, without long-term current use of insulin ([facility])    Lab Results   Component Value Date    HGBA1C 7.0 (H) 2024     Initial blood glucose level at 44  Received D10 250 ml in the ER  Holding oral diabetic medications  Suspect this is secondary to her Amaryl and poor dietary intake  Would consider discontinuing Amaryl on discharge pending glucose levels during admission  Hypertension  Blood pressure is stable  Holding lisinopril in the setting of JOSE ELIAS  Hyperlipidemia  Lipid panel uncontrolled  Patient has reported reaction to statin in the past  Anemia  Hemoglobin levels at 7.6, hemoglobins were within normal range around 12 but it has not been checked in 5 years  No evidence of acute bleeding   Iron panel within normal limits, B12 within normal limits  Pending stool occult blood  Check a folate level  Continue to trend hemoglobin levels  Can consider GI consult next week if still available  Hyponatremia  Sodium level at 129 on admission  Resolved after IV fluids  cont to monitor    Elevated lipase  Lipase level at 425  Patient without abdominal pain  CT negative for evidence of acute pancreatitis  Lipase level now resolved    Depression, recurrent (HCC)  Patient reports that she has had depression since her   5 years ago  Has been on Zoloft 100 mg since that time  She feels like her depression has been persistent and worsening  Given  poor sleep and poor appetite, willing to trial Remeron 7.5 mg at bedtime to see how she feels  Declines counseling referral on discharge    Hypothyroidism  Check a TSH  Neuropathy  Patient complains of neuropathy in her fingers bilaterally and her right foot  Notes this is progressively been worsening over the last year  MRI C-spine from May 2024 unremarkable for any significant stenosis but does show very mild nerve impingement between C5 and C6  Declined any steroid injections for fear that it might make her diabetes worse  Was told that she has bilateral carpal tunnel and recommended for surgery  She initially declined further imaging inpatient for this but stated later that if we did not find anything else she might consider repeat images      VTE Pharmacologic Prophylaxis:   High Risk (Score >/= 5) - Pharmacological DVT Prophylaxis Contraindicated. Sequential Compression Devices Ordered.    Mobility:   Basic Mobility Inpatient Raw Score: 20  JH-HLM Goal: 6: Walk 10 steps or more  JH-HLM Achieved: 6: Walk 10 steps or more  JH-HLM Goal achieved. Continue to encourage appropriate mobility.    Patient Centered Rounds: I performed bedside rounds with nursing staff today.   Discussions with Specialists or Other Care Team Provider: Case management    Education and Discussions with Family / Patient: Updated  (son) at bedside.  And grandson who lives with patient    Current Length of Stay: 0 day(s)  Current Patient Status: Inpatient   Certification Statement: The patient will continue to require additional inpatient hospital stay due to monitoring blood counts and renal function  Discharge Plan: Anticipate discharge in 24-48 hrs to discharge location to be determined pending rehab evaluations.    Code Status: Level 1 - Full Code    Subjective   Patient reports that she feels tired and depressed and has continued numbness and tingling in her bilateral hands and right leg.  Denies any bright red blood per  rectum or dark tarry stools    Objective :  Temp:  [97.2 °F (36.2 °C)-97.9 °F (36.6 °C)] 97.6 °F (36.4 °C)  HR:  [62-75] 75  BP: (109-156)/(50-69) 137/65  Resp:  [16-37] 18  SpO2:  [93 %-98 %] 93 %  O2 Device: None (Room air)    Body mass index is 29.54 kg/m².     Input and Output Summary (last 24 hours):     Intake/Output Summary (Last 24 hours) at 12/20/2024 1548  Last data filed at 12/20/2024 1512  Gross per 24 hour   Intake 1969 ml   Output 2400 ml   Net -431 ml       Physical Exam  Vitals and nursing note reviewed.   Constitutional:       General: She is not in acute distress.     Appearance: She is ill-appearing.   Cardiovascular:      Rate and Rhythm: Normal rate and regular rhythm.      Pulses: Normal pulses.      Heart sounds: Normal heart sounds. No murmur heard.     No gallop.   Pulmonary:      Effort: Pulmonary effort is normal.      Breath sounds: Normal breath sounds. No wheezing, rhonchi or rales.   Musculoskeletal:      Right lower leg: No edema.      Left lower leg: No edema.   Neurological:      Mental Status: She is alert.   Psychiatric:      Comments: Appears down and depressed           Lines/Drains:              Lab Results: I have reviewed the following results:   Results from last 7 days   Lab Units 12/20/24  0501   WBC Thousand/uL 5.95   HEMOGLOBIN g/dL 7.5*   HEMATOCRIT % 24.7*   PLATELETS Thousands/uL 243   SEGS PCT % 45   LYMPHO PCT % 44   MONO PCT % 8   EOS PCT % 2     Results from last 7 days   Lab Units 12/20/24  1352 12/20/24  0501   SODIUM mmol/L 134* 136   POTASSIUM mmol/L 5.1 4.9   CHLORIDE mmol/L 104 107   CO2 mmol/L 23 22   BUN mg/dL 35* 38*   CREATININE mg/dL 1.59* 1.39*   ANION GAP mmol/L 7 7   CALCIUM mg/dL 8.7 8.3*   ALBUMIN g/dL  --  2.4*   TOTAL BILIRUBIN mg/dL  --  0.15*   ALK PHOS U/L  --  43   ALT U/L  --  8   AST U/L  --  7*   GLUCOSE RANDOM mg/dL 102 46*     Results from last 7 days   Lab Units 12/20/24  0501   INR  1.16     Results from last 7 days   Lab Units  12/20/24  1533 12/20/24  1109 12/20/24  0653 12/20/24  0635 12/20/24  0148 12/19/24  2247 12/19/24  2130 12/19/24  1918   POC GLUCOSE mg/dl 102 122 88 61* 118 170* 44* 70               Recent Cultures (last 7 days):         Imaging Results Review: I reviewed radiology reports from this admission including: CT abdomen/pelvis.  Other Study Results Review: No additional pertinent studies reviewed.    Last 24 Hours Medication List:     Current Facility-Administered Medications:     acetaminophen (TYLENOL) tablet 650 mg, Q6H PRN    Cholecalciferol (VITAMIN D3) tablet 2,000 Units, Daily    lidocaine (LIDODERM) 5 % patch 2 patch, Daily    mirtazapine (REMERON) tablet 7.5 mg, HS    nicotine (NICODERM CQ) 7 mg/24hr TD 24 hr patch 1 patch, Daily    ondansetron (ZOFRAN) injection 4 mg, Q6H PRN    sertraline (ZOLOFT) tablet 100 mg, Daily    sodium chloride 0.9 % infusion, Continuous, Last Rate: 75 mL/hr (12/20/24 1534)    tiZANidine (ZANAFLEX) tablet 2 mg, TID PRN    Administrative Statements   Today, Patient Was Seen By: Maddy Thomas PA-C  I have spent a total time of 65 minutes in caring for this patient on the day of the visit/encounter including Diagnostic results, Prognosis, Risks and benefits of tx options, Instructions for management, Patient and family education, Importance of tx compliance, Risk factor reductions, Impressions, Counseling / Coordination of care, Documenting in the medical record, Reviewing / ordering tests, medicine, procedures  , Obtaining or reviewing history  , and Communicating with other healthcare professionals .    **Please Note: This note may have been constructed using a voice recognition system.**

## 2024-12-20 NOTE — CASE MANAGEMENT
Case Management Discharge Planning Note    Patient name Nidia Hamlin  Location /417-01 MRN [id number]  : 1955 Date 2024       Current Admission Date: 2024  Current Admission Diagnosis:JOSE ELIAS (acute kidney injury) ([facility])   Patient Active Problem List    Diagnosis Date Noted Date Diagnosed    Elevated lipase 2024     JOSE ELIAS (acute kidney injury) ([facility]) 2024     Anemia 2024     Hyponatremia 2024     Type 2 diabetes mellitus with hypoglycemia, without long-term current use of insulin ([facility]) 2024     Carpal tunnel syndrome on right 2024     Idiopathic progressive neuropathy 2024     Numbness and tingling of hand 2024     Cervical radiculopathy 2024     Chronic kidney disease, stage 3b ([facility]) 2024     Chronic pain of both shoulders 2024     Hidradenitis suppurativa 2023     Screening mammogram for breast cancer 2023     History of adrenal insufficiency 2022     Osteoporosis 2020     Anxiety 2019     Vitamin D deficiency 2016     Non-ST elevation myocardial infarction (NSTEMI), type 2 2016     Hypertension 2016     Tobacco abuse 2016     Hypothyroidism 2015     Depression, recurrent ([facility]) 2012     Type 2 diabetes mellitus with hyperglycemia, without long-term current use of insulin ([facility]) 2012     Hyperlipidemia 2012       LOS (days): 0  Geometric Mean LOS (GMLOS) (days):   Days to GMLOS:     OBJECTIVE:            Current admission status: Observation   Preferred Pharmacy:   [facility] [place] [facility] [address], [address]  [address], [address]  [facility] [address]  Phone: [phone] Fax: [phone]    Primary Care Provider: Tasha Palmer DO    Primary Insurance: FABIO  REP  Secondary Insurance:     DISCHARGE DETAILS:  Discussed pt in interdisciplinary team meeting. Possible dc tomorrow. Will await PT/OT evals/recommendations.

## 2024-12-20 NOTE — ASSESSMENT & PLAN NOTE
Creatinine levels at 1.54  Baseline appears to be between 0.9 and 1.2  Received IV fluids in the ER   --Avoid nephrotoxic agents  --Continue gentle IV fluids  --Monitor urinary output, creatinine levels, electrolytes  --Check a.m. CMP  --Nephrology consult

## 2024-12-20 NOTE — NURSING NOTE
Patient blood sugar 44. Patient awake and alert, offers no complaints. Provider notified, orders placed.

## 2024-12-20 NOTE — ASSESSMENT & PLAN NOTE
Creatinine levels at 1.54 on admission  Baseline around 1.25   Improved to 1.39 with fluids, continue fluids  Check urine and serum protein electrophoresis  Urinary retention protocol  Imaging does show bilateral multiple renal cysts which may be contributing to a progression of chronic renal disease

## 2024-12-20 NOTE — ASSESSMENT & PLAN NOTE
Hemoglobin levels at 7.6, hemoglobins were within normal range around 12 but it has not been checked in 5 years  No evidence of acute bleeding   Iron panel within normal limits, B12 within normal limits  Pending stool occult blood  Check a folate level  Continue to trend hemoglobin levels  Can consider GI consult next week if still available

## 2024-12-20 NOTE — ASSESSMENT & PLAN NOTE
Sodium level at 129  Likely hypovolemic hyponatremia  --Will check sodium urine random, osmolality urine  --Monitor sodium levels  --Repeat BMP  --Nephrology consult ordered

## 2024-12-20 NOTE — ASSESSMENT & PLAN NOTE
Patient reports that she has had depression since her   5 years ago  Has been on Zoloft 100 mg since that time  She feels like her depression has been persistent and worsening  Given poor sleep and poor appetite, willing to trial Remeron 7.5 mg at bedtime to see how she feels  Declines counseling referral on discharge

## 2024-12-20 NOTE — ASSESSMENT & PLAN NOTE
Lipase level at 425  Patient without abdominal pain  CT negative for evidence of acute pancreatitis  Lipase level now resolved

## 2024-12-21 LAB
ALBUMIN SERPL BCG-MCNC: 2.4 G/DL (ref 3.5–5)
ALP SERPL-CCNC: 43 U/L (ref 34–104)
ALT SERPL W P-5'-P-CCNC: 6 U/L (ref 7–52)
ANION GAP SERPL CALCULATED.3IONS-SCNC: 7 MMOL/L (ref 4–13)
ANION GAP SERPL CALCULATED.3IONS-SCNC: 7 MMOL/L (ref 4–13)
AST SERPL W P-5'-P-CCNC: 6 U/L (ref 13–39)
BASOPHILS # BLD AUTO: 0.03 THOUSANDS/ÂΜL (ref 0–0.1)
BASOPHILS NFR BLD AUTO: 1 % (ref 0–1)
BILIRUB SERPL-MCNC: 0.15 MG/DL (ref 0.2–1)
BUN SERPL-MCNC: 32 MG/DL (ref 5–25)
BUN SERPL-MCNC: 34 MG/DL (ref 5–25)
CALCIUM ALBUM COR SERPL-MCNC: 9.8 MG/DL (ref 8.3–10.1)
CALCIUM SERPL-MCNC: 8.5 MG/DL (ref 8.4–10.2)
CALCIUM SERPL-MCNC: 8.7 MG/DL (ref 8.4–10.2)
CHLORIDE SERPL-SCNC: 106 MMOL/L (ref 96–108)
CHLORIDE SERPL-SCNC: 108 MMOL/L (ref 96–108)
CK SERPL-CCNC: 15 U/L (ref 26–192)
CO2 SERPL-SCNC: 20 MMOL/L (ref 21–32)
CO2 SERPL-SCNC: 23 MMOL/L (ref 21–32)
CORTIS AM PEAK SERPL-MCNC: 10.5 UG/DL (ref 6.7–22.6)
CREAT SERPL-MCNC: 1.35 MG/DL (ref 0.6–1.3)
CREAT SERPL-MCNC: 1.43 MG/DL (ref 0.6–1.3)
EOSINOPHIL # BLD AUTO: 0.14 THOUSAND/ÂΜL (ref 0–0.61)
EOSINOPHIL NFR BLD AUTO: 3 % (ref 0–6)
ERYTHROCYTE [DISTWIDTH] IN BLOOD BY AUTOMATED COUNT: 16.7 % (ref 11.6–15.1)
GFR SERPL CREATININE-BSD FRML MDRD: 37 ML/MIN/1.73SQ M
GFR SERPL CREATININE-BSD FRML MDRD: 40 ML/MIN/1.73SQ M
GLUCOSE SERPL-MCNC: 101 MG/DL (ref 65–140)
GLUCOSE SERPL-MCNC: 103 MG/DL (ref 65–140)
GLUCOSE SERPL-MCNC: 116 MG/DL (ref 65–140)
GLUCOSE SERPL-MCNC: 135 MG/DL (ref 65–140)
GLUCOSE SERPL-MCNC: 92 MG/DL (ref 65–140)
GLUCOSE SERPL-MCNC: 95 MG/DL (ref 65–140)
HCT VFR BLD AUTO: 24.2 % (ref 34.8–46.1)
HGB BLD-MCNC: 7.6 G/DL (ref 11.5–15.4)
IMM GRANULOCYTES # BLD AUTO: 0.04 THOUSAND/UL (ref 0–0.2)
IMM GRANULOCYTES NFR BLD AUTO: 1 % (ref 0–2)
LYMPHOCYTES # BLD AUTO: 2 THOUSANDS/ÂΜL (ref 0.6–4.47)
LYMPHOCYTES NFR BLD AUTO: 38 % (ref 14–44)
MCH RBC QN AUTO: 30.5 PG (ref 26.8–34.3)
MCHC RBC AUTO-ENTMCNC: 31.4 G/DL (ref 31.4–37.4)
MCV RBC AUTO: 97 FL (ref 82–98)
MONOCYTES # BLD AUTO: 0.48 THOUSAND/ÂΜL (ref 0.17–1.22)
MONOCYTES NFR BLD AUTO: 9 % (ref 4–12)
NEUTROPHILS # BLD AUTO: 2.56 THOUSANDS/ÂΜL (ref 1.85–7.62)
NEUTS SEG NFR BLD AUTO: 48 % (ref 43–75)
NRBC BLD AUTO-RTO: 0 /100 WBCS
PLATELET # BLD AUTO: 222 THOUSANDS/UL (ref 149–390)
PMV BLD AUTO: 8.9 FL (ref 8.9–12.7)
POTASSIUM SERPL-SCNC: 5 MMOL/L (ref 3.5–5.3)
POTASSIUM SERPL-SCNC: 5.5 MMOL/L (ref 3.5–5.3)
PROT SERPL-MCNC: 9.6 G/DL (ref 6.4–8.4)
RBC # BLD AUTO: 2.49 MILLION/UL (ref 3.81–5.12)
SODIUM SERPL-SCNC: 135 MMOL/L (ref 135–147)
SODIUM SERPL-SCNC: 136 MMOL/L (ref 135–147)
TSH SERPL DL<=0.05 MIU/L-ACNC: 1.48 UIU/ML (ref 0.45–4.5)
WBC # BLD AUTO: 5.25 THOUSAND/UL (ref 4.31–10.16)

## 2024-12-21 PROCEDURE — 99232 SBSQ HOSP IP/OBS MODERATE 35: CPT

## 2024-12-21 PROCEDURE — 85025 COMPLETE CBC W/AUTO DIFF WBC: CPT | Performed by: PHYSICIAN ASSISTANT

## 2024-12-21 PROCEDURE — 82550 ASSAY OF CK (CPK): CPT

## 2024-12-21 PROCEDURE — 86334 IMMUNOFIX E-PHORESIS SERUM: CPT

## 2024-12-21 PROCEDURE — 97163 PT EVAL HIGH COMPLEX 45 MIN: CPT | Performed by: PHYSICAL THERAPIST

## 2024-12-21 PROCEDURE — 82948 REAGENT STRIP/BLOOD GLUCOSE: CPT

## 2024-12-21 PROCEDURE — 97166 OT EVAL MOD COMPLEX 45 MIN: CPT

## 2024-12-21 PROCEDURE — 84165 PROTEIN E-PHORESIS SERUM: CPT

## 2024-12-21 PROCEDURE — 82533 TOTAL CORTISOL: CPT | Performed by: PHYSICIAN ASSISTANT

## 2024-12-21 PROCEDURE — 80048 BASIC METABOLIC PNL TOTAL CA: CPT

## 2024-12-21 PROCEDURE — 80053 COMPREHEN METABOLIC PANEL: CPT | Performed by: PHYSICIAN ASSISTANT

## 2024-12-21 RX ADMIN — MIRTAZAPINE 7.5 MG: 15 TABLET, FILM COATED ORAL at 21:01

## 2024-12-21 RX ADMIN — SERTRALINE 100 MG: 100 TABLET, FILM COATED ORAL at 08:19

## 2024-12-21 RX ADMIN — Medication 2000 UNITS: at 08:19

## 2024-12-21 RX ADMIN — SODIUM CHLORIDE 75 ML/HR: 0.9 INJECTION, SOLUTION INTRAVENOUS at 06:06

## 2024-12-21 NOTE — ASSESSMENT & PLAN NOTE
-Patient presented with serum sodium level 129 mmol/L on 12/19.  Patient given Isolyte bolus and NSS with sodium increasing to 136 by 12/20 and remaining stable at 136 on 12/21. Continue with sodium chloride infusion at 75 ml/hr until oral intake is improves. Continue to monitor daily BMP.

## 2024-12-21 NOTE — PHYSICAL THERAPY NOTE
Physical Therapy Evaluation     Patient Name: Nidia Hamlin    Today's Date: 12/21/2024     Problem List  Principal Problem:    JOSE ELIAS (acute kidney injury) (HCC)  Active Problems:    Hypertension    Depression, recurrent (HCC)    Hyperlipidemia    Hypothyroidism    Neuropathy    Anemia    Hyponatremia    Type 2 diabetes mellitus with hypoglycemia, without long-term current use of insulin (HCC)    Elevated lipase       Past Medical History  Past Medical History:   Diagnosis Date    Adrenal insufficiency (HCC)     Anxiety     Bacterial sepsis (HCC)     last assessed: 06/13/16    Cushing's syndrome (HCC)     last assessed: 05/29/16    Depression     last assessed: 01/02/18    Diabetes mellitus (HCC)     Hydradenitis     Hyperlipidemia     Hypertension     Positive FIT (fecal immunochemical test) 06/22/2018    Added automatically from request for surgery [id number]    Rib contusion, left, sequela 06/14/2022    Trigger finger, right middle finger 03/07/2022        Past Surgical History  Past Surgical History:   Procedure Laterality Date    ADRENALECTOMY  12/2002    CHOLECYSTECTOMY      HERNIA REPAIR      umbilical    OTHER SURGICAL HISTORY      Injection of trigger joint    MN COLONOSCOPY FLX DX W/COLLJ SPEC WHEN PFRMD N/A 7/10/2018    Procedure: COLONOSCOPY;  Surgeon: Pako Nieves MD;  Location: [facility];  Service: Gastroenterology    TONSILLECTOMY AND ADENOIDECTOMY      TUBAL LIGATION           12/21/24 1200   PT Last Visit   PT Visit Date 12/21/24   Note Type   Note type Evaluation   Pain Assessment   Pain Assessment Tool 0-10   Pain Score No Pain   Restrictions/Precautions   Weight Bearing Precautions Per Order No   Other Precautions Chair Alarm;Fall Risk   Home Living   Type of Home House   Home Layout Two level;Able to live on main level with bedroom/bathroom   Bathroom Shower/Tub Tub/shower unit   Bathroom Toilet Standard   Bathroom Equipment Shower chair   Home  Equipment Walker   Prior Function   Level of [place] Independent with ADLs;Independent with functional mobility;Independent with IADLS   Lives With Family   Receives Help From Family   IADLs Independent with driving;Independent with medication management;Independent with meal prep   Falls in the last 6 months 1 to 4   Vocational Retired   Cognition   Overall Cognitive Status WFL   Arousal/Participation Alert   Attention Within functional limits   Orientation Level Oriented X4   Memory Within functional limits   Following Commands Follows all commands and directions without difficulty   RLE Assessment   RLE Assessment WFL   LLE Assessment   LLE Assessment WFL   Bed Mobility   Supine to Sit 5  Supervision   Additional items Increased time required   Sit to Supine 5  Supervision   Additional items Increased time required   Transfers   Sit to Stand 5  Supervision   Additional items Increased time required   Stand to Sit 5  Supervision   Additional items Increased time required   Stand pivot 5  Supervision   Additional items Increased time required   Additional Comments RW   Ambulation/Elevation   Gait pattern Decreased hip extension;Decreased heel strike;Decreased toe off;Foward flexed   Gait Assistance 5  Supervision   Assistive Device Rolling walker   Distance 125'   Balance   Static Sitting Good   Dynamic Sitting Good   Static Standing Fair   Dynamic Standing Fair   Ambulatory Fair   Activity Tolerance   Activity Tolerance Patient tolerated treatment well   Assessment   Prognosis Good   Problem List Decreased strength;Decreased range of motion;Decreased endurance;Impaired balance;Decreased mobility   Assessment Pt is 69 y.o. female seen for PT evaluation s/p admit to [facility] on 12/19/2024 w/ JOSE ELIAS (acute kidney injury) (HCC). PT consulted to assess pt's functional mobility and d/c needs. Order placed for PT eval and tx, w/ up and OOB as tolerated order. Comorbidities affecting pt's physical performance  at time of assessment include, HTN, DM2, anemia, HTN JOSE ELIAS (acute kidney injury). PTA, pt was independent w/ all functional mobility w/ no AD  . Personal factors affecting pt at time of IE include: ambulating w/ assistive device, inability to ambulate household distances, inability to navigate community distances, inability to navigate level surfaces w/o external assistance, unable to perform dynamic tasks in community, positive fall history, unable to perform physical activity, limited insight into impairments, inability to perform IADLs, inability to perform ADLs, and inability to live alone. Please find objective findings from PT assessment regarding body systems outlined above with impairments and limitations including weakness, decreased ROM, impaired balance, decreased endurance, gait deviations, pain, decreased activity tolerance, decreased functional mobility tolerance, decreased safety awareness, and fall risk.  Pt's clinical presentation is currently unstable/unpredictable seen in pt's presentation t/o the session, Pt to benefit from continued PT tx to address deficits as defined above and maximize level of functional independent mobility and consistency. From PT/mobility standpoint, recommendation at time of d/c would be Level 3 minimal resource intensity pending progress in order to facilitate return to PLOF.   Goals   Patient Goals to feel better   LTG Expiration Date 01/04/25   Long Term Goal #1 Pt will be (I) with all transfers and bed mobility to help promote safe return to PLOF   Long Term Goal #2 Pt will safely ambulate 400 feet with MI to help promote safe return to PLOF   Plan   Treatment/Interventions ADL retraining;Functional transfer training;LE strengthening/ROM;Elevations;Therapeutic exercise;Endurance training;Bed mobility;Gait training   PT Frequency 3-5x/wk   Discharge Recommendation   Rehab Resource Intensity Level, PT III (Minimum Resource Intensity)   AM-PAC Basic Mobility Inpatient    Turning in Flat Bed Without Bedrails 3   Lying on Back to Sitting on Edge of Flat Bed Without Bedrails 3   Moving Bed to Chair 3   Standing Up From Chair Using Arms 3   Walk in Room 3   Climb 3-5 Stairs With Railing 3   Basic Mobility Inpatient Raw Score 18   Basic Mobility Standardized Score 41.05   [facility] Highest Level Of Mobility   -HLM Goal 6: Walk 10 steps or more   -HLM Achieved 7: Walk 25 feet or more     Patient in bed at the end of the session, all lines intact, all needs within reach. Bed alarm on. The patient's AM-PAC Basic Mobility Inpatient Short Form Raw Score is  18 A Raw score of greater than 16 suggests the patient may benefit from discharge to home. Please also refer to the recommendation of the Physical Therapist for safe discharge planning.

## 2024-12-21 NOTE — PROGRESS NOTES
Progress Note - Nephrology   Name: Nidia Hamlin 69 y.o. female I MRN: [id number]  Unit/Bed#: 417-01 I Date of Admission: 12/19/2024   Date of Service: 12/21/2024 I Hospital Day: 1    Assessment & Plan  JOSE ELIAS (acute kidney injury) (HCC)  Creatinine on admission 1.54 mg/dL on 12/19, most recent creatinine 1.43 mg/dL 12/21  -Etiology: Likely prerenal azotemia as patient presented with nausea, vomiting and decreased oral intake.  Her renal function is improving with IV fluids.  Continue with current therapy repeat BMP in AM  -UA: Specific gravity less than 1.005, small blood, 0-1 RBCs  -Renal imaging: Numerous bilateral renal cysts, no hydronephrosis  -Patient with elevated total protein level but low albumin.  Check SPEP/UPEP  -Urine sodium 31  -Avoid hypotension, avoid nephrotoxins, avoid NSAIDS  Continue NSS at 75 ml/hr  Hypertension  Blood pressure stable, -148 12/21. Continue to hold lisinopril with JOSE ELIAS and hyperkalemia. Potassium trends around 5 mg/dL, may need to transition off lisinopril if potassium remains elevated.   Depression, recurrent (HCC)    Hyperlipidemia    Hypothyroidism    Neuropathy    Anemia  Hemoglobin 7.6 g/dL 12/21. Stable since 12/19. Will continue to monitor. Transfuse for hemoglobin <7 g/dL at discretion of primary team.   Hyponatremia  -Patient presented with serum sodium level 129 mmol/L on 12/19.  Patient given Isolyte bolus and NSS with sodium increasing to 136 by 12/20 and remaining stable at 136 on 12/21. Continue with sodium chloride infusion at 75 ml/hr until oral intake is improves. Continue to monitor daily BMP.  Type 2 diabetes mellitus with hypoglycemia, without long-term current use of insulin ([facility])  Lab Results   Component Value Date    HGBA1C 7.0 (H) 12/07/2024       Recent Labs     12/20/24  2055 12/21/24  0716 12/21/24  1128 12/21/24  1554   POCGLU 87 95 116 101       Blood Sugar Average: Last 72 hrs:  (P) [id number]    Elevated lipase    Stage 3b chronic kidney  disease (HCC)  Lab Results   Component Value Date    EGFR 37 12/21/2024    EGFR 40 12/21/2024    EGFR 33 12/20/2024    CREATININE 1.43 (H) 12/21/2024    CREATININE 1.35 (H) 12/21/2024    CREATININE 1.59 (H) 12/20/2024   -Baseline creatinine 1.1 to 1.2 mg/dL  -Etiology diabetic nephropathy, hypertensive nephrosclerosis and age related nephron loss  -Does not follow with nephrology outpatient.     I have reviewed the nephrology recommendations including creatinine, blood pressure and potassium trends with hsopitalist, and we are in agreement with renal plan including the information outlined above.     Subjective   Brief History of Admission - 69 y.o. year old female with a past medical history of type 2 diabetes, neuropathy, hypertension, CKD 3b who was admitted to [facility] 12/19 after presenting with nausea, vomiting and general malaise.  On admission her lab work revealed a sodium of 129, bicarb of 17, creatinine of 1.5, glucose 44, total protein 10.3, albumin 2.5, lipase 425 and hemoglobin 7.6.  CT scan showed unremarkable pancreas, postcholecystectomy, no lung pathology, no hydroureteronephrosis, numerous bilateral renal cysts with a 4 mm hyperdense interpolar lesion in the left lateral kidney.  She was given a liter bolus of Isolyte with modest improvement in renal function. Nephrology is following for JOSE ELIAS on CKD 3b, hyperkalemia and hypertension.    Patient is examined witting upright in bed. Denies complaints including nausea, vomiting, diarrhea, dysuria, hematuria, decreased urine amount, headaches, or dizziness.       Objective :  Temp:  [98.2 °F (36.8 °C)-98.7 °F (37.1 °C)] 98.3 °F (36.8 °C)  HR:  [62-86] 79  BP: ()/(57-94) 144/63  Resp:  [18] 18  SpO2:  [91 %-95 %] 92 %  O2 Device: None (Room air)    Current Weight: Weight - Scale: 69.1 kg (152 lb 6.4 oz)  First Weight: Weight - Scale: 68.6 kg (151 lb 3.8 oz)  I/O         12/19 0701 12/20 0700 12/20 0701 12/21 0700 12/21 0701 12/22 0700    P.O.  970  400    I.V. (mL/kg) 999 (14.6) 1938.3 (28.1)     IV Piggyback 250      Total Intake(mL/kg) 1249 (18.2) 2908.3 (42.1) 400 (5.8)    Urine (mL/kg/hr) 500 4833 (2.9) 1275 (1.9)    Total Output 500 4833 1275    Net +749 -1924.7 -875                 Physical Exam  Vitals and nursing note reviewed.   Constitutional:       General: She is not in acute distress.     Appearance: Normal appearance. She is well-developed. She is ill-appearing.   HENT:      Head: Normocephalic and atraumatic.      Right Ear: External ear normal.      Left Ear: External ear normal.      Nose: Nose normal.      Mouth/Throat:      Mouth: Mucous membranes are moist.      Pharynx: Oropharynx is clear.   Eyes:      Extraocular Movements: Extraocular movements intact.      Conjunctiva/sclera: Conjunctivae normal.      Pupils: Pupils are equal, round, and reactive to light.   Cardiovascular:      Rate and Rhythm: Normal rate and regular rhythm.      Heart sounds: Normal heart sounds. No murmur heard.  Pulmonary:      Effort: Pulmonary effort is normal. No respiratory distress.      Breath sounds: Normal breath sounds.   Abdominal:      Palpations: Abdomen is soft.      Tenderness: There is no abdominal tenderness.   Musculoskeletal:         General: No swelling.      Cervical back: Neck supple.      Right lower leg: No edema.      Left lower leg: No edema.   Skin:     General: Skin is warm and dry.      Capillary Refill: Capillary refill takes less than 2 seconds.      Coloration: Skin is pale.   Neurological:      General: No focal deficit present.      Mental Status: She is alert and oriented to person, place, and time.   Psychiatric:         Mood and Affect: Mood normal.         Medications:    Current Facility-Administered Medications:     acetaminophen (TYLENOL) tablet 650 mg, 650 mg, Oral, Q6H PRN, Jairon Prescott PA-C    Cholecalciferol (VITAMIN D3) tablet 2,000 Units, 2,000 Units, Oral, Daily, Jairon Prescott PA-C, 2,000 Units at 12/21/24 0819     "lidocaine (LIDODERM) 5 % patch 2 patch, 2 patch, Topical, Daily, Maddy Thomas PA-C, 2 patch at 12/20/24 1205    mirtazapine (REMERON) tablet 7.5 mg, 7.5 mg, Oral, HS, Maddy Thomas PA-C, 7.5 mg at 12/20/24 2218    nicotine (NICODERM CQ) 7 mg/24hr TD 24 hr patch 1 patch, 1 patch, Transdermal, Daily, Jairon Prescott PA-C, 1 patch at 12/20/24 0859    ondansetron (ZOFRAN) injection 4 mg, 4 mg, Intravenous, Q6H PRN, Jairon Prescott PA-C    sertraline (ZOLOFT) tablet 100 mg, 100 mg, Oral, Daily, Jairon Prescott PA-C, 100 mg at 12/21/24 0819    sodium chloride 0.9 % infusion, 75 mL/hr, Intravenous, Continuous, Maddy Thomas PA-C, Last Rate: 75 mL/hr at 12/21/24 0606, 75 mL/hr at 12/21/24 0606    tiZANidine (ZANAFLEX) tablet 2 mg, 2 mg, Oral, TID PRN, Jairon Prescott PA-C      Lab Results: I have reviewed the following results:  Results from last 7 days   Lab Units 12/21/24  1006 12/21/24  0614 12/20/24  1352 12/20/24  0501 12/19/24  2259 12/19/24  1655   WBC Thousand/uL  --  5.25  --  5.95  --  7.83   HEMOGLOBIN g/dL  --  7.6*  --  7.5*  --  7.6*   HEMATOCRIT %  --  24.2*  --  24.7*  --  24.7*   PLATELETS Thousands/uL  --  222  --  243  --  244   POTASSIUM mmol/L 5.0 5.5* 5.1 4.9 4.8 5.3   CHLORIDE mmol/L 106 108 104 107 102 103   CO2 mmol/L 23 20* 23 22 20* 17*   BUN mg/dL 32* 34* 35* 38* 43* 48*   CREATININE mg/dL 1.43* 1.35* 1.59* 1.39* 1.42* 1.54*   CALCIUM mg/dL 8.7 8.5 8.7 8.3* 8.3* 8.3*   MAGNESIUM mg/dL  --   --   --  2.1  --  1.9   PHOSPHORUS mg/dL  --   --   --  4.2*  --   --    ALBUMIN g/dL  --  2.4*  --  2.4*  --  2.5*       Administrative Statements     Portions of the record may have been created with voice recognition software. Occasional wrong word or \"sound a like\" substitutions may have occurred due to the inherent limitations of voice recognition software. Read the chart carefully and recognize, using context, where substitutions have occurred.If you have any questions, please contact the dictating provider.  "

## 2024-12-21 NOTE — ASSESSMENT & PLAN NOTE
Creatinine on admission 1.54 mg/dL on 12/19, most recent creatinine 1.43 mg/dL 12/21  -Etiology: Likely prerenal azotemia as patient presented with nausea, vomiting and decreased oral intake.  Her renal function is improving with IV fluids.  Continue with current therapy repeat BMP in AM  -UA: Specific gravity less than 1.005, small blood, 0-1 RBCs  -Renal imaging: Numerous bilateral renal cysts, no hydronephrosis  -Patient with elevated total protein level but low albumin.  Check SPEP/UPEP  -Urine sodium 31  -Avoid hypotension, avoid nephrotoxins, avoid NSAIDS  Continue NSS at 75 ml/hr

## 2024-12-21 NOTE — ASSESSMENT & PLAN NOTE
Blood pressure stable, -148 12/21. Continue to hold lisinopril with JOSE ELIAS and hyperkalemia. Potassium trends around 5 mg/dL, may need to transition off lisinopril if potassium remains elevated.

## 2024-12-21 NOTE — ASSESSMENT & PLAN NOTE
Lab Results   Component Value Date    HGBA1C 7.0 (H) 12/07/2024     With hypoglycemia initially   Continue to hold oral diabetic medications  Discontinue Amaryl at discharge

## 2024-12-21 NOTE — ASSESSMENT & PLAN NOTE
Lab Results   Component Value Date    EGFR 37 12/21/2024    EGFR 40 12/21/2024    EGFR 33 12/20/2024    CREATININE 1.43 (H) 12/21/2024    CREATININE 1.35 (H) 12/21/2024    CREATININE 1.59 (H) 12/20/2024   -Baseline creatinine 1.1 to 1.2 mg/dL  -Etiology diabetic nephropathy, hypertensive nephrosclerosis and age related nephron loss  -Does not follow with nephrology outpatient.

## 2024-12-21 NOTE — PLAN OF CARE
Problem: PHYSICAL THERAPY ADULT  Goal: Performs mobility at highest level of function for planned discharge setting.  See evaluation for individualized goals.  Description: Treatment/Interventions: ADL retraining, Functional transfer training, LE strengthening/ROM, Elevations, Therapeutic exercise, Endurance training, Bed mobility, Gait training          See flowsheet documentation for full assessment, interventions and recommendations.  Note: Prognosis: Good  Problem List: Decreased strength, Decreased range of motion, Decreased endurance, Impaired balance, Decreased mobility  Assessment: Pt is 69 y.o. female seen for PT evaluation s/p admit to [facility] on 12/19/2024 w/ JOSE ELIAS (acute kidney injury) ([facility]). PT consulted to assess pt's functional mobility and d/c needs. Order placed for PT eval and tx, w/ up and OOB as tolerated order. Comorbidities affecting pt's physical performance at time of assessment include, HTN, DM2, anemia, HTN JOSE ELIAS (acute kidney injury). PTA, pt was independent w/ all functional mobility w/ no AD  . Personal factors affecting pt at time of IE include: ambulating w/ assistive device, inability to ambulate household distances, inability to navigate community distances, inability to navigate level surfaces w/o external assistance, unable to perform dynamic tasks in community, positive fall history, unable to perform physical activity, limited insight into impairments, inability to perform IADLs, inability to perform ADLs, and inability to live alone. Please find objective findings from PT assessment regarding body systems outlined above with impairments and limitations including weakness, decreased ROM, impaired balance, decreased endurance, gait deviations, pain, decreased activity tolerance, decreased functional mobility tolerance, decreased safety awareness, and fall risk.  Pt's clinical presentation is currently unstable/unpredictable seen in pt's presentation t/o the session, Pt to  benefit from continued PT tx to address deficits as defined above and maximize level of functional independent mobility and consistency. From PT/mobility standpoint, recommendation at time of d/c would be Level 3 minimal resource intensity pending progress in order to facilitate return to PLOF.        Rehab Resource Intensity Level, PT: III (Minimum Resource Intensity)    See flowsheet documentation for full assessment.

## 2024-12-21 NOTE — PROGRESS NOTES
Patient:    MRN:  [id number]    Connie Request ID:  [id number]    Level of care reserved:  Home Health Agency    Partner Reserved:  [facility] [place] [address] [phone]    Clinical needs requested:    Geography searched:  [id number]    Start of Service:    Request sent:  12:01pm EST on 12/21/2024 by Jocelyn Vazquez    Partner reserved:  1:21pm EST on 12/21/2024 by Jocelyn Vazquez    Choice list shared:  1:20pm EST on 12/21/2024 by Jocelyn Vazquez

## 2024-12-21 NOTE — ASSESSMENT & PLAN NOTE
Hemoglobin levels at 7.6, hemoglobins were within normal range around 12 but it has not been checked in 5 years  No evidence of acute bleeding   Iron panel within normal limits, B12 & folate within normal limits  Pending stool occult blood  Continue to trend hemoglobin levels  C scope from 2021 reviewed, was recommended for repeat C scope in 3 years given colon polyps   Inpatient vs outpatient GI follow-up pending course

## 2024-12-21 NOTE — ASSESSMENT & PLAN NOTE
Lab Results   Component Value Date    HGBA1C 7.0 (H) 12/07/2024       Recent Labs     12/20/24 2055 12/21/24  0716 12/21/24  1128 12/21/24  1554   POCGLU 87 95 116 101       Blood Sugar Average: Last 72 hrs:  (P) [id number]

## 2024-12-21 NOTE — QUICK NOTE
Noted with mild hyperkalemia on morning labs, K5.5  Repeat BMP in 4 hours with resolution- K 5.0  Placed on potassium low diet  Ongoing treatment of JOSE ELIAS  Appreciate nephrology consultation  If potassium trends do not improve, may need to continue to hold lisinopril at discharge

## 2024-12-21 NOTE — UTILIZATION REVIEW
*PLEASE SEE INITIAL REVIEW COMPLETED 12/20/24 BY RANDALL GARCIA RN    Continued Stay Review - DAY 3  Date: 12/21/24                        Current Patient Class: Inpatient    Current Level of Care: Med Surg    HPI:  70 y/o female with PMHx HTN, HLD, DM2 on Insulin - initially admitted to Observation on 12/19/24 then converted to Inpatient on 12/20/24 2nd JOSE ELIAS requiring continued Inpatient management    12/21/24:  Has surpassed a 2nd midnight with active treatments and services.  MD Certification Statement: Requires additional inpatient hospital stay due to JOSE ELIAS     Mild hyperkalemia on morning labs, K+ 5.5  Repeat BMP in 4 hours with resolution- K 5.0  Placed on potassium low diet  Ongoing treatment of JOSE ELIAS  If potassium trends do not improve, may need to continue to hold lisinopril at discharge    JOSE ELIAS (acute kidney injury) (HCC)  Continue IV fluids   Urine & serum protein electrophoresis pending  Urinary retention protocol   Imaging does show bilateral multiple renal cysts which may be contributing to a progression of chronic renal disease    Scheduled Medications:  Cholecalciferol, 2,000 Units, Oral, Daily  lidocaine, 2 patch, Topical, Daily  mirtazapine, 7.5 mg, Oral, HS  nicotine, 1 patch, Transdermal, Daily  sertraline, 100 mg, Oral, Daily    Continuous IV Infusions:  sodium chloride, 75 mL/hr, Intravenous, Continuous    PRN Meds:  acetaminophen, 650 mg, Oral, Q6H PRN  ondansetron, 4 mg, Intravenous, Q6H PRN  tiZANidine, 2 mg, Oral, TID PRN    Diet Jean/CHO Controlled; Consistent Carbohydrate Diet Level 2 (5 carb servings/75 grams CHO/meal); Potassium 2 GM     Discharge Plan:   To be determined   Inpatient Case Management following for all discharge needs    Vital Signs (last 3 days)       Date/Time Temp Pulse Resp BP MAP (mmHg) SpO2 O2 Device Patient Position - Orthostatic VS Debora Coma Scale Score Pain    12/21/24 1200 -- -- -- -- -- -- -- -- -- No Pain    12/21/24 09:53:17 -- 84 18 127/94 105 93 %  Handoff to receiving nurse was performed and questions were answered. Vital signs noted. Patient stable.  Comments:   -- Standing - Orthostatic VS -- --    12/21/24 09:51:12 -- 86 18 148/61 90 93 % -- Sitting - Orthostatic VS -- --    12/21/24 09:49:25 -- 82 18 144/61 89 91 % -- Lying - Orthostatic VS -- --    12/21/24 0831 -- -- -- -- -- -- -- -- -- No Pain    12/21/24 0830 -- -- -- -- -- -- None (Room air) -- -- No Pain    12/21/24 06:23:41 98.2 °F (36.8 °C) 62 18 140/63 89 94 % None (Room air) Sitting -- --    12/20/24 22:26:47 98.7 °F (37.1 °C) 79 18 94/57 69 91 % -- -- -- --    12/20/24 2100 -- -- -- -- -- 94 % -- -- 15 No Pain    12/20/24 1925 -- -- -- -- -- -- -- -- -- No Pain    12/20/24 1900 -- -- -- -- -- 95 % None (Room air) -- 15 No Pain    OBSERV: Pt resting in bed comfortably. Denies pain/discomfort or nausea. VSS. Callbell within reach-able to make needs known. No distress reported or observed. Given nighttime snack of desmond crackers with peanut butter. at 12/20/24 1900 12/20/24 14:38:56 97.6 °F (36.4 °C) 75 18 137/65 89 93 % -- -- -- --    12/20/24 1001 -- -- -- -- -- -- -- -- -- No Pain    12/20/24 06:34:43 97.3 °F (36.3 °C) 62 16 129/51 77 96 % -- -- -- --    12/19/24 21:20:57 97.9 °F (36.6 °C) 67 22 153/64 94 98 % -- -- 15 7    12/19/24 1930 97.5 °F (36.4 °C) 67 37 141/64 92 97 % None (Room air) Sitting -- 3    12/19/24 1837 97.5 °F (36.4 °C) 62 19 156/69 -- 98 % None (Room air) Sitting -- 5    12/19/24 1645 -- -- -- -- -- -- -- -- 15 --    12/19/24 1633 97.2 °F (36.2 °C) 71 18 109/50 72 96 % None (Room air) Sitting -- No Pain     Weight (last 2 days)       Date/Time Weight    12/21/24 0600 69.1 (152.4)    12/20/24 1900 --    Comment rows:    OBSERV: Pt resting in bed comfortably. Denies pain/discomfort or nausea. VSS. Callbell within reach-able to make needs known. No distress reported or observed. Given nighttime snack of desmond crackers with peanut butter. at 12/20/24 1900    12/20/24 0600 68.6 (151.24)    12/19/24 21:20:57 68.6 (151.24)      12/19  0701 12/20  0701 12/21  0701    Intake   (mL/kg) 1,249    (18.2) 2,908.3   (42.1) 400   (5.8)    P.O. -- 970 400    I.V.   (mL/kg) 999   (14.6) 1,938.3   (28) --    IV Piggyback 250 -- --    Output 500 4,833 900    Urine   (mL/kg/hr) 500 4,833   (2.9) 900    Net +749 -1,924.7 -500      Pertinent Labs/Diagnostic Results:   XR chest 1 view portable   Final Interpretation (12/20 0952)      No acute cardiopulmonary disease.      CT abdomen pelvis wo contrast   Final Interpretation b(12/19 1846)      1.  No identifiable acute abnormality to account for the patient's clinical presentation. Please refer to the report body for description of other incidental, chronic and/or benign findings.     Results from last 7 days   Lab Units 12/21/24  0614 12/20/24  0501 12/19/24  1655   WBC Thousand/uL 5.25 5.95 7.83   HEMOGLOBIN g/dL 7.6* 7.5* 7.6*   HEMATOCRIT % 24.2* 24.7* 24.7*   PLATELETS Thousands/uL 222 243 244   TOTAL NEUT ABS Thousands/µL 2.56 2.66 3.44     Results from last 7 days   Lab Units 12/21/24  1006 12/21/24  0614 12/20/24  1352 12/20/24  0501 12/19/24  2259 12/19/24  1655   SODIUM mmol/L 136 135 134* 136 130* 129*   POTASSIUM mmol/L 5.0 5.5* 5.1 4.9 4.8 5.3   CHLORIDE mmol/L 106 108 104 107 102 103   CO2 mmol/L 23 20* 23 22 20* 17*   ANION GAP mmol/L 7 7 7 7 8 9   BUN mg/dL 32* 34* 35* 38* 43* 48*   CREATININE mg/dL 1.43* 1.35* 1.59* 1.39* 1.42* 1.54*   EGFR ml/min/1.73sq m 37 40 33 38 37 34   CALCIUM mg/dL 8.7 8.5 8.7 8.3* 8.3* 8.3*   MAGNESIUM mg/dL  --   --   --  2.1  --  1.9   PHOSPHORUS mg/dL  --   --   --  4.2*  --   --      Results from last 7 days   Lab Units 12/21/24  0614 12/20/24  0501 12/19/24  1655   AST U/L 6* 7* 9*   ALT U/L 6* 8 7   ALK PHOS U/L 43 43 46   TOTAL PROTEIN g/dL 9.6* 9.6* 10.3*   ALBUMIN g/dL 2.4* 2.4* 2.5*   TOTAL BILIRUBIN mg/dL 0.15* 0.15* 0.18*     Results from last 7 days   Lab Units 12/21/24  1128 12/21/24  0716 12/20/24  2055 12/20/24  1533 12/20/24  1109 12/20/24  0653 12/20/24  0635 12/20/24  0148 12/19/24  2247 12/19/24  2130  12/19/24  1918   POC GLUCOSE mg/dl 116 95 87 102 122 88 61* 118 170* 44* 70     Results from last 7 days   Lab Units 12/21/24  1006 12/21/24  0614 12/20/24  1352 12/20/24  0501 12/19/24  2259 12/19/24  1655   GLUCOSE RANDOM mg/dL 135 92 102 46* 144* 44*     Results from last 7 days   Lab Units 12/21/24  0614   CK TOTAL U/L 15*     Results from last 7 days   Lab Units 12/19/24  1922 12/19/24  1655   HS TNI 0HR ng/L  --  <2   HS TNI 2HR ng/L <2  --          Results from last 7 days   Lab Units 12/20/24  0501   PROTIME seconds 15.3*   INR  1.16   PTT seconds 25     Results from last 7 days   Lab Units 12/20/24  1616   TSH 3RD GENERATON uIU/mL 1.481     Results from last 7 days   Lab Units 12/20/24  0501   FERRITIN ng/mL 154   IRON SATURATION % 41   IRON ug/dL 131   TIBC ug/dL 319.2     Results from last 7 days   Lab Units 12/20/24  0501   TRANSFERRIN mg/dL 228     Results from last 7 days   Lab Units 12/20/24  0501 12/19/24  1655   LIPASE u/L 59 425*     Results from last 7 days   Lab Units 12/19/24  2251   OSMO UR mmol/*     Results from last 7 days   Lab Units 12/19/24  2251 12/19/24  2250   CLARITY UA   --  Clear   COLOR UA   --  Colorless   SPEC GRAV UA   --  <1.005*   PH UA   --  5.5   GLUCOSE UA mg/dl  --  Negative   KETONES UA mg/dl  --  Negative   BLOOD UA   --  Small*   PROTEIN UA mg/dl  --  Negative   NITRITE UA   --  Negative   BILIRUBIN UA   --  Negative   UROBILINOGEN UA (BE) mg/dl  --  <2.0   LEUKOCYTES UA   --  Negative   WBC UA /hpf  --  None Seen   RBC UA /hpf  --  0-1   BACTERIA UA /hpf  --  None Seen   EPITHELIAL CELLS WET PREP /hpf  --  Occasional   SODIUM UR  31  --      Network Utilization Review Department  ATTENTION: Please call with any questions or concerns to [phone] and carefully listen to the prompts so that you are directed to the right person. All voicemails are confidential.   For Discharge needs, contact Care Management DC Support Team at [phone] opt. 2  Send all  requests for admission clinical reviews, approved or denied determinations and any other requests to dedicated fax number below belonging to the campus where the patient is receiving treatment. List of dedicated fax numbers for the Facilities:  FACILITY NAME UR FAX NUMBER   ADMISSION DENIALS (Administrative/Medical Necessity) [phone]   DISCHARGE SUPPORT TEAM (NETWORK) [phone]   PARENT CHILD HEALTH (Maternity/NICU/Pediatrics) [phone]   [facility] [phone]   [facility] [phone]   [facility] [phone]   [facility] [phone]   [facility] [phone]   [facility] [phone]   [facility] [phone]   [facility] [phone]   [facility] [phone]   [facility] [phone]   [facility] [phone]   [facility] [phone]

## 2024-12-21 NOTE — PLAN OF CARE
Problem: OCCUPATIONAL THERAPY ADULT  Goal: Performs self-care activities at highest level of function for planned discharge setting.  See evaluation for individualized goals.  Description: Treatment Interventions: ADL retraining, Functional transfer training, UE strengthening/ROM, Endurance training, Patient/family training, Equipment evaluation/education, Fine motor coordination activities, Energy conservation, Activityengagement          See flowsheet documentation for full assessment, interventions and recommendations.   Note: Limitation: Decreased ADL status, Decreased UE ROM, Decreased UE strength, Decreased endurance, Decreased self-care trans, Decreased high-level ADLs, Decreased fine motor control  Prognosis: Fair  Assessment: Pt is a 69 y.o. female who was admitted to [facility] on 12/19/2024 w/ JOSE ELIAS (acute kidney injury) (HCC). Pt has a past medical history of anxiety, sepsis, depression, DM, HTN and trigger finger. Pt lives with family in a 2 story home with a [facility]. At baseline, pt was completing ADLs independently and functional mobility and transfers independently no AD. Currently, pt requires supervision overall for ADLs and supervision for mobility and transfers. Pt presents with deficits in the following categories: difficulty performing ADLS and difficulty performing IADLS  activity tolerance, endurance, and standing balance/tolerance. These deficits, along with pt's  strength and ROM limit the pt's ability to safely engage in areas of occupation such as: grooming, bathing/shower, toilet hygiene, dressing, functional mobility, community mobility, and household maintenance. Pt would benefit from continued skilled acute OT in order to maximize independence and safety in ADLs, mobility, and transfers. he patient's raw score on the -PAC Daily Activity Inpatient Short Form is 21. A raw score of greater than or equal to 19 suggests the patient may benefit from discharge to home. Discharge recommendation at  this time is level III.     Rehab Resource Intensity Level, OT: III (Minimum Resource Intensity)

## 2024-12-21 NOTE — ASSESSMENT & PLAN NOTE
Appreciate nephrology consult  Continue IV fluids   Urine & serum protein electrophoresis pending  Urinary retention protocol   Imaging does show bilateral multiple renal cysts which may be contributing to a progression of chronic renal disease

## 2024-12-21 NOTE — ASSESSMENT & PLAN NOTE
Hemoglobin 7.6 g/dL 12/21. Stable since 12/19. Will continue to monitor. Transfuse for hemoglobin <7 g/dL at discretion of primary team.

## 2024-12-21 NOTE — ASSESSMENT & PLAN NOTE
Patient complains of neuropathy in her fingers bilaterally and her right foot  Notes this is progressively been worsening over the last year  Was seen by orthopedics 11/2024 diagnosed with bilateral carpal tunnel, recommend for EMG which is scheduled for 1/27/2025  Previously on gabapentin but reported worsening insomnia  Recently placed on Lyrica but states she had adverse GI rxn  MRI C-spine from May 2024 unremarkable for any significant stenosis but does show very mild nerve impingement between C5 and C6

## 2024-12-21 NOTE — PLAN OF CARE
Problem: PAIN - ADULT  Goal: Verbalizes/displays adequate comfort level or baseline comfort level  Description: Interventions:  - Encourage patient to monitor pain and request assistance  - Assess pain using appropriate pain scale  - Administer analgesics based on type and severity of pain and evaluate response  - Implement non-pharmacological measures as appropriate and evaluate response  - Consider cultural and social influences on pain and pain management  - Notify physician/advanced practitioner if interventions unsuccessful or patient reports new pain  Outcome: Progressing     Problem: INFECTION - ADULT  Goal: Absence or prevention of progression during hospitalization  Description: INTERVENTIONS:  - Assess and monitor for signs and symptoms of infection  - Monitor lab/diagnostic results  - Monitor all insertion sites, i.e. indwelling lines, tubes, and drains  - Monitor endotracheal if appropriate and nasal secretions for changes in amount and color  - [place] appropriate cooling/warming therapies per order  - Administer medications as ordered  - Instruct and encourage patient and family to use good hand hygiene technique  - Identify and instruct in appropriate isolation precautions for identified infection/condition  Outcome: Progressing     Problem: SAFETY ADULT  Goal: Patient will remain free of falls  Description: INTERVENTIONS:  - Educate patient/family on patient safety including physical limitations  - Instruct patient to call for assistance with activity   - Consult OT/PT to assist with strengthening/mobility   - Keep Call bell within reach  - Keep bed low and locked with side rails adjusted as appropriate  - Keep care items and personal belongings within reach  - Initiate and maintain comfort rounds  - Make Fall Risk Sign visible to staff  - Offer Toileting every 2 Hours, in advance of need  - Initiate/Maintain bed/chair alarm  - Obtain necessary fall risk management equipment: as needed  - Apply  yellow socks and bracelet for high fall risk patients  - Consider moving patient to room near nurses station  Outcome: Progressing  Goal: Maintain or return to baseline ADL function  Description: INTERVENTIONS:  -  Assess patient's ability to carry out ADLs; assess patient's baseline for ADL function and identify physical deficits which impact ability to perform ADLs (bathing, care of mouth/teeth, toileting, grooming, dressing, etc.)  - Assess/evaluate cause of self-care deficits   - Assess range of motion  - Assess patient's mobility; develop plan if impaired  - Assess patient's need for assistive devices and provide as appropriate  - Encourage maximum independence but intervene and supervise when necessary  - Involve family in performance of ADLs  - Assess for home care needs following discharge   - Consider OT consult to assist with ADL evaluation and planning for discharge  - Provide patient education as appropriate  Outcome: Progressing  Goal: Maintains/Returns to pre admission functional level  Description: INTERVENTIONS:  - Perform AM-PAC 6 Click Basic Mobility/ Daily Activity assessment daily.  - Set and communicate daily mobility goal to care team and patient/family/caregiver.   - Collaborate with rehabilitation services on mobility goals if consulted  - Perform Range of Motion 3 times a day.    - Dangle patient 3 times a day  - Stand patient 3 times a day  - Ambulate patient 3 times a day  - Out of bed to chair 3 times a day   - Out of bed for meals 3 times a day  - Out of bed for toileting  - Record patient progress and toleration of activity level   Outcome: Progressing     Problem: DISCHARGE PLANNING  Goal: Discharge to home or other facility with appropriate resources  Description: INTERVENTIONS:  - Identify barriers to discharge w/patient and caregiver  - Arrange for needed discharge resources and transportation as appropriate  - Identify discharge learning needs (meds, wound care, etc.)  - Arrange  for interpretive services to assist at discharge as needed  - Refer to Case Management Department for coordinating discharge planning if the patient needs post-hospital services based on physician/advanced practitioner order or complex needs related to functional status, cognitive ability, or social support system  Outcome: Progressing     Problem: Knowledge Deficit  Goal: Patient/family/caregiver demonstrates understanding of disease process, treatment plan, medications, and discharge instructions  Description: Complete learning assessment and assess knowledge base.  Interventions:  - Provide teaching at level of understanding  - Provide teaching via preferred learning methods  Outcome: Progressing     Problem: METABOLIC, FLUID AND ELECTROLYTES - ADULT  Goal: Electrolytes maintained within normal limits  Description: INTERVENTIONS:  - Monitor labs and assess patient for signs and symptoms of electrolyte imbalances  - Administer electrolyte replacement as ordered  - Monitor response to electrolyte replacements, including repeat lab results as appropriate  - Instruct patient on fluid and nutrition as appropriate  Outcome: Progressing  Goal: Fluid balance maintained  Description: INTERVENTIONS:  - Monitor labs   - Monitor I/O and WT  - Instruct patient on fluid and nutrition as appropriate  - Assess for signs & symptoms of volume excess or deficit  Outcome: Progressing  Goal: Glucose maintained within target range  Description: INTERVENTIONS:  - Monitor Blood Glucose as ordered  - Assess for signs and symptoms of hyperglycemia and hypoglycemia  - Administer ordered medications to maintain glucose within target range  - Assess nutritional intake and initiate nutrition service referral as needed  Outcome: Progressing     Problem: GENITOURINARY - ADULT  Goal: Maintains or returns to baseline urinary function  Description: INTERVENTIONS:  - Assess urinary function  - Encourage oral fluids to ensure adequate hydration if  ordered  - Administer IV fluids as ordered to ensure adequate hydration  - Administer ordered medications as needed  - Offer frequent toileting  - Follow urinary retention protocol if ordered  Outcome: Progressing  Goal: Absence of urinary retention  Description: INTERVENTIONS:  - Assess patient’s ability to void and empty bladder  - Monitor I/O  - Bladder scan as needed  - Discuss with physician/AP medications to alleviate retention as needed  - Discuss catheterization for long term situations as appropriate  Outcome: Progressing     Problem: HEMATOLOGIC - ADULT  Goal: Maintains hematologic stability  Description: INTERVENTIONS  - Assess for signs and symptoms of bleeding or hemorrhage  - Monitor labs  - Administer supportive blood products/factors as ordered and appropriate  Outcome: Progressing

## 2024-12-21 NOTE — PROGRESS NOTES
Progress Note - Hospitalist   Name: Nidia Hamlin 69 y.o. female I MRN: [id number]  Unit/Bed#: 417-01 I Date of Admission: 2024   Date of Service: 2024 I Hospital Day: 1    Assessment & Plan  JOSE ELIAS (acute kidney injury) ([facility])  Appreciate nephrology consult  Continue IV fluids   Urine & serum protein electrophoresis pending  Urinary retention protocol   Imaging does show bilateral multiple renal cysts which may be contributing to a progression of chronic renal disease  Type 2 diabetes mellitus with hypoglycemia, without long-term current use of insulin ([facility])    Lab Results   Component Value Date    HGBA1C 7.0 (H) 2024     With hypoglycemia initially   Continue to hold oral diabetic medications  Discontinue Amaryl at discharge   Hypertension  Blood pressure is stable  Holding lisinopril in the setting of JOSE ELIAS  Hyperlipidemia  Lipid panel uncontrolled  Patient has reported reaction to statin in the past  Anemia  Hemoglobin levels at 7.6, hemoglobins were within normal range around 12 but it has not been checked in 5 years  No evidence of acute bleeding   Iron panel within normal limits, B12 & folate within normal limits  Pending stool occult blood  Continue to trend hemoglobin levels  C scope from  reviewed, was recommended for repeat C scope in 3 years given colon polyps   Inpatient vs outpatient GI follow-up pending course   Hyponatremia  Sodium level at 129 on admission  Resolved after IV fluids  cont to monitor    Elevated lipase  Lipase level at 425  Patient without abdominal pain  CT negative for evidence of acute pancreatitis  Lipase level now resolved    Depression, recurrent ([facility])  Patient reports that she has had depression since her   5 years ago  Has been on Zoloft 100 mg since that time  She feels like her depression has been persistent and worsening  Given poor sleep and poor appetite, willing to trial Remeron 7.5 mg at bedtime to see how she feels  Declines counseling referral  on discharge    Hypothyroidism  TSH wnl  Neuropathy  Patient complains of neuropathy in her fingers bilaterally and her right foot  Notes this is progressively been worsening over the last year  Was seen by orthopedics 11/2024 diagnosed with bilateral carpal tunnel, recommend for EMG which is scheduled for 1/27/2025  Previously on gabapentin but reported worsening insomnia  Recently placed on Lyrica but states she had adverse GI rxn  MRI C-spine from May 2024 unremarkable for any significant stenosis but does show very mild nerve impingement between C5 and C6    VTE Pharmacologic Prophylaxis:   SCDs    Mobility:   Basic Mobility Inpatient Raw Score: 20  JH-HLM Goal: 6: Walk 10 steps or more  JH-HLM Achieved: 7: Walk 25 feet or more  JH-HLM Goal achieved. Continue to encourage appropriate mobility.    Patient Centered Rounds: I performed bedside rounds with nursing staff today.   Discussions with Specialists or Other Care Team Provider:     Education and Discussions with Family / Patient: Updated  (daughter) via phone.    Current Length of Stay: 1 day(s)  Current Patient Status: Inpatient   Certification Statement: The patient will continue to require additional inpatient hospital stay due to JOSE ELIAS  Discharge Plan: Anticipate discharge tomorrow to home with home services.    Code Status: Level 1 - Full Code    Subjective   Patient reports feeling better today. No abdominal pain, nausea or vomiting. Only complaint of her chronic hand paresthesias.     Objective :  Temp:  [97.6 °F (36.4 °C)-98.7 °F (37.1 °C)] 98.2 °F (36.8 °C)  HR:  [62-86] 84  BP: ()/(57-94) 127/94  Resp:  [18] 18  SpO2:  [91 %-95 %] 93 %  O2 Device: None (Room air)    Body mass index is 29.76 kg/m².     Input and Output Summary (last 24 hours):     Intake/Output Summary (Last 24 hours) at 12/21/2024 1117  Last data filed at 12/21/2024 0924  Gross per 24 hour   Intake 2668.33 ml   Output 4183 ml   Net -1514.67 ml       Physical  Exam  HENT:      Head: Normocephalic and atraumatic.   Cardiovascular:      Rate and Rhythm: Normal rate and regular rhythm.   Pulmonary:      Effort: Pulmonary effort is normal. No respiratory distress.      Breath sounds: Normal breath sounds.   Abdominal:      General: Abdomen is flat. Bowel sounds are normal. There is no distension.      Palpations: Abdomen is soft.      Tenderness: There is no abdominal tenderness.   Skin:     General: Skin is warm and dry.   Neurological:      General: No focal deficit present.      Mental Status: She is alert.           Lines/Drains:              Lab Results: I have reviewed the following results:   Results from last 7 days   Lab Units 12/21/24  0614   WBC Thousand/uL 5.25   HEMOGLOBIN g/dL 7.6*   HEMATOCRIT % 24.2*   PLATELETS Thousands/uL 222   SEGS PCT % 48   LYMPHO PCT % 38   MONO PCT % 9   EOS PCT % 3     Results from last 7 days   Lab Units 12/21/24  1006 12/21/24  0614   SODIUM mmol/L 136 135   POTASSIUM mmol/L 5.0 5.5*   CHLORIDE mmol/L 106 108   CO2 mmol/L 23 20*   BUN mg/dL 32* 34*   CREATININE mg/dL 1.43* 1.35*   ANION GAP mmol/L 7 7   CALCIUM mg/dL 8.7 8.5   ALBUMIN g/dL  --  2.4*   TOTAL BILIRUBIN mg/dL  --  0.15*   ALK PHOS U/L  --  43   ALT U/L  --  6*   AST U/L  --  6*   GLUCOSE RANDOM mg/dL 135 92     Results from last 7 days   Lab Units 12/20/24  0501   INR  1.16     Results from last 7 days   Lab Units 12/21/24  0716 12/20/24  2055 12/20/24  1533 12/20/24  1109 12/20/24  0653 12/20/24  0635 12/20/24  0148 12/19/24  2247 12/19/24  2130 12/19/24  1918   POC GLUCOSE mg/dl 95 87 102 122 88 61* 118 170* 44* 70               Recent Cultures (last 7 days):         Imaging Results Review: I reviewed radiology reports from this admission including: chest xray and CT abdomen/pelvis.  Other Study Results Review: No additional pertinent studies reviewed.    Last 24 Hours Medication List:     Current Facility-Administered Medications:     acetaminophen (TYLENOL) tablet  650 mg, Q6H PRN    Cholecalciferol (VITAMIN D3) tablet 2,000 Units, Daily    lidocaine (LIDODERM) 5 % patch 2 patch, Daily    mirtazapine (REMERON) tablet 7.5 mg, HS    nicotine (NICODERM CQ) 7 mg/24hr TD 24 hr patch 1 patch, Daily    ondansetron (ZOFRAN) injection 4 mg, Q6H PRN    sertraline (ZOLOFT) tablet 100 mg, Daily    sodium chloride 0.9 % infusion, Continuous, Last Rate: 75 mL/hr (12/21/24 0606)    tiZANidine (ZANAFLEX) tablet 2 mg, TID PRN    Administrative Statements   Today, Patient Was Seen By: Angela Marsh PA-C      **Please Note: This note may have been constructed using a voice recognition system.**

## 2024-12-21 NOTE — OCCUPATIONAL THERAPY NOTE
Occupational Therapy Evaluation     Patient Name: Nidia Hamlin  Today's Date: 12/21/2024  Problem List  Principal Problem:    JOSE ELIAS (acute kidney injury) (HCC)  Active Problems:    Hypertension    Depression, recurrent (HCC)    Hyperlipidemia    Hypothyroidism    Neuropathy    Anemia    Hyponatremia    Type 2 diabetes mellitus with hypoglycemia, without long-term current use of insulin (HCC)    Elevated lipase    Past Medical History  Past Medical History:   Diagnosis Date    Adrenal insufficiency (HCC)     Anxiety     Bacterial sepsis (HCC)     last assessed: 06/13/16    Cushing's syndrome (HCC)     last assessed: 05/29/16    Depression     last assessed: 01/02/18    Diabetes mellitus (HCC)     Hydradenitis     Hyperlipidemia     Hypertension     Positive FIT (fecal immunochemical test) 06/22/2018    Added automatically from request for surgery [id number]    Rib contusion, left, sequela 06/14/2022    Trigger finger, right middle finger 03/07/2022     Past Surgical History  Past Surgical History:   Procedure Laterality Date    ADRENALECTOMY  12/2002    CHOLECYSTECTOMY      HERNIA REPAIR      umbilical    OTHER SURGICAL HISTORY      Injection of trigger joint    AR COLONOSCOPY FLX DX W/COLLJ SPEC WHEN PFRMD N/A 7/10/2018    Procedure: COLONOSCOPY;  Surgeon: Pako Nieves MD;  Location: [facility];  Service: Gastroenterology    TONSILLECTOMY AND ADENOIDECTOMY      TUBAL LIGATION           12/21/24 0831   OT Last Visit   OT Visit Date 12/21/24   Note Type   Note type Evaluation   Pain Assessment   Pain Assessment Tool 0-10   Pain Score No Pain   Restrictions/Precautions   Weight Bearing Precautions Per Order No   Other Precautions Chair Alarm;Fall Risk   Home Living   Type of Home House   Home Layout Two level;Able to live on main level with bedroom/bathroom   Bathroom Shower/Tub Tub/shower unit   Bathroom Toilet Standard   Bathroom Equipment Shower chair   Home Equipment Walker   Additional Comments Pt has a FFSU with a  "half bath and recliner chair; pt stating that she goes upstairs once a week for a shower   Prior Function   Level of [place] Independent with ADLs;Independent with functional mobility;Independent with IADLS   Lives With Family  (grandson and granddaughter)   Receives Help From Family   IADLs Independent with driving;Independent with medication management;Independent with meal prep   Falls in the last 6 months 1 to 4  (1 fall in the past 6 months - pt stating that she got lightheaded and dizzy after standing up - educated on safety)   Vocational Retired   Lifestyle   Autonomy PTA pt reports independence with all self care, mobility no AD and IADLs. Pt drives and is responsible for taking her 18 year old granddaughter to work (PT at [employer]). Pt is home alone for 2-6 hours at a time.   Reciprocal Relationships Supportive family. Her grandchildren help out in the home and her daughter calls daily to check in.   Service to Others Retired. Helps care for her grandchildren.   Intrinsic Gratification Likes watching TV   Subjective   Subjective \"My TV won't work\"   ADL   Where Assessed Chair   Eating Assistance 7  Independent   Grooming Assistance 7  Independent   UB Bathing Assistance 5  Supervision/Setup   LB Bathing Assistance 5  Supervision/Setup   UB Dressing Assistance 5  Supervision/Setup   LB Dressing Assistance 5  Supervision/Setup   Toileting Assistance  5  Supervision/Setup   Toileting Deficit Grab bar use;Clothing management up;Clothing management down;Perineal hygiene   Bed Mobility   Additional Comments seated OOB upon arrival   Transfers   Sit to Stand 5  Supervision   Additional items Assist x 1   Stand to Sit 5  Supervision   Additional items Assist x 1   Stand pivot 5  Supervision   Additional items Assist x 1   Toilet transfer 5  Supervision   Additional items Assist x 1   Functional Mobility   Functional Mobility 5  Supervision   Additional Comments Functional mobility approx 200 feet with RW with " increased time and minimal cues for safety and pacing - multiple standing rest breaks   Additional items Rolling walker   Balance   Static Sitting Normal   Dynamic Sitting Good   Static Standing Fair +   Dynamic Standing Fair -   Ambulatory Fair -   Activity Tolerance   Activity Tolerance Patient tolerated treatment well   RUE Assessment   RUE Assessment WFL   LUE Assessment   LUE Assessment X  (approx 120 degrees of active shoulder flexion - PROM WFL)   Hand Function   Gross Motor Coordination Functional   Fine Motor Coordination Impaired   Hand Function Comments Pt with reports of neuropathy in BL hands and fingers. Minimal deficits noted during assessments - no deficits noted functionally.   Sensation   Light Touch Partial deficits in the RUE;Partial deficits in the LUE;Partial deficits in the RLE   Vision-Basic Assessment   Current Vision Wears glasses for distance only   Psychosocial   Psychosocial (WDL) WDL   Cognition   Overall Cognitive Status WFL   Arousal/Participation Alert;Responsive;Arousable;Cooperative   Attention Within functional limits   Orientation Level Oriented X4   Memory Within functional limits   Following Commands Follows all commands and directions without difficulty   Assessment   Limitation Decreased ADL status;Decreased UE ROM;Decreased UE strength;Decreased endurance;Decreased self-care trans;Decreased high-level ADLs;Decreased fine motor control   Prognosis Fair   Assessment Pt is a 69 y.o. female who was admitted to [facility] on 12/19/[facility]24 w/ JOSE ELIAS (acute kidney injury) (HCC). Pt has a past medical history of anxiety, sepsis, depression, DM, HTN and trigger finger. Pt lives with family in a 2 story home with a [facility]. At baseline, pt was completing ADLs independently and functional mobility and transfers independently no AD. Currently, pt requires supervision overall for ADLs and supervision for mobility and transfers. Pt presents with deficits in the following categories: difficulty performing  ADLS and difficulty performing IADLS  activity tolerance, endurance, and standing balance/tolerance. These deficits, along with pt's  strength and ROM limit the pt's ability to safely engage in areas of occupation such as: grooming, bathing/shower, toilet hygiene, dressing, functional mobility, community mobility, and household maintenance. Pt would benefit from continued skilled acute OT in order to maximize independence and safety in ADLs, mobility, and transfers. he patient's raw score on the AM-PAC Daily Activity Inpatient Short Form is 21. A raw score of greater than or equal to 19 suggests the patient may benefit from discharge to home. Discharge recommendation at this time is level III.   Goals   Patient Goals to feel better   LTG Time Frame 10-14   Long Term Goal #1 refer to established goals   Plan   Treatment Interventions ADL retraining;Functional transfer training;UE strengthening/ROM;Endurance training;Patient/family training;Equipment evaluation/education;Fine motor coordination activities;Energy conservation;Activityengagement   Goal Expiration Date 01/04/25   OT Treatment Day 0   OT Frequency 3-5x/wk   Discharge Recommendation   Rehab Resource Intensity Level, OT III (Minimum Resource Intensity)   AM-PAC Daily Activity Inpatient   Lower Body Dressing 3   Bathing 3   Toileting 3   Upper Body Dressing 4   Grooming 4   Eating 4   Daily Activity Raw Score 21   Daily Activity Standardized Score (Calc for Raw Score >=11) 44.27   AM-PAC Applied Cognition Inpatient   Following a Speech/Presentation 4   Understanding Ordinary Conversation 4   Taking Medications 4   Remembering Where Things Are Placed or Put Away 4   Remembering List of 4-5 Errands 4   Taking Care of Complicated Tasks 4   Applied Cognition Raw Score 24   Applied Cognition Standardized Score 62.21       OCCUPATIONAL THERAPY GOALS:  Mod I/I with all UB and LB ADLs with AD prn   Mod I/I with all functional mobility and transfers while  demonstrating good safety and judgement  Mod I/I with toileting and clothing management   Increase activity tolerance to 40-45 minutes in order to participate in ADLs and leisure activities   Demonstrate good carryover of body mechanics and energy conservation techniques in order to participate in functional mobility, transfers, ADLs, IADLs, and leisure exploration   Assess DME needs      Grace Patiño OTR/L

## 2024-12-22 ENCOUNTER — APPOINTMENT (INPATIENT)
Dept: RADIOLOGY | Facility: HOSPITAL | Age: 69
DRG: 683 | End: 2024-12-22
Payer: COMMERCIAL

## 2024-12-22 PROBLEM — D72.819 LEUKOPENIA: Status: ACTIVE | Noted: 2024-12-22

## 2024-12-22 LAB
ABO GROUP BLD: NORMAL
ANION GAP SERPL CALCULATED.3IONS-SCNC: 8 MMOL/L (ref 4–13)
BASOPHILS # BLD AUTO: 0.01 THOUSANDS/ÂΜL (ref 0–0.1)
BASOPHILS # BLD AUTO: 0.01 THOUSANDS/ÂΜL (ref 0–0.1)
BASOPHILS NFR BLD AUTO: 0 % (ref 0–1)
BASOPHILS NFR BLD AUTO: 0 % (ref 0–1)
BILIRUB SERPL-MCNC: 0.18 MG/DL (ref 0.2–1)
BLD GP AB SCN SERPL QL: NEGATIVE
BUN SERPL-MCNC: 27 MG/DL (ref 5–25)
CALCIUM SERPL-MCNC: 8.3 MG/DL (ref 8.4–10.2)
CHLORIDE SERPL-SCNC: 107 MMOL/L (ref 96–108)
CO2 SERPL-SCNC: 19 MMOL/L (ref 21–32)
CREAT SERPL-MCNC: 1.3 MG/DL (ref 0.6–1.3)
EOSINOPHIL # BLD AUTO: 0.06 THOUSAND/ÂΜL (ref 0–0.61)
EOSINOPHIL # BLD AUTO: 0.07 THOUSAND/ÂΜL (ref 0–0.61)
EOSINOPHIL NFR BLD AUTO: 2 % (ref 0–6)
EOSINOPHIL NFR BLD AUTO: 2 % (ref 0–6)
ERYTHROCYTE [DISTWIDTH] IN BLOOD BY AUTOMATED COUNT: 16.7 % (ref 11.6–15.1)
ERYTHROCYTE [DISTWIDTH] IN BLOOD BY AUTOMATED COUNT: 16.8 % (ref 11.6–15.1)
GFR SERPL CREATININE-BSD FRML MDRD: 42 ML/MIN/1.73SQ M
GLUCOSE SERPL-MCNC: 107 MG/DL (ref 65–140)
GLUCOSE SERPL-MCNC: 89 MG/DL (ref 65–140)
GLUCOSE SERPL-MCNC: 90 MG/DL (ref 65–140)
GLUCOSE SERPL-MCNC: 92 MG/DL (ref 65–140)
GLUCOSE SERPL-MCNC: 95 MG/DL (ref 65–140)
HCT VFR BLD AUTO: 21.7 % (ref 34.8–46.1)
HCT VFR BLD AUTO: 22 % (ref 34.8–46.1)
HGB BLD-MCNC: 6.9 G/DL (ref 11.5–15.4)
HGB BLD-MCNC: 6.9 G/DL (ref 11.5–15.4)
IMM GRANULOCYTES # BLD AUTO: 0.05 THOUSAND/UL (ref 0–0.2)
IMM GRANULOCYTES # BLD AUTO: 0.05 THOUSAND/UL (ref 0–0.2)
IMM GRANULOCYTES NFR BLD AUTO: 1 % (ref 0–2)
IMM GRANULOCYTES NFR BLD AUTO: 2 % (ref 0–2)
LYMPHOCYTES # BLD AUTO: 1.18 THOUSANDS/ÂΜL (ref 0.6–4.47)
LYMPHOCYTES # BLD AUTO: 1.27 THOUSANDS/ÂΜL (ref 0.6–4.47)
LYMPHOCYTES NFR BLD AUTO: 36 % (ref 14–44)
LYMPHOCYTES NFR BLD AUTO: 37 % (ref 14–44)
MCH RBC QN AUTO: 30.4 PG (ref 26.8–34.3)
MCH RBC QN AUTO: 30.8 PG (ref 26.8–34.3)
MCHC RBC AUTO-ENTMCNC: 31.4 G/DL (ref 31.4–37.4)
MCHC RBC AUTO-ENTMCNC: 31.8 G/DL (ref 31.4–37.4)
MCV RBC AUTO: 96 FL (ref 82–98)
MCV RBC AUTO: 98 FL (ref 82–98)
MONOCYTES # BLD AUTO: 0.33 THOUSAND/ÂΜL (ref 0.17–1.22)
MONOCYTES # BLD AUTO: 0.36 THOUSAND/ÂΜL (ref 0.17–1.22)
MONOCYTES NFR BLD AUTO: 10 % (ref 4–12)
MONOCYTES NFR BLD AUTO: 10 % (ref 4–12)
NEUTROPHILS # BLD AUTO: 1.69 THOUSANDS/ÂΜL (ref 1.85–7.62)
NEUTROPHILS # BLD AUTO: 1.72 THOUSANDS/ÂΜL (ref 1.85–7.62)
NEUTS SEG NFR BLD AUTO: 50 % (ref 43–75)
NEUTS SEG NFR BLD AUTO: 50 % (ref 43–75)
NRBC BLD AUTO-RTO: 0 /100 WBCS
NRBC BLD AUTO-RTO: 1 /100 WBCS
PLATELET # BLD AUTO: 183 THOUSANDS/UL (ref 149–390)
PLATELET # BLD AUTO: 191 THOUSANDS/UL (ref 149–390)
PMV BLD AUTO: 8.8 FL (ref 8.9–12.7)
PMV BLD AUTO: 8.9 FL (ref 8.9–12.7)
POTASSIUM SERPL-SCNC: 4.9 MMOL/L (ref 3.5–5.3)
RBC # BLD AUTO: 2.24 MILLION/UL (ref 3.81–5.12)
RBC # BLD AUTO: 2.27 MILLION/UL (ref 3.81–5.12)
RH BLD: POSITIVE
SODIUM SERPL-SCNC: 134 MMOL/L (ref 135–147)
SPECIMEN EXPIRATION DATE: NORMAL
WBC # BLD AUTO: 3.32 THOUSAND/UL (ref 4.31–10.16)
WBC # BLD AUTO: 3.48 THOUSAND/UL (ref 4.31–10.16)

## 2024-12-22 PROCEDURE — 0202U NFCT DS 22 TRGT SARS-COV-2: CPT

## 2024-12-22 PROCEDURE — 86901 BLOOD TYPING SEROLOGIC RH(D): CPT

## 2024-12-22 PROCEDURE — 99232 SBSQ HOSP IP/OBS MODERATE 35: CPT

## 2024-12-22 PROCEDURE — 86920 COMPATIBILITY TEST SPIN: CPT

## 2024-12-22 PROCEDURE — 86900 BLOOD TYPING SEROLOGIC ABO: CPT

## 2024-12-22 PROCEDURE — 86850 RBC ANTIBODY SCREEN: CPT

## 2024-12-22 PROCEDURE — 80048 BASIC METABOLIC PNL TOTAL CA: CPT

## 2024-12-22 PROCEDURE — 71045 X-RAY EXAM CHEST 1 VIEW: CPT

## 2024-12-22 PROCEDURE — P9016 RBC LEUKOCYTES REDUCED: HCPCS

## 2024-12-22 PROCEDURE — 30233N1 TRANSFUSION OF NONAUTOLOGOUS RED BLOOD CELLS INTO PERIPHERAL VEIN, PERCUTANEOUS APPROACH: ICD-10-PCS | Performed by: INTERNAL MEDICINE

## 2024-12-22 PROCEDURE — 85025 COMPLETE CBC W/AUTO DIFF WBC: CPT

## 2024-12-22 PROCEDURE — 82948 REAGENT STRIP/BLOOD GLUCOSE: CPT

## 2024-12-22 PROCEDURE — 82247 BILIRUBIN TOTAL: CPT

## 2024-12-22 PROCEDURE — 94664 DEMO&/EVAL PT USE INHALER: CPT

## 2024-12-22 RX ORDER — BENZONATATE 100 MG/1
100 CAPSULE ORAL 3 TIMES DAILY PRN
Status: DISCONTINUED | OUTPATIENT
Start: 2024-12-22 | End: 2024-12-23

## 2024-12-22 RX ORDER — ALBUTEROL SULFATE 0.83 MG/ML
2.5 SOLUTION RESPIRATORY (INHALATION) EVERY 6 HOURS PRN
Status: DISCONTINUED | OUTPATIENT
Start: 2024-12-22 | End: 2024-12-24 | Stop reason: HOSPADM

## 2024-12-22 RX ADMIN — ONDANSETRON 4 MG: 2 INJECTION INTRAMUSCULAR; INTRAVENOUS at 18:29

## 2024-12-22 RX ADMIN — MIRTAZAPINE 7.5 MG: 15 TABLET, FILM COATED ORAL at 21:57

## 2024-12-22 RX ADMIN — SERTRALINE 100 MG: 100 TABLET, FILM COATED ORAL at 08:30

## 2024-12-22 RX ADMIN — Medication 2000 UNITS: at 08:30

## 2024-12-22 NOTE — PLAN OF CARE
Problem: PAIN - ADULT  Goal: Verbalizes/displays adequate comfort level or baseline comfort level  Description: Interventions:  - Encourage patient to monitor pain and request assistance  - Assess pain using appropriate pain scale  - Administer analgesics based on type and severity of pain and evaluate response  - Implement non-pharmacological measures as appropriate and evaluate response  - Consider cultural and social influences on pain and pain management  - Notify physician/advanced practitioner if interventions unsuccessful or patient reports new pain  Outcome: Progressing     Problem: INFECTION - ADULT  Goal: Absence or prevention of progression during hospitalization  Description: INTERVENTIONS:  - Assess and monitor for signs and symptoms of infection  - Monitor lab/diagnostic results  - Monitor all insertion sites, i.e. indwelling lines, tubes, and drains  - Monitor endotracheal if appropriate and nasal secretions for changes in amount and color  - [place] appropriate cooling/warming therapies per order  - Administer medications as ordered  - Instruct and encourage patient and family to use good hand hygiene technique  - Identify and instruct in appropriate isolation precautions for identified infection/condition  Outcome: Progressing     Problem: SAFETY ADULT  Goal: Patient will remain free of falls  Description: INTERVENTIONS:  - Educate patient/family on patient safety including physical limitations  - Instruct patient to call for assistance with activity   - Consult OT/PT to assist with strengthening/mobility   - Keep Call bell within reach  - Keep bed low and locked with side rails adjusted as appropriate  - Keep care items and personal belongings within reach  - Initiate and maintain comfort rounds  - Make Fall Risk Sign visible to staff  - Offer Toileting every 2 Hours, in advance of need  - Initiate/Maintain bed/chair alarm  - Obtain necessary fall risk management equipment: as needed  - Apply  yellow socks and bracelet for high fall risk patients  - Consider moving patient to room near nurses station  Outcome: Progressing  Goal: Maintain or return to baseline ADL function  Description: INTERVENTIONS:  -  Assess patient's ability to carry out ADLs; assess patient's baseline for ADL function and identify physical deficits which impact ability to perform ADLs (bathing, care of mouth/teeth, toileting, grooming, dressing, etc.)  - Assess/evaluate cause of self-care deficits   - Assess range of motion  - Assess patient's mobility; develop plan if impaired  - Assess patient's need for assistive devices and provide as appropriate  - Encourage maximum independence but intervene and supervise when necessary  - Involve family in performance of ADLs  - Assess for home care needs following discharge   - Consider OT consult to assist with ADL evaluation and planning for discharge  - Provide patient education as appropriate  Outcome: Progressing  Goal: Maintains/Returns to pre admission functional level  Description: INTERVENTIONS:  - Perform AM-PAC 6 Click Basic Mobility/ Daily Activity assessment daily.  - Set and communicate daily mobility goal to care team and patient/family/caregiver.   - Collaborate with rehabilitation services on mobility goals if consulted  - Perform Range of Motion 3 times a day.    - Dangle patient 3 times a day  - Stand patient 3 times a day  - Ambulate patient 3 times a day  - Out of bed to chair 3 times a day   - Out of bed for meals 3 times a day  - Out of bed for toileting  - Record patient progress and toleration of activity level   Outcome: Progressing     Problem: DISCHARGE PLANNING  Goal: Discharge to home or other facility with appropriate resources  Description: INTERVENTIONS:  - Identify barriers to discharge w/patient and caregiver  - Arrange for needed discharge resources and transportation as appropriate  - Identify discharge learning needs (meds, wound care, etc.)  - Arrange  for interpretive services to assist at discharge as needed  - Refer to Case Management Department for coordinating discharge planning if the patient needs post-hospital services based on physician/advanced practitioner order or complex needs related to functional status, cognitive ability, or social support system  Outcome: Progressing     Problem: Knowledge Deficit  Goal: Patient/family/caregiver demonstrates understanding of disease process, treatment plan, medications, and discharge instructions  Description: Complete learning assessment and assess knowledge base.  Interventions:  - Provide teaching at level of understanding  - Provide teaching via preferred learning methods  Outcome: Progressing     Problem: METABOLIC, FLUID AND ELECTROLYTES - ADULT  Goal: Electrolytes maintained within normal limits  Description: INTERVENTIONS:  - Monitor labs and assess patient for signs and symptoms of electrolyte imbalances  - Administer electrolyte replacement as ordered  - Monitor response to electrolyte replacements, including repeat lab results as appropriate  - Instruct patient on fluid and nutrition as appropriate  Outcome: Progressing  Goal: Fluid balance maintained  Description: INTERVENTIONS:  - Monitor labs   - Monitor I/O and WT  - Instruct patient on fluid and nutrition as appropriate  - Assess for signs & symptoms of volume excess or deficit  Outcome: Progressing  Goal: Glucose maintained within target range  Description: INTERVENTIONS:  - Monitor Blood Glucose as ordered  - Assess for signs and symptoms of hyperglycemia and hypoglycemia  - Administer ordered medications to maintain glucose within target range  - Assess nutritional intake and initiate nutrition service referral as needed  Outcome: Progressing     Problem: GENITOURINARY - ADULT  Goal: Maintains or returns to baseline urinary function  Description: INTERVENTIONS:  - Assess urinary function  - Encourage oral fluids to ensure adequate hydration if  ordered  - Administer IV fluids as ordered to ensure adequate hydration  - Administer ordered medications as needed  - Offer frequent toileting  - Follow urinary retention protocol if ordered  Outcome: Progressing  Goal: Absence of urinary retention  Description: INTERVENTIONS:  - Assess patient’s ability to void and empty bladder  - Monitor I/O  - Bladder scan as needed  - Discuss with physician/AP medications to alleviate retention as needed  - Discuss catheterization for long term situations as appropriate  Outcome: Progressing     Problem: HEMATOLOGIC - ADULT  Goal: Maintains hematologic stability  Description: INTERVENTIONS  - Assess for signs and symptoms of bleeding or hemorrhage  - Monitor labs  - Administer supportive blood products/factors as ordered and appropriate  Outcome: Progressing

## 2024-12-22 NOTE — ASSESSMENT & PLAN NOTE
Creatinine 1.30 mg/dL 12/22. Improved from admission creatinine 1.54 mg/dL on 12/19   Etiology: Likely prerenal azotemia with nausea, vomiting and decreased oral intake present on admission.  Her renal function continues to improve with IV fluids.  Continue with current therapy.    UA: Specific gravity less than 1.005, small blood, 0-1 RBCs  Renal imaging: Numerous bilateral renal cysts, no hydronephrosis  Patient with elevated total protein level but low albumin.  Urine sodium 31, on 12/19.   Avoid hypotension, avoid nephrotoxins, avoid NSAIDS  Continue NSS at 75 ml/hr.  Repeat BMP in AM

## 2024-12-22 NOTE — ASSESSMENT & PLAN NOTE
WBC 3.3 from 5   With mild neutropenia as well  Noted with low grade fever overnight. Does not meet SIRS criteria  Check CXR with reports of cough  Check RP2 panel  Remains stable on room air. Continue with supportive care. Monitor off abx for now  Follow CBC

## 2024-12-22 NOTE — PROGRESS NOTES
Progress Note - Nephrology   Name: Nidia Hamlin 69 y.o. female I MRN: [id number]  Unit/Bed#: 417-01 I Date of Admission: 12/19/2024   Date of Service: 12/22/2024 I Hospital Day: 2    Assessment & Plan  JOSE ELIAS (acute kidney injury) (HCC)  Creatinine 1.30 mg/dL 12/22. Improved from admission creatinine 1.54 mg/dL on 12/19   Etiology: Likely prerenal azotemia with nausea, vomiting and decreased oral intake present on admission.  Her renal function continues to improve with IV fluids.  Continue with current therapy.    UA: Specific gravity less than 1.005, small blood, 0-1 RBCs  Renal imaging: Numerous bilateral renal cysts, no hydronephrosis  Patient with elevated total protein level but low albumin.  Urine sodium 31, on 12/19.   Avoid hypotension, avoid nephrotoxins, avoid NSAIDS  Continue NSS at 75 ml/hr.  Repeat BMP in AM  Hypertension  Blood pressure stable, -131, 12/22.. Continue to hold lisinopril with JOSE ELIAS and hyperkalemia. Potassium levels have been trending around 5 mg/dL, may need to transition off lisinopril if potassium remains elevated.   Depression, recurrent ([facility])    Hyperlipidemia    Hypothyroidism    Neuropathy    Anemia  Hemoglobin 6.9 12/22, decreased from 7.6 g/dL 12/21.  Patient is being given PRBCx1 per primary team. Unclear etiology of hemoglobin decrease.  Patient denies known sources of blood loss including hematruia, hematemesis, melena, and hematochezia. Will continue to monitor.    Hyponatremia  Sodium 134 mmol/L, 12/22. Admission sodium 129 mmol/L on 12/19.  Patient given IVF with sodium stable at 136 on 12/20-12/21. Continue with NSS at 75 ml/hr until oral intake is improves. I/O indicates % of meals eaten. Continue to monitor daily BMP.  Patient examines euvolemic to mildly hypovolemic.  Type 2 diabetes mellitus with hypoglycemia, without long-term current use of insulin ([facility])  Lab Results   Component Value Date    HGBA1C 7.0 (H) 12/07/2024       Recent Labs     12/21/24  3253  12/21/24 2052 12/22/24  0742 12/22/24  1050   POCGLU 101 103 95 92       Blood Sugar Average: Last 72 hrs:  (P) 97.6    Elevated lipase    Stage 3b chronic kidney disease (HCC)  Lab Results   Component Value Date    EGFR 42 12/22/2024    EGFR 37 12/21/2024    EGFR 40 12/21/2024    CREATININE 1.30 12/22/2024    CREATININE 1.43 (H) 12/21/2024    CREATININE 1.35 (H) 12/21/2024   -Baseline creatinine 1.1 to 1.2 mg/dL  -Etiology diabetic nephropathy, hypertensive nephrosclerosis and age related nephron loss  -Does not follow with nephrology outpatient.   Leukopenia      I have reviewed the nephrology recommendations including creatinine and sodium trends with hospitalist and we are in agreement with renal plan including the information outlined above.     Subjective   Brief History of Admission - 69 y.o. year old female with PMH of T2DM, neuropathy, HTN, CKD 3b who was admitted to [facility] 12/19 after presenting with nausea, vomiting and general malaise.  Admission lab work revealed a sodium of 129, bicarb of 17, creatinine of 1.5, glucose 44, total protein 10.3, albumin 2.5, lipase 425 and hemoglobin 7.6.  CT scan showed unremarkable pancreas, postcholecystectomy, no lung pathology, no hydroureteronephrosis, numerous bilateral renal cysts with a 4 mm hyperdense interpolar lesion in the left lateral kidney.  She was given a liter bolus of Isolyte with modest improvement in renal function. Nephrology is following for JOSE ELIAS on CKD 3b, hyperkalemia and hypertension.     Patient is examined sitting upright in bed.  Patient states she feels very tired today, otherwise denies complaints including nausea, vomiting, diarrhea, dysuria, hematuria, decreased urine amount, headaches, or dizziness.  Patient to receive 1 unit PRBC for hemoglobin 6.9 today, explained to patient that fatigue may be related to decrease in hemoglobin and that she may feel better after transfusion.    Objective :  Temp:  [98.3 °F (36.8 °C)-100 °F (37.8 °C)]  99.1 °F (37.3 °C)  HR:  [74-81] 76  BP: (121-146)/(51-63) 130/51  Resp:  [16-20] 20  SpO2:  [91 %-96 %] 91 %  O2 Device: None (Room air)    Current Weight: Weight - Scale: 70.9 kg (156 lb 4.9 oz)  First Weight: Weight - Scale: 68.6 kg (151 lb 3.8 oz)  I/O         12/20 0701  12/21 0700 12/21 0701  12/22 0700 12/22 0701  12/23 0700    P.O. 970 520 120    I.V. (mL/kg) 1938.3 (28.1)  81.7 (1.2)    IV Piggyback       Total Intake(mL/kg) 2908.3 (42.1) 520 (7.3) 201.7 (2.8)    Urine (mL/kg/hr) 4833 (2.9) 2850 (1.7)     Total Output 4833 2850     Net -1924.7 -2330 +201.7                 Physical Exam  Vitals and nursing note reviewed.   Constitutional:       General: She is not in acute distress.     Appearance: She is well-developed and normal weight. She is ill-appearing.   HENT:      Head: Normocephalic and atraumatic.      Right Ear: External ear normal.      Left Ear: External ear normal.      Nose: Nose normal.      Mouth/Throat:      Mouth: Mucous membranes are moist.      Pharynx: Oropharynx is clear.   Eyes:      Extraocular Movements: Extraocular movements intact.      Conjunctiva/sclera: Conjunctivae normal.      Pupils: Pupils are equal, round, and reactive to light.   Cardiovascular:      Rate and Rhythm: Normal rate and regular rhythm.      Heart sounds: Normal heart sounds. No murmur heard.  Pulmonary:      Effort: Pulmonary effort is normal. No respiratory distress.      Breath sounds: Normal breath sounds.   Abdominal:      Palpations: Abdomen is soft.      Tenderness: There is no abdominal tenderness.   Musculoskeletal:         General: No swelling.      Cervical back: Neck supple.      Right lower leg: No edema.      Left lower leg: No edema.   Skin:     General: Skin is warm and dry.      Capillary Refill: Capillary refill takes less than 2 seconds.   Neurological:      General: No focal deficit present.      Mental Status: She is alert and oriented to person, place, and time.      Comments: Patient  states she feels fatigued today   Psychiatric:         Mood and Affect: Mood normal.         Behavior: Behavior normal.       Medications:    Current Facility-Administered Medications:     acetaminophen (TYLENOL) tablet 650 mg, 650 mg, Oral, Q6H PRN, Jairon Prescott PA-C    benzonatate (TESSALON PERLES) capsule 100 mg, 100 mg, Oral, TID PRN, Angela Marsh PA-C    Cholecalciferol (VITAMIN D3) tablet 2,000 Units, 2,000 Units, Oral, Daily, Jairon Prescott PA-C, 2,000 Units at 12/22/24 0830    lidocaine (LIDODERM) 5 % patch 2 patch, 2 patch, Topical, Daily, Maddy Thomas PA-C, 2 patch at 12/20/24 1205    mirtazapine (REMERON) tablet 7.5 mg, 7.5 mg, Oral, HS, Maddy Thomas PA-C, 7.5 mg at 12/21/24 2101    nicotine (NICODERM CQ) 7 mg/24hr TD 24 hr patch 1 patch, 1 patch, Transdermal, Daily, Jairon Prescott PA-C, 1 patch at 12/20/24 0859    ondansetron (ZOFRAN) injection 4 mg, 4 mg, Intravenous, Q6H PRN, Jairon Prescott PA-C    sertraline (ZOLOFT) tablet 100 mg, 100 mg, Oral, Daily, Jairon Prescott PA-C, 100 mg at 12/22/24 0830    tiZANidine (ZANAFLEX) tablet 2 mg, 2 mg, Oral, TID PRN, Jairon Prescott PA-C      Lab Results: I have reviewed the following results:  Results from last 7 days   Lab Units 12/22/24  0908 12/22/24  0513 12/21/24  1006 12/21/24  0614 12/20/24  1352 12/20/24  0501 12/19/24  2259 12/19/24  1655   WBC Thousand/uL 3.32* 3.48*  --  5.25  --  5.95  --  7.83   HEMOGLOBIN g/dL 6.9* 6.9*  --  7.6*  --  7.5*  --  7.6*   HEMATOCRIT % 22.0* 21.7*  --  24.2*  --  24.7*  --  24.7*   PLATELETS Thousands/uL 191 183  --  222  --  243  --  244   POTASSIUM mmol/L  --  4.9 5.0 5.5* 5.1 4.9 4.8 5.3   CHLORIDE mmol/L  --  107 106 108 104 107 102 103   CO2 mmol/L  --  19* 23 20* 23 22 20* 17*   BUN mg/dL  --  27* 32* 34* 35* 38* 43* 48*   CREATININE mg/dL  --  1.30 1.43* 1.35* 1.59* 1.39* 1.42* 1.54*   CALCIUM mg/dL  --  8.3* 8.7 8.5 8.7 8.3* 8.3* 8.3*   MAGNESIUM mg/dL  --   --   --   --   --  2.1  --  1.9   PHOSPHORUS mg/dL  --   --  "  --   --   --  4.2*  --   --    ALBUMIN g/dL  --   --   --  2.4*  --  2.4*  --  2.5*       Administrative Statements     Portions of the record may have been created with voice recognition software. Occasional wrong word or \"sound a like\" substitutions may have occurred due to the inherent limitations of voice recognition software. Read the chart carefully and recognize, using context, where substitutions have occurred.If you have any questions, please contact the dictating provider.  "

## 2024-12-22 NOTE — ASSESSMENT & PLAN NOTE
Improving, Cr down to 1.3  Appreciate nephrology consult, suspected prerenal  S/p IV fluids. Monitor off today  Urine & serum protein electrophoresis pending  Urinary retention protocol   Imaging does show bilateral multiple renal cysts which may be contributing to a progression of chronic renal disease

## 2024-12-22 NOTE — ASSESSMENT & PLAN NOTE
Sodium 134 mmol/L, 12/22. Admission sodium 129 mmol/L on 12/19.  Patient given IVF with sodium stable at 136 on 12/20-12/21. Continue with NSS at 75 ml/hr until oral intake is improves. I/O indicates % of meals eaten. Continue to monitor daily BMP.  Patient examines euvolemic to mildly hypovolemic.

## 2024-12-22 NOTE — ASSESSMENT & PLAN NOTE
Hemoglobin levels at 7.6 on admission, hemoglobins were within normal range around 12 but it has not been checked in 5 years  Hgb down to 6.9 today, verified with repeat draw  Give 1 unit pRBC   No evidence of acute bleeding   Iron panel within normal limits, B12 & folate within normal limits  Check LDH, haptoglobin, smear, Tbili  Pending stool occult blood  Continue to trend hemoglobin levels  C scope from 2021 reviewed, was recommended for repeat C scope in 3 years given colon polyps   Inpatient vs outpatient GI follow-up pending course

## 2024-12-22 NOTE — ASSESSMENT & PLAN NOTE
Blood pressure stable, -131, 12/22.. Continue to hold lisinopril with JOSE ELIAS and hyperkalemia. Potassium levels have been trending around 5 mg/dL, may need to transition off lisinopril if potassium remains elevated.

## 2024-12-22 NOTE — ASSESSMENT & PLAN NOTE
Lab Results   Component Value Date    EGFR 42 12/22/2024    EGFR 37 12/21/2024    EGFR 40 12/21/2024    CREATININE 1.30 12/22/2024    CREATININE 1.43 (H) 12/21/2024    CREATININE 1.35 (H) 12/21/2024

## 2024-12-22 NOTE — ASSESSMENT & PLAN NOTE
Hemoglobin 6.9 12/22, decreased from 7.6 g/dL 12/21.  Patient is being given PRBCx1 per primary team. Unclear etiology of hemoglobin decrease.  Patient denies known sources of blood loss including hematruia, hematemesis, melena, and hematochezia. Will continue to monitor.

## 2024-12-22 NOTE — ASSESSMENT & PLAN NOTE
Lab Results   Component Value Date    HGBA1C 7.0 (H) 12/07/2024       Recent Labs     12/21/24  1554 12/21/24 2052 12/22/24  0742 12/22/24  1050   POCGLU 101 103 95 92       Blood Sugar Average: Last 72 hrs:  (P) 97.6

## 2024-12-22 NOTE — ASSESSMENT & PLAN NOTE
Lab Results   Component Value Date    EGFR 42 12/22/2024    EGFR 37 12/21/2024    EGFR 40 12/21/2024    CREATININE 1.30 12/22/2024    CREATININE 1.43 (H) 12/21/2024    CREATININE 1.35 (H) 12/21/2024   -Baseline creatinine 1.1 to 1.2 mg/dL  -Etiology diabetic nephropathy, hypertensive nephrosclerosis and age related nephron loss  -Does not follow with nephrology outpatient.

## 2024-12-22 NOTE — RESPIRATORY THERAPY NOTE
RT Protocol Note  Nidia Hamlin 69 y.o. female MRN: [id number]  Unit/Bed#: 417-01 Encounter: [id number]    Assessment    Principal Problem:    JOSE ELIAS (acute kidney injury) (HCC)  Active Problems:    Hypertension    Depression, recurrent (HCC)    Hyperlipidemia    Hypothyroidism    Stage 3b chronic kidney disease (HCC)    Neuropathy    Anemia    Hyponatremia    Type 2 diabetes mellitus with hypoglycemia, without long-term current use of insulin (HCC)    Elevated lipase    Leukopenia      Home Pulmonary Medications:Home Devices/Therapy: Other (Comment) (none)    Past Medical History:   Diagnosis Date    Adrenal insufficiency (HCC)     Anxiety     Bacterial sepsis (HCC)     last assessed: 06/13/16    Cushing's syndrome (HCC)     last assessed: 05/29/16    Depression     last assessed: 01/02/18    Diabetes mellitus (HCC)     Hydradenitis     Hyperlipidemia     Hypertension     Positive FIT (fecal immunochemical test) 06/22/2018    Added automatically from request for surgery [id number]    Rib contusion, left, sequela 06/14/2022    Trigger finger, right middle finger 03/07/2022     Social History     Socioeconomic History    Marital status:      Spouse name: None    Number of children: None    Years of education: None    Highest education level: None   Occupational History    None   Tobacco Use    Smoking status: Every Day     Current packs/day: 0.25     Average packs/day: 0.3 packs/day for 50.0 years (12.5 ttl pk-yrs)     Types: Cigarettes    Smokeless tobacco: Never   Vaping Use    Vaping status: Never Used   Substance and Sexual Activity    Alcohol use: Never    Drug use: No    Sexual activity: Not Currently   Other Topics Concern    None   Social History Narrative    Always uses seat belt    Always uses sunscreen    Caffeine use    Dental care, regularly    No living will    Uses safety equipment - seatbelts     Social Drivers of Health     Financial Resource Strain: Low Risk  (5/16/2023)    Overall Financial Resource  Strain (CARDIA)     Difficulty of Paying Living Expenses: Not very hard   Food Insecurity: No Food Insecurity (2024)    Nursing - Inadequate Food Risk Classification     Worried About Running Out of Food in the Last Year: Never true     Ran Out of Food in the Last Year: Never true     Ran Out of Food in the Last Year: 1   Transportation Needs: No Transportation Needs (2024)    Nursing - Transportation Risk Classification     Lack of Transportation: Not on file     Lack of Transportation: 2   Physical Activity: Not on file   Stress: Not on file   Social Connections: Not on file   Intimate Partner Violence: Unknown (2024)    Nursing IPS     Feels Physically and Emotionally Safe: Not on file     Physically Hurt by Someone: Not on file     Humiliated or Emotionally Abused by Someone: Not on file     Physically Hurt by Someone: 2     Hurt or Threatened by Someone: 2   Housing Stability: Unknown (2024)    Nursing: Inadequate Housing Risk Classification     Has Housing: Not on file     Worried About Losing Housing: Not on file     Unable to Get Utilities: Not on file     Unable to Pay for Housing in the Last Year: 2     Has Housin       Subjective         Objective    Physical Exam:   Assessment Type: Assess only  General Appearance: Alert, Awake  Respiratory Pattern: Normal, Symmetrical, Spontaneous  Chest Assessment: Chest expansion symmetrical, Trachea midline  Bilateral Breath Sounds: Diminished  R Breath Sounds: Clear  L Breath Sounds: Crackles (few crackles, pt lying on left)  Cough: None  O2 Device: RA baseline    Vitals:  Blood pressure 130/51, pulse 90, temperature 99.1 °F (37.3 °C), resp. rate 16, height 5' (1.524 m), weight 70.9 kg (156 lb 4.9 oz), SpO2 94%.          Imaging and other studies: xray,CT scan    O2 Device: RA baseline     Plan  Bronchodilator therapy with albuterol 0.083% Q6prn for SOB and wheezing           Resp Comments: pt states she doesn't wear any PAP therapy or  oxygen

## 2024-12-22 NOTE — ASSESSMENT & PLAN NOTE
Sodium level at 129 on admission  Improved with IV fluids. Na 134 today, discontinue IV fluids and monitor   cont to monitor

## 2024-12-23 PROBLEM — E87.20 ACIDOSIS: Status: ACTIVE | Noted: 2024-12-23

## 2024-12-23 PROBLEM — J10.1 INFLUENZA A (H1N1): Status: ACTIVE | Noted: 2024-12-23

## 2024-12-23 LAB
ABO GROUP BLD BPU: NORMAL
ALBUMIN SERPL BCG-MCNC: 2.4 G/DL (ref 3.5–5)
ALBUMIN SERPL ELPH-MCNC: 2.73 G/DL (ref 3.2–5.1)
ALBUMIN SERPL ELPH-MCNC: 28.4 % (ref 48–70)
ALBUMIN UR ELPH-MCNC: 28.3 %
ALP SERPL-CCNC: 44 U/L (ref 34–104)
ALPHA1 GLOB MFR UR ELPH: 7.9 %
ALPHA1 GLOB SERPL ELPH-MCNC: 0.34 G/DL (ref 0.15–0.47)
ALPHA1 GLOB SERPL ELPH-MCNC: 3.5 % (ref 1.8–7)
ALPHA2 GLOB MFR UR ELPH: 28.7 %
ALPHA2 GLOB SERPL ELPH-MCNC: 0.83 G/DL (ref 0.42–1.04)
ALPHA2 GLOB SERPL ELPH-MCNC: 8.6 % (ref 5.9–14.9)
ALT SERPL W P-5'-P-CCNC: 10 U/L (ref 7–52)
ANION GAP SERPL CALCULATED.3IONS-SCNC: 9 MMOL/L (ref 4–13)
ANION GAP SERPL CALCULATED.3IONS-SCNC: 9 MMOL/L (ref 4–13)
AST SERPL W P-5'-P-CCNC: 9 U/L (ref 13–39)
ATRIAL RATE: 65 BPM
ATRIAL RATE: 65 BPM
B PARAP IS1001 DNA NPH QL NAA+NON-PROBE: NOT DETECTED
B PERT.PT PRMT NPH QL NAA+NON-PROBE: NOT DETECTED
B-GLOBULIN MFR UR ELPH: 5.2 %
BETA GLOB ABNORMAL SERPL ELPH-MCNC: 0.43 G/DL (ref 0.31–0.57)
BETA1 GLOB SERPL ELPH-MCNC: 4.5 % (ref 4.7–7.7)
BETA2 GLOB SERPL ELPH-MCNC: 3.2 % (ref 3.1–7.9)
BETA2+GAMMA GLOB SERPL ELPH-MCNC: 0.31 G/DL (ref 0.2–0.58)
BILIRUB SERPL-MCNC: 0.28 MG/DL (ref 0.2–1)
BPU ID: NORMAL
BUN SERPL-MCNC: 26 MG/DL (ref 5–25)
BUN SERPL-MCNC: 41 MG/DL (ref 5–25)
C PNEUM DNA NPH QL NAA+NON-PROBE: NOT DETECTED
CALCIUM ALBUM COR SERPL-MCNC: 9.7 MG/DL (ref 8.3–10.1)
CALCIUM SERPL-MCNC: 8.3 MG/DL (ref 8.4–10.2)
CALCIUM SERPL-MCNC: 8.4 MG/DL (ref 8.4–10.2)
CHLORIDE SERPL-SCNC: 101 MMOL/L (ref 96–108)
CHLORIDE SERPL-SCNC: 99 MMOL/L (ref 96–108)
CO2 SERPL-SCNC: 19 MMOL/L (ref 21–32)
CO2 SERPL-SCNC: 20 MMOL/L (ref 21–32)
CREAT SERPL-MCNC: 1.51 MG/DL (ref 0.6–1.3)
CREAT SERPL-MCNC: 1.55 MG/DL (ref 0.6–1.3)
CREAT UR-MCNC: 75.8 MG/DL
CROSSMATCH: NORMAL
ERYTHROCYTE [DISTWIDTH] IN BLOOD BY AUTOMATED COUNT: 17.5 % (ref 11.6–15.1)
FLUAV H1 2009 PAN RNA NPH NAA+NON-PROBE: DETECTED
FLUBV RNA NPH QL NAA+NON-PROBE: NOT DETECTED
GAMMA GLOB ABNORMAL SERPL ELPH-MCNC: 4.97 G/DL (ref 0.4–1.66)
GAMMA GLOB MFR UR ELPH: 29.9 %
GAMMA GLOB SERPL ELPH-MCNC: 51.8 % (ref 6.9–22.3)
GFR SERPL CREATININE-BSD FRML MDRD: 34 ML/MIN/1.73SQ M
GFR SERPL CREATININE-BSD FRML MDRD: 35 ML/MIN/1.73SQ M
GLUCOSE SERPL-MCNC: 120 MG/DL (ref 65–140)
GLUCOSE SERPL-MCNC: 82 MG/DL (ref 65–140)
GLUCOSE SERPL-MCNC: 87 MG/DL (ref 65–140)
GLUCOSE SERPL-MCNC: 88 MG/DL (ref 65–140)
GLUCOSE SERPL-MCNC: 93 MG/DL (ref 65–140)
GLUCOSE SERPL-MCNC: 96 MG/DL (ref 65–140)
HADV DNA NPH QL NAA+NON-PROBE: NOT DETECTED
HCOV 229E RNA NPH QL NAA+NON-PROBE: NOT DETECTED
HCOV HKU1 RNA NPH QL NAA+NON-PROBE: NOT DETECTED
HCOV NL63 RNA NPH QL NAA+NON-PROBE: NOT DETECTED
HCOV OC43 RNA NPH QL NAA+NON-PROBE: NOT DETECTED
HCT VFR BLD AUTO: 24.6 % (ref 34.8–46.1)
HGB BLD-MCNC: 8 G/DL (ref 11.5–15.4)
HMPV RNA NPH QL NAA+NON-PROBE: NOT DETECTED
HPIV1 RNA NPH QL NAA+NON-PROBE: NOT DETECTED
HPIV2 RNA NPH QL NAA+NON-PROBE: NOT DETECTED
HPIV3 RNA NPH QL NAA+NON-PROBE: NOT DETECTED
HPIV4 RNA NPH QL NAA+NON-PROBE: NOT DETECTED
IGG/ALB SER: 0.4 {RATIO} (ref 1.1–1.8)
INTERPRETATION UR IFE-IMP: NORMAL
INTERPRETATION UR IFE-IMP: NORMAL
LDH SERPL-CCNC: 90 U/L (ref 140–271)
M PNEUMO DNA NPH QL NAA+NON-PROBE: NOT DETECTED
M PROTEIN 1 MFR SERPL ELPH: 48.9 %
M PROTEIN 1 SERPL ELPH-MCNC: 4.69 G/DL
M PROTEIN 2 MFR UR ELPH: 4.1 MG/DL
M PROTEIN 2 UR-MCNC: 15.7 %
M PROTEIN MFR UR ELPH: 4.6 MG/DL
M PROTEIN UR-MCNC: 17.4 %
MCH RBC QN AUTO: 30.2 PG (ref 26.8–34.3)
MCHC RBC AUTO-ENTMCNC: 32.5 G/DL (ref 31.4–37.4)
MCV RBC AUTO: 93 FL (ref 82–98)
OSMOLALITY UR: 360 MMOL/KG (ref 250–900)
P AXIS: 78 DEGREES
P AXIS: 83 DEGREES
PLATELET # BLD AUTO: 158 THOUSANDS/UL (ref 149–390)
PMV BLD AUTO: 9.4 FL (ref 8.9–12.7)
POTASSIUM SERPL-SCNC: 4.8 MMOL/L (ref 3.5–5.3)
POTASSIUM SERPL-SCNC: 4.9 MMOL/L (ref 3.5–5.3)
PR INTERVAL: 156 MS
PR INTERVAL: 162 MS
PROT PATTERN SERPL ELPH-IMP: ABNORMAL
PROT PATTERN UR ELPH-IMP: NORMAL
PROT SERPL-MCNC: 9.5 G/DL (ref 6.4–8.4)
PROT SERPL-MCNC: 9.6 G/DL (ref 6.4–8.2)
PROT UR-MCNC: 127.8 MG/DL
PROT UR-MCNC: 26.3 MG/DL
PROT/CREAT UR: 1.7 MG/G{CREAT} (ref 0–0.1)
QRS AXIS: 68 DEGREES
QRS AXIS: 74 DEGREES
QRSD INTERVAL: 82 MS
QRSD INTERVAL: 86 MS
QT INTERVAL: 392 MS
QT INTERVAL: 398 MS
QTC INTERVAL: 407 MS
QTC INTERVAL: 413 MS
RBC # BLD AUTO: 2.65 MILLION/UL (ref 3.81–5.12)
RSV RNA NPH QL NAA+NON-PROBE: NOT DETECTED
RV+EV RNA NPH QL NAA+NON-PROBE: DETECTED
SARS-COV-2 RNA NPH QL NAA+NON-PROBE: NOT DETECTED
SODIUM 24H UR-SCNC: 69 MOL/L
SODIUM SERPL-SCNC: 128 MMOL/L (ref 135–147)
SODIUM SERPL-SCNC: 129 MMOL/L (ref 135–147)
T WAVE AXIS: 74 DEGREES
T WAVE AXIS: 80 DEGREES
UNIT DISPENSE STATUS: NORMAL
UNIT PRODUCT CODE: NORMAL
UNIT PRODUCT VOLUME: 350 ML
UNIT RH: NORMAL
VENTRICULAR RATE: 65 BPM
VENTRICULAR RATE: 65 BPM
WBC # BLD AUTO: 3.24 THOUSAND/UL (ref 4.31–10.16)

## 2024-12-23 PROCEDURE — 84300 ASSAY OF URINE SODIUM: CPT | Performed by: NURSE PRACTITIONER

## 2024-12-23 PROCEDURE — 85007 BL SMEAR W/DIFF WBC COUNT: CPT

## 2024-12-23 PROCEDURE — 94760 N-INVAS EAR/PLS OXIMETRY 1: CPT

## 2024-12-23 PROCEDURE — 83521 IG LIGHT CHAINS FREE EACH: CPT | Performed by: NURSE PRACTITIONER

## 2024-12-23 PROCEDURE — 82570 ASSAY OF URINE CREATININE: CPT | Performed by: NURSE PRACTITIONER

## 2024-12-23 PROCEDURE — 83930 ASSAY OF BLOOD OSMOLALITY: CPT | Performed by: INTERNAL MEDICINE

## 2024-12-23 PROCEDURE — 93010 ELECTROCARDIOGRAM REPORT: CPT | Performed by: INTERNAL MEDICINE

## 2024-12-23 PROCEDURE — 86335 IMMUNFIX E-PHORSIS/URINE/CSF: CPT | Performed by: STUDENT IN AN ORGANIZED HEALTH CARE EDUCATION/TRAINING PROGRAM

## 2024-12-23 PROCEDURE — 85027 COMPLETE CBC AUTOMATED: CPT

## 2024-12-23 PROCEDURE — 86334 IMMUNOFIX E-PHORESIS SERUM: CPT | Performed by: STUDENT IN AN ORGANIZED HEALTH CARE EDUCATION/TRAINING PROGRAM

## 2024-12-23 PROCEDURE — 82232 ASSAY OF BETA-2 PROTEIN: CPT

## 2024-12-23 PROCEDURE — 83935 ASSAY OF URINE OSMOLALITY: CPT | Performed by: NURSE PRACTITIONER

## 2024-12-23 PROCEDURE — 97530 THERAPEUTIC ACTIVITIES: CPT

## 2024-12-23 PROCEDURE — 82948 REAGENT STRIP/BLOOD GLUCOSE: CPT

## 2024-12-23 PROCEDURE — 82043 UR ALBUMIN QUANTITATIVE: CPT | Performed by: NURSE PRACTITIONER

## 2024-12-23 PROCEDURE — 99232 SBSQ HOSP IP/OBS MODERATE 35: CPT

## 2024-12-23 PROCEDURE — 84166 PROTEIN E-PHORESIS/URINE/CSF: CPT | Performed by: STUDENT IN AN ORGANIZED HEALTH CARE EDUCATION/TRAINING PROGRAM

## 2024-12-23 PROCEDURE — 85060 BLOOD SMEAR INTERPRETATION: CPT | Performed by: STUDENT IN AN ORGANIZED HEALTH CARE EDUCATION/TRAINING PROGRAM

## 2024-12-23 PROCEDURE — 99232 SBSQ HOSP IP/OBS MODERATE 35: CPT | Performed by: INTERNAL MEDICINE

## 2024-12-23 PROCEDURE — 97535 SELF CARE MNGMENT TRAINING: CPT

## 2024-12-23 PROCEDURE — 83615 LACTATE (LD) (LDH) ENZYME: CPT

## 2024-12-23 PROCEDURE — 83010 ASSAY OF HAPTOGLOBIN QUANT: CPT

## 2024-12-23 PROCEDURE — 84165 PROTEIN E-PHORESIS SERUM: CPT | Performed by: STUDENT IN AN ORGANIZED HEALTH CARE EDUCATION/TRAINING PROGRAM

## 2024-12-23 PROCEDURE — 84156 ASSAY OF PROTEIN URINE: CPT | Performed by: NURSE PRACTITIONER

## 2024-12-23 PROCEDURE — 97116 GAIT TRAINING THERAPY: CPT

## 2024-12-23 PROCEDURE — 99449 NTRPROF PH1/NTRNET/EHR 31/>: CPT | Performed by: INTERNAL MEDICINE

## 2024-12-23 PROCEDURE — 80048 BASIC METABOLIC PNL TOTAL CA: CPT | Performed by: INTERNAL MEDICINE

## 2024-12-23 PROCEDURE — 80053 COMPREHEN METABOLIC PANEL: CPT

## 2024-12-23 PROCEDURE — 88184 FLOWCYTOMETRY/ TC 1 MARKER: CPT

## 2024-12-23 RX ORDER — SODIUM CHLORIDE 9 MG/ML
75 INJECTION, SOLUTION INTRAVENOUS CONTINUOUS
Status: DISCONTINUED | OUTPATIENT
Start: 2024-12-23 | End: 2024-12-23

## 2024-12-23 RX ORDER — OSELTAMIVIR PHOSPHATE 30 MG/1
30 CAPSULE ORAL EVERY 12 HOURS SCHEDULED
Status: DISCONTINUED | OUTPATIENT
Start: 2024-12-23 | End: 2024-12-24 | Stop reason: HOSPADM

## 2024-12-23 RX ORDER — BENZONATATE 100 MG/1
100 CAPSULE ORAL 3 TIMES DAILY
Status: DISCONTINUED | OUTPATIENT
Start: 2024-12-23 | End: 2024-12-24 | Stop reason: HOSPADM

## 2024-12-23 RX ORDER — GABAPENTIN 100 MG/1
100 CAPSULE ORAL 3 TIMES DAILY
Status: DISCONTINUED | OUTPATIENT
Start: 2024-12-23 | End: 2024-12-24 | Stop reason: HOSPADM

## 2024-12-23 RX ADMIN — BENZONATATE 100 MG: 100 CAPSULE ORAL at 15:58

## 2024-12-23 RX ADMIN — BENZONATATE 100 MG: 100 CAPSULE ORAL at 21:09

## 2024-12-23 RX ADMIN — Medication 2000 UNITS: at 09:03

## 2024-12-23 RX ADMIN — ONDANSETRON 4 MG: 2 INJECTION INTRAMUSCULAR; INTRAVENOUS at 21:09

## 2024-12-23 RX ADMIN — GABAPENTIN 100 MG: 100 CAPSULE ORAL at 21:09

## 2024-12-23 RX ADMIN — OSELTAMAVIR PHOSPHATE 30 MG: 30 CAPSULE ORAL at 15:58

## 2024-12-23 RX ADMIN — MIRTAZAPINE 7.5 MG: 15 TABLET, FILM COATED ORAL at 21:09

## 2024-12-23 RX ADMIN — LIDOCAINE 2 PATCH: 50 PATCH TOPICAL at 09:03

## 2024-12-23 RX ADMIN — NICOTINE 1 PATCH: 7 PATCH, EXTENDED RELEASE TRANSDERMAL at 09:03

## 2024-12-23 RX ADMIN — SERTRALINE 100 MG: 100 TABLET, FILM COATED ORAL at 09:03

## 2024-12-23 RX ADMIN — ACETAMINOPHEN 650 MG: 325 TABLET, FILM COATED ORAL at 06:38

## 2024-12-23 NOTE — PLAN OF CARE
Problem: OCCUPATIONAL THERAPY ADULT  Goal: Performs self-care activities at highest level of function for planned discharge setting.  See evaluation for individualized goals.  Description: Treatment Interventions: ADL retraining, Functional transfer training, UE strengthening/ROM, Endurance training, Patient/family training, Equipment evaluation/education, Fine motor coordination activities, Energy conservation, Activityengagement          See flowsheet documentation for full assessment, interventions and recommendations.   Outcome: Progressing  Note: Limitation: Decreased ADL status, Decreased UE ROM, Decreased UE strength, Decreased endurance, Decreased self-care trans, Decreased high-level ADLs, Decreased fine motor control  Prognosis: Fair  Assessment: Patient participated in Skilled OT session this date with interventions consisting of ADL re training with the use of correct body mechnaics and  therapeutic activities to: increase activity tolerance . Patient agreeable to OT treatment session, upon arrival patient was found supine in bed. Supine to sit txfrs MI, sit to stand txfrs from bed I, standing balance/functional mobility around room and hallway with use of RW and S, 10 steps with use of B hand rails and S, to increase posture, dynamic standing balance, balance during self cares, use of BUE. Pt completed LB dressing S. Patient requiring verbal cues for safety and verbal cues for correct technique. Patient continues to be functioning below baseline level, occupational performance remains limited secondary to factors listed above and increased risk for falls and injury. The patient's raw score on the AM-PAC Daily Activity inpatient short form is 21, standardized score is 44.27, greater than 39.4. Patients at this level are likely to benefit from discharge to home. Please refer to the recommendation of the Occupational Therapist for safe discharge planning, which is Minimum Resource Level 3.     Rehab  Resource Intensity Level, OT: III (Minimum Resource Intensity)

## 2024-12-23 NOTE — PLAN OF CARE
Problem: PAIN - ADULT  Goal: Verbalizes/displays adequate comfort level or baseline comfort level  Description: Interventions:  - Encourage patient to monitor pain and request assistance  - Assess pain using appropriate pain scale  - Administer analgesics based on type and severity of pain and evaluate response  - Implement non-pharmacological measures as appropriate and evaluate response  - Consider cultural and social influences on pain and pain management  - Notify physician/advanced practitioner if interventions unsuccessful or patient reports new pain  Outcome: Progressing     Problem: INFECTION - ADULT  Goal: Absence or prevention of progression during hospitalization  Description: INTERVENTIONS:  - Assess and monitor for signs and symptoms of infection  - Monitor lab/diagnostic results  - Monitor all insertion sites, i.e. indwelling lines, tubes, and drains  - Monitor endotracheal if appropriate and nasal secretions for changes in amount and color  - [place] appropriate cooling/warming therapies per order  - Administer medications as ordered  - Instruct and encourage patient and family to use good hand hygiene technique  - Identify and instruct in appropriate isolation precautions for identified infection/condition  Outcome: Progressing     Problem: SAFETY ADULT  Goal: Patient will remain free of falls  Description: INTERVENTIONS:  - Educate patient/family on patient safety including physical limitations  - Instruct patient to call for assistance with activity   - Consult OT/PT to assist with strengthening/mobility   - Keep Call bell within reach  - Keep bed low and locked with side rails adjusted as appropriate  - Keep care items and personal belongings within reach  - Initiate and maintain comfort rounds  - Make Fall Risk Sign visible to staff  - Offer Toileting every 2 Hours, in advance of need  - Initiate/Maintain bed/chair alarm  - Obtain necessary fall risk management equipment: as needed  - Apply  yellow socks and bracelet for high fall risk patients  - Consider moving patient to room near nurses station  Outcome: Progressing  Goal: Maintain or return to baseline ADL function  Description: INTERVENTIONS:  -  Assess patient's ability to carry out ADLs; assess patient's baseline for ADL function and identify physical deficits which impact ability to perform ADLs (bathing, care of mouth/teeth, toileting, grooming, dressing, etc.)  - Assess/evaluate cause of self-care deficits   - Assess range of motion  - Assess patient's mobility; develop plan if impaired  - Assess patient's need for assistive devices and provide as appropriate  - Encourage maximum independence but intervene and supervise when necessary  - Involve family in performance of ADLs  - Assess for home care needs following discharge   - Consider OT consult to assist with ADL evaluation and planning for discharge  - Provide patient education as appropriate  Outcome: Progressing  Goal: Maintains/Returns to pre admission functional level  Description: INTERVENTIONS:  - Perform AM-PAC 6 Click Basic Mobility/ Daily Activity assessment daily.  - Set and communicate daily mobility goal to care team and patient/family/caregiver.   - Collaborate with rehabilitation services on mobility goals if consulted  - Perform Range of Motion 3 times a day.    - Dangle patient 3 times a day  - Stand patient 3 times a day  - Ambulate patient 3 times a day  - Out of bed to chair 3 times a day   - Out of bed for meals 3 times a day  - Out of bed for toileting  - Record patient progress and toleration of activity level   Outcome: Progressing     Problem: DISCHARGE PLANNING  Goal: Discharge to home or other facility with appropriate resources  Description: INTERVENTIONS:  - Identify barriers to discharge w/patient and caregiver  - Arrange for needed discharge resources and transportation as appropriate  - Identify discharge learning needs (meds, wound care, etc.)  - Arrange  for interpretive services to assist at discharge as needed  - Refer to Case Management Department for coordinating discharge planning if the patient needs post-hospital services based on physician/advanced practitioner order or complex needs related to functional status, cognitive ability, or social support system  Outcome: Progressing     Problem: Knowledge Deficit  Goal: Patient/family/caregiver demonstrates understanding of disease process, treatment plan, medications, and discharge instructions  Description: Complete learning assessment and assess knowledge base.  Interventions:  - Provide teaching at level of understanding  - Provide teaching via preferred learning methods  Outcome: Progressing     Problem: METABOLIC, FLUID AND ELECTROLYTES - ADULT  Goal: Electrolytes maintained within normal limits  Description: INTERVENTIONS:  - Monitor labs and assess patient for signs and symptoms of electrolyte imbalances  - Administer electrolyte replacement as ordered  - Monitor response to electrolyte replacements, including repeat lab results as appropriate  - Instruct patient on fluid and nutrition as appropriate  Outcome: Progressing  Goal: Fluid balance maintained  Description: INTERVENTIONS:  - Monitor labs   - Monitor I/O and WT  - Instruct patient on fluid and nutrition as appropriate  - Assess for signs & symptoms of volume excess or deficit  Outcome: Progressing  Goal: Glucose maintained within target range  Description: INTERVENTIONS:  - Monitor Blood Glucose as ordered  - Assess for signs and symptoms of hyperglycemia and hypoglycemia  - Administer ordered medications to maintain glucose within target range  - Assess nutritional intake and initiate nutrition service referral as needed  Outcome: Progressing     Problem: GENITOURINARY - ADULT  Goal: Maintains or returns to baseline urinary function  Description: INTERVENTIONS:  - Assess urinary function  - Encourage oral fluids to ensure adequate hydration if  ordered  - Administer IV fluids as ordered to ensure adequate hydration  - Administer ordered medications as needed  - Offer frequent toileting  - Follow urinary retention protocol if ordered  Outcome: Progressing  Goal: Absence of urinary retention  Description: INTERVENTIONS:  - Assess patient’s ability to void and empty bladder  - Monitor I/O  - Bladder scan as needed  - Discuss with physician/AP medications to alleviate retention as needed  - Discuss catheterization for long term situations as appropriate  Outcome: Progressing     Problem: HEMATOLOGIC - ADULT  Goal: Maintains hematologic stability  Description: INTERVENTIONS  - Assess for signs and symptoms of bleeding or hemorrhage  - Monitor labs  - Administer supportive blood products/factors as ordered and appropriate  Outcome: Progressing     Problem: Prexisting or High Potential for Compromised Skin Integrity  Goal: Skin integrity is maintained or improved  Description: INTERVENTIONS:  - Identify patients at risk for skin breakdown  - Assess and monitor skin integrity  - Assess and monitor nutrition and hydration status  - Monitor labs   - Assess for incontinence   - Turn and reposition patient  - Assist with mobility/ambulation  - Relieve pressure over bony prominences  - Avoid friction and shearing  - Provide appropriate hygiene as needed including keeping skin clean and dry  - Evaluate need for skin moisturizer/barrier cream  - Collaborate with interdisciplinary team   - Patient/family teaching  - Consider wound care consult   Outcome: Progressing

## 2024-12-23 NOTE — PROGRESS NOTES
Progress Note - Hospitalist   Name: Nidia Hamlin 69 y.o. female I MRN: [id number]  Unit/Bed#: 417-01 I Date of Admission: 12/19/2024   Date of Service: 12/23/2024 I Hospital Day: 3    Assessment & Plan  JOSE ELIAS (acute kidney injury) (HCC)  Appreciate nephrology consult  S/p IV fluids. Continue to monitor off   Lisinopril remains on hold  Repeat urine studies ordered   Urine & serum protein electrophoresis pending  Urinary retention protocol     Anemia  Hemoglobin levels at 7.6 on admission, hemoglobins were within normal range around 12 but it has not been checked in 5 years  S/p 1 unit pRBC on 12/222  No evidence of acute bleeding. Pending stool occult blood.  Iron panel within normal limits, B12 & folate within normal limits  Hemolysis less likely- LDH 90, Tbili 0.28, haptoglobin pending. Reticulocytes count checked today.  Appreciate hematology consult with elevated total protein, concern for paraprotein. Serum electrophoresis & reflex immunofixation & FLC pending. Check immunoglobulins, B2M, reticulocytes.   Continue to trend hemoglobin levels  C scope from 2021 reviewed, was recommended for repeat C scope in 3 years given colon polyps     Leukopenia  WBC 3.3 from 5   With mild neutropenia as well  Suspected from acute viral infection with Influenza & rhinovirus   Over, with 5% reported today. Hematology consulted, appreciate recs  Check peripheral smear/pathology review, flow cytometry   No acute reason for BMBx at this time, pending further workup   Follow CBC   Type 2 diabetes mellitus with hypoglycemia, without long-term current use of insulin ([facility])    Lab Results   Component Value Date    HGBA1C 7.0 (H) 12/07/2024     With hypoglycemia initially   Continue to hold oral diabetic medications  Discontinue Amaryl at discharge   Hypertension  Blood pressure is stable  Holding lisinopril in the setting of JOSE ELIAS & with borderline potassium levels   Hyperlipidemia  Lipid panel uncontrolled  Patient has reported reaction  to statin in the past  Hyponatremia  Na 129 today   Appreciate nephrology recs - placed on fluid restriction  Trend       Influenza A (H1N1)  With concurrent rhinovirus  CXR NAD   Start Tamiflu 30mg q12  Supportive care  Elevated lipase  Lipase level at 425  Patient without abdominal pain  CT negative for evidence of acute pancreatitis  Lipase level now resolved    Depression, recurrent (HCC)  Patient reports that she has had depression since her   5 years ago  Has been on Zoloft 100 mg since that time  She feels like her depression has been persistent and worsening  Given poor sleep and poor appetite, willing to trial Remeron 7.5 mg at bedtime to see how she feels  Declines counseling referral on discharge    Hypothyroidism  TSH wnl  Neuropathy  Patient complains of neuropathy in her fingers bilaterally and her right foot  Notes this is progressively been worsening over the last year  Was seen by orthopedics 2024 diagnosed with bilateral carpal tunnel, recommend for EMG which is scheduled for 2025  Previously on gabapentin but reported worsening insomnia  Recently placed on Lyrica but states she had adverse GI rxn  MRI C-spine from May 2024 unremarkable for any significant stenosis but does show very mild nerve impingement between C5 and C6  Stage 3b chronic kidney disease (HCC)  Lab Results   Component Value Date    EGFR 35 2024    EGFR 42 2024    EGFR 37 2024    CREATININE 1.51 (H) 2024    CREATININE 1.30 2024    CREATININE 1.43 (H) 2024     Elevated total protein  As above SPEP/UPEP in process  FLC added today  Acidosis      VTE Pharmacologic Prophylaxis:   SCDs    Mobility:   Basic Mobility Inpatient Raw Score: 23  JH-HLM Goal: 7: Walk 25 feet or more  JH-HLM Achieved: 8: Walk 250 feet ot more  JH-HLM Goal achieved. Continue to encourage appropriate mobility.    Patient Centered Rounds: I performed bedside rounds with nursing staff today.   Discussions with  Specialists or Other Care Team Provider: case management, nephrology, hematology     Education and Discussions with Family / Patient: Updated  (daughter) via phone.    Current Length of Stay: 3 day(s)  Current Patient Status: Inpatient   Certification Statement: The patient will continue to require additional inpatient hospital stay due to further workup  Discharge Plan: Anticipate discharge in 24-48 hrs to home.    Code Status: Level 1 - Full Code    Subjective   Patient reports feeling improved from yesterday. Still with persistent cough    Objective :  Temp:  [99.2 °F (37.3 °C)-101.1 °F (38.4 °C)] 99.2 °F (37.3 °C)  HR:  [67-81] 67  BP: (107-143)/(45-66) 127/53  Resp:  [18-20] 18  SpO2:  [89 %-92 %] 90 %  O2 Device: None (Room air)    Body mass index is 29.79 kg/m².     Input and Output Summary (last 24 hours):     Intake/Output Summary (Last 24 hours) at 12/23/2024 1443  Last data filed at 12/23/2024 1202  Gross per 24 hour   Intake 315 ml   Output 675 ml   Net -360 ml       Physical Exam  HENT:      Head: Normocephalic and atraumatic.   Cardiovascular:      Rate and Rhythm: Normal rate and regular rhythm.   Pulmonary:      Effort: Pulmonary effort is normal.      Breath sounds: Normal breath sounds. No wheezing or rales.   Abdominal:      General: Abdomen is flat. Bowel sounds are normal. There is no distension.      Palpations: Abdomen is soft.      Tenderness: There is no abdominal tenderness.   Skin:     General: Skin is warm and dry.   Neurological:      General: No focal deficit present.      Mental Status: She is alert.           Lines/Drains:              Lab Results: I have reviewed the following results:   Results from last 7 days   Lab Units 12/23/24  0450 12/22/24  0908   WBC Thousand/uL 3.24* 3.32*   HEMOGLOBIN g/dL 8.0* 6.9*   HEMATOCRIT % 24.6* 22.0*   PLATELETS Thousands/uL 158 191   SEGS PCT %  --  50   LYMPHO PCT % 51* 36   MONO PCT % 5 10   EOS PCT % 0 2     Results from last 7  days   Lab Units 12/23/24  0450   SODIUM mmol/L 129*   POTASSIUM mmol/L 4.8   CHLORIDE mmol/L 101   CO2 mmol/L 19*   BUN mg/dL 26*   CREATININE mg/dL 1.51*   ANION GAP mmol/L 9   CALCIUM mg/dL 8.4   ALBUMIN g/dL 2.4*   TOTAL BILIRUBIN mg/dL 0.28   ALK PHOS U/L 44   ALT U/L 10   AST U/L 9*   GLUCOSE RANDOM mg/dL 82     Results from last 7 days   Lab Units 12/20/24  0501   INR  1.16     Results from last 7 days   Lab Units 12/23/24  1159 12/23/24  0715 12/22/24  2121 12/22/24  1612 12/22/24  1050 12/22/24  0742 12/21/24  2052 12/21/24  1554 12/21/24  1128 12/21/24  0716 12/20/24  2055 12/20/24  1533   POC GLUCOSE mg/dl 120 93 90 107 92 95 103 101 116 95 87 102               Recent Cultures (last 7 days):         Imaging Results Review: I reviewed radiology reports from this admission including: chest xray.  Other Study Results Review: No additional pertinent studies reviewed.    Last 24 Hours Medication List:     Current Facility-Administered Medications:     acetaminophen (TYLENOL) tablet 650 mg, Q6H PRN    albuterol inhalation solution 2.5 mg, Q6H PRN    benzonatate (TESSALON PERLES) capsule 100 mg, TID PRN    Cholecalciferol (VITAMIN D3) tablet 2,000 Units, Daily    lidocaine (LIDODERM) 5 % patch 2 patch, Daily    mirtazapine (REMERON) tablet 7.5 mg, HS    nicotine (NICODERM CQ) 7 mg/24hr TD 24 hr patch 1 patch, Daily    ondansetron (ZOFRAN) injection 4 mg, Q6H PRN    sertraline (ZOLOFT) tablet 100 mg, Daily    tiZANidine (ZANAFLEX) tablet 2 mg, TID PRN    Administrative Statements   Today, Patient Was Seen By: Angela Marsh PA-C      **Please Note: This note may have been constructed using a voice recognition system.**

## 2024-12-23 NOTE — ASSESSMENT & PLAN NOTE
Blood pressure is stable  Holding lisinopril in the setting of JOSE ELIAS & with borderline potassium levels

## 2024-12-23 NOTE — ASSESSMENT & PLAN NOTE
States she has been excessively hydrating.  Will place patient on fluid restriction check urine studies.  Agree with discontinuing volume expansion

## 2024-12-23 NOTE — ASSESSMENT & PLAN NOTE
Lab Results   Component Value Date    EGFR 35 12/23/2024    EGFR 42 12/22/2024    EGFR 37 12/21/2024    CREATININE 1.51 (H) 12/23/2024    CREATININE 1.30 12/22/2024    CREATININE 1.43 (H) 12/21/2024   Record review indicates baseline creatinine 1.2-1.4 mg/dL.  She has multiple kidney cysts and lesion on imaging.  Follow-up repeat imaging in the outpatient setting. Recommend follow-up with nephrology in office

## 2024-12-23 NOTE — ASSESSMENT & PLAN NOTE
Hemoglobin levels at 7.6 on admission, hemoglobins were within normal range around 12 but it has not been checked in 5 years  S/p 1 unit pRBC on 12/222  No evidence of acute bleeding. Pending stool occult blood.  Iron panel within normal limits, B12 & folate within normal limits  Hemolysis less likely- LDH 90, Tbili 0.28, haptoglobin pending. Reticulocytes count checked today.  Appreciate hematology consult with elevated total protein, concern for paraprotein. Serum electrophoresis & reflex immunofixation & FLC pending. Check immunoglobulins, B2M, reticulocytes.   Continue to trend hemoglobin levels  C scope from 2021 reviewed, was recommended for repeat C scope in 3 years given colon polyps

## 2024-12-23 NOTE — PLAN OF CARE
Problem: PHYSICAL THERAPY ADULT  Goal: Performs mobility at highest level of function for planned discharge setting.  See evaluation for individualized goals.  Description:   Outcome: Progressing  Note: Prognosis: Good  Problem List: Decreased endurance, Impaired balance, Decreased strength, Decreased mobility  Assessment: Pt. seen for PT treatment session this date with interventions consisting of  bed mobility, transfers and  gait training w/ emphasis on improving pt's functional activity tolerance. In comparison to previous session, Pt. With improvements  in activity tolerance.   Pt is in need of continued activity in PT to improve strength balance endurance mobility transfers and ambulation with return to maximize LOF. From PT/mobility standpoint, recommendation at time of d/c would be Level III: Minimal resource intensity in order to promote return to PLOF and independence.   The patient's AM-PAC Basic Mobility Inpatient Short Form Raw Score is 23. A Raw score of greater than 16 suggests the patient may benefit from discharge to home.  Please also refer to physical therapist recommendation for safe DC planning.        Rehab Resource Intensity Level, PT: III (Minimum Resource Intensity)    See flowsheet documentation for full assessment.

## 2024-12-23 NOTE — ASSESSMENT & PLAN NOTE
WBC 3.3 from 5   With mild neutropenia as well  Suspected from acute viral infection with Influenza & rhinovirus   Over, with 5% reported today. Hematology consulted, appreciate recs  Check peripheral smear/pathology review, flow cytometry   No acute reason for BMBx at this time, pending further workup   Follow CBC

## 2024-12-23 NOTE — PROGRESS NOTES
NEPHROLOGY PROGRESS NOTE   Nidia MILTON Yakelin 69 y.o. female MRN: [id number]  Unit/Bed#: 417-01 Encounter: [id number]      Brief History of Admission - 70 yo female with CKD 3B, T2DM, HTN, HLD, osteoporosis who presented 12/19 for N/V/fatigue found to have anemia, leukopenia, following depletion.  Nephrology following along for elevated creatinine    Assessment & Plan  JOSE ELIAS (acute kidney injury) ([facility])  Record review indicates baseline creatinine around 1.2-1.4 mg/dL.  Creatinine slightly elevated today compared to yesterday.  She examines stable in no extremis.  Agree with discontinuing volume expansion and monitoring response.  She had urinary retention on 12/21-check PVR today.  Continue to hold lisinopril and metformin  Stage 3b chronic kidney disease ([facility])  Lab Results   Component Value Date    EGFR 35 12/23/2024    EGFR 42 12/22/2024    EGFR 37 12/21/2024    CREATININE 1.51 (H) 12/23/2024    CREATININE 1.30 12/22/2024    CREATININE 1.43 (H) 12/21/2024   Record review indicates baseline creatinine 1.2-1.4 mg/dL.  She has multiple kidney cysts and lesion on imaging.  Follow-up repeat imaging in the outpatient setting. Recommend follow-up with nephrology in office  Hyponatremia  States she has been excessively hydrating.  Will place patient on fluid restriction check urine studies.  Agree with discontinuing volume expansion  Hypertension  Continue to hold lisinopril    Anemia  PRBC transfusion and workup per primary team.   Type 2 diabetes mellitus with hypoglycemia, without long-term current use of insulin ([facility])  Lab Results   Component Value Date    HGBA1C 7.0 (H) 12/07/2024       Recent Labs     12/22/24  1050 12/22/24  1612 12/22/24  2121 12/23/24  0715   POCGLU 92 107 90 93       Blood Sugar Average: Last 72 hrs:  (P) 98    Elevated total protein   SPEP/UPEP in process.  Will check FLC, urine protein quantification  Leukopenia  Workup per primary team/hematology  Acidosis  Suspect second to JOSE ELIAS/CKD plus saline.  Saline has been discontinued. Trend and add bicarb if indicated           SUBJECTIVE / 24H INTERVAL HISTORY:  Patient examined sitting upright in chair.  States she feels improved overall overall and is asking when she can go home    OBJECTIVE:  Current Weight: Weight - Scale: 69.2 kg (152 lb 8.9 oz)  Vitals:    12/23/24 0600 12/23/24 0614 12/23/24 0717 12/23/24 0717   BP:  (!) 107/45  127/53   BP Location:  Right arm     Pulse:  67  67   Resp:  18  18   Temp:  100.3 °F (37.9 °C)  99.2 °F (37.3 °C)   TempSrc:  Oral     SpO2:  (!) 89% 92% 90%   Weight: 69.2 kg (152 lb 8.9 oz)      Height:           Intake/Output Summary (Last 24 hours) at 12/23/2024 1055  Last data filed at 12/23/2024 0456  Gross per 24 hour   Intake 315 ml   Output 400 ml   Net -85 ml   Medications:    Current Facility-Administered Medications:     acetaminophen (TYLENOL) tablet 650 mg, 650 mg, Oral, Q6H PRN, Jairon Prescott PA-C, 650 mg at 12/23/24 0638    albuterol inhalation solution 2.5 mg, 2.5 mg, Nebulization, Q6H PRN, Al Marshall DO    benzonatate (TESSALON PERLES) capsule 100 mg, 100 mg, Oral, TID PRN, Angela Marsh PA-C    Cholecalciferol (VITAMIN D3) tablet 2,000 Units, 2,000 Units, Oral, Daily, Jairon Prescott PA-C, 2,000 Units at 12/23/24 0903    lidocaine (LIDODERM) 5 % patch 2 patch, 2 patch, Topical, Daily, Maddy Thomas PA-C, 2 patch at 12/23/24 0903    mirtazapine (REMERON) tablet 7.5 mg, 7.5 mg, Oral, HS, Maddy Thomas PA-C, 7.5 mg at 12/22/24 2157    nicotine (NICODERM CQ) 7 mg/24hr TD 24 hr patch 1 patch, 1 patch, Transdermal, Daily, Jairon Prescott PA-C, 1 patch at 12/23/24 0903    ondansetron (ZOFRAN) injection 4 mg, 4 mg, Intravenous, Q6H PRN, Jairon Prescott PA-C, 4 mg at 12/22/24 1829    sertraline (ZOLOFT) tablet 100 mg, 100 mg, Oral, Daily, Jairon Prescott PA-C, 100 mg at 12/23/24 0903    tiZANidine (ZANAFLEX) tablet 2 mg, 2 mg, Oral, TID PRN, Jairon Prescott PA-C    Laboratory Results:  Results from last 7 days   Lab Units  "12/23/24  0450 12/22/24  0908 12/22/24  0513 12/21/24  1006 12/21/24  0614 12/20/24  1352 12/20/24  0501 12/19/24  2259 12/19/24  1655   WBC Thousand/uL 3.24* 3.32* 3.48*  --  5.25  --  5.95  --  7.83   HEMOGLOBIN g/dL 8.0* 6.9* 6.9*  --  7.6*  --  7.5*  --  7.6*   HEMATOCRIT % 24.6* 22.0* 21.7*  --  24.2*  --  24.7*  --  24.7*   PLATELETS Thousands/uL 158 191 183  --  222  --  243  --  244   POTASSIUM mmol/L 4.8  --  4.9 5.0 5.5* 5.1 4.9 4.8 5.3   CHLORIDE mmol/L 101  --  107 106 108 104 107 102 103   CO2 mmol/L 19*  --  19* 23 20* 23 22 20* 17*   BUN mg/dL 26*  --  27* 32* 34* 35* 38* 43* 48*   CREATININE mg/dL 1.51*  --  1.30 1.43* 1.35* 1.59* 1.39* 1.42* 1.54*   CALCIUM mg/dL 8.4  --  8.3* 8.7 8.5 8.7 8.3* 8.3* 8.3*   MAGNESIUM mg/dL  --   --   --   --   --   --  2.1  --  1.9   PHOSPHORUS mg/dL  --   --   --   --   --   --  4.2*  --   --        Portions of the record may have been created with voice recognition software. Occasional wrong word or \"sound a like\" substitutions may have occurred due to the inherent limitations of voice recognition software. Read the chart carefully and recognize, using context, where substitutions have occurred.If you have any questions, please contact the dictating provider.            Historical Information   Past Medical History:   Diagnosis Date    Adrenal insufficiency (HCC)     Anxiety     Bacterial sepsis (HCC)     last assessed: 06/13/16    Cushing's syndrome (HCC)     last assessed: 05/29/16    Depression     last assessed: 01/02/18    Diabetes mellitus (HCC)     Hydradenitis     Hyperlipidemia     Hypertension     Positive FIT (fecal immunochemical test) 06/22/2018    Added automatically from request for surgery [id number]    Rib contusion, left, sequela 06/14/2022    Trigger finger, right middle finger 03/07/2022     Past Surgical History:   Procedure Laterality Date    ADRENALECTOMY  12/2002    CHOLECYSTECTOMY      HERNIA REPAIR      umbilical    OTHER SURGICAL HISTORY      " Injection of trigger joint    [facility] COLONOSCOPY FLX DX W/COLLJ SPEC WHEN PFRMD N/A 7/10/2018    Procedure: COLONOSCOPY;  Surgeon: Pako Nieves MD;  Location: [facility];  Service: Gastroenterology    TONSILLECTOMY AND ADENOIDECTOMY      TUBAL LIGATION       Social History   Social History     Substance and Sexual Activity   Alcohol Use Never     Social History     Substance and Sexual Activity   Drug Use No     Social History     Tobacco Use   Smoking Status Every Day    Current packs/day: 0.25    Average packs/day: 0.3 packs/day for 50.0 years (12.5 ttl pk-yrs)    Types: Cigarettes   Smokeless Tobacco Never       Family History:   Family History   Problem Relation Age of Onset    Breast cancer Mother         unknown age    No Known Problems Sister     No Known Problems Daughter     No Known Problems Daughter     No Known Problems Maternal Grandmother     No Known Problems Maternal Grandfather     No Known Problems Paternal Grandmother     No Known Problems Paternal Grandfather     No Known Problems Paternal Aunt     No Known Problems Paternal Aunt     No Known Problems Paternal Aunt     No Known Problems Half-Sister     No Known Problems Half-Sister     No Known Problems Half-Sister     No Known Problems Half-Sister     No Known Problems Half-Sister        Medications:  Pertinent medications were reviewed  Current Facility-Administered Medications   Medication Dose Route Frequency Provider Last Rate    acetaminophen  650 mg Oral Q6H PRN Jairon Prescott PA-C      albuterol  2.5 mg Nebulization Q6H PRN Al Marshall DO      benzonatate  100 mg Oral TID PRN Angela Marsh PA-C      Cholecalciferol  2,000 Units Oral Daily Jairon Prescott PA-C      lidocaine  2 patch Topical Daily Maddy Thomas PA-C      mirtazapine  7.5 mg Oral HS Maddy Thomas PA-C      nicotine  1 patch Transdermal Daily Jairon Prescott PA-C      ondansetron  4 mg Intravenous Q6H PRN Jairon Prescott PA-C      sertraline  100 mg Oral Daily Jairon Prescott  "MIGDALIA      tiZANidine  2 mg Oral TID PRN Jairon Perscott PA-C           Allergies   Allergen Reactions    Fosamax [Alendronate] Diarrhea    Paxil [Paroxetine Hcl] Rash         Vitals:   /53   Pulse 67   Temp 99.2 °F (37.3 °C)   Resp 18   Ht 5' (1.524 m)   Wt 69.2 kg (152 lb 8.9 oz)   SpO2 90%   BMI 29.79 kg/m²   Body mass index is 29.79 kg/m².  SpO2: 90 %,   SpO2 Activity: At Rest,   O2 Device: None (Room air)      Intake/Output Summary (Last 24 hours) at 12/23/2024 1055  Last data filed at 12/23/2024 0456  Gross per 24 hour   Intake 315 ml   Output 400 ml   Net -85 ml     Invasive Devices       Peripheral Intravenous Line  Duration             Peripheral IV 12/19/24 Right Antecubital 3 days                    Physical Exam  General: conscious, cooperative, in no acute distress  Eyes: conjunctivae pink, anicteric sclerae  ENT: lips and mucous membranes moist  Neck: supple, no JVD, no masses  Chest: diminished breath sounds bilaterally, no crackles, ronchus or wheezings scattered wheezing  CVS: S1 & S2, normal rate, regular rhythm  Abdomen: soft, non-tender, non-distended, normoactive bowel sounds  Extremities: trace edema of both legs  Skin: no rash  Neuro: awake, alert, oriented      Diagnostic Data:  Lab: I have personally reviewed pertinent lab results.,   CBC:  Results from last 7 days   Lab Units 12/23/24  0450   WBC Thousand/uL 3.24*   HEMOGLOBIN g/dL 8.0*   HEMATOCRIT % 24.6*   PLATELETS Thousands/uL 158      CMP:   Lab Results   Component Value Date    SODIUM 129 (L) 12/23/2024    K 4.8 12/23/2024     12/23/2024    CO2 19 (L) 12/23/2024    BUN 26 (H) 12/23/2024    CREATININE 1.51 (H) 12/23/2024    CALCIUM 8.4 12/23/2024    AST 9 (L) 12/23/2024    ALT 10 12/23/2024    ALKPHOS 44 12/23/2024    EGFR 35 12/23/2024   ,   PT/INR: No results found for: \"PT\", \"INR\",   Magnesium: No components found for: \"MAG\",  Phosphorous: No results found for: " "\"PHOS\"    Microbiology:  @[facility],(urinecx:7)@        NATHAN Rios    Portions of the record may have been created with voice recognition software. Occasional wrong word or \"sound a like\" substitutions may have occurred due to the inherent limitations of voice recognition software. Read the chart carefully and recognize, using context, where substitutions have occurred.  "

## 2024-12-23 NOTE — ASSESSMENT & PLAN NOTE
Lab Results   Component Value Date    HGBA1C 7.0 (H) 12/07/2024       Recent Labs     12/22/24  1050 12/22/24  1612 12/22/24  2121 12/23/24  0715   POCGLU 92 107 90 93       Blood Sugar Average: Last 72 hrs:  (P) 98

## 2024-12-23 NOTE — ASSESSMENT & PLAN NOTE
Suspect second to JOSE ELIAS/CKD plus saline. Saline has been discontinued. Trend and add bicarb if indicated

## 2024-12-23 NOTE — PHYSICAL THERAPY NOTE
PHYSICAL THERAPY NOTE          Patient Name: Nidia Hamlin  Today's Date: 12/23/2024 12/23/24 0821   PT Last Visit   PT Visit Date 12/23/24   Note Type   Note Type Treatment   Pain Assessment   Pain Assessment Tool 0-10   Pain Score No Pain   Restrictions/Precautions   Weight Bearing Precautions Per Order No   Other Precautions Chair Alarm;Bed Alarm   General   Chart Reviewed Yes   Response to Previous Treatment Patient reporting fatigue but able to participate.   Cognition   Overall Cognitive Status WFL   Arousal/Participation Alert;Cooperative   Attention Within functional limits   Orientation Level Oriented X4   Memory Within functional limits   Following Commands Follows all commands and directions without difficulty   Subjective   Subjective Agreeable to therapy   Bed Mobility   Supine to Sit 6  Modified independent   Additional items Bedrails   Additional Comments Sat EOB with Good sitting balance. No c/o dizziness with transitional movement.   Transfers   Sit to Stand 7  Independent   Stand to Sit 7  Independent   Additional Comments RW used   Ambulation/Elevation   Gait pattern   (decreased gait speed)   Gait Assistance 5  Supervision   Assistive Device Rolling walker   Distance 250'   Stair Management Assistance 5  Supervision   Additional items Verbal cues   Stair Management Technique Two rails;Alternating pattern   Number of Stairs 10   Balance   Static Sitting Good   Dynamic Sitting Good   Static Standing Fair   Dynamic Standing Fair   Ambulatory Fair  (RW)   Endurance Deficit   Endurance Deficit Yes   Activity Tolerance   Activity Tolerance Patient tolerated treatment well   Assessment   Prognosis Good   Problem List Decreased endurance;Impaired balance;Decreased strength;Decreased mobility   Assessment Pt. seen for PT treatment session this date with interventions consisting of  bed mobility, transfers and  gait training  w/ emphasis on improving pt's functional activity tolerance. In comparison to previous session, Pt. With improvements  in activity tolerance.   Pt is in need of continued activity in PT to improve strength balance endurance mobility transfers and ambulation with return to maximize LOF. From PT/mobility standpoint, recommendation at time of d/c would be Level III: Minimal resource intensity in order to promote return to PLOF and independence.   The patient's AM-PAC Basic Mobility Inpatient Short Form Raw Score is 23. A Raw score of greater than 16 suggests the patient may benefit from discharge to home.  Please also refer to physical therapist recommendation for safe DC planning.   Goals   LTG Expiration Date 01/04/25   PT Treatment Day 1   Plan   Treatment/Interventions Functional transfer training;LE strengthening/ROM;Elevations;Therapeutic exercise;Endurance training;Bed mobility;Gait training   Progress Progressing toward goals   PT Frequency 3-5x/wk   Discharge Recommendation   Rehab Resource Intensity Level, PT III (Minimum Resource Intensity)   AM-PAC Basic Mobility Inpatient   Turning in Flat Bed Without Bedrails 4   Lying on Back to Sitting on Edge of Flat Bed Without Bedrails 4   Moving Bed to Chair 4   Standing Up From Chair Using Arms 4   Walk in Room 4   Climb 3-5 Stairs With Railing 3   Basic Mobility Inpatient Raw Score 23   Basic Mobility Standardized Score 50.88   [facility] Highest Level Of Mobility   JH-HLM Goal 7: Walk 25 feet or more   JH-HLM Achieved 8: Walk 250 feet ot more   Education   Education Provided Mobility training   Patient Demonstrates verbal understanding   End of Consult   Patient Position at End of Consult Bedside chair;Bed/Chair alarm activated;All needs within reach   End of Consult Comments discussed POC with PT

## 2024-12-23 NOTE — RESPIRATORY THERAPY NOTE
12/23/24 0799   Respiratory Assessment   Resp Comments Patient received on room air, she is sitting up , no in any respiratory distress at this time   Oxygen Therapy/Pulse Ox   O2 Device None (Room air)   O2 Therapy Room air   SpO2 92 %   SpO2 Activity At Rest   $ Pulse Oximetry Spot Check Charge Completed

## 2024-12-23 NOTE — ASSESSMENT & PLAN NOTE
Record review indicates baseline creatinine around 1.2-1.4 mg/dL.  Creatinine slightly elevated today compared to yesterday.  She examines stable in no extremis.  Agree with discontinuing volume expansion and monitoring response.  She had urinary retention on 12/21-check PVR today.  Continue to hold lisinopril and metformin

## 2024-12-23 NOTE — CONSULTS
E-Consult  Nidia Hamlin 69 y.o. female MRN: [id number]  Unit/Bed#: 417-01 Encounter: [id number]      Reason for Consult / Principal Problem:      Leucopenia/Neutropenia with active infection    5% Blasts peripheral blood     Anemia with markedly elevated total protein         Consulting Provider:     Roberta Pablo MD PhD     Requesting Provider:     Hospitalist            ASSESSMENT:          Nidia Hamlin is a 69 y.o. female with a PMH of diabetes, hypertension, hyperlipidemia who presents to the emergency room 12/19/2024 for evaluation of complaint of nausea and vomiting.     Found to be anemic/leukopenic on admission with Hgb 7.6 WBC 7.83 and plts 244  ANC 3440 with no blasts     Based on N/V symptoms, Na 129 BUN 48/Cr 1.54 lipase 425     Cr not much improved with hydration, Cr today     Found to have influenza A and rhinovirus    Labs:     Labs 12/20/2024 WBC 5.95 Hgb 7.5  plts 243 ANC 2660    12/21/2024 WBC 5.3 Hgb 7.6 plts 222 AN 2580     12/22/2024  WBC 3.5 Hgb 6.9 plts 183  ANC 1720     12/23/2024 with WBC 3.2 Hgb 8.0 plts 158 ANC 1260 now with 5% blasts by machine    RECOMMENDATIONS:      Leucopenia     WBC today 3.24   ANC 1260     5% blasts reported but unclear if per smear/hand count/machine count    Trend of WBC decreasing which may be due to acute infection     Please have smear made/pathology review as can see blasts with stressed marrow but if this was per tesha counter, may be abnormal forms, not necessarily blasts     Need to review actual smear and do blast count      Please  also get flow cytometry     Not acute reason to do bone marrow yet pending further workup    As below, concern for paraprotein based on        Anemia    Hgb 12/23/2024 after 1 unit was 8.0 MCV 93     Workup anemia with total iron 131 ferritin 154 % sat 41     Please get B12/folate although not appearing --MCV was 100 on admission    Concern TP 9.5 would suggesting looking for paraprotein    Please get SPEP/reflex  immunofixation, immunoglobulins, B2M, FLC,  reticulocytes    Less likely hemolysis, TB 0.28 LDH 90 but complete this workup with haptoglobin/retic       Renal    Cr worse today at 1.51 with Na 129-nephrology  following would check urine lytes    Hypoalbuminemia     Alb has been chronically low with rising total protein    May be related to current infectino     Would have nutrition see     CT AP with no evidence of maligancy on admission;     Recent mammogram negative    Consider CT chest  as concerned with degree of hypoalbuminemia         HPI admission  12/19/2024 admission      Nidia KARINE Yakelin is a 69 y.o. female with a PMH of diabetes, hypertension, hyperlipidemia who presents to the emergency room for evaluation of complaint of nausea and vomiting.  Patient reports that she generally does not feel well that her symptoms started last night.  Patient states that she initially attributed her symptoms to her starting Lyrica for the first time..  Patient also reports feeling fatigued.  She also states that she felt a bit dizzy and lightheaded however the lightheadedness and dizziness has since resolved.  Patient denies having any chest pain any shortness of breath any cough any fever, chills, diarrhea, constipation, abdominal pain, urinary symptoms..     Workup in the emergency room included labs significant for sodium of 129, carbon dioxide of 17, BUN of 48, creatinine of 1.54, initial glucose of 44, lipase of 425, hemoglobin of 7.6.  COVID-19/flu/RSV negative.  Chest x-ray completed official report pending.  CT abdomen and pelvis completed no acute findings.  While in the emergency room patient treated with dextrose 10% follows 250 mL, Pepcid 20 mg IV, Isolyte 1 L, Zofran 4 mg IV.               Total time spent 40 minutes, >50% of the total time devoted to medical consultative verbal/EMR discussion between providers. Written report will be generated in the EMR.

## 2024-12-23 NOTE — ASSESSMENT & PLAN NOTE
Appreciate nephrology consult  S/p IV fluids. Continue to monitor off   Lisinopril remains on hold  Repeat urine studies ordered   Urine & serum protein electrophoresis pending  Urinary retention protocol

## 2024-12-23 NOTE — OCCUPATIONAL THERAPY NOTE
Occupational Therapy Progress Note     Patient Name: Nidia Hamlin  Today's Date: 12/23/2024  Problem List  Principal Problem:    JOSE ELIAS (acute kidney injury) (HCC)  Active Problems:    Elevated total protein    Hypertension    Depression, recurrent (HCC)    Hyperlipidemia    Hypothyroidism    Stage 3b chronic kidney disease (HCC)    Neuropathy    Anemia    Hyponatremia    Type 2 diabetes mellitus with hypoglycemia, without long-term current use of insulin (HCC)    Elevated lipase    Leukopenia    Acidosis       12/23/24 0845   OT Last Visit   OT Visit Date 12/23/24   Note Type   Note Type Treatment   Pain Assessment   Pain Assessment Tool 0-10   Pain Score No Pain   Restrictions/Precautions   Weight Bearing Precautions Per Order No   Other Precautions Chair Alarm;Fall Risk;Droplet precautions   ADL   Where Assessed Edge of bed   LB Dressing Assistance 5  Supervision/Setup   LB Dressing Deficit Setup;Verbal cueing;Increased time to complete   LB Dressing Comments Donning and doffing B  socks   Bed Mobility   Supine to Sit 6  Modified independent   Transfers   Sit to Stand 7  Independent   Additional items Increased time required   Stand to Sit 7  Independent   Additional items Increased time required   Additional Comments Use of RW   Functional Mobility   Functional Mobility 5  Supervision   Additional Comments Pt completed standing balance/functional mobility around room and hallway with use of RW and S with no LOB throughout, patient completed 10 steps with use of B hand rails and S with no LOB throughout.   Additional items Rolling walker   Cognition   Overall Cognitive Status WFL   Arousal/Participation Alert;Responsive;Arousable;Cooperative   Attention Within functional limits   Orientation Level Oriented X4   Memory Within functional limits   Following Commands Follows all commands and directions without difficulty   Activity Tolerance   Activity Tolerance Patient tolerated treatment well   Assessment    Assessment Patient participated in Skilled OT session this date with interventions consisting of ADL re training with the use of correct body mechnaics and  therapeutic activities to: increase activity tolerance . Patient agreeable to OT treatment session, upon arrival patient was found supine in bed. Supine to sit txfrs MI, sit to stand txfrs from bed I, standing balance/functional mobility around room and hallway with use of RW and S, 10 steps with use of B hand rails and S, to increase posture, dynamic standing balance, balance during self cares, use of BUE. Pt completed LB dressing S. Patient requiring verbal cues for safety and verbal cues for correct technique. Patient continues to be functioning below baseline level, occupational performance remains limited secondary to factors listed above and increased risk for falls and injury. The patient's raw score on the AM-PAC Daily Activity inpatient short form is 21, standardized score is 44.27, greater than 39.4. Patients at this level are likely to benefit from discharge to home. Please refer to the recommendation of the Occupational Therapist for safe discharge planning, which is Minimum Resource Level 3.   Plan   Goal Expiration Date 01/04/25   OT Treatment Day 1   OT Frequency 3-5x/wk   Discharge Recommendation   Rehab Resource Intensity Level, OT III (Minimum Resource Intensity)   AM-PAC Daily Activity Inpatient   Lower Body Dressing 3   Bathing 3   Toileting 3   Upper Body Dressing 4   Grooming 4   Eating 4   Daily Activity Raw Score 21   Daily Activity Standardized Score (Calc for Raw Score >=11) 44.27   AM-PAC Applied Cognition Inpatient   Following a Speech/Presentation 4   Understanding Ordinary Conversation 4   Taking Medications 4   Remembering Where Things Are Placed or Put Away 4   Remembering List of 4-5 Errands 4   Taking Care of Complicated Tasks 4   Applied Cognition Raw Score 24   Applied Cognition Standardized Score 62.21       Pt left seated  in nadege chair with chair alarm on and all needs in reach.

## 2024-12-23 NOTE — ASSESSMENT & PLAN NOTE
Lab Results   Component Value Date    EGFR 35 12/23/2024    EGFR 42 12/22/2024    EGFR 37 12/21/2024    CREATININE 1.51 (H) 12/23/2024    CREATININE 1.30 12/22/2024    CREATININE 1.43 (H) 12/21/2024

## 2024-12-24 ENCOUNTER — TELEPHONE (OUTPATIENT)
Dept: OTHER | Facility: HOSPITAL | Age: 69
End: 2024-12-24

## 2024-12-24 ENCOUNTER — TRANSITIONAL CARE MANAGEMENT (OUTPATIENT)
Dept: FAMILY MEDICINE CLINIC | Facility: CLINIC | Age: 69
End: 2024-12-24

## 2024-12-24 VITALS
DIASTOLIC BLOOD PRESSURE: 56 MMHG | OXYGEN SATURATION: 93 % | HEART RATE: 66 BPM | RESPIRATION RATE: 17 BRPM | SYSTOLIC BLOOD PRESSURE: 110 MMHG | TEMPERATURE: 99.2 F | HEIGHT: 60 IN | BODY MASS INDEX: 29.95 KG/M2 | WEIGHT: 152.56 LBS

## 2024-12-24 PROBLEM — C90.00 MYELOMA (HCC): Status: ACTIVE | Noted: 2024-12-24

## 2024-12-24 LAB
ANION GAP SERPL CALCULATED.3IONS-SCNC: 9 MMOL/L (ref 4–13)
BASOPHILS # BLD MANUAL: 0 THOUSAND/UL (ref 0–0.1)
BASOPHILS # BLD MANUAL: 0 THOUSAND/UL (ref 0–0.1)
BASOPHILS NFR MAR MANUAL: 0 % (ref 0–1)
BASOPHILS NFR MAR MANUAL: 0 % (ref 0–1)
BLASTS ABSOLUTE COUNT: 0.16 THOUSAND/UL (ref 0–0)
BUN SERPL-MCNC: 39 MG/DL (ref 5–25)
CALCIUM SERPL-MCNC: 8.4 MG/DL (ref 8.4–10.2)
CHLORIDE SERPL-SCNC: 101 MMOL/L (ref 96–108)
CO2 SERPL-SCNC: 20 MMOL/L (ref 21–32)
CREAT SERPL-MCNC: 1.58 MG/DL (ref 0.6–1.3)
CREAT UR-MCNC: 80.4 MG/DL
EOSINOPHIL # BLD MANUAL: 0 THOUSAND/UL (ref 0–0.4)
EOSINOPHIL # BLD MANUAL: 0.03 THOUSAND/UL (ref 0–0.4)
EOSINOPHIL NFR BLD MANUAL: 1 % (ref 0–6)
EOSINOPHIL NFR BLD MANUAL: 1 % (ref 0–6)
ERYTHROCYTE [DISTWIDTH] IN BLOOD BY AUTOMATED COUNT: 17.2 % (ref 11.6–15.1)
GFR SERPL CREATININE-BSD FRML MDRD: 33 ML/MIN/1.73SQ M
GLUCOSE SERPL-MCNC: 92 MG/DL (ref 65–140)
GLUCOSE SERPL-MCNC: 92 MG/DL (ref 65–140)
GLUCOSE SERPL-MCNC: 95 MG/DL (ref 65–140)
HAPTOGLOB SERPL-MCNC: 185 MG/DL (ref 37–355)
HCT VFR BLD AUTO: 27.5 % (ref 34.8–46.1)
HGB BLD-MCNC: 8.8 G/DL (ref 11.5–15.4)
HYPERCHROMIA BLD QL SMEAR: PRESENT
HYPERCHROMIA BLD QL SMEAR: PRESENT
KAPPA LC FREE SER-MCNC: 8.8 MG/L (ref 3.3–19.4)
KAPPA LC FREE/LAMBDA FREE SER: 0.01 {RATIO} (ref 0.26–1.65)
LAMBDA LC FREE SERPL-MCNC: 904.2 MG/L (ref 5.7–26.3)
LYMPHOCYTES # BLD AUTO: 1.35 THOUSAND/UL (ref 0.6–4.47)
LYMPHOCYTES # BLD AUTO: 1.65 THOUSAND/UL (ref 0.6–4.47)
LYMPHOCYTES # BLD AUTO: 39 % (ref 14–44)
LYMPHOCYTES # BLD AUTO: 42 % (ref 14–44)
MCH RBC QN AUTO: 29.9 PG (ref 26.8–34.3)
MCHC RBC AUTO-ENTMCNC: 32 G/DL (ref 31.4–37.4)
MCV RBC AUTO: 94 FL (ref 82–98)
METAMYELOCYTES NFR BLD MANUAL: 1 % (ref 0–1)
MICROALBUMIN UR-MCNC: 638.1 MG/L
MICROALBUMIN/CREAT 24H UR: 794 MG/G CREATININE (ref 0–30)
MONOCYTES # BLD AUTO: 0.16 THOUSAND/UL (ref 0–1.22)
MONOCYTES # BLD AUTO: 0.23 THOUSAND/UL (ref 0–1.22)
MONOCYTES NFR BLD: 5 % (ref 4–12)
MONOCYTES NFR BLD: 7 % (ref 4–12)
NEUTROPHILS # BLD MANUAL: 1.26 THOUSAND/UL (ref 1.85–7.62)
NEUTROPHILS # BLD MANUAL: 1.61 THOUSAND/UL (ref 1.85–7.62)
NEUTS BAND NFR BLD MANUAL: 2 % (ref 0–8)
NEUTS SEG NFR BLD AUTO: 48 % (ref 43–75)
NEUTS SEG NFR BLD AUTO: 50 % (ref 43–75)
NRBC BLD AUTO-RTO: 1 /100 WBC (ref 0–2)
OSMOLALITY UR/SERPL-RTO: 294 MMOL/KG (ref 282–298)
PATHOLOGY REVIEW: YES
PLATELET # BLD AUTO: 152 THOUSANDS/UL (ref 149–390)
PLATELET BLD QL SMEAR: ABNORMAL
PLATELET BLD QL SMEAR: ADEQUATE
PLATELET CLUMP BLD QL SMEAR: PRESENT
PMV BLD AUTO: 9.3 FL (ref 8.9–12.7)
POLYCHROMASIA BLD QL SMEAR: PRESENT
POLYCHROMASIA BLD QL SMEAR: PRESENT
POTASSIUM SERPL-SCNC: 4.9 MMOL/L (ref 3.5–5.3)
RBC # BLD AUTO: 2.94 MILLION/UL (ref 3.81–5.12)
RBC MORPH BLD: PRESENT
RETICS # AUTO: NORMAL 10*3/UL (ref 14097–95744)
RETICS # CALC: 1.03 % (ref 0.37–1.87)
ROULEAUX BLD QL SMEAR: PRESENT
SODIUM SERPL-SCNC: 130 MMOL/L (ref 135–147)
VARIANT LYMPHS # BLD AUTO: 4 %
WBC # BLD AUTO: 3.22 THOUSAND/UL (ref 4.31–10.16)

## 2024-12-24 PROCEDURE — 85007 BL SMEAR W/DIFF WBC COUNT: CPT

## 2024-12-24 PROCEDURE — 99239 HOSP IP/OBS DSCHRG MGMT >30: CPT

## 2024-12-24 PROCEDURE — 85027 COMPLETE CBC AUTOMATED: CPT

## 2024-12-24 PROCEDURE — 99232 SBSQ HOSP IP/OBS MODERATE 35: CPT

## 2024-12-24 PROCEDURE — 85045 AUTOMATED RETICULOCYTE COUNT: CPT

## 2024-12-24 PROCEDURE — 82787 IGG 1 2 3 OR 4 EACH: CPT

## 2024-12-24 PROCEDURE — 82784 ASSAY IGA/IGD/IGG/IGM EACH: CPT

## 2024-12-24 PROCEDURE — 82948 REAGENT STRIP/BLOOD GLUCOSE: CPT

## 2024-12-24 PROCEDURE — 80048 BASIC METABOLIC PNL TOTAL CA: CPT | Performed by: NURSE PRACTITIONER

## 2024-12-24 PROCEDURE — 94760 N-INVAS EAR/PLS OXIMETRY 1: CPT

## 2024-12-24 RX ORDER — GABAPENTIN 100 MG/1
100 CAPSULE ORAL 3 TIMES DAILY
Qty: 90 CAPSULE | Refills: 0 | Status: SHIPPED | OUTPATIENT
Start: 2024-12-24

## 2024-12-24 RX ORDER — MIRTAZAPINE 7.5 MG/1
7.5 TABLET, FILM COATED ORAL
Qty: 30 TABLET | Refills: 0 | Status: SHIPPED | OUTPATIENT
Start: 2024-12-24

## 2024-12-24 RX ORDER — OSELTAMIVIR PHOSPHATE 30 MG/1
30 CAPSULE ORAL EVERY 12 HOURS SCHEDULED
Qty: 8 CAPSULE | Refills: 0 | Status: SHIPPED | OUTPATIENT
Start: 2024-12-24 | End: 2024-12-28

## 2024-12-24 NOTE — ASSESSMENT & PLAN NOTE
Lab Results   Component Value Date    HGBA1C 7.0 (H) 12/07/2024       Recent Labs     12/23/24  1605 12/23/24  2131 12/24/24  0701 12/24/24  1049   POCGLU 88 96 92 92       Blood Sugar Average: Last 72 hrs:  (P) [id number]

## 2024-12-24 NOTE — ASSESSMENT & PLAN NOTE
Continue Zoloft 100mg   Added Remeron 7.5mg hs this admission  Declines counseling referral on discharge

## 2024-12-24 NOTE — ASSESSMENT & PLAN NOTE
Appreciate nephrology consult  S/p IV fluids.   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  For outpatient follow-up with hematology for further workup of suspected plasma cell neoplasm

## 2024-12-24 NOTE — ASSESSMENT & PLAN NOTE
Lab Results   Component Value Date    EGFR 33 12/24/2024    EGFR 34 12/23/2024    EGFR 35 12/23/2024    CREATININE 1.58 (H) 12/24/2024    CREATININE 1.55 (H) 12/23/2024    CREATININE 1.51 (H) 12/23/2024   Record review indicates baseline creatinine 1.2-1.4 mg/dL.  She has multiple kidney cysts and lesion on imaging.  Follow-up dedicated renal ultrasound to follow-up on renal cysts either as inpatient or outpatient.

## 2024-12-24 NOTE — ASSESSMENT & PLAN NOTE
Patient should follow-up with hematology oncology.  They are considering bone marrow biopsy at this time however patient would like to defer for now.  Patient is status post 1 unit of packed red blood cells this admission

## 2024-12-24 NOTE — DISCHARGE SUMMARY
Discharge Summary - Hospitalist   Name: Nidia Hamlin 69 y.o. female I MRN: [id number]  Unit/Bed#: 417-01 I Date of Admission: 12/19/2024   Date of Service: 12/24/2024 I Hospital Day: 4     Assessment & Plan  JOSE ELIAS (acute kidney injury) (HCC)  Appreciate nephrology consult  S/p IV fluids.   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  For outpatient follow-up with hematology for further workup of suspected plasma cell neoplasm    Anemia  Hemoglobin levels at 7.6 on admission, hemoglobins were within normal range around 12 but it has not been checked in 5 years  S/p 1 unit pRBC on 12/222  No evidence of acute bleeding.  Iron panel within normal limits, B12 & folate within normal limits  Hemolysis workup negative.  Appreciate hematology consult, workup concerning for multiple myeloma. For outpatient follow-up with hematology for further testing and treatment. Outpatient bone marrow biopsy with IR ordered. Inpatient bone marrow biopsy offered & patient/family declined at this time   C scope from 2021 reviewed, was recommended for repeat C scope in 3 years given colon polyps     Leukopenia  Mild leukopenia   With mild neutropenia as well  Suspected from acute viral infection with Influenza & rhinovirus   Question of blasts in cell count, peripheral smear obtained and negative for blasts. See below for full path slide report  Flow cytometry pending at time of discharge   Appreciate hematology consult.   Patient/family declined inpatient BMBx. Outpatient BMBx ordered & referral placed to hematology to intends to see patient 2 weeks after BMBx to discuss results & next steps.      Path slide, peripheral smear:  No evidence of circulating blasts. Atypical plasmacytoid lymphocytes concerning for circulating plasma cells 3-4% of total cellularity; flow cytometry is pending and will be  resulted as an addendum.     WBC: Mild neutropenia, atypical lymphocytes, abnormal plasmacytoid forms, see comment.  RBC: Normochromic and normocytic anemia with anisopoikilocytosis and nucleated red blood cells.  PLT: Within normal limits.     Comment: The patient's presentation for infection with cytopenias is noted. The current peripheral blood show evidence of an atypical lymphocyte population raising the possibility of circulating plasma cells. There is no evidence of circulating blasts in the reviewed material. Of note recent SPEP and immunofixation are positive for a monoclonal gammopathy, further work-up to evaluate for plasma cell neoplasm is recommended.      Type 2 diabetes mellitus with hypoglycemia, without long-term current use of insulin ([facility])    Lab Results   Component Value Date    HGBA1C 7.0 (H) 12/07/2024     With hypoglycemia initially since resolved  Discontinue Amaryl  Continue metformin  Hypertension  Blood pressure is stable  Discontinue lisinopril at discharge with borderline hyperkalemia & JOSE ELIAS  Outpatient follow-up with PCP & nephrology   Hyperlipidemia  Lipid panel uncontrolled  Patient has reported reaction to statin in the past  Hyponatremia  With pseudohyponatremia due to paraproteinemia     Influenza A (H1N1)  With concurrent rhinovirus  CXR NAD   Continue Tamiflu 30mg q12 x 5 days  Elevated lipase  Lipase level at 425  Patient without abdominal pain  CT negative for evidence of acute pancreatitis  Lipase level now resolved    Depression, recurrent (HCC)  Continue Zoloft 100mg   Added Remeron 7.5mg hs this admission  Declines counseling referral on discharge    Hypothyroidism  TSH wnl  Neuropathy  Patient complains of neuropathy in her fingers bilaterally and her right foot  Notes this is progressively been worsening over the last year  Was seen by orthopedics 11/2024 diagnosed with bilateral carpal tunnel, recommend for EMG which is scheduled for 1/27/2025  Requested to restart  gabapentin. Continue 100mg tid  Discontinue Lyrica   MRI C-spine from May 2024 unremarkable for any significant stenosis but does show very mild nerve impingement between C5 and C6  Stage 3b chronic kidney disease (HCC)  Lab Results   Component Value Date    EGFR 33 12/24/2024    EGFR 34 12/23/2024    EGFR 35 12/23/2024    CREATININE 1.58 (H) 12/24/2024    CREATININE 1.55 (H) 12/23/2024    CREATININE 1.51 (H) 12/23/2024     Elevated total protein  SPEP  Outpatient follow-up with hematology  Bone marrow biopsy ordered. Patient/family declined inpatient BMBx at this time  Myeloma (HCC)  Outpatient follow-up with hematology  Outpatient bone marrow biopsy ordered      Medical Problems       Resolved Problems  Date Reviewed: 12/19/2024   None       Discharging Physician / Practitioner: Angela Marsh PA-C  PCP: Tasha Palmer DO  Admission Date:   Admission Orders (From admission, onward)       Ordered        12/20/24 1527  INPATIENT ADMISSION  Once            12/19/24 2038  Place in Observation  Once                          Discharge Date: 12/24/2024    Consultations During Hospital Stay:  Nephrology  Hematology   Case management  PT/OT    Procedures Performed:   Path slide review: No evidence of circulating blasts. Atypical plasmacytoid lymphocytes concerning for circulating plasma cells 3-4% of total cellularity; flow cytometry is pending and will be resulted as an addendum.     WBC: Mild neutropenia, atypical lymphocytes, abnormal plasmacytoid forms, see comment.  RBC: Normochromic and normocytic anemia with anisopoikilocytosis and nucleated red blood cells.  PLT: Within normal limits.     Comment: The patient's presentation for infection with cytopenias is noted. The current peripheral blood show evidence of an atypical lymphocyte population raising the possibility of circulating plasma cells. There is no evidence of circulating blasts in the reviewed material. Of note recent SPEP and immunofixation  are positive for a monoclonal gammopathy, further work-up to evaluate for plasma cell neoplasm is recommended.       Significant Findings / Test Results:   CT abd/pelvis: No identifiable acute abnormality to account for the patient's clinical presentation. Please refer to the report body for description of other incidental, chronic and/or benign findings.   CXR: No acute cardiopulmonary disease.   CXR:No acute consolidationLeft basilar density may be due to atelectasis, stable     Results Reviewed       Procedure Component Value Units Date/Time    UA w Reflex to Microscopic w Reflex to Culture [[id number]]  (Abnormal) Collected: 12/19/24 2250    Lab Status: Final result Specimen: Urine, Clean Catch Updated: 12/19/24 2307     Color, UA Colorless     Clarity, UA Clear     Specific Gravity, UA <1.005     pH, UA 5.5     Leukocytes, UA Negative     Nitrite, UA Negative     Protein, UA Negative mg/dl      Glucose, UA Negative mg/dl      Ketones, UA Negative mg/dl      Urobilinogen, UA <2.0 mg/dl      Bilirubin, UA Negative     Occult Blood, UA Small    HS Troponin I 2hr [[id number]] Collected: 12/19/24 1922    Lab Status: Final result Specimen: Blood from Arm, Right Updated: 12/19/24 2057     hs TnI 2hr <2 ng/L      Delta 2hr hsTnI --    Fingerstick Glucose (POCT) [[id number]]  (Normal) Collected: 12/19/24 1918    Lab Status: Final result Specimen: Blood Updated: 12/19/24 1919     POC Glucose 70 mg/dl     Comprehensive metabolic panel [[id number]]  (Abnormal) Collected: 12/19/24 1655    Lab Status: Final result Specimen: Blood from Arm, Right Updated: 12/19/24 1745     Sodium 129 mmol/L      Potassium 5.3 mmol/L      Chloride 103 mmol/L      CO2 17 mmol/L      ANION GAP 9 mmol/L      BUN 48 mg/dL      Creatinine 1.54 mg/dL      Glucose 44 mg/dL      Calcium 8.3 mg/dL      Corrected Calcium 9.5 mg/dL      AST 9 U/L      ALT 7 U/L      Alkaline Phosphatase 46 U/L      Total Protein 10.3 g/dL      Albumin 2.5 g/dL      Total  Bilirubin 0.18 mg/dL      eGFR 34 ml/min/1.73sq m     Narrative:      National Kid[employer] Disease Foundation guidelines for Chronic Kidney Disease (CKD):     Stage 1 with normal or high GFR (GFR > 90 mL/min/1.73 square meters)    Stage 2 Mild CKD (GFR = 60-89 mL/min/1.73 square meters)    Stage 3A Moderate CKD (GFR = 45-59 mL/min/1.73 square meters)    Stage 3B Moderate CKD (GFR = 30-44 mL/min/1.73 square meters)    Stage 4 Severe CKD (GFR = 15-29 mL/min/1.73 square meters)    Stage 5 End Stage CKD (GFR <15 mL/min/1.73 square meters)  Note: GFR calculation is accurate only with a steady state creatinine    HS Troponin 0hr (reflex protocol) [[id number]]  (Normal) Collected: 12/19/24 1655    Lab Status: Final result Specimen: Blood from Arm, Right Updated: 12/19/24 1744     hs TnI 0hr <2 ng/L     Lipase [[id number]]  (Abnormal) Collected: 12/19/24 1655    Lab Status: Final result Specimen: Blood from Arm, Right Updated: 12/19/24 1743     Lipase 425 u/L     Magnesium [[id number]]  (Normal) Collected: 12/19/24 1655    Lab Status: Final result Specimen: Blood from Arm, Right Updated: 12/19/24 1743     Magnesium 1.9 mg/dL     FLU/COVID Rapid Antigen (30 min. TAT) - Preferred screening test in ED [[id number]]  (Normal) Collected: 12/19/24 1655    Lab Status: Final result Specimen: Nares from Nose Updated: 12/19/24 1742     SARS COV Rapid Antigen Negative     Influenza A Rapid Antigen Negative     Influenza B Rapid Antigen Negative    Narrative:      This test has been performed using the [employer] Peggy 2 FLU+SARS Antigen test under the Emergency Use Authorization (EUA). This test has been validated by the  and verified by the performing laboratory. The Peggy uses lateral flow immunofluorescent sandwich assay to detect SARS-COV, Influenza A and Influenza B Antigen.     The Quidel Peggy 2 SARS Antigen test does not differentiate between SARS-CoV and SARS-CoV-2.     Negative results are presumptive and may be confirmed with  a molecular assay, if necessary, for patient management. Negative results do not rule out SARS-CoV-2 or influenza infection and should not be used as the sole basis for treatment or patient management decisions. A negative test result may occur if the level of antigen in a sample is below the limit of detection of this test.     Positive results are indicative of the presence of viral antigens, but do not rule out bacterial infection or co-infection with other viruses.     All test results should be used as an adjunct to clinical observations and other information available to the provider.    FOR PEDIATRIC PATIENTS - copy/paste COVID Guidelines URL to browser: [email or web address]    CBC and differential [[id number]]  (Abnormal) Collected: 12/19/24 1659    Lab Status: Final result Specimen: Blood from Arm, Right Updated: 12/19/24 1713     WBC 7.83 Thousand/uL      RBC 2.43 Million/uL      Hemoglobin 7.6 g/dL      Hematocrit 24.7 %       fL      MCH 31.3 pg      MCHC 30.8 g/dL      RDW 16.8 %      MPV 8.9 fL      Platelets 244 Thousands/uL      nRBC 0 /100 WBCs      Segmented % 45 %      Immature Grans % 1 %      Lymphocytes % 43 %      Monocytes % 9 %      Eosinophils Relative 2 %      Basophils Relative 0 %      Absolute Neutrophils 3.44 Thousands/µL      Absolute Immature Grans 0.07 Thousand/uL      Absolute Lymphocytes 3.39 Thousands/µL      Absolute Monocytes 0.72 Thousand/µL      Eosinophils Absolute 0.18 Thousand/µL      Basophils Absolute 0.03 Thousands/µL         Serum immunofixation shows monoclonal gammopathy identified as IgG lambda  IgG lambda free light chain 904.2  SPEP monoclonal gammopathy in the gamma region manifestation notified medical peak as IgG lambda    Incidental Findings:   none       Test Results Pending at Discharge (will require follow up):   Flow cytometry  Beta-2 microglobulin  IgG 1,2,3 and 4     Outpatient Tests  Requested:  Outpatient follow with hematology  Outpatient follow-up with nephrology  Outpatient bone marrow biopsy     Complications:  none    Reason for Admission: JOSE ELIAS    Hospital Course:   Nidia Hamlin is a 69 y.o. female patient who originally presented to the hospital on 12/19/2024 due to JOSE ELIAS and hypoglycemia. Nephrology was consulted & patients renal function stabilized. Nephrology arranging outpatient appt in 1-2 weeks for continued monitoring. Hypoglycemia resolved & Amaryl was discontinued. Patient developed cough over admission and tested positive for rhinovirus & Influenza A H1N1. She was given course of Tamiflu. She remained stable on room air and CXR was negative for pneumonia. During admission, patient found with new anemia since prior lab work in 2019 and underwent extensive workup that was consistent with suspected plasma cell neoplasm. Hematology was consulted. SPEP revealing monoclonal gammopathy in gamma region, peak identified as IgG alida. Inpatient bone marrow biopsy was offered and patient/family declined at this time as they would like patient to recover from influenza first. Outpatient bone marrow biopsy order placed & referral to hematology placed. Patient to follow-up with PCP within 1 week. She was discharge home in stable condition with home health care services.           Please see above list of diagnoses and related plan for additional information.     Condition at Discharge: stable    Discharge Day Visit / Exam:   Subjective:  patient reports feeling improved today. Still with intermittent cough. No chest pain. Fatigue improved. Persistent hand paresthesias   Vitals: Blood Pressure: 110/56 (12/24/24 0702)  Pulse: 66 (12/24/24 0702)  Temperature: 99.2 °F (37.3 °C) (12/24/24 0702)  Temp Source: Oral (12/23/24 0614)  Respirations: 17 (12/24/24 0702)  Height: 5' (152.4 cm) (12/19/24 2120)  Weight - Scale: 69.2 kg (152 lb 8.9 oz) (12/24/24 0600)  SpO2: 93 % (12/24/24 0740)  Physical  Exam  HENT:      Head: Normocephalic and atraumatic.   Cardiovascular:      Rate and Rhythm: Normal rate and regular rhythm.   Pulmonary:      Effort: Pulmonary effort is normal. No respiratory distress.      Breath sounds: Normal breath sounds.   Abdominal:      General: Abdomen is flat. Bowel sounds are normal. There is no distension.      Palpations: Abdomen is soft.      Tenderness: There is no abdominal tenderness.   Musculoskeletal:      Right lower leg: No edema.      Left lower leg: No edema.   Skin:     General: Skin is warm and dry.   Neurological:      General: No focal deficit present.      Mental Status: She is alert and oriented to person, place, and time.          Discussion with Family: Updated  (daughter) via phone.    Discharge instructions/Information to patient and family:   See after visit summary for information provided to patient and family.      Provisions for Follow-Up Care:  See after visit summary for information related to follow-up care and any pertinent home health orders.      Mobility at time of Discharge:   Basic Mobility Inpatient Raw Score: 23  JH-HLM Goal: 7: Walk 25 feet or more  JH-HLM Achieved: 6: Walk 10 steps or more  HLM Goal NOT achieved. Continue to encourage mobility in post discharge setting.     Disposition:   Home with VNA Services (Reminder: Complete face to face encounter)    Planned Readmission: none    Discharge Medications:  See after visit summary for reconciled discharge medications provided to patient and/or family.      Administrative Statements   Discharge Statement:  I have spent a total time of 35 minutes in caring for this patient on the day of the visit/encounter. .    **Please Note: This note may have been constructed using a voice recognition system**

## 2024-12-24 NOTE — ASSESSMENT & PLAN NOTE
Mild leukopenia   With mild neutropenia as well  Suspected from acute viral infection with Influenza & rhinovirus   Question of blasts in cell count, peripheral smear obtained and negative for blasts. See below for full path slide report  Flow cytometry pending at time of discharge   Appreciate hematology consult.   Patient/family declined inpatient BMBx. Outpatient BMBx ordered & referral placed to hematology to intends to see patient 2 weeks after BMBx to discuss results & next steps.      Path slide, peripheral smear:  No evidence of circulating blasts. Atypical plasmacytoid lymphocytes concerning for circulating plasma cells 3-4% of total cellularity; flow cytometry is pending and will be resulted as an addendum.     WBC: Mild neutropenia, atypical lymphocytes, abnormal plasmacytoid forms, see comment.  RBC: Normochromic and normocytic anemia with anisopoikilocytosis and nucleated red blood cells.  PLT: Within normal limits.     Comment: The patient's presentation for infection with cytopenias is noted. The current peripheral blood show evidence of an atypical lymphocyte population raising the possibility of circulating plasma cells. There is no evidence of circulating blasts in the reviewed material. Of note recent SPEP and immunofixation are positive for a monoclonal gammopathy, further work-up to evaluate for plasma cell neoplasm is recommended.

## 2024-12-24 NOTE — ASSESSMENT & PLAN NOTE
Lab Results   Component Value Date    EGFR 33 12/24/2024    EGFR 34 12/23/2024    EGFR 35 12/23/2024    CREATININE 1.58 (H) 12/24/2024    CREATININE 1.55 (H) 12/23/2024    CREATININE 1.51 (H) 12/23/2024

## 2024-12-24 NOTE — RESPIRATORY THERAPY NOTE
12/24/24 0740   Respiratory Assessment   Resp Comments Patient remains on room air, she is using the IS, cough on command coarse congested npc   Oxygen Therapy/Pulse Ox   O2 Device None (Room air)   O2 Therapy Room air   SpO2 93 %   SpO2 Activity At Rest   $ Pulse Oximetry Spot Check Charge Completed

## 2024-12-24 NOTE — ASSESSMENT & PLAN NOTE
Patient complains of neuropathy in her fingers bilaterally and her right foot  Notes this is progressively been worsening over the last year  Was seen by orthopedics 11/2024 diagnosed with bilateral carpal tunnel, recommend for EMG which is scheduled for 1/27/2025  Requested to restart gabapentin. Continue 100mg tid  Discontinue Lyrica   MRI C-spine from May 2024 unremarkable for any significant stenosis but does show very mild nerve impingement between C5 and C6

## 2024-12-24 NOTE — ASSESSMENT & PLAN NOTE
Blood pressure is stable  Discontinue lisinopril at discharge with borderline hyperkalemia & JOSE ELIAS  Outpatient follow-up with PCP & nephrology

## 2024-12-24 NOTE — CASE MANAGEMENT
Case Management Discharge Planning Note    Patient name Nidia Hurtadof  Location /417-01 MRN [id number]  : 1955 Date 2024       Current Admission Date: 2024  Current Admission Diagnosis:JOSE ELIAS (acute kidney injury) (HCC)   Patient Active Problem List    Diagnosis Date Noted Date Diagnosed    Myeloma (HCC) 2024     Acidosis 2024     Influenza A (H1N1) 2024     Leukopenia 2024     Elevated lipase 2024     JOSE ELIAS (acute kidney injury) (HCC) 2024     Anemia 2024     Hyponatremia 2024     Type 2 diabetes mellitus with hypoglycemia, without long-term current use of insulin ([facility]) 2024     Carpal tunnel syndrome on right 2024     Neuropathy 2024     Numbness and tingling of hand 2024     Cervical radiculopathy 2024     Stage 3b chronic kidney disease (HCC) 2024     Chronic pain of both shoulders 2024     Hidradenitis suppurativa 2023     Screening mammogram for breast cancer 2023     History of adrenal insufficiency 2022     Osteoporosis 2020     Anxiety 2019     Vitamin D deficiency 2016     Non-ST elevation myocardial infarction (NSTEMI), type 2 2016     Elevated total protein 2016     Hypertension 2016     Tobacco abuse 2016     Hypothyroidism 2015     Depression, recurrent ([facility]) 2012     Type 2 diabetes mellitus with hyperglycemia, without long-term current use of insulin ([facility]) 2012     Hyperlipidemia 2012       LOS (days): 4  Geometric Mean LOS (GMLOS) (days): 2.6  Days to GMLOS:-1.3     OBJECTIVE:  Risk of Unplanned Readmission Score: 16.33         Current admission status: Inpatient   Preferred Pharmacy:   [id number]  [place] - [address], [address]  [address], [address]  [facility] [address]  Phone: [phone] Fax: [phone]    Primary Care Provider: Tasha Palmer DO    Primary Insurance:  FAB[employer]  REP  Secondary Insurance:     DISCHARGE DETAILS:  Pt is being dc'd home on this date with OP follow up and [facility]. CM notified [facility] of dc and AVS sent over.

## 2024-12-24 NOTE — PROGRESS NOTES
Progress Note - Nephrology   Name: Nidia Hamlin 69 y.o. female I MRN: [id number]  Unit/Bed#: 417-01 I Date of Admission: 12/19/2024   Date of Service: 12/24/2024 I Hospital Day: 4     Assessment & Plan  JOSE ELIAS (acute kidney injury) ([facility])  Patient's creatinine has been stable in the past few days around 1.6 mg/dL which is slightly above her baseline of 1.2 to 1.4 mg/dL.  Creatinine level was initially improving with IV fluids but has now stabilized around 1.6.  Will continue to monitor the patient as an outpatient regarding her renal function.  Bladder scan done yesterday showed 29 mL.  She had IV fluids discontinued yesterday.  She appears euvolemic and has nonoliguric urine output  Hyponatremia  Patient has serum sodium of 130 but serum osmolality of 294 which is normal so this is a pseudohyponatremia most likely due to paraproteinemia process. For now, will monitor with no treatment for this condition other than addressing underlying cause.   Stage 3b chronic kidney disease ([facility])  Lab Results   Component Value Date    EGFR 33 12/24/2024    EGFR 34 12/23/2024    EGFR 35 12/23/2024    CREATININE 1.58 (H) 12/24/2024    CREATININE 1.55 (H) 12/23/2024    CREATININE 1.51 (H) 12/23/2024   Record review indicates baseline creatinine 1.2-1.4 mg/dL.  She has multiple kidney cysts and lesion on imaging.  Follow-up dedicated renal ultrasound to follow-up on renal cysts either as inpatient or outpatient.  Hypertension  Would agree with continuing to hold lisinopril until renal function stabilizes  Anemia  Patient should follow-up with hematology oncology.  They are considering bone marrow biopsy at this time however patient would like to defer for now.  Patient is status post 1 unit of packed red blood cells this admission  Type 2 diabetes mellitus with hypoglycemia, without long-term current use of insulin ([facility])  Lab Results   Component Value Date    HGBA1C 7.0 (H) 12/07/2024       Recent Labs     12/23/24  1605 12/23/24 2131  12/24/24  0701 12/24/24  1049   POCGLU 88 96 92 92       Blood Sugar Average: Last 72 hrs:  (P) [id number]    Elevated total protein  SPEP with notable M spike of 4.69 g/dL with IgG lambda specificity.  Leukopenia  Workup per primary team/hematology  Acidosis  Bicarbonate trends are stable at 20  Influenza A (H1N1)  Continue supportive care, Tamiflu renally dosed        Subjective   Brief History of Admission -69-year-old female with a past medical history of type 2 diabetes, neuropathy, hypertension, CKD 3 who presented with nausea, vomiting and general malaise.  Nephrology was consulted for JOSE ELIAS.  Patient seen and examined she denies chest pain, shortness of breath, decreased appetite, headache, dizziness, frothy urine. She endorses hand pain from carpal tunnel syndrome.         Objective :  Temp:  [98.6 °F (37 °C)-99.2 °F (37.3 °C)] 99.2 °F (37.3 °C)  HR:  [66-78] 66  BP: (110-139)/(55-57) 110/56  Resp:  [17-20] 17  SpO2:  [90 %-95 %] 93 %  O2 Device: None (Room air)    Current Weight: Weight - Scale: 69.2 kg (152 lb 8.9 oz)  First Weight: Weight - Scale: 68.6 kg (151 lb 3.8 oz)  I/O         12/22 0701  12/23 0700 12/23 0701  12/24 0700 12/24 0701  12/25 0700    P.O. 120 240 0    I.V. (mL/kg) 81.7 (1.2)      Blood 315      Total Intake(mL/kg) 516.7 (7.5) 240 (3.5) 0 (0)    Urine (mL/kg/hr) 400 (0.2) 1175 (0.7)     Total Output 400 1175     Net +116.7 -935 0           Unmeasured Urine Occurrence 1 x            Physical Exam  Vitals and nursing note reviewed.   Constitutional:       General: She is not in acute distress.     Appearance: She is well-developed.   HENT:      Head: Normocephalic and atraumatic.   Cardiovascular:      Rate and Rhythm: Normal rate and regular rhythm.      Heart sounds: No murmur heard.  Pulmonary:      Effort: Pulmonary effort is normal. No respiratory distress.      Breath sounds: Normal breath sounds.   Abdominal:      Palpations: Abdomen is soft.      Tenderness: There is no  abdominal tenderness.   Musculoskeletal:         General: No swelling.   Skin:     General: Skin is warm and dry.      Capillary Refill: Capillary refill takes less than 2 seconds.   Neurological:      Mental Status: She is alert.   Psychiatric:         Mood and Affect: Mood normal.         Medications:    Current Facility-Administered Medications:     acetaminophen (TYLENOL) tablet 650 mg, 650 mg, Oral, Q6H PRN, Jairon Prescott PA-C, 650 mg at 12/23/24 0638    albuterol inhalation solution 2.5 mg, 2.5 mg, Nebulization, Q6H PRN, Al Marshall, DO    benzonatate (TESSALON PERLES) capsule 100 mg, 100 mg, Oral, TID, Angela Marsh PA-C, 100 mg at 12/23/24 2109    Cholecalciferol (VITAMIN D3) tablet 2,000 Units, 2,000 Units, Oral, Daily, Jairon Prescott PA-C, 2,000 Units at 12/23/24 0903    gabapentin (NEURONTIN) capsule 100 mg, 100 mg, Oral, TID, Angela Marsh PA-C, 100 mg at 12/23/24 2109    lidocaine (LIDODERM) 5 % patch 2 patch, 2 patch, Topical, Daily, Maddy Thomas PA-C, 2 patch at 12/23/24 0903    mirtazapine (REMERON) tablet 7.5 mg, 7.5 mg, Oral, HS, Maddy Thomas PA-C, 7.5 mg at 12/23/24 2109    nicotine (NICODERM CQ) 7 mg/24hr TD 24 hr patch 1 patch, 1 patch, Transdermal, Daily, Jairon Prescott PA-C, 1 patch at 12/23/24 0903    ondansetron (ZOFRAN) injection 4 mg, 4 mg, Intravenous, Q6H PRN, Jairon Prescott PA-C, 4 mg at 12/23/24 2109    oseltamivir (TAMIFLU) capsule 30 mg, 30 mg, Oral, Q12H MELONIE, Angela Marsh PA-C, 30 mg at 12/23/24 1558    sertraline (ZOLOFT) tablet 100 mg, 100 mg, Oral, Daily, Jairon Prescott PA-C, 100 mg at 12/23/24 0903    tiZANidine (ZANAFLEX) tablet 2 mg, 2 mg, Oral, TID PRN, Jairon Prescott PA-C      Lab Results: I have reviewed the following results:  Results from last 7 days   Lab Units 12/24/24  0554 12/23/24  2134 12/23/24  0450 12/22/24  0908 12/22/24  0513 12/21/24  1006 12/21/24  0614 12/20/24  1352 12/20/24  0501 12/19/24  2259 12/19/24  1655   WBC Thousand/uL  "3.22*  --  3.24* 3.32* 3.48*  --  5.25  --  5.95  --  7.83   HEMOGLOBIN g/dL 8.8*  --  8.0* 6.9* 6.9*  --  7.6*  --  7.5*  --  7.6*   HEMATOCRIT % 27.5*  --  24.6* 22.0* 21.7*  --  24.2*  --  24.7*  --  24.7*   PLATELETS Thousands/uL 152  --  158 191 183  --  222  --  243  --  244   POTASSIUM mmol/L 4.9 4.9 4.8  --  4.9 5.0 5.5* 5.1 4.9   < > 5.3   CHLORIDE mmol/L 101 99 101  --  107 106 108 104 107   < > 103   CO2 mmol/L 20* 20* 19*  --  19* 23 20* 23 22   < > 17*   BUN mg/dL 39* 41* 26*  --  27* 32* 34* 35* 38*   < > 48*   CREATININE mg/dL 1.58* 1.55* 1.51*  --  1.30 1.43* 1.35* 1.59* 1.39*   < > 1.54*   CALCIUM mg/dL 8.4 8.3* 8.4  --  8.3* 8.7 8.5 8.7 8.3*   < > 8.3*   MAGNESIUM mg/dL  --   --   --   --   --   --   --   --  2.1  --  1.9   PHOSPHORUS mg/dL  --   --   --   --   --   --   --   --  4.2*  --   --    ALBUMIN g/dL  --   --  2.4*  --   --   --  2.4*  --  2.4*  --  2.5*    < > = values in this interval not displayed.       Administrative Statements     Portions of the record may have been created with voice recognition software. Occasional wrong word or \"sound a like\" substitutions may have occurred due to the inherent limitations of voice recognition software. Read the chart carefully and recognize, using context, where substitutions have occurred.If you have any questions, please contact the dictating provider.  "

## 2024-12-24 NOTE — ASSESSMENT & PLAN NOTE
Lab Results   Component Value Date    HGBA1C 7.0 (H) 12/07/2024     With hypoglycemia initially since resolved  Discontinue Amaryl  Continue metformin

## 2024-12-24 NOTE — ASSESSMENT & PLAN NOTE
Hemoglobin levels at 7.6 on admission, hemoglobins were within normal range around 12 but it has not been checked in 5 years  S/p 1 unit pRBC on 12/222  No evidence of acute bleeding.  Iron panel within normal limits, B12 & folate within normal limits  Hemolysis workup negative.  Appreciate hematology consult, workup concerning for multiple myeloma. For outpatient follow-up with hematology for further testing and treatment. Outpatient bone marrow biopsy with IR ordered. Inpatient bone marrow biopsy offered & patient/family declined at this time   C scope from 2021 reviewed, was recommended for repeat C scope in 3 years given colon polyps

## 2024-12-24 NOTE — ASSESSMENT & PLAN NOTE
Patient has serum sodium of 130 but serum osmolality of 294 which is normal so this is a pseudohyponatremia most likely due to paraproteinemia process. For now, will monitor with no treatment for this condition other than addressing underlying cause.

## 2024-12-24 NOTE — ASSESSMENT & PLAN NOTE
SPEP  Outpatient follow-up with hematology  Bone marrow biopsy ordered. Patient/family declined inpatient BMBx at this time

## 2024-12-24 NOTE — ASSESSMENT & PLAN NOTE
Patient's creatinine has been stable in the past few days around 1.6 mg/dL which is slightly above her baseline of 1.2 to 1.4 mg/dL.  Creatinine level was initially improving with IV fluids but has now stabilized around 1.6.  Will continue to monitor the patient as an outpatient regarding her renal function.  Bladder scan done yesterday showed 29 mL.  She had IV fluids discontinued yesterday.  She appears euvolemic and has nonoliguric urine output

## 2024-12-24 NOTE — DISCHARGE INSTR - AVS FIRST PAGE
Please see below for dedicated medication regimen.  In summary, stop taking Amaryl, pregabalin and lisinopril. Please resume gabapentin 100 mg 3 times daily.  Remeron 7.5 mg at bedtime was started to help with insomnia.  Please continue with Tamiflu 30 mg every 12 for the next 4 days for treatment of influenza A.  Can continue with over the counter medications for symptom relief regarding Influenza A and rhinovirus infection  I placed referral to IR for the bone marrow biopsy for further workup of suspected multiple myeloma.   Referral placed to hematology for further workup, they are planning on making appointment 2 weeks after bone marrow biopsy to discuss results   Referral to nephrology placed to further kidney monitoring. They are arranging appointment, but if you do not hear from them, please call to schedule appointment   Please follow-up with your primary care provider within 1 week for transition of care appointment

## 2024-12-24 NOTE — PLAN OF CARE
Problem: PAIN - ADULT  Goal: Verbalizes/displays adequate comfort level or baseline comfort level  Description: Interventions:  - Encourage patient to monitor pain and request assistance  - Assess pain using appropriate pain scale  - Administer analgesics based on type and severity of pain and evaluate response  - Implement non-pharmacological measures as appropriate and evaluate response  - Consider cultural and social influences on pain and pain management  - Notify physician/advanced practitioner if interventions unsuccessful or patient reports new pain  Outcome: Progressing     Problem: INFECTION - ADULT  Goal: Absence or prevention of progression during hospitalization  Description: INTERVENTIONS:  - Assess and monitor for signs and symptoms of infection  - Monitor lab/diagnostic results  - Monitor all insertion sites, i.e. indwelling lines, tubes, and drains  - Monitor endotracheal if appropriate and nasal secretions for changes in amount and color  - Omro appropriate cooling/warming therapies per order  - Administer medications as ordered  - Instruct and encourage patient and family to use good hand hygiene technique  - Identify and instruct in appropriate isolation precautions for identified infection/condition  Outcome: Progressing     Problem: SAFETY ADULT  Goal: Patient will remain free of falls  Description: INTERVENTIONS:  - Educate patient/family on patient safety including physical limitations  - Instruct patient to call for assistance with activity   - Consult OT/PT to assist with strengthening/mobility   - Keep Call bell within reach  - Keep bed low and locked with side rails adjusted as appropriate  - Keep care items and personal belongings within reach  - Initiate and maintain comfort rounds  - Make Fall Risk Sign visible to staff  - Offer Toileting every 2 Hours, in advance of need  - Initiate/Maintain bed/chair alarm  - Obtain necessary fall risk management equipment: as needed  - Apply  yellow socks and bracelet for high fall risk patients  - Consider moving patient to room near nurses station  Outcome: Progressing  Goal: Maintain or return to baseline ADL function  Description: INTERVENTIONS:  -  Assess patient's ability to carry out ADLs; assess patient's baseline for ADL function and identify physical deficits which impact ability to perform ADLs (bathing, care of mouth/teeth, toileting, grooming, dressing, etc.)  - Assess/evaluate cause of self-care deficits   - Assess range of motion  - Assess patient's mobility; develop plan if impaired  - Assess patient's need for assistive devices and provide as appropriate  - Encourage maximum independence but intervene and supervise when necessary  - Involve family in performance of ADLs  - Assess for home care needs following discharge   - Consider OT consult to assist with ADL evaluation and planning for discharge  - Provide patient education as appropriate  Outcome: Progressing  Goal: Maintains/Returns to pre admission functional level  Description: INTERVENTIONS:  - Perform AM-PAC 6 Click Basic Mobility/ Daily Activity assessment daily.  - Set and communicate daily mobility goal to care team and patient/family/caregiver.   - Collaborate with rehabilitation services on mobility goals if consulted  - Perform Range of Motion 3 times a day.    - Dangle patient 3 times a day  - Stand patient 3 times a day  - Ambulate patient 3 times a day  - Out of bed to chair 3 times a day   - Out of bed for meals 3 times a day  - Out of bed for toileting  - Record patient progress and toleration of activity level   Outcome: Progressing     Problem: DISCHARGE PLANNING  Goal: Discharge to home or other facility with appropriate resources  Description: INTERVENTIONS:  - Identify barriers to discharge w/patient and caregiver  - Arrange for needed discharge resources and transportation as appropriate  - Identify discharge learning needs (meds, wound care, etc.)  - Arrange  for interpretive services to assist at discharge as needed  - Refer to Case Management Department for coordinating discharge planning if the patient needs post-hospital services based on physician/advanced practitioner order or complex needs related to functional status, cognitive ability, or social support system  Outcome: Progressing     Problem: Knowledge Deficit  Goal: Patient/family/caregiver demonstrates understanding of disease process, treatment plan, medications, and discharge instructions  Description: Complete learning assessment and assess knowledge base.  Interventions:  - Provide teaching at level of understanding  - Provide teaching via preferred learning methods  Outcome: Progressing     Problem: METABOLIC, FLUID AND ELECTROLYTES - ADULT  Goal: Electrolytes maintained within normal limits  Description: INTERVENTIONS:  - Monitor labs and assess patient for signs and symptoms of electrolyte imbalances  - Administer electrolyte replacement as ordered  - Monitor response to electrolyte replacements, including repeat lab results as appropriate  - Instruct patient on fluid and nutrition as appropriate  Outcome: Progressing  Goal: Fluid balance maintained  Description: INTERVENTIONS:  - Monitor labs   - Monitor I/O and WT  - Instruct patient on fluid and nutrition as appropriate  - Assess for signs & symptoms of volume excess or deficit  Outcome: Progressing  Goal: Glucose maintained within target range  Description: INTERVENTIONS:  - Monitor Blood Glucose as ordered  - Assess for signs and symptoms of hyperglycemia and hypoglycemia  - Administer ordered medications to maintain glucose within target range  - Assess nutritional intake and initiate nutrition service referral as needed  Outcome: Progressing     Problem: GENITOURINARY - ADULT  Goal: Maintains or returns to baseline urinary function  Description: INTERVENTIONS:  - Assess urinary function  - Encourage oral fluids to ensure adequate hydration if  ordered  - Administer IV fluids as ordered to ensure adequate hydration  - Administer ordered medications as needed  - Offer frequent toileting  - Follow urinary retention protocol if ordered  Outcome: Progressing  Goal: Absence of urinary retention  Description: INTERVENTIONS:  - Assess patient’s ability to void and empty bladder  - Monitor I/O  - Bladder scan as needed  - Discuss with physician/AP medications to alleviate retention as needed  - Discuss catheterization for long term situations as appropriate  Outcome: Progressing     Problem: HEMATOLOGIC - ADULT  Goal: Maintains hematologic stability  Description: INTERVENTIONS  - Assess for signs and symptoms of bleeding or hemorrhage  - Monitor labs  - Administer supportive blood products/factors as ordered and appropriate  Outcome: Progressing     Problem: Prexisting or High Potential for Compromised Skin Integrity  Goal: Skin integrity is maintained or improved  Description: INTERVENTIONS:  - Identify patients at risk for skin breakdown  - Assess and monitor skin integrity  - Assess and monitor nutrition and hydration status  - Monitor labs   - Assess for incontinence   - Turn and reposition patient  - Assist with mobility/ambulation  - Relieve pressure over bony prominences  - Avoid friction and shearing  - Provide appropriate hygiene as needed including keeping skin clean and dry  - Evaluate need for skin moisturizer/barrier cream  - Collaborate with interdisciplinary team   - Patient/family teaching  - Consider wound care consult   Outcome: Progressing

## 2024-12-26 ENCOUNTER — RESULTS FOLLOW-UP (OUTPATIENT)
Dept: OTHER | Facility: HOSPITAL | Age: 69
End: 2024-12-26

## 2024-12-26 ENCOUNTER — DOCUMENTATION (OUTPATIENT)
Dept: HEMATOLOGY ONCOLOGY | Facility: CLINIC | Age: 69
End: 2024-12-26

## 2024-12-26 ENCOUNTER — TELEPHONE (OUTPATIENT)
Age: 69
End: 2024-12-26

## 2024-12-26 DIAGNOSIS — Z71.89 COMPLEX CARE COORDINATION: Primary | ICD-10-CM

## 2024-12-26 NOTE — TELEPHONE ENCOUNTER
I called Nidia in response to a referral that was received for patient to establish care with Medical Oncology    Outreach was made to schedule a consultation.    A consultation was scheduled for patient during this call. Patient is scheduled on 1/13/25 at 2:00 PM with Dr Chavez at the [facility]    Schedule within: 3- 7 days please prioritize for 7 day appt or sooner    Pt scheduled for first available appt at preferred location.     Please advise if this appt can be moved sooner to meet scheduling guidelines per NN review.

## 2024-12-26 NOTE — TELEPHONE ENCOUNTER
I called and spoke with Pt.  Pt ada'ada 1/17/2025 in TO with Marylou at [facility].

## 2024-12-26 NOTE — PROGRESS NOTES
All records needed are in patients chart. No records retrieval needed at this time.     Referral received/ Chart reviewed for work up completed   Referral to: hematology oncology   Dx: multiple myeloma (diagnosis in process  follow up after heme onc)    Imaging completed: [x][facility] []Other:   [] PET/CT   [] MRI   [x] CT   [] US   [] Mammo   [] Bone scan   [] N/A    Pathology completed: [][facility] []Other:   Date:   Location:   [x]N/A    Additional records needed:[][facility] []Other:   [] Genomic report   [] Genetic testing results   [] Office Note   [] Procedure/ Operative note   [] Lab results   [x] N/A      [] Radiation Oncology records retrieval needed (PN to route to rad/onc clerical pool once scheduled)  Date:  Location:       Referring physician:   U      Labs ordered:   Imaging ordered:

## 2024-12-27 ENCOUNTER — PATIENT OUTREACH (OUTPATIENT)
Dept: CASE MANAGEMENT | Facility: OTHER | Age: 69
End: 2024-12-27

## 2024-12-27 ENCOUNTER — PATIENT OUTREACH (OUTPATIENT)
Dept: HEMATOLOGY ONCOLOGY | Facility: CLINIC | Age: 69
End: 2024-12-27

## 2024-12-27 DIAGNOSIS — C90.00 MULTIPLE MYELOMA, REMISSION STATUS UNSPECIFIED (HCC): Primary | ICD-10-CM

## 2024-12-27 LAB
B2 MICROGLOB SERPL-MCNC: 10.4 MG/L (ref 0.6–2.4)
IGG SERPL-MCNC: 5562 MG/DL (ref 586–1602)
IGG1 SER-MCNC: ABNORMAL MG/DL
IGG2 SER-MCNC: ABNORMAL MG/DL
IGG3 SER-MCNC: ABNORMAL MG/DL
IGG4 SER-MCNC: ABNORMAL MG/DL
SCAN RESULT: NORMAL

## 2024-12-27 RX ORDER — SODIUM CHLORIDE 9 MG/ML
75 INJECTION, SOLUTION INTRAVENOUS CONTINUOUS
OUTPATIENT
Start: 2024-12-27

## 2024-12-27 NOTE — PROGRESS NOTES
Received referral via in basket message as covering RN Care Manager. Chart reviewed.   Pt was admitted 12/19-12/24/24 with JOSE ELIAS, Hypoglycemia, positive for Flu/Rhinovirus started on Tamiflu.  Pt to be scheduled for bone biopsy with IR.  Discharged to home with Home Health with [facility].  Telephone call to patient, left message on machine with my name, number and request for return call. Provided contact number for RN TAWANNA Sevilla.   Will retry again next week.

## 2024-12-27 NOTE — PROGRESS NOTES
"Pt called back, feeling \"tired\".  Daughter bought her a rollator and she is using it for ambulation.  Her daughter assists her with transportation to medical appointments.  Pt has \"many appointments\".  Had a call from [facility] pharmacy, Tamiflu was back ordered and only ready today. Does not intent to restart due to delay and missed past three days. Will send message to PCP to alert of same.  Has all medications, reviewed together and has AVS.  Reviewed symptoms that would require medical attention.  Agrees to follow up call again in two weeks.  "

## 2024-12-27 NOTE — PROGRESS NOTES
Telephone call to [facility], verified they received patients name but no discharge date.  Faxed over Referral and H&P to fax number [phone].

## 2024-12-27 NOTE — PROGRESS NOTES
Call to IR for scheduling BM BX as ordered   Per KATHLEEN Barber will outreach patient and schedule.

## 2024-12-30 ENCOUNTER — OFFICE VISIT (OUTPATIENT)
Dept: ENDOCRINOLOGY | Facility: CLINIC | Age: 69
End: 2024-12-30
Payer: COMMERCIAL

## 2024-12-30 VITALS
DIASTOLIC BLOOD PRESSURE: 74 MMHG | WEIGHT: 144.6 LBS | HEART RATE: 77 BPM | HEIGHT: 60 IN | BODY MASS INDEX: 28.39 KG/M2 | TEMPERATURE: 97.4 F | SYSTOLIC BLOOD PRESSURE: 148 MMHG

## 2024-12-30 DIAGNOSIS — E55.9 VITAMIN D DEFICIENCY: ICD-10-CM

## 2024-12-30 DIAGNOSIS — I10 ESSENTIAL HYPERTENSION: ICD-10-CM

## 2024-12-30 DIAGNOSIS — N18.32 TYPE 2 DIABETES MELLITUS WITH STAGE 3B CHRONIC KIDNEY DISEASE, WITHOUT LONG-TERM CURRENT USE OF INSULIN (HCC): Primary | ICD-10-CM

## 2024-12-30 DIAGNOSIS — E11.22 TYPE 2 DIABETES MELLITUS WITH STAGE 3B CHRONIC KIDNEY DISEASE, WITHOUT LONG-TERM CURRENT USE OF INSULIN (HCC): Primary | ICD-10-CM

## 2024-12-30 DIAGNOSIS — M81.0 OSTEOPOROSIS, UNSPECIFIED OSTEOPOROSIS TYPE, UNSPECIFIED PATHOLOGICAL FRACTURE PRESENCE: ICD-10-CM

## 2024-12-30 DIAGNOSIS — Z75.8 DOES NOT HAVE PRIMARY CARE PROVIDER: ICD-10-CM

## 2024-12-30 PROCEDURE — 99214 OFFICE O/P EST MOD 30 MIN: CPT | Performed by: STUDENT IN AN ORGANIZED HEALTH CARE EDUCATION/TRAINING PROGRAM

## 2024-12-30 PROCEDURE — G2211 COMPLEX E/M VISIT ADD ON: HCPCS | Performed by: STUDENT IN AN ORGANIZED HEALTH CARE EDUCATION/TRAINING PROGRAM

## 2024-12-30 NOTE — PROGRESS NOTES
Name: Nidia Hamlin      : 1955      MRN: [id number]  Encounter Provider: Sebastián Page DO  Encounter Date: 2024   Encounter department: [facility]  :  Assessment & Plan  Type 2 diabetes mellitus with stage 3b chronic kidney disease, without long-term current use of insulin (HCC)  Control appears to be sound.  There was a recent admission with severe hypoglycemia.  Her sulfonylurea was appropriately discontinued.  Metformin is being held.  This is reasonable as well as fingerstick testing done in the hospital reflected euglycemia.  On account of CKD 3B, metformin can be continued but should be limited in its dosing to 500 - 1000 mg total daily. Nidia reports a barrier to fingerstick testing.  She is presently in the evaluation for a monoclonal gammopathy and has a forthcoming visit with oncology.  We will plan to reassess her control in a 6-month period but she may notify me sooner of any concerns develop.  Lab Results   Component Value Date    HGBA1C 7.0 (H) 2024       Orders:  •  Comprehensive metabolic panel; Future  •  Hemoglobin A1C; Future    Essential hypertension         Osteoporosis, unspecified osteoporosis type, unspecified pathological fracture presence  Reclast x3 years, last 23. Last BMD stable. Has follow up DXA in Aug 2025       Does not have primary care provider    Orders:  •  Ambulatory Referral to Family Practice; Future    Vitamin D deficiency  Cw vit D replacement  Orders:  •  Vitamin D 25 hydroxy; Future      RTC 6-mo    History of Present Illness   HPI  Nidia Hamlin is a 69 y.o. female who presents in follow up of T2D and osteoporosis. There is recent history of hospitalization 24 2/2 JOSE ELIAS and hypoglycemia. Glimepiride and metformin was DC on discharge.  Patient did receive packed red blood cell transfusion for anemia.  Kidney function stabilized. Inpatient evaluation notable for monoclonal gammopathy and patient  is being referred to oncology. She is being requested for bone marrow biopsy.  Nidia does not do fingerstick testing.  Her blood sugars were in good control in the hospital and her last A1c was 7%.      History obtained from: patient and patient's daughter    Review of Systems   Constitutional:  Positive for fatigue.   All other systems reviewed and are negative.         Objective   /74 (Patient Position: Sitting, Cuff Size: Standard)   Pulse 77   Temp (!) 97.4 °F (36.3 °C)   Ht 5' (1.524 m)   Wt 65.6 kg (144 lb 9.6 oz)   BMI 28.24 kg/m²      Physical Exam  Vitals reviewed.   Constitutional:       General: She is not in acute distress.     Appearance: Normal appearance.   HENT:      Head: Normocephalic and atraumatic.   Eyes:      General: No scleral icterus.     Conjunctiva/sclera: Conjunctivae normal.   Pulmonary:      Effort: Pulmonary effort is normal. No respiratory distress.   Musculoskeletal:         General: No deformity.      Cervical back: Normal range of motion.   Neurological:      General: No focal deficit present.      Mental Status: She is alert.   Psychiatric:         Mood and Affect: Mood normal.         Behavior: Behavior normal.       Lab Results   Component Value Date     12/28/2014    SODIUM 130 (L) 12/24/2024    K 4.9 12/24/2024     12/24/2024    CO2 20 (L) 12/24/2024    ANIONGAP 12 12/28/2014    AGAP 9 12/24/2024    BUN 39 (H) 12/24/2024    CREATININE 1.58 (H) 12/24/2024    GLUC 95 12/24/2024    GLUF 46 (LL) 12/20/2024    CALCIUM 8.4 12/24/2024    AST 9 (L) 12/23/2024    ALT 10 12/23/2024    ALKPHOS 44 12/23/2024    PROT 7.8 12/28/2014    TP 9.5 (H) 12/23/2024    BILITOT 0.4 12/28/2014    TBILI 0.28 12/23/2024    EGFR 33 12/24/2024     Lab Results   Component Value Date    HGBA1C 7.0 (H) 12/07/2024

## 2024-12-31 NOTE — TELEPHONE ENCOUNTER
----- Message from Marietta Lewis DO sent at 12/26/2024  1:45 PM EST -----  Please call patient, one of her tests called immunoglobulin showed an elevated lambda free light chain in your blood, this is potentially concerning and I would advise follow up with hematology to determine next best steps to discuss meaning of test.

## 2025-01-02 ENCOUNTER — TELEPHONE (OUTPATIENT)
Age: 70
End: 2025-01-02

## 2025-01-02 ENCOUNTER — DOCUMENTATION (OUTPATIENT)
Dept: HEMATOLOGY ONCOLOGY | Facility: CLINIC | Age: 70
End: 2025-01-02

## 2025-01-02 NOTE — TELEPHONE ENCOUNTER
Gracy from [facility] called to let Dr. Palmer know that patient had her OT evaluation today.  Gracy will be picking her up 1x a week for 5 weeks for OT.  MICAH

## 2025-01-06 ENCOUNTER — TELEPHONE (OUTPATIENT)
Age: 70
End: 2025-01-06

## 2025-01-06 ENCOUNTER — TELEPHONE (OUTPATIENT)
Dept: HEMATOLOGY ONCOLOGY | Facility: CLINIC | Age: 70
End: 2025-01-06

## 2025-01-06 NOTE — TELEPHONE ENCOUNTER
Patient would like blood work to be ordered, to include an M-Protein. She requests call back to advise when ordered.

## 2025-01-06 NOTE — TELEPHONE ENCOUNTER
Pt daughter called in stating she prefers pt to see Dr. Ramos instead of Dr. Chavez due to a recommendation. She had a consultation with Dr. Chavez for 1/13/25. A new consultation is now scheduled for 2/11/25 at 12:40pm with Dr. Ramos at the [place] location. Guidelines stated within 7 days to be scheduled. Pt daughter Cally can be reached at [phone]. Thank you.

## 2025-01-06 NOTE — TELEPHONE ENCOUNTER
She had urine and protein electrophoresis and other testing in the hospital The electrophoresis checked for m spike  She has hematology followup so she can ask their office if what she is referring to was already done

## 2025-01-09 ENCOUNTER — CONSULT (OUTPATIENT)
Dept: HEMATOLOGY ONCOLOGY | Facility: CLINIC | Age: 70
End: 2025-01-09
Payer: COMMERCIAL

## 2025-01-09 VITALS
SYSTOLIC BLOOD PRESSURE: 136 MMHG | TEMPERATURE: 97.4 F | WEIGHT: 147 LBS | BODY MASS INDEX: 28.86 KG/M2 | OXYGEN SATURATION: 97 % | HEIGHT: 60 IN | RESPIRATION RATE: 18 BRPM | HEART RATE: 70 BPM | DIASTOLIC BLOOD PRESSURE: 62 MMHG

## 2025-01-09 DIAGNOSIS — C90.00 MULTIPLE MYELOMA, REMISSION STATUS UNSPECIFIED (HCC): ICD-10-CM

## 2025-01-09 DIAGNOSIS — C90.00 MULTIPLE MYELOMA NOT HAVING ACHIEVED REMISSION (HCC): Primary | ICD-10-CM

## 2025-01-09 PROCEDURE — 88184 FLOWCYTOMETRY/ TC 1 MARKER: CPT | Performed by: INTERNAL MEDICINE

## 2025-01-09 PROCEDURE — 88264 CHROMOSOME ANALYSIS 20-25: CPT

## 2025-01-09 PROCEDURE — 88374 M/PHMTRC ALYS ISHQUANT/SEMIQ: CPT | Performed by: INTERNAL MEDICINE

## 2025-01-09 PROCEDURE — 88185 FLOWCYTOMETRY/TC ADD-ON: CPT | Performed by: INTERNAL MEDICINE

## 2025-01-09 PROCEDURE — 99205 OFFICE O/P NEW HI 60 MIN: CPT | Performed by: INTERNAL MEDICINE

## 2025-01-09 PROCEDURE — 38222 DX BONE MARROW BX & ASPIR: CPT | Performed by: INTERNAL MEDICINE

## 2025-01-09 PROCEDURE — 88237 TISSUE CULTURE BONE MARROW: CPT

## 2025-01-09 NOTE — PROGRESS NOTES
Oncology Consult Note  Nidia Hamlin 69 y.o. female MRN: [id number]  Unit/Bed#:  Encounter: [id number]      Presenting Complaint: IgG lambda multiple myeloma    History of Presenting Illness: Nidia Hamlin is seen for initial consultation 1/9/2025 at the referral of Angela Marsh   69-year-old female with history of anxiety, Cushing syndrome, depression, diabetes mellitus type 2, neuropathy more pronounced in the upper extremities, hyperlipidemia, hidradenitis, she had been complaining of neuropathy of the upper hands over the past 1 year with motor weakness and intermuscular atrophy she took Lyrica with dizziness, was seen in the hospital on 12/21/2024 with WBC of 5.2, hemoglobin 7.6, MCV 97, platelets 222,000, sodium 129, creatinine 1.5, total protein 9.6, albumin 2.4, normal calcium serum protein electrophoresis showed IgG lambda monoclonal protein 4.6 g, urine immunofixation showed biclonal free lambda light chain and IgG lambda, beta-2 microglobulin 10.4    She was diagnosed with the flu that is why the bone marrow was not done at that time    Additional workup showed kappa light chain of 8.8 lambda light chain of 904    CAT scan of the abdomen and pelvis showed no evidence of hepatosplenomegaly no evidence of lytic bone lesions    She had been complaining of kyphosis, tingling and numbness in both hands and right feet denied any night sweats low-grade fever or constitutional symptoms    She said that the urine is frothy    Used to smoke 1 to 2 pack of cigarette daily she quit in December 2024 when she was admitted to the hospital  Review of Systems - As stated in the HPI otherwise the fourteen point review of systems was negative.    Past Medical History:   Diagnosis Date    Adrenal insufficiency (HCC)     Anxiety     Bacterial sepsis (HCC)     last assessed: 06/13/16    Cushing's syndrome (HCC)     last assessed: 05/29/16    Depression     last assessed: 01/02/18    Diabetes mellitus (HCC)      Hydradenitis     Hyperlipidemia     Hypertension     Positive FIT (fecal immunochemical test) 06/22/2018    Added automatically from request for surgery [id number]    Rib contusion, left, sequela 06/14/2022    Trigger finger, right middle finger 03/07/2022       Social History     Socioeconomic History    Marital status:      Spouse name: Not on file    Number of children: Not on file    Years of education: Not on file    Highest education level: Not on file   Occupational History    Not on file   Tobacco Use    Smoking status: Every Day     Current packs/day: 0.25     Average packs/day: 0.3 packs/day for 50.0 years (12.5 ttl pk-yrs)     Types: Cigarettes    Smokeless tobacco: Never   Vaping Use    Vaping status: Never Used   Substance and Sexual Activity    Alcohol use: Never    Drug use: No    Sexual activity: Not Currently   Other Topics Concern    Not on file   Social History Narrative    Always uses seat belt    Always uses sunscreen    Caffeine use    Dental care, regularly    No living will    Uses safety equipment - seatbelts     Social Drivers of Health     Financial Resource Strain: Low Risk  (5/16/2023)    Overall Financial Resource Strain (CARDIA)     Difficulty of Paying Living Expenses: Not very hard   Food Insecurity: No Food Insecurity (12/19/2024)    Nursing - Inadequate Food Risk Classification     Worried About Running Out of Food in the Last Year: Never true     Ran Out of Food in the Last Year: Never true     Ran Out of Food in the Last Year: Never true   Transportation Needs: No Transportation Needs (12/19/2024)    Nursing - Transportation Risk Classification     Lack of Transportation: Not on file     Lack of Transportation: No   Physical Activity: Not on file   Stress: Not on file   Social Connections: Not on file   Intimate Partner Violence: Unknown (12/19/2024)    Nursing IPS     Feels Physically and Emotionally Safe: Not on file     Physically Hurt by Someone: Not on file     Humiliated  or Emotionally Abused by Someone: Not on file     Physically Hurt by Someone: No     Hurt or Threatened by Someone: No   Housing Stability: Unknown (2024)    Nursing: Inadequate Housing Risk Classification     Has Housing: Not on file     Worried About Losing Housing: Not on file     Unable to Get Utilities: Not on file     Unable to Pay for Housing in the Last Year: No     Has Housin       Family History   Problem Relation Age of Onset    Breast cancer Mother         unknown age    No Known Problems Sister     No Known Problems Daughter     No Known Problems Daughter     No Known Problems Maternal Grandmother     No Known Problems Maternal Grandfather     No Known Problems Paternal Grandmother     No Known Problems Paternal Grandfather     No Known Problems Paternal Aunt     No Known Problems Paternal Aunt     No Known Problems Paternal Aunt     No Known Problems Half-Sister     No Known Problems Half-Sister     No Known Problems Half-Sister     No Known Problems Half-Sister     No Known Problems Half-Sister        Allergies   Allergen Reactions    Fosamax [Alendronate] Diarrhea    Paxil [Paroxetine Hcl] Rash         Current Outpatient Medications:     Cholecalciferol (VITAMIN D3) 1,000 units tablet, Take 2 tablets (2,000 Units total) by mouth daily, Disp: , Rfl:     clindamycin (CLEOCIN T) 1 % lotion, Apply topically 2 (two) times a day To affected areas, Disp: , Rfl:     ergocalciferol (VITAMIN D2) 50,000 units, Take 1 capsule (50,000 Units total) by mouth once a week, Disp: 12 capsule, Rfl: 0    gabapentin (NEURONTIN) 100 mg capsule, Take 1 capsule (100 mg total) by mouth 3 (three) times a day, Disp: 90 capsule, Rfl: 0    mirtazapine (REMERON) 7.5 MG tablet, Take 1 tablet (7.5 mg total) by mouth daily at bedtime, Disp: 30 tablet, Rfl: 0    metFORMIN (GLUCOPHAGE) 500 mg tablet, Take 1 tablet by mouth once daily (Patient not taking: Reported on 2024), Disp: 100 tablet, Rfl: 1    sertraline (ZOLOFT)  100 mg tablet, TAKE 1 TABLET BY MOUTH ONCE DAILY AS DIRECTED (Patient not taking: Reported on 12/30/2024), Disp: 90 tablet, Rfl: 1    TiZANidine (ZANAFLEX) 2 MG capsule, Take 1 capsule (2 mg total) by mouth 3 (three) times a day as needed for muscle spasms (Patient not taking: Reported on 12/30/2024), Disp: 30 capsule, Rfl: 0      /62 (BP Location: Right arm, Patient Position: Sitting, Cuff Size: Adult)   Pulse 70   Temp (!) 97.4 °F (36.3 °C) (Temporal)   Resp 18   Ht 5' (1.524 m)   Wt 66.7 kg (147 lb)   SpO2 97%   BMI 28.71 kg/m²       General Appearance:    Alert, oriented        Eyes:    PERRL   Ears:    Normal external ear canals, both ears   Nose:   Nares normal, septum midline   Throat:   Mucosa moist. Pharynx without injection.    Neck:   Supple       Lungs:     Clear to auscultation bilaterally   Chest Wall:    No tenderness or deformity    Heart:    Regular rate and rhythm       Abdomen:     Soft, non-tender, bowel sounds +, no organomegaly           Extremities:   Extremities no cyanosis or edema       Skin:   no rash or icterus.    Lymph nodes:   Cervical, supraclavicular, and axillary nodes normal   Neurologic:   CNII-XII intact, weakness of both hands, atrophy of the intermuscular muscles sensation and reflexes     Throughout               No results found for this or any previous visit (from the past 48 hours).      XR chest portable  Result Date: 12/22/2024  Narrative: XR CHEST PORTABLE INDICATION: Cough. COMPARISON: Chest radiograph 12/19/2024 FINDINGS: Left basilar atelectasis. No focal consolidation. No pneumothorax or pleural effusion. Normal cardiomediastinal silhouette. Bones are unremarkable for age. Normal upper abdomen.     Impression: No acute consolidation Left basilar density may be due to atelectasis, stable Resident: Pina Ríos I, the attending radiologist, have reviewed the images and agree with the final report above. Workstation performed: [id number]     XR chest 1  view portable  Result Date: 12/20/2024  Narrative: XR CHEST PORTABLE INDICATION: pain. COMPARISON: July 19, 2022 FINDINGS: Clear lungs. No pneumothorax or pleural effusion. Normal cardiomediastinal silhouette. Bones are unremarkable for age. Normal upper abdomen.     Impression: No acute cardiopulmonary disease. Workstation performed: [id number]     CT abdomen pelvis wo contrast  Result Date: 12/19/2024  Narrative: CT ABDOMEN AND PELVIS WITHOUT IV CONTRAST INDICATION: Abdominal pain, nausea and vomiting. COMPARISON: CT AP 5/19/2016 TECHNIQUE: CT examination of the abdomen and pelvis was performed without intravenous contrast. Multiplanar 2D reformatted images were created from the source data. This examination, like all CT scans performed in the [facility], was performed utilizing techniques to minimize radiation dose exposure, including the use of iterative reconstruction and automated exposure control. Radiation dose length product (DLP) for this visit: 612.31 mGy-cm Enteric Contrast: Not administered. FINDINGS: Evaluation of the parenchymal organs, mucosa and urothelium is significantly limited without intravenous contrast. ABDOMEN LOWER CHEST: No clinically significant abnormality in the visualized lower chest. LIVER/BILIARY TREE: Unremarkable. GALLBLADDER: Post cholecystectomy. SPLEEN: Unremarkable. PANCREAS: Unremarkable. ADRENAL GLANDS: The right adrenal gland is surgically absent. There is mild diffuse thickening of the left adrenal gland, which may represent hyperplasia. KIDNEYS/URETERS: Numerous bilateral renal cysts, the majority of which are measuring simple fluid density. There is an additional 4 mm hyperdense interpolar lesion within the lateral left kidney (2/57), which is nonspecific but favors a proteinaceous cyst. The ureters are unremarkable. STOMACH AND BOWEL: The stomach and small bowel are unremarkable. There are numerous colonic diverticula, without evidence of acute  inflammation. APPENDIX: No findings to suggest appendicitis. ABDOMINOPELVIC CAVITY: No ascites. No pneumoperitoneum. No lymphadenopathy. VESSELS: Moderate diffuse atherosclerotic changes/calcifications throughout the aorta and central branch vessels. The IVC is grossly unremarkable. PELVIS REPRODUCTIVE ORGANS: Unremarkable for patient's age. URINARY BLADDER: Unremarkable. ABDOMINAL WALL/INGUINAL REGIONS: Small fat-containing left inguinal hernia. BONES: No acute fracture or suspicious osseous lesion.     Impression: 1.  No identifiable acute abnormality to account for the patient's clinical presentation. Please refer to the report body for description of other incidental, chronic and/or benign findings. Workstation performed: [id number]     ECOG PS:1      Assessment and plan:  69-year-old  female with history of diabetes mellitus, chronic smoking, hypertension, hyperlipidemia, presented with ongoing bilateral hand tingling and numbness over the past year    She also presented with weakness after she took Lyrica for neuropathy in December 2024, was found to have elevated total protein, low albumin, creatinine 1.5, normocytic anemia with hemoglobin of 6.9, mild leukopenia    As a workup she was found to have elevated lambda light chain of 904, IgG lambda monoclonal protein of 4.6, elevated beta-2 microglobulin around 10, albumin 2.4    Flow cytometry on the peripheral blood showed monoclonal and plasma cells about 0.3%    Imaging studies showed no evidence of lytic lesions    1.  Will proceed with bone marrow biopsy and aspirate to rule out multiple myeloma IgG lambda    2.  Patient to have Decadron 20 mg p.o. daily for 5 days to cool down her myeloma    3.  I offered daratumumab/lenalidomide however her daughter Cally prefer to wait and talk to Dr. Chavez    4.  I will shy away from Velcade because of neuropathy

## 2025-01-09 NOTE — PROGRESS NOTES
Addended by: CHOLO SANCHEZ on: 11/1/2022 09:45 AM     Modules accepted: Orders     Bone marrow biopsy and aspirate procedure:  Consent obtained, right superior posterior iliac crest prepared in normal fashion, 1% lidocaine used for local anesthesia, we proceeded with Jamshidi bone marrow biopsy obtained good aspirate and clot biopsy, the patient was anxious, we did not try again to proceed to get a solid biopsy    No immediate complication, will send for flow cytometry, NGS, and biopsy

## 2025-01-09 NOTE — ADDENDUM NOTE
Addended by: CORI GUTIERREZ on: 1/9/2025 01:52 PM     Modules accepted: Orders     [Initial Consult] : an initial consult for [Morbid Obesity (BMI<40)] : morbid obesity (bmi<40) [S/P Bariatric Surgery] : s/p bariatric surgery

## 2025-01-10 ENCOUNTER — PATIENT OUTREACH (OUTPATIENT)
Dept: CASE MANAGEMENT | Facility: HOSPITAL | Age: 70
End: 2025-01-10

## 2025-01-10 NOTE — PROGRESS NOTES
Chart reviewed. Last out reach patient seen by Efraín on 12/30. No med changes. Hematology 1/9. No medication changes. Patient to be scheduled for bone marrow biopsy. Patient with h/o Multiple Myeloma.    Call placed to patient.  No answer and VM left with request for a CB.    RNCM to follow.

## 2025-01-13 ENCOUNTER — TELEPHONE (OUTPATIENT)
Age: 70
End: 2025-01-13

## 2025-01-13 DIAGNOSIS — I10 PRIMARY HYPERTENSION: Primary | ICD-10-CM

## 2025-01-13 DIAGNOSIS — E87.1 HYPONATREMIA: ICD-10-CM

## 2025-01-13 DIAGNOSIS — N17.9 AKI (ACUTE KIDNEY INJURY) (HCC): ICD-10-CM

## 2025-01-13 DIAGNOSIS — N18.32 STAGE 3B CHRONIC KIDNEY DISEASE (HCC): ICD-10-CM

## 2025-01-13 NOTE — TELEPHONE ENCOUNTER
"Caller: Patient     Doctor: Koby     Reason for call: Patient is asking if she HAS to have the EMG done. She states she has so much pain already and she gets \"zapped\" every time she she uses her right hand.  She is asking, that if she does have CTS, can she just proceed to the surgery without having the EMG.  Please advise     Call back#: [phone]  "

## 2025-01-14 ENCOUNTER — APPOINTMENT (OUTPATIENT)
Dept: LAB | Facility: HOSPITAL | Age: 70
End: 2025-01-14
Payer: COMMERCIAL

## 2025-01-14 DIAGNOSIS — N18.32 STAGE 3B CHRONIC KIDNEY DISEASE (HCC): ICD-10-CM

## 2025-01-14 DIAGNOSIS — I10 PRIMARY HYPERTENSION: ICD-10-CM

## 2025-01-14 DIAGNOSIS — N17.9 AKI (ACUTE KIDNEY INJURY) (HCC): ICD-10-CM

## 2025-01-14 DIAGNOSIS — E87.1 HYPONATREMIA: ICD-10-CM

## 2025-01-14 LAB
ANION GAP SERPL CALCULATED.3IONS-SCNC: 9 MMOL/L (ref 4–13)
BACTERIA UR QL AUTO: ABNORMAL /HPF
BASOPHILS # BLD AUTO: 0.03 THOUSANDS/ΜL (ref 0–0.1)
BASOPHILS NFR BLD AUTO: 0 % (ref 0–1)
BILIRUB UR QL STRIP: NEGATIVE
BUN SERPL-MCNC: 33 MG/DL (ref 5–25)
CALCIUM SERPL-MCNC: 9.6 MG/DL (ref 8.4–10.2)
CHLORIDE SERPL-SCNC: 101 MMOL/L (ref 96–108)
CLARITY UR: CLEAR
CO2 SERPL-SCNC: 21 MMOL/L (ref 21–32)
COLOR UR: ABNORMAL
CREAT SERPL-MCNC: 1.31 MG/DL (ref 0.6–1.3)
CREAT UR-MCNC: 66.9 MG/DL
CREAT UR-MCNC: 72.2 MG/DL
EOSINOPHIL # BLD AUTO: 0.18 THOUSAND/ΜL (ref 0–0.61)
EOSINOPHIL NFR BLD AUTO: 2 % (ref 0–6)
ERYTHROCYTE [DISTWIDTH] IN BLOOD BY AUTOMATED COUNT: 16.2 % (ref 11.6–15.1)
GFR SERPL CREATININE-BSD FRML MDRD: 41 ML/MIN/1.73SQ M
GLUCOSE P FAST SERPL-MCNC: 146 MG/DL (ref 65–99)
GLUCOSE UR STRIP-MCNC: NEGATIVE MG/DL
HCT VFR BLD AUTO: 29.2 % (ref 34.8–46.1)
HGB BLD-MCNC: 9.2 G/DL (ref 11.5–15.4)
HGB UR QL STRIP.AUTO: ABNORMAL
IMM GRANULOCYTES # BLD AUTO: 0.05 THOUSAND/UL (ref 0–0.2)
IMM GRANULOCYTES NFR BLD AUTO: 1 % (ref 0–2)
KETONES UR STRIP-MCNC: NEGATIVE MG/DL
LEUKOCYTE ESTERASE UR QL STRIP: NEGATIVE
LYMPHOCYTES # BLD AUTO: 3.87 THOUSANDS/ΜL (ref 0.6–4.47)
LYMPHOCYTES NFR BLD AUTO: 46 % (ref 14–44)
MAGNESIUM SERPL-MCNC: 1.7 MG/DL (ref 1.9–2.7)
MCH RBC QN AUTO: 30.2 PG (ref 26.8–34.3)
MCHC RBC AUTO-ENTMCNC: 31.5 G/DL (ref 31.4–37.4)
MCV RBC AUTO: 96 FL (ref 82–98)
MICROALBUMIN UR-MCNC: 429.4 MG/L
MICROALBUMIN/CREAT 24H UR: 595 MG/G CREATININE (ref 0–30)
MONOCYTES # BLD AUTO: 0.63 THOUSAND/ΜL (ref 0.17–1.22)
MONOCYTES NFR BLD AUTO: 7 % (ref 4–12)
NEUTROPHILS # BLD AUTO: 3.7 THOUSANDS/ΜL (ref 1.85–7.62)
NEUTS SEG NFR BLD AUTO: 44 % (ref 43–75)
NITRITE UR QL STRIP: NEGATIVE
NON-SQ EPI CELLS URNS QL MICRO: ABNORMAL /HPF
NRBC BLD AUTO-RTO: 0 /100 WBCS
PH UR STRIP.AUTO: 5.5 [PH]
PHOSPHATE SERPL-MCNC: 4.8 MG/DL (ref 2.3–4.1)
PLATELET # BLD AUTO: 233 THOUSANDS/UL (ref 149–390)
PMV BLD AUTO: 9.3 FL (ref 8.9–12.7)
POTASSIUM SERPL-SCNC: 4.9 MMOL/L (ref 3.5–5.3)
PROT UR STRIP-MCNC: ABNORMAL MG/DL
PROT UR-MCNC: 85 MG/DL
PROT/CREAT UR: 1.3 MG/G{CREAT} (ref 0–0.1)
PTH-INTACT SERPL-MCNC: 38.8 PG/ML (ref 12–88)
RBC # BLD AUTO: 3.05 MILLION/UL (ref 3.81–5.12)
RBC #/AREA URNS AUTO: ABNORMAL /HPF
SODIUM SERPL-SCNC: 131 MMOL/L (ref 135–147)
SP GR UR STRIP.AUTO: 1.01 (ref 1–1.03)
UROBILINOGEN UR STRIP-ACNC: <2 MG/DL
WBC # BLD AUTO: 8.46 THOUSAND/UL (ref 4.31–10.16)
WBC #/AREA URNS AUTO: ABNORMAL /HPF

## 2025-01-14 PROCEDURE — 83970 ASSAY OF PARATHORMONE: CPT

## 2025-01-14 PROCEDURE — 84156 ASSAY OF PROTEIN URINE: CPT

## 2025-01-14 PROCEDURE — 82570 ASSAY OF URINE CREATININE: CPT

## 2025-01-14 PROCEDURE — 80048 BASIC METABOLIC PNL TOTAL CA: CPT

## 2025-01-14 PROCEDURE — 85025 COMPLETE CBC W/AUTO DIFF WBC: CPT

## 2025-01-14 PROCEDURE — 82043 UR ALBUMIN QUANTITATIVE: CPT

## 2025-01-14 PROCEDURE — 81001 URINALYSIS AUTO W/SCOPE: CPT

## 2025-01-14 PROCEDURE — 83735 ASSAY OF MAGNESIUM: CPT

## 2025-01-14 PROCEDURE — 36415 COLL VENOUS BLD VENIPUNCTURE: CPT

## 2025-01-14 PROCEDURE — 84100 ASSAY OF PHOSPHORUS: CPT

## 2025-01-15 ENCOUNTER — OFFICE VISIT (OUTPATIENT)
Dept: FAMILY MEDICINE CLINIC | Facility: CLINIC | Age: 70
End: 2025-01-15
Payer: COMMERCIAL

## 2025-01-15 VITALS
OXYGEN SATURATION: 98 % | TEMPERATURE: 97.7 F | HEART RATE: 76 BPM | BODY MASS INDEX: 28.7 KG/M2 | RESPIRATION RATE: 18 BRPM | WEIGHT: 146.2 LBS | HEIGHT: 60 IN | SYSTOLIC BLOOD PRESSURE: 134 MMHG | DIASTOLIC BLOOD PRESSURE: 76 MMHG

## 2025-01-15 DIAGNOSIS — E11.22 TYPE 2 DIABETES MELLITUS WITH STAGE 3B CHRONIC KIDNEY DISEASE, WITHOUT LONG-TERM CURRENT USE OF INSULIN (HCC): ICD-10-CM

## 2025-01-15 DIAGNOSIS — R20.0 NUMBNESS AND TINGLING OF HAND: ICD-10-CM

## 2025-01-15 DIAGNOSIS — C90.00 MULTIPLE MYELOMA NOT HAVING ACHIEVED REMISSION (HCC): ICD-10-CM

## 2025-01-15 DIAGNOSIS — N18.32 TYPE 2 DIABETES MELLITUS WITH STAGE 3B CHRONIC KIDNEY DISEASE, WITHOUT LONG-TERM CURRENT USE OF INSULIN (HCC): ICD-10-CM

## 2025-01-15 DIAGNOSIS — R20.2 NUMBNESS AND TINGLING OF HAND: ICD-10-CM

## 2025-01-15 DIAGNOSIS — F33.0 MILD EPISODE OF RECURRENT MAJOR DEPRESSIVE DISORDER (HCC): ICD-10-CM

## 2025-01-15 DIAGNOSIS — D70.9 NEUTROPENIA, UNSPECIFIED TYPE (HCC): Primary | ICD-10-CM

## 2025-01-15 DIAGNOSIS — N17.9 AKI (ACUTE KIDNEY INJURY) (HCC): ICD-10-CM

## 2025-01-15 LAB
MISCELLANEOUS LAB TEST RESULT: NORMAL
SCAN RESULT: NORMAL
SCAN RESULT: NORMAL

## 2025-01-15 PROCEDURE — 99214 OFFICE O/P EST MOD 30 MIN: CPT | Performed by: INTERNAL MEDICINE

## 2025-01-15 NOTE — PROGRESS NOTES
Name: Nidia Hamlin      : 1955      MRN: [id number]  Encounter Provider: Tasha Palmer DO  Encounter Date: 1/15/2025   Encounter department: [facility]  :  Assessment & Plan  Neutropenia, unspecified type (HCC)  Pt is following with oncology She had bone marrow bx and is awaiting followup visit/results Repeat cbc showed some improved counts        Type 2 diabetes mellitus with stage 3b chronic kidney disease, without long-term current use of insulin (HCC)    Lab Results   Component Value Date    HGBA1C 7.0 (H) 2024   BS monitoring is stable overall FBS yesterday was 146 and she is currently off medications She is eating less overall but still eating better than she had prehospital          Mild episode of recurrent major depressive disorder (HCC)  Pt doing better today She is feeling better off sertraline          Numbness and tingling of hand  Pt had seen Dr Whalen and will followup once she has plan with oncology        JOSE ELIAS (acute kidney injury) ([facility])  Yesterday GFR increased to 41 Avoid nsaids Stay hydrated        Multiple myeloma not having achieved remission ([facility])  Pt awaiting results of bone marrow but suspicion for myeloproliferative dz        Rto February as scheduled       Falls Plan of Care: balance, strength, and gait training instructions were provided.   History of Present Illness     HPI  Pt recently hospitalized and found to have hgb less than 8 She had bone marrow biopsy and is awaiting results but concern for possible MM Pt is feeling a bit better and he did get blood transfusion in the hospital She is eating not a lot but is eating No chest pain or sob She is off diabetic meds due to kidney dysfunction and not using nsaids She is taking gabapentin and Tylenol prn for ongoing hand pain She does plan to followup with Dr Whalen She is sleeping a few hours but has to sleep in recliner and try to stay in one position due to hand pain No fever or chills No n/v She is trying  to do things for herself but her hands and the pain limit activities at times No falls She is drinking water more than she had been She has appt with Dr Chavez next week to review results   Review of Systems   Constitutional:  Positive for activity change and fatigue. Negative for chills and fever.   HENT: Negative.     Eyes:  Negative for visual disturbance.   Respiratory:  Positive for cough. Negative for shortness of breath and wheezing.    Cardiovascular: Negative.    Gastrointestinal: Negative.    Genitourinary:  Positive for frequency.   Musculoskeletal:  Positive for arthralgias and joint swelling.   Neurological:  Negative for dizziness, light-headedness and headaches.   Psychiatric/Behavioral:  Negative for sleep disturbance. The patient is not nervous/anxious.        Objective   /76   Pulse 76   Temp 97.7 °F (36.5 °C) (Temporal)   Resp 18   Ht 5' (1.524 m)   Wt 66.3 kg (146 lb 3.2 oz)   SpO2 98%   BMI 28.55 kg/m²      Physical Exam  Vitals and nursing note reviewed.   Constitutional:       General: She is not in acute distress.     Appearance: Normal appearance. She is not ill-appearing, toxic-appearing or diaphoretic.   HENT:      Head: Normocephalic and atraumatic.      Right Ear: External ear normal.      Left Ear: External ear normal.      Nose: Nose normal.      Mouth/Throat:      Mouth: Mucous membranes are moist.   Eyes:      General: No scleral icterus.     Extraocular Movements: Extraocular movements intact.      Pupils: Pupils are equal, round, and reactive to light.   Cardiovascular:      Rate and Rhythm: Normal rate and regular rhythm.      Pulses: Normal pulses.      Heart sounds: Normal heart sounds.   Pulmonary:      Effort: Pulmonary effort is normal. No respiratory distress.      Breath sounds: Normal breath sounds. No wheezing.   Abdominal:      General: Bowel sounds are normal. There is no distension.      Palpations: Abdomen is soft.      Tenderness: There is no abdominal  tenderness.   Musculoskeletal:         General: Swelling and tenderness present.      Cervical back: Normal range of motion and neck supple.      Right lower leg: No edema.      Left lower leg: No edema.      Comments: Swelling of fingers and hand    Lymphadenopathy:      Cervical: No cervical adenopathy.   Skin:     General: Skin is warm and dry.      Coloration: Skin is not jaundiced or pale.      Findings: No erythema.   Neurological:      General: No focal deficit present.      Mental Status: She is alert and oriented to person, place, and time. Mental status is at baseline.      Cranial Nerves: No cranial nerve deficit.   Psychiatric:         Mood and Affect: Mood normal.         Behavior: Behavior normal.         Thought Content: Thought content normal.         Judgment: Judgment normal.

## 2025-01-15 NOTE — ASSESSMENT & PLAN NOTE
Lab Results   Component Value Date    HGBA1C 7.0 (H) 12/07/2024   BS monitoring is stable overall FBS yesterday was 146 and she is currently off medications She is eating less overall but still eating better than she had prehospital

## 2025-01-15 NOTE — ASSESSMENT & PLAN NOTE
Pt is following with oncology She had bone marrow bx and is awaiting followup visit/results Repeat cbc showed some improved counts

## 2025-01-16 LAB
MISCELLANEOUS LAB TEST RESULT: NORMAL
MISCELLANEOUS LAB TEST RESULT: NORMAL

## 2025-01-16 NOTE — ASSESSMENT & PLAN NOTE
Vitamin D 13.2 ng/mL.   Will start ergocalciferol 50,000 units weekly x 12 weeks then transition to 1000 units daily thereafter.

## 2025-01-16 NOTE — ASSESSMENT & PLAN NOTE
Blood pressure 115/76 mHg  West snot check BP at home. Denies dizziness, headaches or lightheadedness since hospital discharge. Has BP cuff at home, will check BP 3-4 times per week and keep record of readings.

## 2025-01-16 NOTE — PROGRESS NOTES
Name: Nidia Hamlin      : 1955      MRN: [id number]  Encounter Provider: NATHAN Cardozo  Encounter Date: 2025   Encounter department: [facility]  :  Assessment & Plan  Stage 3b chronic kidney disease (HCC)  Lab Results   Component Value Date    EGFR 41 2025    EGFR 33 2024    EGFR 34 2024    CREATININE 1.31 (H) 2025    CREATININE 1.58 (H) 2024    CREATININE 1.55 (H) 2024   Baseline creatinine 1.3-1.4 mg/dL, GFR 41 ml/min, 25  Needs to establish with nephrologist  Etiology diabetic nephropathy, previous JOSE ELIAS events, age related nephron loss, previous smoker  Creatinine 1.31 mg/dL, GFR 41 mL/min, 2025  UA with moderate blood, 1+ protein, 25.   UACR 525 mg/g, UPCR 1.3, 25.   Recent JOSE ELIAS, will check BMP and urine in 2 months with consideration to start RAAS if BP allows and proteiniuria remains elevated.      Primary hypertension  Blood pressure 115/76 mHg  West snot check BP at home. Denies dizziness, headaches or lightheadedness since hospital discharge. Has BP cuff at home, will check BP 3-4 times per week and keep record of readings.      Anemia, unspecified type  Hgb 9.2 g/dL, 25. Discussed high iron foods. Will continue to monitor.      Hyponatremia  Sodium 131 mmol/L, 2025.      Acidosis  Bicarb 21 mmol/L, 25.      Vitamin D deficiency  Vitamin D 13.2 ng/mL.   Will start ergocalciferol 50,000 units weekly x 12 weeks then transition to 1000 units daily thereafter.     Hypomagnesemia  Magnesium 1.7 mg/dL, 2025  List of high magnesium foods provided to increase intake.          History of Present Illness   HPI  Nidia Hamlin is a 69 y.o. female who presents  to [place] office for stage IIIb chronic kidney disease, hypertension, anemia, hyponatremia, acidosis, vitamin D deficiency, hypomagnesemia proteinuria.  Denies recent hospitalizations, emergency department visits  or illness.  States overall she has been feeling well.  Patient does note she has cold hands the past 2 weeks but denies color changes to fingers as noted above.  Her most recent labs are from 1/14/2025 which we have reviewed together.          Review of Systems   Constitutional:  Negative for activity change, appetite change, chills, fatigue and fever.   HENT:  Negative for ear pain and sore throat.    Eyes:  Negative for pain and visual disturbance.   Respiratory:  Negative for cough and shortness of breath.    Cardiovascular:  Negative for chest pain and palpitations.   Gastrointestinal:  Negative for abdominal pain, constipation, diarrhea, nausea and vomiting.   Genitourinary:  Negative for decreased urine volume, difficulty urinating, dysuria, flank pain, frequency and hematuria.   Musculoskeletal:  Negative for arthralgias and back pain.        C/O of bilateral hand subjective feeling of coldness the weeks. Denies coolness to touch or  discoloration in fingers. Has history of neuropathy and carpal tunnel syndrome. Follows with Dr. Whalen, advised to contact if she has any issues.     Skin:  Negative for color change and rash.   Neurological:  Negative for dizziness, seizures, syncope, light-headedness and headaches.   Hematological: Negative.    Psychiatric/Behavioral: Negative.     All other systems reviewed and are negative.         Objective   There were no vitals taken for this visit.     Physical Exam  Vitals reviewed.   Constitutional:       General: She is not in acute distress.     Appearance: She is well-developed and normal weight. She is not ill-appearing.   HENT:      Head: Normocephalic and atraumatic.      Right Ear: External ear normal.      Left Ear: External ear normal.      Nose: Nose normal.      Mouth/Throat:      Mouth: Mucous membranes are moist.      Pharynx: Oropharynx is clear.   Eyes:      Extraocular Movements: Extraocular movements intact.      Conjunctiva/sclera: Conjunctivae  normal.      Pupils: Pupils are equal, round, and reactive to light.   Cardiovascular:      Rate and Rhythm: Normal rate and regular rhythm.      Heart sounds: Normal heart sounds. No murmur heard.  Pulmonary:      Effort: Pulmonary effort is normal. No respiratory distress.      Breath sounds: Normal breath sounds.   Abdominal:      Palpations: Abdomen is soft.      Tenderness: There is no abdominal tenderness.   Musculoskeletal:         General: No swelling.      Cervical back: Neck supple.      Right lower leg: No edema.      Left lower leg: No edema.   Skin:     General: Skin is warm and dry.      Capillary Refill: Capillary refill takes less than 2 seconds.   Neurological:      General: No focal deficit present.      Mental Status: She is alert and oriented to person, place, and time.   Psychiatric:         Mood and Affect: Mood normal.         Behavior: Behavior normal.

## 2025-01-16 NOTE — ASSESSMENT & PLAN NOTE
Lab Results   Component Value Date    EGFR 41 01/14/2025    EGFR 33 12/24/2024    EGFR 34 12/23/2024    CREATININE 1.31 (H) 01/14/2025    CREATININE 1.58 (H) 12/24/2024    CREATININE 1.55 (H) 12/23/2024   Baseline creatinine 1.3-1.4 mg/dL, GFR 41 ml/min, 1/14/25  Needs to establish with nephrologist  Etiology diabetic nephropathy, previous JOSE ELIAS events, age related nephron loss, previous smoker  Creatinine 1.31 mg/dL, GFR 41 mL/min, 1/14/2025  UA with moderate blood, 1+ protein, 1/14/25.   UACR 525 mg/g, UPCR 1.3, 1/14/25.   Recent JOSE ELIAS, will check BMP and urine in 2 months with consideration to start RAAS if BP allows and proteiniuria remains elevated.

## 2025-01-17 ENCOUNTER — PATIENT OUTREACH (OUTPATIENT)
Dept: CASE MANAGEMENT | Facility: HOSPITAL | Age: 70
End: 2025-01-17

## 2025-01-17 ENCOUNTER — OFFICE VISIT (OUTPATIENT)
Dept: NEPHROLOGY | Facility: CLINIC | Age: 70
End: 2025-01-17
Payer: COMMERCIAL

## 2025-01-17 VITALS
HEIGHT: 60 IN | DIASTOLIC BLOOD PRESSURE: 76 MMHG | BODY MASS INDEX: 28.66 KG/M2 | TEMPERATURE: 97.7 F | SYSTOLIC BLOOD PRESSURE: 115 MMHG | HEART RATE: 78 BPM | OXYGEN SATURATION: 99 % | WEIGHT: 146 LBS | RESPIRATION RATE: 16 BRPM

## 2025-01-17 DIAGNOSIS — R80.1 PERSISTENT PROTEINURIA: ICD-10-CM

## 2025-01-17 DIAGNOSIS — N18.32 STAGE 3B CHRONIC KIDNEY DISEASE (HCC): Primary | ICD-10-CM

## 2025-01-17 DIAGNOSIS — E55.9 VITAMIN D DEFICIENCY: ICD-10-CM

## 2025-01-17 DIAGNOSIS — E87.20 ACIDOSIS: ICD-10-CM

## 2025-01-17 DIAGNOSIS — E83.42 HYPOMAGNESEMIA: ICD-10-CM

## 2025-01-17 DIAGNOSIS — D64.9 ANEMIA, UNSPECIFIED TYPE: ICD-10-CM

## 2025-01-17 DIAGNOSIS — E87.1 HYPONATREMIA: ICD-10-CM

## 2025-01-17 DIAGNOSIS — I10 PRIMARY HYPERTENSION: ICD-10-CM

## 2025-01-17 PROCEDURE — 99214 OFFICE O/P EST MOD 30 MIN: CPT

## 2025-01-17 RX ORDER — ERGOCALCIFEROL 1.25 MG/1
50000 CAPSULE, LIQUID FILLED ORAL WEEKLY
Qty: 13 CAPSULE | Refills: 1 | Status: SHIPPED | OUTPATIENT
Start: 2025-01-17

## 2025-01-17 NOTE — LETTER
Date: 01/17/25  Dear Nidia Hamlin,   My name is Di; I am a registered nurse care manager working with Dr Palmer's office.  I have not been able to reach you and would like to set a time that I can talk with you over the phone.  My work is to help patients that have complex medical conditions get the care they need. This includes patients who may have been in the hospital or emergency room.    Please call me with any questions you may have. I look forward to speaking with you.  Sincerely,  Di Sullivan, RNCM  [phone]  Outpatient Care Manager

## 2025-01-17 NOTE — PATIENT INSTRUCTIONS
Pleasure seeing you today.     Your kidney function is stable with a creatinine of 1.31 mg/dL and a GFR of 41 mL/min.  Please continue to avoid NSAIDs such as Advil, Motrin, Aleve, ibuprofen and naproxen.  Please continue to follow 2 g sodium restriction.    Please continue checking blood pressure at home 3-4 times per week and keep a record of readings.  If blood pressure upper number is consistently less than 100 or greater than 150 please contact the office.  Please also call the office if you are experiencing increased headaches, dizziness, lightheadedness, chest pain or blurred vision.  Please continue to maintain a 2 g sodium restriction.    High magnesium foods include legumes such as kidney beans, peanut butter, nuts, bananas, pumpkin seeds, whole grains, yogurt, and potatoes.     Please continue taking all medications as previously ordered.    Please return for follow-up appointment in 2 months with lab work completed prior to that visit.

## 2025-01-17 NOTE — PROGRESS NOTES
Chart reviewed. Patient had PCP apt on 1/15 for follow up on neutropenia. Call placed to patient. No answer and VM left with request for CB.    Outreach #2 and an UTR letter sent via My Chart and this RNCM removed self from care team..

## 2025-01-20 ENCOUNTER — TELEPHONE (OUTPATIENT)
Dept: OTHER | Facility: OTHER | Age: 70
End: 2025-01-20

## 2025-01-20 NOTE — TELEPHONE ENCOUNTER
Patient is calling regarding cancelling an appointment.    Date/Time: 01/21/25 9:40am     Patient was rescheduled: YES [] NO [x]    Patient requesting call back to reschedule: YES [x] NO []    Pt reached answering service, she needs to reschedule due to transportation.

## 2025-01-22 PROBLEM — J10.1 INFLUENZA A (H1N1): Status: RESOLVED | Noted: 2024-12-23 | Resolved: 2025-01-22

## 2025-01-24 ENCOUNTER — OFFICE VISIT (OUTPATIENT)
Dept: HEMATOLOGY ONCOLOGY | Facility: CLINIC | Age: 70
End: 2025-01-24
Payer: COMMERCIAL

## 2025-01-24 VITALS
OXYGEN SATURATION: 100 % | DIASTOLIC BLOOD PRESSURE: 80 MMHG | HEART RATE: 84 BPM | SYSTOLIC BLOOD PRESSURE: 135 MMHG | HEIGHT: 60 IN | BODY MASS INDEX: 29.25 KG/M2 | WEIGHT: 149 LBS | TEMPERATURE: 97.4 F

## 2025-01-24 DIAGNOSIS — C90.00 MULTIPLE MYELOMA NOT HAVING ACHIEVED REMISSION (HCC): Primary | ICD-10-CM

## 2025-01-24 PROCEDURE — 99215 OFFICE O/P EST HI 40 MIN: CPT | Performed by: INTERNAL MEDICINE

## 2025-01-24 PROCEDURE — G2211 COMPLEX E/M VISIT ADD ON: HCPCS | Performed by: INTERNAL MEDICINE

## 2025-01-24 NOTE — TELEPHONE ENCOUNTER
I called pt, but did not get a answer. I messaged the pt through [employer] to let her know Dr. Whalen needs her to have the EMG testing done. I asked her to call if she has any further questions or concerns.

## 2025-01-24 NOTE — PROGRESS NOTES
Oncology Teach:   Review of Plan:  Treatment Plan Reviewed    Notes:  Revlimid 10mg 21 days on, 7 days off  Daratumumab  Dexamethasone weekly     Review of Pre-Treatment Needs:  Lab work frequency reviewed    PICC or Port Placement Needs:  Not needed    Expectations at First Appointment Reviewed:  Yes    Patient Medication Education Reviewed:  Yes    Supportive Medication Reviewed:  Rx meds reviewed    Review when to call office vs. present to ED:  Yes    Provided Oncology Access Center Number:  Yes    Chemo consent obtained?:  Yes  Revlimid patient/physician agreement form signed.  Will fax when patient ready to move forward with treatment     Reviewed speciality pharmacy process.  Patient requesting to not start treatment until after possible surgery coming up.  Patient has appt 2/5 and will update office after appointment.

## 2025-01-24 NOTE — PROGRESS NOTES
[facility] [place]  [address] [facility] [address]  [phone]  [phone]    Nidia Hamlin,1955, [id number]  01/24/25    Discussion:   In summary, this is a 69-year-old female with a history of newly diagnosed myeloma.  Symptomatic by anemia attributable to myeloma.  I agree with Dr. Ramos's previous recommendation for DRCRISTOPHER.  We reviewed potential toxicities including but not limited to allergic reaction, rash, diarrhea, somnolence, cytopenias, hyperglycemia, insomnia, increased risk for infection.  Patient is agreeable to institution of dexamethasone at this time but defers other medications understanding that this could decrease likelihood of efficacy.  She wishes to have nerve conduction studies and carpal tunnel repair, if applicable, prior to initiating these other treatments.  Weekly CBC, CMP.  I note that B12 is depressed.  MMA is requested for cross check.  Imaging is requested.  Favor myeloma bone CT.  If she is unable to accomplish this, skeletal survey would be an less favored substitute.  We reviewed that the goal of treatment is disease control, palliative benefit, survival benefit but that cure is not likely.  We briefly mentioned that bone marrow transplantation can play a role for treatment of patients with myeloma though not used in the first-line setting.  I discussed the above with the patient.  The patient and her daughter voiced understanding and agreement.  ______________________________________________________________________    No chief complaint on file.      HPI:  Oncology History   Multiple myeloma not having achieved remission (HCC)   12/24/2024 Initial Diagnosis    Multiple myeloma not having achieved remission (HCC)     1/9/2025 -  Cancer Staged    Staging form: Plasma Cell Myeloma and Disorders, AJCC 8th Edition  - Clinical stage from 1/9/2025: High-risk cytogenetics: Absent - Signed by Alvin Chavez DO on 1/24/2025  Stage prefix:  Initial diagnosis  Cytogenetics: 1q addition, Other mutation  Serum calcium level: Normal  Serum creatinine level: Elevated  Creatinine (mg/dL): 1.5           Interval History:  December 19, 2024 patient presented with nausea and vomiting.  Sodium 129, creatinine 1.5, glucose 44, hemoglobin 7.6.  CT abdomen pelvis no acute findings.  Vitamin B12 199, iron saturation 41%.  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  Serum free kappa 8.8, lambda 904.2, ratio 0.01.  IgG 5.5 g.  January 9, 2025 bone marrow biopsy showed 11% plasma cells.  Lambda light chain restriction.  Normal karyotype.  13 q. deletion.  1 q. +.    1 - Symptomatic but completely ambulatory    Review of Systems   Constitutional:  Negative for appetite change, diaphoresis, fatigue and fever.   HENT:  Negative for sinus pain.    Eyes:  Negative for discharge.   Respiratory:  Negative for cough and shortness of breath.    Cardiovascular:  Negative for chest pain.   Gastrointestinal:  Negative for abdominal pain, constipation and diarrhea.   Endocrine: Negative for cold intolerance.   Genitourinary:  Negative for difficulty urinating and hematuria.   Musculoskeletal:  Negative for joint swelling.   Skin:  Negative for rash.   Allergic/Immunologic: Negative for environmental allergies.   Neurological:  Positive for numbness. Negative for dizziness and headaches.   Hematological:  Negative for adenopathy.   Psychiatric/Behavioral:  Negative for agitation.        Past Medical History:   Diagnosis Date    Adrenal insufficiency (HCC)     Anxiety     Bacterial sepsis (HCC)     last assessed: 06/13/16    Cushing's syndrome (HCC)     last assessed: 05/29/16    Depression     last assessed: 01/02/18    Diabetes mellitus (HCC)     Hydradenitis     Hyperlipidemia     Hypertension     Positive FIT (fecal immunochemical test) 06/22/2018    Added automatically from request for surgery [id number]    Rib contusion, left, sequela 06/14/2022     Trigger finger, right middle finger 03/07/2022     Patient Active Problem List   Diagnosis    Elevated total protein    Hypertension    Tobacco abuse    Non-ST elevation myocardial infarction (NSTEMI), type 2    Vitamin D deficiency    Depression, recurrent (HCC)    Type 2 diabetes mellitus with hyperglycemia, without long-term current use of insulin ([facility])    Hyperlipidemia    Hypothyroidism    Anxiety    Osteoporosis    History of adrenal insufficiency    Hidradenitis suppurativa    Screening mammogram for breast cancer    Stage 3b chronic kidney disease ([facility])    Chronic pain of both shoulders    Cervical radiculopathy    Carpal tunnel syndrome on right    Neuropathy    Numbness and tingling of hand    JOSE ELIAS (acute kidney injury) ([facility])    Anemia    Hyponatremia    Type 2 diabetes mellitus with hypoglycemia, without long-term current use of insulin ([facility])    Elevated lipase    Leukopenia    Acidosis    Multiple myeloma not having achieved remission ([facility])    Neutropenia, unspecified type ([facility])    Type 2 diabetes mellitus with stage 3b chronic kidney disease, without long-term current use of insulin ([facility])       Current Outpatient Medications:     Cholecalciferol (VITAMIN D3) 1,000 units tablet, Take 2 tablets (2,000 Units total) by mouth daily, Disp: , Rfl:     clindamycin (CLEOCIN T) 1 % lotion, Apply topically 2 (two) times a day To affected areas, Disp: , Rfl:     ergocalciferol (VITAMIN D2) 50,000 units, Take 1 capsule (50,000 Units total) by mouth once a week, Disp: 13 capsule, Rfl: 1    gabapentin (NEURONTIN) 100 mg capsule, Take 1 capsule (100 mg total) by mouth 3 (three) times a day, Disp: 90 capsule, Rfl: 0  Allergies   Allergen Reactions    Fosamax [Alendronate] Diarrhea    Paxil [Paroxetine Hcl] Rash     Past Surgical History:   Procedure Laterality Date    ADRENALECTOMY  12/2002    CHOLECYSTECTOMY      HERNIA REPAIR      umbilical    OTHER SURGICAL HISTORY      Injection of trigger joint    CO COLONOSCOPY FLX  DX W/COLLJ SPEC WHEN PFRMD N/A 7/10/2018    Procedure: COLONOSCOPY;  Surgeon: Pako Nieves MD;  Location: [facility];  Service: Gastroenterology    TONSILLECTOMY AND ADENOIDECTOMY      TUBAL LIGATION       Social History     Objective:  Vitals:    01/24/25 1434   BP: 135/80   BP Location: Left arm   Patient Position: Sitting   Cuff Size: Adult   Pulse: 84   Temp: (!) 97.4 °F (36.3 °C)   TempSrc: Temporal   SpO2: 100%   Weight: 67.6 kg (149 lb)   Height: 5' (1.524 m)     Physical Exam  Constitutional:       Appearance: She is well-developed.   HENT:      Head: Normocephalic and atraumatic.   Eyes:      Pupils: Pupils are equal, round, and reactive to light.   Cardiovascular:      Rate and Rhythm: Normal rate.      Heart sounds: No murmur heard.  Pulmonary:      Effort: No respiratory distress.      Breath sounds: No wheezing or rales.   Abdominal:      General: There is no distension.      Palpations: Abdomen is soft.      Tenderness: There is no abdominal tenderness. There is no rebound.   Musculoskeletal:         General: No tenderness.      Cervical back: Neck supple.   Lymphadenopathy:      Cervical: No cervical adenopathy.   Skin:     General: Skin is warm.      Findings: No rash.   Neurological:      Mental Status: She is alert and oriented to person, place, and time.      Deep Tendon Reflexes: Reflexes normal.   Psychiatric:         Thought Content: Thought content normal.           Labs:  I personally reviewed the labs and imaging pertinent to this patient care.

## 2025-01-27 ENCOUNTER — HOSPITAL ENCOUNTER (OUTPATIENT)
Dept: NEUROLOGY | Facility: CLINIC | Age: 70
Discharge: HOME | End: 2025-01-27
Payer: COMMERCIAL

## 2025-01-27 DIAGNOSIS — G56.01 CARPAL TUNNEL SYNDROME ON RIGHT: ICD-10-CM

## 2025-01-27 DIAGNOSIS — R20.2 NUMBNESS AND TINGLING OF HAND: ICD-10-CM

## 2025-01-27 DIAGNOSIS — G60.3 IDIOPATHIC PROGRESSIVE NEUROPATHY: ICD-10-CM

## 2025-01-27 DIAGNOSIS — R20.0 NUMBNESS AND TINGLING OF HAND: ICD-10-CM

## 2025-01-27 PROBLEM — G56.03 BILATERAL CARPAL TUNNEL SYNDROME: Status: ACTIVE | Noted: 2024-11-20

## 2025-01-27 PROCEDURE — 95913 NRV CNDJ TEST 13/> STUDIES: CPT | Performed by: PSYCHIATRY & NEUROLOGY

## 2025-01-27 PROCEDURE — 95886 MUSC TEST DONE W/N TEST COMP: CPT | Performed by: PSYCHIATRY & NEUROLOGY

## 2025-01-28 ENCOUNTER — TELEPHONE (OUTPATIENT)
Age: 70
End: 2025-01-28

## 2025-01-28 NOTE — TELEPHONE ENCOUNTER
OT reports she saw pt for discharge, since he has met/exceeded OT services.OT reports pt had muscular test with electrical pulses done the other day and now she has 8 out of 10 pain at this time. Nothing further the OT wanted to note at this time.

## 2025-01-29 ENCOUNTER — DOCUMENTATION (OUTPATIENT)
Dept: OBGYN CLINIC | Facility: CLINIC | Age: 70
End: 2025-01-29

## 2025-01-30 PROCEDURE — 85097 BONE MARROW INTERPRETATION: CPT | Performed by: STUDENT IN AN ORGANIZED HEALTH CARE EDUCATION/TRAINING PROGRAM

## 2025-01-30 PROCEDURE — 88313 SPECIAL STAINS GROUP 2: CPT | Performed by: STUDENT IN AN ORGANIZED HEALTH CARE EDUCATION/TRAINING PROGRAM

## 2025-01-30 PROCEDURE — 88305 TISSUE EXAM BY PATHOLOGIST: CPT | Performed by: STUDENT IN AN ORGANIZED HEALTH CARE EDUCATION/TRAINING PROGRAM

## 2025-01-31 ENCOUNTER — HOSPITAL ENCOUNTER (OUTPATIENT)
Dept: CT IMAGING | Facility: HOSPITAL | Age: 70
Discharge: HOME | End: 2025-01-31
Attending: INTERNAL MEDICINE
Payer: COMMERCIAL

## 2025-01-31 DIAGNOSIS — C90.00 MULTIPLE MYELOMA NOT HAVING ACHIEVED REMISSION (HCC): ICD-10-CM

## 2025-01-31 PROCEDURE — 76497 UNLISTED CT PROCEDURE: CPT

## 2025-02-05 ENCOUNTER — OFFICE VISIT (OUTPATIENT)
Dept: FAMILY MEDICINE CLINIC | Facility: CLINIC | Age: 70
End: 2025-02-05
Payer: COMMERCIAL

## 2025-02-05 ENCOUNTER — OFFICE VISIT (OUTPATIENT)
Dept: OBGYN CLINIC | Facility: CLINIC | Age: 70
End: 2025-02-05
Payer: COMMERCIAL

## 2025-02-05 ENCOUNTER — TELEPHONE (OUTPATIENT)
Dept: OBGYN CLINIC | Facility: CLINIC | Age: 70
End: 2025-02-05

## 2025-02-05 ENCOUNTER — PREP FOR PROCEDURE (OUTPATIENT)
Dept: OBGYN CLINIC | Facility: CLINIC | Age: 70
End: 2025-02-05

## 2025-02-05 VITALS
BODY MASS INDEX: 28.86 KG/M2 | WEIGHT: 147 LBS | HEIGHT: 60 IN | HEART RATE: 97 BPM | TEMPERATURE: 97.9 F | OXYGEN SATURATION: 98 % | RESPIRATION RATE: 16 BRPM

## 2025-02-05 VITALS
HEART RATE: 97 BPM | HEIGHT: 60 IN | OXYGEN SATURATION: 98 % | RESPIRATION RATE: 18 BRPM | SYSTOLIC BLOOD PRESSURE: 126 MMHG | TEMPERATURE: 97.7 F | WEIGHT: 147.2 LBS | BODY MASS INDEX: 28.9 KG/M2 | DIASTOLIC BLOOD PRESSURE: 78 MMHG

## 2025-02-05 DIAGNOSIS — E11.65 TYPE 2 DIABETES MELLITUS WITH HYPERGLYCEMIA, WITHOUT LONG-TERM CURRENT USE OF INSULIN (HCC): ICD-10-CM

## 2025-02-05 DIAGNOSIS — C90.00 MULTIPLE MYELOMA NOT HAVING ACHIEVED REMISSION (HCC): ICD-10-CM

## 2025-02-05 DIAGNOSIS — I10 HYPERTENSION, UNSPECIFIED TYPE: Primary | ICD-10-CM

## 2025-02-05 DIAGNOSIS — N18.32 STAGE 3B CHRONIC KIDNEY DISEASE (HCC): ICD-10-CM

## 2025-02-05 DIAGNOSIS — G56.01 CARPAL TUNNEL SYNDROME ON RIGHT: Primary | ICD-10-CM

## 2025-02-05 DIAGNOSIS — R20.0 NUMBNESS AND TINGLING OF HAND: ICD-10-CM

## 2025-02-05 DIAGNOSIS — R20.2 NUMBNESS AND TINGLING OF HAND: ICD-10-CM

## 2025-02-05 PROBLEM — R74.8 ELEVATED LIPASE: Status: RESOLVED | Noted: 2024-12-20 | Resolved: 2025-02-05

## 2025-02-05 PROBLEM — N17.9 AKI (ACUTE KIDNEY INJURY) (HCC): Status: RESOLVED | Noted: 2024-12-19 | Resolved: 2025-02-05

## 2025-02-05 PROBLEM — E87.20 ACIDOSIS: Status: RESOLVED | Noted: 2024-12-23 | Resolved: 2025-02-05

## 2025-02-05 PROCEDURE — 99214 OFFICE O/P EST MOD 30 MIN: CPT | Performed by: INTERNAL MEDICINE

## 2025-02-05 PROCEDURE — 99214 OFFICE O/P EST MOD 30 MIN: CPT | Performed by: STUDENT IN AN ORGANIZED HEALTH CARE EDUCATION/TRAINING PROGRAM

## 2025-02-05 PROCEDURE — G2211 COMPLEX E/M VISIT ADD ON: HCPCS | Performed by: INTERNAL MEDICINE

## 2025-02-05 RX ORDER — CHLORHEXIDINE GLUCONATE ORAL RINSE 1.2 MG/ML
15 SOLUTION DENTAL ONCE
OUTPATIENT
Start: 2025-02-17 | End: 2025-02-05

## 2025-02-05 NOTE — ASSESSMENT & PLAN NOTE
Lab Results   Component Value Date    HGBA1C 7.0 (H) 12/07/2024   She has repeat labs ordered and will get with GI labs for March Monitor BS She is trying to keep BS trend more stable It is better than it was from pt report

## 2025-02-05 NOTE — ASSESSMENT & PLAN NOTE
Pt seeing Dr Whalen today EMG recently does not suggest cervical origin but shows 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 She is interested in intervention if an option and would proceed prior to starting chemo treatments   Patient cleared for carpal tunnel surgery by Dr Whalen with low risk for short procedure

## 2025-02-05 NOTE — PROGRESS NOTES
Orthopedic Surgery Management Note  Nidia Hamlin (69 y.o. female)  : 1955 Encounter Date: 2025      Assessment and Plan:  1. Carpal tunnel syndrome on right  Case request operating room: RELEASE CARPAL TUNNEL - RIGHT    Case request operating room: RELEASE CARPAL TUNNEL - RIGHT          69 y.o. female presents in follow up for the above diagnosis.     The patient has had longstanding symptoms with attempted conservative managed which has failed given the persistence and worsening of symptoms. The anatomy and physiology of carpal tunnel syndrome was discussed with the patient today.  Increase pressure localized under the transverse carpal ligament can cause pain, numbness, tingling, or dysesthesias within the median nerve distribution as well as feelings of fatigue, clumsiness, or awkwardness.  These symptoms typically occur at night and worse in the morning upon waking.  Eventually, untreated carpal tunnel syndrome can result in weakness and permanent loss of muscle within the thenar compartment of the hand.  Treatment options were discussed with the patient, including nonoperative and operative options.  Conservative treatment includes nocturnal resting splints to keep the nerve in a neutral position, ergonomic changes within the work or home environment, activity modification, and physical therapy.  Vitamin B6 one tablet daily over the counter may helpful to reduce symptoms.  Steroid injections within the carpal canal can help a majority of patients, however this is often self-limited in a majority of patients.  Surgical options focus on interventions to divide the transverse carpal ligament typically results in a long-lasting relief of the patient's complaints, with the recurrence rate of less than 1%. The patient has an understanding of the above mentioned discussion.The risks and benefits of the procedure were explained to the patient, which include, but are not limited to: Bleeding, infection,  recurrence, pain, scar, damage to tendons, damage to nerves, and damage to blood vessels, failure to give desired results and complications related to anesthesia.  These risks, along with alternative conservative treatment options, and postoperative protocols were voiced back and understood by the patient.      The patient has elected to undergo an open carpal tunnel release.  The palmar incision technique was discussed in the office with the patient today.   In the postoperative period, light activities are allowed immediately, driving is allowed when narcotic medication has stopped, and the bandages may be removed and incision may get wet after 3 days.  Heavy activities (lifting more than approximately 10 pounds) will be allowed after follow up appointment in 2 weeks.  While night symptoms (waking from sleep, pain, and discomfort in the hands) generally improves rapidly, the numbness and tingling as well as the strength will slowly improve over weeks to months depending on the chronicity and severity of the carpal tunnel syndrome.  Pillar pain and scar discomfort were discussed with the patient which are self-limiting conditions.  The risks of bleeding and infection from the surgery are less than 1%.  Risk of recurrence is approximately 0.5%.  The risks of nerve injury or nerve damage or damage to the blood vessels is approximately 1 in 1200. We further discussed that conditions such as nicotine use of any kind, diabetes, chronic kidney disease, malnutrition and peripheral vascular disease all increase the wrist of postoperative complications, including infection and wound healing complications. The patient specifically has the risks of tobacco use and diabetes. The patient has agreed to attempt to wean from nicotine use prior to surgery and through the perioperative period and while the wound is healing. We discussed that continued nicotine use is detrimental for overall health and the patient should consider  quitting with this an an opportunity. The patient will be vigilant to control their blood glucose levels throughout the perioperative period and while the wound is healing.  Her last A1c on 12/7/2024 was 7.0 which was down from her June A1c of 7.2.  Overall use her cutoff of 7.5 to delay surgery so this patient is within the acceptable range at this time.    All questions were answered to the patient's satisfaction.  The patient agrees to comply with a standard postoperative protocol, and is willing to proceed.  There were no barriers to learning. Prior to surgery, the patient may be requested to stop all anti-inflammatory medications.  Prophylactic aspirin, Plavix, and Coumadin may be allowed to be continued.  Medications including vitamin E., ginkgo, and fish oil are requested to be stopped approximately one week prior to surgery.       After a thorough discussion of risks, benefits, and alternatives, patient is indicated for open carpal tunnel release.  Reviewed previous A1C.  Plan for surgery on February 17, 2025  Patient/Guardian verbalizes understanding and consent to treatment plan. All questions answered. We encouraged them to contact our office with any questions or concerns.       Chief Complaint:     Numbness and tingling in the right hand    Previous History:   The patient was last seen on 11/20/2025 with a complaint of bilateral hand numbness and tingling. Her physical exam presented as carpal tunnel. An EMG was ordered.     Interval History:  Today, she complains that the right hand is more painful. She reports that she does need to start chemotherapy treatments, but would like to have the carpal tunnel releases prior to starting treatments.  This is because her numbness and tingling has been worsening as well as the pain.  She feels severely limited as she is now waking most nights and is tired during the day.  Additionally she is having daytime numbness that is greatly limiting her.  She has been  compliant with the bracing but reports that this has not been helping.    Past Medical History:  Past Medical History:   Diagnosis Date    Adrenal insufficiency (HCC)     Anxiety     Bacterial sepsis (HCC)     last assessed: 06/13/16    Cushing's syndrome (HCC)     last assessed: 05/29/16    Depression     last assessed: 01/02/18    Diabetes mellitus (HCC)     Hydradenitis     Hyperlipidemia     Hypertension     Positive FIT (fecal immunochemical test) 06/22/2018    Added automatically from request for surgery [id number]    Rib contusion, left, sequela 06/14/2022    Trigger finger, right middle finger 03/07/2022     Past Surgical History:   Procedure Laterality Date    ADRENALECTOMY  12/2002    CHOLECYSTECTOMY      HERNIA REPAIR      umbilical    OTHER SURGICAL HISTORY      Injection of trigger joint    ND COLONOSCOPY FLX DX W/COLLJ SPEC WHEN PFRMD N/A 7/10/2018    Procedure: COLONOSCOPY;  Surgeon: Pako Nieves MD;  Location: [facility];  Service: Gastroenterology    TONSILLECTOMY AND ADENOIDECTOMY      TUBAL LIGATION       Family History   Problem Relation Age of Onset    Breast cancer Mother         unknown age    No Known Problems Sister     No Known Problems Daughter     No Known Problems Daughter     No Known Problems Maternal Grandmother     No Known Problems Maternal Grandfather     No Known Problems Paternal Grandmother     No Known Problems Paternal Grandfather     No Known Problems Paternal Aunt     No Known Problems Paternal Aunt     No Known Problems Paternal Aunt     No Known Problems Half-Sister     No Known Problems Half-Sister     No Known Problems Half-Sister     No Known Problems Half-Sister     No Known Problems Half-Sister      Social History     Socioeconomic History    Marital status:      Spouse name: Not on file    Number of children: Not on file    Years of education: Not on file    Highest education level: Not on file   Occupational History    Not on file   Tobacco Use    Smoking  status: Every Day     Current packs/day: 0.25     Average packs/day: 0.3 packs/day for 50.0 years (12.5 ttl pk-yrs)     Types: Cigarettes    Smokeless tobacco: Never   Vaping Use    Vaping status: Never Used   Substance and Sexual Activity    Alcohol use: Never    Drug use: No    Sexual activity: Not Currently   Other Topics Concern    Not on file   Social History Narrative    Always uses seat belt    Always uses sunscreen    Caffeine use    Dental care, regularly    No living will    Uses safety equipment - seatbelts     Social Drivers of Health     Financial Resource Strain: Low Risk  (2023)    Overall Financial Resource Strain (CARDIA)     Difficulty of Paying Living Expenses: Not very hard   Food Insecurity: No Food Insecurity (2024)    Nursing - Inadequate Food Risk Classification     Worried About Running Out of Food in the Last Year: Never true     Ran Out of Food in the Last Year: Never true     Ran Out of Food in the Last Year: Never true   Transportation Needs: No Transportation Needs (2024)    Nursing - Transportation Risk Classification     Lack of Transportation: Not on file     Lack of Transportation: No   Physical Activity: Not on file   Stress: Not on file   Social Connections: Not on file   Intimate Partner Violence: Unknown (2024)    Nursing IPS     Feels Physically and Emotionally Safe: Not on file     Physically Hurt by Someone: Not on file     Humiliated or Emotionally Abused by Someone: Not on file     Physically Hurt by Someone: No     Hurt or Threatened by Someone: No   Housing Stability: Unknown (2024)    Nursing: Inadequate Housing Risk Classification     Has Housing: Not on file     Worried About Losing Housing: Not on file     Unable to Get Utilities: Not on file     Unable to Pay for Housing in the Last Year: No     Has Housin     Scheduled Meds:  Continuous Infusions:No current facility-administered medications for this visit.    PRN Meds:.  Allergies    Allergen Reactions    Fosamax [Alendronate] Diarrhea    Paxil [Paroxetine Hcl] Rash       Physical Examination:    Pulse 97   Temp 97.9 °F (36.6 °C) (Temporal)   Resp 16   Ht 5' (1.524 m)   Wt 66.7 kg (147 lb)   SpO2 98%   BMI 28.71 kg/m²     Gen: A&Ox3, NAD  Cardiac: regular rate  Chest: non labored breathing  Abdomen: Non-distended    Cervcial Spine: Negative Spurling's    Bilateral Hand  Digital motion is full     FDS/FDP/FPL/finger extensors intact      negative Finkelstein's  negative Triggering      Neurovascular:   Two point discrimination is 6 mm in the median and ulnar nerve distribution, bilaterally  positive Tinel at CT bilaterally  positive Phalen bilaterally  Durkan Positive on the right  There is no atrophy of the thenar muscles.  5/5 APB strength   5/5 FPL strength   5/5 IO strength   5/5 Abd-DQ strength   Positive tinel at the elbow bilaterally  Positive pain to palpation over the ulnar nerve bilaterally  Wwp      Vascular Exam: < 2 sec capillary refill     CTS-6:  Numbness in median nerve distribution (3.5): y  Nocturnal numbness (4): y  Thenar atrophy/weakness(5): n  Positive Phalen test (5): y  Loss of 2pt discrimination (4.5): y  Positive Tinel sign (4): y  Total: 21/26 (12)      Studies:  Radiographs: I personally reviewed and independently interpreted the available   1/29/2025: EMG of the bilateral upper extremity demonstrates severe right-sided carpal tunnel syndrome and moderate left-sided carpal tunnel syndrome.  Specifically both sides demonstrate sensory and motor nerve conduction studies with increased latency, decreased velocity, and decreased amplitude.  Both sides have EMG changes of the median nerve innervated APB with more significant findings on the right side.  All nerve deficits are associated with the median nerve as it crosses the wrist.        Kenji Whalen MD  Hand and Upper Extremity Surgery      *This note was dictated using Dragon voice recognition software.  Please excuse any word substitutions or errors.*      Scribe Attestation      I,:  Marycruz Giraldo am acting as a scribe while in the presence of the attending physician.:       I,:  Kenji Whalen MD personally performed the services described in this documentation    as scribed in my presence.:

## 2025-02-05 NOTE — ASSESSMENT & PLAN NOTE
Lab Results   Component Value Date    EGFR 41 01/14/2025    EGFR 33 12/24/2024    EGFR 34 12/23/2024    CREATININE 1.31 (H) 01/14/2025    CREATININE 1.58 (H) 12/24/2024    CREATININE 1.55 (H) 12/23/2024   Recent labs are improved Pt is hydrating better

## 2025-02-05 NOTE — PROGRESS NOTES
Name: Nidia Hamlin      : 1955      MRN: [id number]  Encounter Provider: Tasha Palmer DO  Encounter Date: 2025   Encounter department: [facility]  :  Assessment & Plan  Hypertension, unspecified type   Stay on current rx Monitor BP       Type 2 diabetes mellitus with hyperglycemia, without long-term current use of insulin (HCC)    Lab Results   Component Value Date    HGBA1C 7.0 (H) 2024   She has repeat labs ordered and will get with GI labs for March Monitor BS She is trying to keep BS trend more stable It is better than it was from pt report          Stage 3b chronic kidney disease (HCC)  Lab Results   Component Value Date    EGFR 41 2025    EGFR 33 2024    EGFR 34 2024    CREATININE 1.31 (H) 2025    CREATININE 1.58 (H) 2024    CREATININE 1.55 (H) 2024   Recent labs are improved Pt is hydrating better          Multiple myeloma not having achieved remission (HCC)  Pt is following with Dr Wheat and considering treat ment options        Numbness and tingling of hand  Pt seeing Dr Whalen today EMG recently does not suggest cervical origin but shows 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 She is interested in intervention if an option and would proceed prior to starting chemo treatments   Patient cleared for carpal tunnel surgery by Dr Whalen with low risk for short procedure        Rto 6 weeks        History of Present Illness   HPI  Pt had EMG and has appt for followup with Dr Whalen today She did see Dr wheat and he offered chemotherapy which she is considering but would want to that after she has hand procedure if that is an options She has some decrease swelling right hand but still limited and pain is limiting her sleep and function She is considering chemo but not sure if she would do the 6 treatments discussed especially if she has side effects She si concerned because she cared for her  thru his cancer tx No falls Her granddaughter and  grandson are helping with appt schedule and at home with some tasks   Review of Systems   Constitutional:  Negative for chills and fever.   HENT: Negative.     Eyes:  Negative for visual disturbance.   Respiratory:  Negative for cough and shortness of breath.    Cardiovascular: Negative.    Gastrointestinal: Negative.    Genitourinary: Negative.    Musculoskeletal:  Positive for arthralgias.   Neurological:  Negative for dizziness, light-headedness and headaches.   Psychiatric/Behavioral:  Negative for sleep disturbance. The patient is not nervous/anxious.        Objective   /78   Pulse 97   Temp 97.7 °F (36.5 °C) (Temporal)   Resp 18   Ht 5' (1.524 m)   Wt 66.8 kg (147 lb 3.2 oz)   SpO2 98%   BMI 28.75 kg/m²      Physical Exam  Vitals and nursing note reviewed.   Constitutional:       General: She is not in acute distress.     Appearance: Normal appearance. She is not ill-appearing, toxic-appearing or diaphoretic.   HENT:      Head: Normocephalic and atraumatic.      Right Ear: External ear normal.      Left Ear: External ear normal.      Nose: Nose normal.      Mouth/Throat:      Mouth: Mucous membranes are moist.   Eyes:      Extraocular Movements: Extraocular movements intact.      Pupils: Pupils are equal, round, and reactive to light.   Cardiovascular:      Rate and Rhythm: Normal rate and regular rhythm.      Pulses: Normal pulses.      Heart sounds: Normal heart sounds.   Pulmonary:      Effort: Pulmonary effort is normal.      Breath sounds: Normal breath sounds.   Abdominal:      General: Bowel sounds are normal.      Palpations: Abdomen is soft.   Musculoskeletal:         General: Swelling, tenderness and deformity present.      Cervical back: Normal range of motion and neck supple.      Right lower leg: No edema.      Left lower leg: No edema.   Lymphadenopathy:      Cervical: No cervical adenopathy.   Skin:     General: Skin is warm and dry.      Coloration: Skin is not pale.      Findings:  Rash present. No erythema.   Neurological:      General: No focal deficit present.      Mental Status: She is alert and oriented to person, place, and time. Mental status is at baseline.      Cranial Nerves: No cranial nerve deficit.      Sensory: Sensory deficit present.      Motor: Weakness present.      Coordination: Coordination abnormal.      Gait: Gait abnormal.   Psychiatric:         Mood and Affect: Mood normal.         Behavior: Behavior normal.         Thought Content: Thought content normal.         Judgment: Judgment normal.

## 2025-02-05 NOTE — H&P (VIEW-ONLY)
Orthopedic Surgery Management Note  Nidia Hamlin (69 y.o. female)  : 1955 Encounter Date: 2025      Assessment and Plan:  1. Carpal tunnel syndrome on right  Case request operating room: RELEASE CARPAL TUNNEL - RIGHT    Case request operating room: RELEASE CARPAL TUNNEL - RIGHT          69 y.o. female presents in follow up for the above diagnosis.     The patient has had longstanding symptoms with attempted conservative managed which has failed given the persistence and worsening of symptoms. The anatomy and physiology of carpal tunnel syndrome was discussed with the patient today.  Increase pressure localized under the transverse carpal ligament can cause pain, numbness, tingling, or dysesthesias within the median nerve distribution as well as feelings of fatigue, clumsiness, or awkwardness.  These symptoms typically occur at night and worse in the morning upon waking.  Eventually, untreated carpal tunnel syndrome can result in weakness and permanent loss of muscle within the thenar compartment of the hand.  Treatment options were discussed with the patient, including nonoperative and operative options.  Conservative treatment includes nocturnal resting splints to keep the nerve in a neutral position, ergonomic changes within the work or home environment, activity modification, and physical therapy.  Vitamin B6 one tablet daily over the counter may helpful to reduce symptoms.  Steroid injections within the carpal canal can help a majority of patients, however this is often self-limited in a majority of patients.  Surgical options focus on interventions to divide the transverse carpal ligament typically results in a long-lasting relief of the patient's complaints, with the recurrence rate of less than 1%. The patient has an understanding of the above mentioned discussion.The risks and benefits of the procedure were explained to the patient, which include, but are not limited to: Bleeding, infection,  recurrence, pain, scar, damage to tendons, damage to nerves, and damage to blood vessels, failure to give desired results and complications related to anesthesia.  These risks, along with alternative conservative treatment options, and postoperative protocols were voiced back and understood by the patient.      The patient has elected to undergo an open carpal tunnel release.  The palmar incision technique was discussed in the office with the patient today.   In the postoperative period, light activities are allowed immediately, driving is allowed when narcotic medication has stopped, and the bandages may be removed and incision may get wet after 3 days.  Heavy activities (lifting more than approximately 10 pounds) will be allowed after follow up appointment in 2 weeks.  While night symptoms (waking from sleep, pain, and discomfort in the hands) generally improves rapidly, the numbness and tingling as well as the strength will slowly improve over weeks to months depending on the chronicity and severity of the carpal tunnel syndrome.  Pillar pain and scar discomfort were discussed with the patient which are self-limiting conditions.  The risks of bleeding and infection from the surgery are less than 1%.  Risk of recurrence is approximately 0.5%.  The risks of nerve injury or nerve damage or damage to the blood vessels is approximately 1 in 1200. We further discussed that conditions such as nicotine use of any kind, diabetes, chronic kidney disease, malnutrition and peripheral vascular disease all increase the wrist of postoperative complications, including infection and wound healing complications. The patient specifically has the risks of tobacco use and diabetes. The patient has agreed to attempt to wean from nicotine use prior to surgery and through the perioperative period and while the wound is healing. We discussed that continued nicotine use is detrimental for overall health and the patient should consider  quitting with this an an opportunity. The patient will be vigilant to control their blood glucose levels throughout the perioperative period and while the wound is healing.  Her last A1c on 12/7/2024 was 7.0 which was down from her June A1c of 7.2.  Overall use her cutoff of 7.5 to delay surgery so this patient is within the acceptable range at this time.    All questions were answered to the patient's satisfaction.  The patient agrees to comply with a standard postoperative protocol, and is willing to proceed.  There were no barriers to learning. Prior to surgery, the patient may be requested to stop all anti-inflammatory medications.  Prophylactic aspirin, Plavix, and Coumadin may be allowed to be continued.  Medications including vitamin E., ginkgo, and fish oil are requested to be stopped approximately one week prior to surgery.       After a thorough discussion of risks, benefits, and alternatives, patient is indicated for open carpal tunnel release.  Reviewed previous A1C.  Plan for surgery on February 17, 2025  Patient/Guardian verbalizes understanding and consent to treatment plan. All questions answered. We encouraged them to contact our office with any questions or concerns.       Chief Complaint:     Numbness and tingling in the right hand    Previous History:   The patient was last seen on 11/20/2025 with a complaint of bilateral hand numbness and tingling. Her physical exam presented as carpal tunnel. An EMG was ordered.     Interval History:  Today, she complains that the right hand is more painful. She reports that she does need to start chemotherapy treatments, but would like to have the carpal tunnel releases prior to starting treatments.  This is because her numbness and tingling has been worsening as well as the pain.  She feels severely limited as she is now waking most nights and is tired during the day.  Additionally she is having daytime numbness that is greatly limiting her.  She has been  compliant with the bracing but reports that this has not been helping.    Past Medical History:  Past Medical History:   Diagnosis Date    Adrenal insufficiency (HCC)     Anxiety     Bacterial sepsis (HCC)     last assessed: 06/13/16    Cushing's syndrome (HCC)     last assessed: 05/29/16    Depression     last assessed: 01/02/18    Diabetes mellitus (HCC)     Hydradenitis     Hyperlipidemia     Hypertension     Positive FIT (fecal immunochemical test) 06/22/2018    Added automatically from request for surgery [id number]    Rib contusion, left, sequela 06/14/2022    Trigger finger, right middle finger 03/07/2022     Past Surgical History:   Procedure Laterality Date    ADRENALECTOMY  12/2002    CHOLECYSTECTOMY      HERNIA REPAIR      umbilical    OTHER SURGICAL HISTORY      Injection of trigger joint    NC COLONOSCOPY FLX DX W/COLLJ SPEC WHEN PFRMD N/A 7/10/2018    Procedure: COLONOSCOPY;  Surgeon: Pako Nieves MD;  Location: [facility];  Service: Gastroenterology    TONSILLECTOMY AND ADENOIDECTOMY      TUBAL LIGATION       Family History   Problem Relation Age of Onset    Breast cancer Mother         unknown age    No Known Problems Sister     No Known Problems Daughter     No Known Problems Daughter     No Known Problems Maternal Grandmother     No Known Problems Maternal Grandfather     No Known Problems Paternal Grandmother     No Known Problems Paternal Grandfather     No Known Problems Paternal Aunt     No Known Problems Paternal Aunt     No Known Problems Paternal Aunt     No Known Problems Half-Sister     No Known Problems Half-Sister     No Known Problems Half-Sister     No Known Problems Half-Sister     No Known Problems Half-Sister      Social History     Socioeconomic History    Marital status:      Spouse name: Not on file    Number of children: Not on file    Years of education: Not on file    Highest education level: Not on file   Occupational History    Not on file   Tobacco Use    Smoking  status: Every Day     Current packs/day: 0.25     Average packs/day: 0.3 packs/day for 50.0 years (12.5 ttl pk-yrs)     Types: Cigarettes    Smokeless tobacco: Never   Vaping Use    Vaping status: Never Used   Substance and Sexual Activity    Alcohol use: Never    Drug use: No    Sexual activity: Not Currently   Other Topics Concern    Not on file   Social History Narrative    Always uses seat belt    Always uses sunscreen    Caffeine use    Dental care, regularly    No living will    Uses safety equipment - seatbelts     Social Drivers of Health     Financial Resource Strain: Low Risk  (2023)    Overall Financial Resource Strain (CARDIA)     Difficulty of Paying Living Expenses: Not very hard   Food Insecurity: No Food Insecurity (2024)    Nursing - Inadequate Food Risk Classification     Worried About Running Out of Food in the Last Year: Never true     Ran Out of Food in the Last Year: Never true     Ran Out of Food in the Last Year: Never true   Transportation Needs: No Transportation Needs (2024)    Nursing - Transportation Risk Classification     Lack of Transportation: Not on file     Lack of Transportation: No   Physical Activity: Not on file   Stress: Not on file   Social Connections: Not on file   Intimate Partner Violence: Unknown (2024)    Nursing IPS     Feels Physically and Emotionally Safe: Not on file     Physically Hurt by Someone: Not on file     Humiliated or Emotionally Abused by Someone: Not on file     Physically Hurt by Someone: No     Hurt or Threatened by Someone: No   Housing Stability: Unknown (2024)    Nursing: Inadequate Housing Risk Classification     Has Housing: Not on file     Worried About Losing Housing: Not on file     Unable to Get Utilities: Not on file     Unable to Pay for Housing in the Last Year: No     Has Housin     Scheduled Meds:  Continuous Infusions:No current facility-administered medications for this visit.    PRN Meds:.  Allergies    Allergen Reactions    Fosamax [Alendronate] Diarrhea    Paxil [Paroxetine Hcl] Rash       Physical Examination:    Pulse 97   Temp 97.9 °F (36.6 °C) (Temporal)   Resp 16   Ht 5' (1.524 m)   Wt 66.7 kg (147 lb)   SpO2 98%   BMI 28.71 kg/m²     Gen: A&Ox3, NAD  Cardiac: regular rate  Chest: non labored breathing  Abdomen: Non-distended    Cervcial Spine: Negative Spurling's    Bilateral Hand  Digital motion is full     FDS/FDP/FPL/finger extensors intact      negative Finkelstein's  negative Triggering      Neurovascular:   Two point discrimination is 6 mm in the median and ulnar nerve distribution, bilaterally  positive Tinel at CT bilaterally  positive Phalen bilaterally  Durkan Positive on the right  There is no atrophy of the thenar muscles.  5/5 APB strength   5/5 FPL strength   5/5 IO strength   5/5 Abd-DQ strength   Positive tinel at the elbow bilaterally  Positive pain to palpation over the ulnar nerve bilaterally  Wwp      Vascular Exam: < 2 sec capillary refill     CTS-6:  Numbness in median nerve distribution (3.5): y  Nocturnal numbness (4): y  Thenar atrophy/weakness(5): n  Positive Phalen test (5): y  Loss of 2pt discrimination (4.5): y  Positive Tinel sign (4): y  Total: 21/26 (12)      Studies:  Radiographs: I personally reviewed and independently interpreted the available   1/29/2025: EMG of the bilateral upper extremity demonstrates severe right-sided carpal tunnel syndrome and moderate left-sided carpal tunnel syndrome.  Specifically both sides demonstrate sensory and motor nerve conduction studies with increased latency, decreased velocity, and decreased amplitude.  Both sides have EMG changes of the median nerve innervated APB with more significant findings on the right side.  All nerve deficits are associated with the median nerve as it crosses the wrist.        Kenji Whalen MD  Hand and Upper Extremity Surgery      *This note was dictated using Dragon voice recognition software.  Please excuse any word substitutions or errors.*      Scribe Attestation      I,:  Marycruz Giraldo am acting as a scribe while in the presence of the attending physician.:       I,:  Kenji Whalen MD personally performed the services described in this documentation    as scribed in my presence.:

## 2025-02-05 NOTE — TELEPHONE ENCOUNTER
Patient was in for an appt today and then  came to Ortho, she is scheduled for a carpal tunnel release on 2/17, I was wondering if doctor would be able to add to the office note that she is cleared for the procedure or if she would need another appt, please advise

## 2025-02-06 NOTE — PRE-PROCEDURE INSTRUCTIONS
Pre-Surgery Instructions:   Medication Instructions    Cholecalciferol (VITAMIN D3) 1,000 units tablet Hold day of surgery.    clindamycin (CLEOCIN T) 1 % lotion Hold day of surgery.    ergocalciferol (VITAMIN D2) 50,000 units Weekly    gabapentin (NEURONTIN) 100 mg capsule Uses PRN- OK to take day of surgery   Medication instructions for day surgery reviewed. Please use only a sip of water to take your instructed medications. Avoid all over the counter vitamins, supplements and NSAIDS for one week prior to surgery per anesthesia guidelines. Tylenol is ok to take as needed.     You will receive a call one business day prior to surgery with an arrival time and hospital directions. If your surgery is scheduled on a Monday, the hospital will be calling you on the Friday prior to your surgery. If you have not heard from anyone by 8pm, please call the hospital supervisor through the hospital  at [phone]  or [place] [phone]).    Do not eat or drink anything after midnight the night before your surgery, including candy, mints, lifesavers, or chewing gum. Do not drink alcohol 24hrs before your surgery. Try not to smoke at least 24hrs before your surgery.       Follow the pre surgery showering instructions as listed in the “My Surgical Experience Booklet” or otherwise provided by your surgeon's office. Do not use a blade to shave the surgical area 1 week before surgery. It is okay to use a clean electric clippers up to 24 hours before surgery. Do not apply any lotions, creams, including makeup, cologne, deodorant, or perfumes after showering on the day of your surgery. Do not use dry shampoo, hair spray, hair gel, or any type of hair products.     No contact lenses, eye make-up, or artificial eyelashes. Remove nail polish, including gel polish, and any artificial, gel, or acrylic nails if possible. Remove all jewelry including rings and body piercing jewelry.     Wear causal clothing that is easy to take  on and off. Consider your type of surgery.    Keep any valuables, jewelry, piercings at home. Please bring any specially ordered equipment (sling, braces) if indicated.    Arrange for a responsible person to drive you to and from the hospital on the day of your surgery. Please confirm the visitor policy for the day of your procedure when you receive your phone call with an arrival time.     Call the surgeon's office with any new illnesses, exposures, or additional questions prior to surgery.    Please reference your “My Surgical Experience Booklet” for additional information to prepare for your upcoming surgery.

## 2025-02-07 ENCOUNTER — PREP FOR PROCEDURE (OUTPATIENT)
Dept: OBGYN CLINIC | Facility: CLINIC | Age: 70
End: 2025-02-07

## 2025-02-07 NOTE — PATIENT INSTRUCTIONS
Patient is medical cleared for surgery on 2/17/25 with Dr. Whalen, Medical Clearance is in Dr. Palmer  office visit note on 2/5/2025

## 2025-02-17 ENCOUNTER — ANESTHESIA EVENT (OUTPATIENT)
Dept: PERIOP | Facility: HOSPITAL | Age: 70
End: 2025-02-17
Payer: COMMERCIAL

## 2025-02-17 ENCOUNTER — ANESTHESIA (OUTPATIENT)
Dept: PERIOP | Facility: HOSPITAL | Age: 70
End: 2025-02-17
Payer: COMMERCIAL

## 2025-02-17 ENCOUNTER — HOSPITAL ENCOUNTER (OUTPATIENT)
Facility: HOSPITAL | Age: 70
Setting detail: OUTPATIENT SURGERY
Discharge: HOME | End: 2025-02-17
Attending: STUDENT IN AN ORGANIZED HEALTH CARE EDUCATION/TRAINING PROGRAM | Admitting: STUDENT IN AN ORGANIZED HEALTH CARE EDUCATION/TRAINING PROGRAM
Payer: COMMERCIAL

## 2025-02-17 VITALS
HEIGHT: 60 IN | OXYGEN SATURATION: 95 % | DIASTOLIC BLOOD PRESSURE: 70 MMHG | BODY MASS INDEX: 28.86 KG/M2 | RESPIRATION RATE: 18 BRPM | SYSTOLIC BLOOD PRESSURE: 147 MMHG | WEIGHT: 147 LBS | TEMPERATURE: 98.4 F | HEART RATE: 69 BPM

## 2025-02-17 DIAGNOSIS — Z98.890 S/P CARPAL TUNNEL RELEASE: Primary | ICD-10-CM

## 2025-02-17 LAB
GLUCOSE SERPL-MCNC: 142 MG/DL (ref 65–140)
GLUCOSE SERPL-MCNC: 162 MG/DL (ref 65–140)

## 2025-02-17 PROCEDURE — 64721 CARPAL TUNNEL SURGERY: CPT | Performed by: STUDENT IN AN ORGANIZED HEALTH CARE EDUCATION/TRAINING PROGRAM

## 2025-02-17 PROCEDURE — 82948 REAGENT STRIP/BLOOD GLUCOSE: CPT

## 2025-02-17 PROCEDURE — 64721 CARPAL TUNNEL SURGERY: CPT

## 2025-02-17 RX ORDER — ALBUTEROL SULFATE 0.83 MG/ML
2.5 SOLUTION RESPIRATORY (INHALATION) ONCE AS NEEDED
Status: DISCONTINUED | OUTPATIENT
Start: 2025-02-17 | End: 2025-02-17 | Stop reason: HOSPADM

## 2025-02-17 RX ORDER — LIDOCAINE HYDROCHLORIDE 10 MG/ML
INJECTION, SOLUTION EPIDURAL; INFILTRATION; INTRACAUDAL; PERINEURAL AS NEEDED
Status: DISCONTINUED | OUTPATIENT
Start: 2025-02-17 | End: 2025-02-17

## 2025-02-17 RX ORDER — ACETAMINOPHEN 500 MG
500 TABLET ORAL EVERY 6 HOURS PRN
Qty: 60 TABLET | Refills: 0 | Status: SHIPPED | OUTPATIENT
Start: 2025-02-17

## 2025-02-17 RX ORDER — ONDANSETRON 2 MG/ML
INJECTION INTRAMUSCULAR; INTRAVENOUS AS NEEDED
Status: DISCONTINUED | OUTPATIENT
Start: 2025-02-17 | End: 2025-02-17

## 2025-02-17 RX ORDER — PROPOFOL 10 MG/ML
INJECTION, EMULSION INTRAVENOUS CONTINUOUS PRN
Status: DISCONTINUED | OUTPATIENT
Start: 2025-02-17 | End: 2025-02-17

## 2025-02-17 RX ORDER — BUPIVACAINE HYDROCHLORIDE AND EPINEPHRINE 5; 5 MG/ML; UG/ML
INJECTION, SOLUTION EPIDURAL; INTRACAUDAL; PERINEURAL AS NEEDED
Status: DISCONTINUED | OUTPATIENT
Start: 2025-02-17 | End: 2025-02-17 | Stop reason: HOSPADM

## 2025-02-17 RX ORDER — ONDANSETRON 2 MG/ML
4 INJECTION INTRAMUSCULAR; INTRAVENOUS EVERY 6 HOURS PRN
Status: DISCONTINUED | OUTPATIENT
Start: 2025-02-17 | End: 2025-02-17 | Stop reason: HOSPADM

## 2025-02-17 RX ORDER — FENTANYL CITRATE 50 UG/ML
INJECTION, SOLUTION INTRAMUSCULAR; INTRAVENOUS AS NEEDED
Status: DISCONTINUED | OUTPATIENT
Start: 2025-02-17 | End: 2025-02-17

## 2025-02-17 RX ORDER — TRAMADOL HYDROCHLORIDE 50 MG/1
50 TABLET ORAL EVERY 6 HOURS PRN
Qty: 5 TABLET | Refills: 0 | Status: SHIPPED | OUTPATIENT
Start: 2025-02-17 | End: 2025-02-27

## 2025-02-17 RX ORDER — SODIUM CHLORIDE, SODIUM LACTATE, POTASSIUM CHLORIDE, CALCIUM CHLORIDE 600; 310; 30; 20 MG/100ML; MG/100ML; MG/100ML; MG/100ML
INJECTION, SOLUTION INTRAVENOUS CONTINUOUS PRN
Status: DISCONTINUED | OUTPATIENT
Start: 2025-02-17 | End: 2025-02-17

## 2025-02-17 RX ORDER — CHLORHEXIDINE GLUCONATE ORAL RINSE 1.2 MG/ML
15 SOLUTION DENTAL ONCE
Status: COMPLETED | OUTPATIENT
Start: 2025-02-17 | End: 2025-02-17

## 2025-02-17 RX ORDER — FENTANYL CITRATE/PF 50 MCG/ML
25 SYRINGE (ML) INJECTION
Status: DISCONTINUED | OUTPATIENT
Start: 2025-02-17 | End: 2025-02-17 | Stop reason: HOSPADM

## 2025-02-17 RX ORDER — CEFAZOLIN SODIUM 1 G/3ML
INJECTION, POWDER, FOR SOLUTION INTRAMUSCULAR; INTRAVENOUS AS NEEDED
Status: DISCONTINUED | OUTPATIENT
Start: 2025-02-17 | End: 2025-02-17

## 2025-02-17 RX ORDER — SODIUM CHLORIDE, SODIUM LACTATE, POTASSIUM CHLORIDE, CALCIUM CHLORIDE 600; 310; 30; 20 MG/100ML; MG/100ML; MG/100ML; MG/100ML
75 INJECTION, SOLUTION INTRAVENOUS CONTINUOUS
Status: DISCONTINUED | OUTPATIENT
Start: 2025-02-17 | End: 2025-02-17 | Stop reason: HOSPADM

## 2025-02-17 RX ORDER — MIDAZOLAM HYDROCHLORIDE 2 MG/2ML
INJECTION, SOLUTION INTRAMUSCULAR; INTRAVENOUS AS NEEDED
Status: DISCONTINUED | OUTPATIENT
Start: 2025-02-17 | End: 2025-02-17

## 2025-02-17 RX ORDER — TRAMADOL HYDROCHLORIDE 50 MG/1
50 TABLET ORAL EVERY 6 HOURS PRN
Status: DISCONTINUED | OUTPATIENT
Start: 2025-02-17 | End: 2025-02-17 | Stop reason: HOSPADM

## 2025-02-17 RX ORDER — ACETAMINOPHEN 325 MG/1
650 TABLET ORAL EVERY 6 HOURS PRN
Status: DISCONTINUED | OUTPATIENT
Start: 2025-02-17 | End: 2025-02-17 | Stop reason: HOSPADM

## 2025-02-17 RX ORDER — CEPHALEXIN 500 MG/1
500 CAPSULE ORAL EVERY 6 HOURS SCHEDULED
Qty: 20 CAPSULE | Refills: 0 | Status: SHIPPED | OUTPATIENT
Start: 2025-02-17 | End: 2025-02-22

## 2025-02-17 RX ORDER — ONDANSETRON 2 MG/ML
4 INJECTION INTRAMUSCULAR; INTRAVENOUS ONCE AS NEEDED
Status: DISCONTINUED | OUTPATIENT
Start: 2025-02-17 | End: 2025-02-17 | Stop reason: HOSPADM

## 2025-02-17 RX ADMIN — MIDAZOLAM 1 MG: 1 INJECTION INTRAMUSCULAR; INTRAVENOUS at 08:41

## 2025-02-17 RX ADMIN — SODIUM CHLORIDE, SODIUM LACTATE, POTASSIUM CHLORIDE, AND CALCIUM CHLORIDE: .6; .31; .03; .02 INJECTION, SOLUTION INTRAVENOUS at 08:39

## 2025-02-17 RX ADMIN — FENTANYL CITRATE 50 MCG: 50 INJECTION INTRAMUSCULAR; INTRAVENOUS at 08:45

## 2025-02-17 RX ADMIN — PROPOFOL 130 MCG/KG/MIN: 10 INJECTION, EMULSION INTRAVENOUS at 08:44

## 2025-02-17 RX ADMIN — TRAMADOL HYDROCHLORIDE 50 MG: 50 TABLET, COATED ORAL at 10:07

## 2025-02-17 RX ADMIN — CHLORHEXIDINE GLUCONATE 15 ML: 1.2 SOLUTION ORAL at 07:07

## 2025-02-17 RX ADMIN — SODIUM CHLORIDE, SODIUM LACTATE, POTASSIUM CHLORIDE, AND CALCIUM CHLORIDE 75 ML/HR: .6; .31; .03; .02 INJECTION, SOLUTION INTRAVENOUS at 07:27

## 2025-02-17 RX ADMIN — LIDOCAINE HYDROCHLORIDE 50 MG: 10 INJECTION, SOLUTION EPIDURAL; INFILTRATION; INTRACAUDAL at 08:45

## 2025-02-17 RX ADMIN — CEFAZOLIN 2000 MG: 1 INJECTION, POWDER, FOR SOLUTION INTRAMUSCULAR; INTRAVENOUS at 08:45

## 2025-02-17 RX ADMIN — ONDANSETRON 4 MG: 2 INJECTION INTRAMUSCULAR; INTRAVENOUS at 08:45

## 2025-02-17 NOTE — DISCHARGE INSTR - AVS FIRST PAGE
POST-OPERATIVE INSTRUCTIONS  CARPAL TUNNEL RELEASE    You have just undergone a carpal tunnel release by Dr. Whalen in the operating room.  It is our wish that your postoperative recovery be as quick and comfortable as possible.  Please carefully review the following items that are important in your recovery.    Pain Control:  After any operation, a certain degree of pain is to be expected.  A prescription for acetaminophen has been electronically sent to your pharmacy. Do not exceed 3000 mg of Tylenol per day. This medication will relieve most of your pain but may not relieve all of the pain. Some pain is normal post operatively.     When you go home, please keep your operated arm elevated at all times (above the level of your heart.)  If you do this, your swelling will be diminished and your pain will be diminished as well.    Finally, it is generally expected that night time symptoms of pain and numbness should be improved within the first week after surgery. It may take six to nine months for a full recovery of your carpal tunnel, such that the numbness in your fingertips will totally go away.      Dressing Care:  After surgery, make sure that your dressing is kept dry.  You can take a shower if you cover your arm with a plastic bag, such as a newspaper bag, and use tape or rubber bands. If your dressing gets wet you may take it off, place Band-Aids on the wound and re-cover it with sterile dressings which you can obtain at your local drug store.    Please remove your dressing down to the incision 3 days after your surgery. You may allow water to run over the incision at that point. After please dry the incision and cover with bandaids or other clean dressing. For example, if your had surgery on a Monday, do this on Thursday.  If your surgery was on Thursday, do this on Sunday.   If your dressing gets wet prior to removing it, please take if off and place something clean, or bandaids, on the wound.    Weight  Bearing:  You should NOT bear weight >5lbs through your operative extremity. Do not push off using your operative extremity.     It is ok to move your fingers as tolerated to prevent stiffness. You may also use your operative hand to assist with light activities of daily living such as putting on clothes, brushing your teeth and eating as tolerated    Please work on these finger range of motions exercises between now and you follow up visit:         Follow Up:  If you don't already have a scheduled postoperative appointment, please call our office for a follow-up appointment. It is best to call the day after surgery to make an appointment in 10-14 days.  At your first postoperative visit, you will be seen by either Dr. Whalen, his PA; or one of their associates. The sutures will be removed and you may be asked to see a hand therapist to optimize your functional result. Each of the hand therapists that you will be referred to have received special training in the care of the hand and upper extremity.    When to Call:  It is normal to see minor staining on the hand surgery dressing after surgery. If there is significant bleeding, you are advised to call the office during regular office hours to have this checked.     If you feel that you have a surgical emergency postoperatively that requires immediate attention, please call 9-1-1. In addition, any emergency can also be handled by the emergency room.        If you have questions regarding your surgery postop that you feel requires attention, please call the office.   If this occurs after our regular office hours, there is a message with instructions to talk to the physician on call.         Take Keflex as prescribed for 5 days postop to help prevent infection.

## 2025-02-17 NOTE — ANESTHESIA POSTPROCEDURE EVALUATION
Post-Op Assessment Note    CV Status:  Stable  Pain Score: 1    Pain management: adequate       Mental Status:  Alert and awake   Hydration Status:  Euvolemic   PONV Controlled:  Controlled   Airway Patency:  Patent     Post Op Vitals Reviewed: Yes    No anethesia notable event occurred.    Staff: Anesthesiologist           Last Filed PACU Vitals:  Vitals Value Taken Time   Temp 98 °F (36.7 °C) 02/17/25 0916   Pulse 79 02/17/25 0917   /63 02/17/25 0916   Resp 21 02/17/25 0917   SpO2 97 % 02/17/25 0917   Vitals shown include unfiled device data.

## 2025-02-17 NOTE — INTERVAL H&P NOTE
H&P reviewed. After examining the patient I find no changes in the patients condition since the H&P had been written.    Vitals:    02/17/25 0710   BP: 133/84   Pulse: 73   Resp: 20   Temp: (!) 97.2 °F (36.2 °C)   SpO2: 96%

## 2025-02-17 NOTE — ANESTHESIA PREPROCEDURE EVALUATION
Procedure:  RELEASE CARPAL TUNNEL - RIGHT (Right: Wrist)    Relevant Problems   CARDIO   (+) Hyperlipidemia   (+) Hypertension   (+) Non-ST elevation myocardial infarction (NSTEMI), type 2      ENDO   (+) Hypothyroidism   (+) Type 2 diabetes mellitus with hyperglycemia, without long-term current use of insulin (HCC)      /RENAL   (+) Stage 3b chronic kidney disease (HCC)      HEMATOLOGY   (+) Anemia      NEURO/PSYCH   (+) Anxiety   (+) Chronic pain of both shoulders   (+) Depression, recurrent (HCC)   (+) Numbness and tingling of hand      Behavioral Health   (+) Tobacco abuse      Oncology   (+) Multiple myeloma not having achieved remission (HCC)      Diabetes mellitus (HCC)no meds or insulinHyperlipidemiaAdrenal insufficiency (HCC)Bacterial sepsis (HCC)last assessed: 06/13/16Cushing's syndrome (HCC)last assessed: 05/29/16Depressionlast assessed: 01/02/18AnxietyRib contusion, left, sequelaPositive FIT (fecal immunochemical test)Added automatically from request for surgery [id number] finger, right middle fingerHydradenitisAKI (acute kidney injury) (HCC)Elevated total proteinElevated lipaseAcidosisHistory of transfusionChronic kidney diseaseCancer (HCC)Per pt blood cancer  Physical Exam    Airway    Mallampati score: II  TM Distance: >3 FB  Neck ROM: full     Dental       Cardiovascular  Cardiovascular exam normal    Pulmonary  Pulmonary exam normal     Other Findings  post-pubertal.      Anesthesia Plan  ASA Score- 2     Anesthesia Type- IV sedation with anesthesia with ASA Monitors.         Additional Monitors:     Airway Plan:            Plan Factors-Exercise tolerance (METS): >4 METS.    Chart reviewed. EKG reviewed. Imaging results reviewed. Existing labs reviewed. Patient summary reviewed.                  Induction- intravenous.    Postoperative Plan-         Informed Consent- Anesthetic plan and risks discussed with patient.  I personally reviewed this patient with the CRNA. Discussed and agreed on the  Anesthesia Plan with the CRNA..      NPO Status:  Vitals Value Taken Time   Date of last liquid 02/16/25 02/17/25 0712   Time of last liquid 2200 02/17/25 0712   Date of last solid 02/16/25 02/17/25 0712   Time of last solid 2130 02/17/25 0712

## 2025-02-17 NOTE — ANESTHESIA POSTPROCEDURE EVALUATION
Post-Op Assessment Note    CV Status:  Stable  Pain Score: 0    Pain management: adequate       Mental Status:  Awake and sleepy   Hydration Status:  Euvolemic   PONV Controlled:  Controlled   Airway Patency:  Patent     Post Op Vitals Reviewed: Yes    No anethesia notable event occurred.    Staff: CRNA           Last Filed PACU Vitals:  Vitals Value Taken Time   Temp 98 °F (36.7 °C) 02/17/25 0916   Pulse 68 02/17/25 0932   /61 02/17/25 0927   Resp 17 02/17/25 0932   SpO2 96 % 02/17/25 0932   Vitals shown include unfiled device data.    Modified Rosa:     Vitals Value Taken Time   Activity 2 02/17/25 0925   Respiration 2 02/17/25 0925   Circulation 2 02/17/25 0925   Consciousness 1 02/17/25 0925   Oxygen Saturation 2 02/17/25 0925     Modified Rosa Score: 9

## 2025-02-17 NOTE — OP NOTE
OPERATIVE REPORT  PATIENT NAME: Nidia Hamlin    :  1955  MRN: [id number]  Pt Location: [facility] OR ROOM 02    SURGERY DATE: 2025    Surgeons and Role:     * Kenji Whalen MD - Primary     * Chandrika Walton PA-C - Assisting    Preop Diagnosis:  Carpal tunnel syndrome on right [G56.01]    Post-Op Diagnosis Codes:     * Carpal tunnel syndrome on right [G56.01]    Procedure(s):  Right - RELEASE CARPAL TUNNEL - RIGHT    Specimen(s):  * No specimens in log *    Estimated Blood Loss:   Minimal    Drains:  * No LDAs found *    Anesthesia Type:   IV Sedation with Anesthesia    Operative Indications:  Carpal tunnel syndrome on right [G56.01]  The patient has had longstanding symptoms with attempted conservative managed which has failed given the persistence and worsening of symptoms.  The patient had EMG evidence of carpal tunnel syndromes as well as a CTS-6 score of 26. Treatment options were discussed with the patient, including nonoperative and operative options.  These risks, along with alternative conservative treatment options, and postoperative protocols were voiced back and understood by the patient.  The patient has elected to undergo an open carpal tunnel release.     Operative Findings:  A thickened transverse carpal ligament was present with hourglass deformity and flattening of the median nerve at this site.      Procedure and Technique:  After the patient, site, and procedure were identified, the patient was brought into the operating room in a supine position.  General anaesthesia and local medication were provided.  A well padded tourniquet was applied to the extremity, set at 250 mmHg.  The  right upper extremity was then prepped and drapped in a normal, sterile, orthopedic fashion.       An incision starting distally at the intersection of Wyatt's cardinal line at the intersection of the longitudinal line drawn on the radial border of the 4th ray was made on the right hand.  This was carried  about 2 cm proximally.  Dissection was carried down through skin and subcutaneous tissue sweeping the fat away from the palmar fascia.  The palmar fascia was incised longitudinally and retractors were placed to identify the transverse carpal ligament.  Gentle sweeping of any muscle present revealed the transverse carpal ligament.  This was incised using a knife from the proximal portion of the incision distally until 2 mm past the distal palmar fat.  Distal release was confirmed and then retractors were placed proximally.  A small subcutaneous pocket was then made superficial to the remaining transverse carpal ligament.  Scissors were then used to then incise the transverse carpal ligament proximally into the antebrachial fascia.  Care was taken to preserve the contents of the carpal tunnel which were deep.  Full release was confirmed both visually and by feel.      At the completion of the procedure, hemostasis was obtained with cautery and direct pressure.  The wounds were copiously irrigated with sterile solution.  The wounds were closed with 4-0 nylon suture.  Sterile dressings were applied, including Xeroform, gauze, webril, coban.  Please note, all sponge, needle, and instrument counts were correct prior to closure.  Loupe magnification was utilized.  The patient tolerated the procedure well.     I was present for the entire procedure. A qualified resident physician was not available and a physician assistant was required during the procedure for retraction, tissue handling, dissection and suturing.     Tourniquet time: 6 minutes, 250 mmHg     Complications:   None     Patient Disposition:  PACU     Plan:  Keep dressing clean and dry for 5 days. Dressing can then be removed.  Sutures to be removed at 2 weeks.  No lifting heavier than a cup of coffee for 2 weeks  Follow up in office in 2 weeks             SIGNATURE: Kenji Whalen MD  DATE: February 17, 2025  TIME: 2:58 PM

## 2025-02-20 ENCOUNTER — TELEPHONE (OUTPATIENT)
Dept: OBGYN CLINIC | Facility: CLINIC | Age: 70
End: 2025-02-20

## 2025-02-20 NOTE — TELEPHONE ENCOUNTER
I called the patient to see how they were doing postoperatively.  The patient reports that they have had minimal postoperative issues.  They have been able to maintain the dressing clean and dry.  They have no numbness or tingling.  Their pain has been well-controlled with the current regimen.  They have no fever or chills.  Overall they are very happy with her progress.  We did revisit their postoperative instructions including wound care and expected outcome.  They are aware of reasons to follow-up urgently such as signs of infection or complications with the wound.  Overall the patient is doing well and experiencing the expected postoperative recovery.  They will follow-up at the standard postoperative appointment.  Patient urged to call the office or send a message on [employer] with any issues that present.

## 2025-03-05 ENCOUNTER — OFFICE VISIT (OUTPATIENT)
Dept: OBGYN CLINIC | Facility: CLINIC | Age: 70
End: 2025-03-05

## 2025-03-05 VITALS
HEART RATE: 77 BPM | WEIGHT: 147 LBS | RESPIRATION RATE: 16 BRPM | OXYGEN SATURATION: 99 % | HEIGHT: 60 IN | TEMPERATURE: 98 F | BODY MASS INDEX: 28.86 KG/M2

## 2025-03-05 DIAGNOSIS — Z98.890 S/P CARPAL TUNNEL RELEASE: Primary | ICD-10-CM

## 2025-03-05 PROCEDURE — 99024 POSTOP FOLLOW-UP VISIT: CPT | Performed by: STUDENT IN AN ORGANIZED HEALTH CARE EDUCATION/TRAINING PROGRAM

## 2025-03-05 NOTE — PROGRESS NOTES
Assessment and Plan:  1. S/P carpal tunnel release            69 y.o. female presents 2 weeks status post right carpal tunnel release.     She has done well since surgery.  She reports a significant improvement in her preoperative symptoms.  She still has some numbness but this is improving.  She does have some pain in the mid palm.  No fever or chills.    she expressed understanding of the plan and agreed. We encouraged them to contact our office with any questions or concerns.     Activities and weightbearing as tolerated  Follow-up in 4 weeks      Chief Complaint:     Post op follow-up    Surgery:  Carpal Tunnel Release - Right Wrist     History of Present Illness:   Patient presents now 2 weeks status post the above surgery. Since last visit she reports improvements in her symptoms. Pain is well controlled on the current regimen. No fever or chills. No new numbness or tingling. Today patient reports hypersensitivity in her finger tips.     The patient reports being able to open a tight jar. They have been able to do household chores. They have been able to use the operative hand to carry a grocery bag. They have been able to wash their back. They have used eating utensils with the operative hand.     The reports that they have had some interference in social situations due to hand problems due to hypersensitivity.     Past Medical History:  Past Medical History:   Diagnosis Date    Acidosis 12/23/2024    Adrenal insufficiency (HCC)     JOSE ELIAS (acute kidney injury) (HCC) 12/19/2024    Anxiety     Bacterial sepsis (HCC)     last assessed: 06/13/16    Cancer (HCC)     Per pt blood cancer    Chronic kidney disease     Cushing's syndrome (HCC)     last assessed: 05/29/16    Depression     last assessed: 01/02/18    Diabetes mellitus (HCC)     no meds or insulin    Elevated lipase 12/20/2024    Elevated total protein 05/19/2016    History of transfusion 12/22/2024    Hydradenitis     Hyperlipidemia     Positive FIT  (fecal immunochemical test) 06/22/2018    Added automatically from request for surgery [id number]    Rib contusion, left, sequela 06/14/2022    Trigger finger, right middle finger 03/07/2022     Past Surgical History:   Procedure Laterality Date    ADRENALECTOMY  12/2002    CHOLECYSTECTOMY      HERNIA REPAIR      umbilical    OTHER SURGICAL HISTORY      Injection of trigger joint    TX COLONOSCOPY FLX DX W/COLLJ SPEC WHEN PFRMD N/A 7/10/2018    Procedure: COLONOSCOPY;  Surgeon: Pako Nieves MD;  Location: [facility];  Service: Gastroenterology    TX NEUROPLASTY &/TRANSPOS MEDIAN NRV CARPAL TUNNE Right 2/17/2025    Procedure: RELEASE CARPAL TUNNEL - RIGHT;  Surgeon: Kenji Whalen MD;  Location: [facility];  Service: Orthopedics    TONSILLECTOMY AND ADENOIDECTOMY      TUBAL LIGATION       Family History   Problem Relation Age of Onset    Breast cancer Mother         unknown age    No Known Problems Sister     No Known Problems Daughter     No Known Problems Daughter     No Known Problems Maternal Grandmother     No Known Problems Maternal Grandfather     No Known Problems Paternal Grandmother     No Known Problems Paternal Grandfather     No Known Problems Paternal Aunt     No Known Problems Paternal Aunt     No Known Problems Paternal Aunt     No Known Problems Half-Sister     No Known Problems Half-Sister     No Known Problems Half-Sister     No Known Problems Half-Sister     No Known Problems Half-Sister      Social History     Socioeconomic History    Marital status:      Spouse name: Not on file    Number of children: Not on file    Years of education: Not on file    Highest education level: Not on file   Occupational History    Not on file   Tobacco Use    Smoking status: Former     Current packs/day: 0.25     Average packs/day: 0.3 packs/day for 50.0 years (12.5 ttl pk-yrs)     Types: Cigarettes    Smokeless tobacco: Never    Tobacco comments:     Quit 12-19-24   Vaping Use    Vaping status: Never Used    Substance and Sexual Activity    Alcohol use: Never    Drug use: No    Sexual activity: Not Currently   Other Topics Concern    Not on file   Social History Narrative    Always uses seat belt    Always uses sunscreen    Caffeine use    Dental care, regularly    No living will    Uses safety equipment - seatbelts     Social Drivers of Health     Financial Resource Strain: Low Risk  (2023)    Overall Financial Resource Strain (CARDIA)     Difficulty of Paying Living Expenses: Not very hard   Food Insecurity: No Food Insecurity (2024)    Nursing - Inadequate Food Risk Classification     Worried About Running Out of Food in the Last Year: Never true     Ran Out of Food in the Last Year: Never true     Ran Out of Food in the Last Year: Never true   Transportation Needs: No Transportation Needs (2024)    Nursing - Transportation Risk Classification     Lack of Transportation: Not on file     Lack of Transportation: No   Physical Activity: Not on file   Stress: Not on file   Social Connections: Not on file   Intimate Partner Violence: Unknown (2024)    Nursing IPS     Feels Physically and Emotionally Safe: Not on file     Physically Hurt by Someone: Not on file     Humiliated or Emotionally Abused by Someone: Not on file     Physically Hurt by Someone: No     Hurt or Threatened by Someone: No   Housing Stability: Unknown (2024)    Nursing: Inadequate Housing Risk Classification     Has Housing: Not on file     Worried About Losing Housing: Not on file     Unable to Get Utilities: Not on file     Unable to Pay for Housing in the Last Year: No     Has Housin     Scheduled Meds:  Continuous Infusions:No current facility-administered medications for this visit.    PRN Meds:.  Allergies   Allergen Reactions    Fosamax [Alendronate] Diarrhea    Paxil [Paroxetine Hcl] Rash         PHYSICAL EXAMINATION:    Pulse 77   Temp 98 °F (36.7 °C) (Temporal)   Resp 16   Ht 5' (1.524 m)   Wt 66.7 kg  (147 lb)   SpO2 99%   BMI 28.71 kg/m²     Gen: A&Ox3, NAD    Right Upper Extremity:  Dressing clean and dry, removed  Underlying incision healing well without signs of infection   Sensation intact to light touch in the axillary median, ulnar, and radial nerve distributions  5/5 FPL (AIN), EPL (PIN), IO and APB  2+RP        STUDIES REVIEWED:  No Studies to review        Kenji Whalen MD  Hand and Upper Extremity Surgery        *This note was dictated using Dragon voice recognition software. Please excuse any word substitutions or errors.*    Scribe Attestation      I,:  Benson Cardoso am acting as a scribe while in the presence of the attending physician.:       I,:  Kenji Whalen MD personally performed the services described in this documentation    as scribed in my presence.:

## 2025-03-06 ENCOUNTER — TELEPHONE (OUTPATIENT)
Age: 70
End: 2025-03-06

## 2025-03-06 ENCOUNTER — PATIENT OUTREACH (OUTPATIENT)
Dept: HEMATOLOGY ONCOLOGY | Facility: CLINIC | Age: 70
End: 2025-03-06

## 2025-03-06 ENCOUNTER — TELEPHONE (OUTPATIENT)
Dept: SURGICAL ONCOLOGY | Facility: CLINIC | Age: 70
End: 2025-03-06

## 2025-03-06 DIAGNOSIS — C90.00 MULTIPLE MYELOMA NOT HAVING ACHIEVED REMISSION (HCC): Primary | ICD-10-CM

## 2025-03-06 RX ORDER — SODIUM CHLORIDE 9 MG/ML
20 INJECTION, SOLUTION INTRAVENOUS ONCE
Status: CANCELLED | OUTPATIENT
Start: 2025-03-18

## 2025-03-06 RX ORDER — DIPHENHYDRAMINE HCL 25 MG
25 TABLET ORAL ONCE
Status: CANCELLED | OUTPATIENT
Start: 2025-03-25

## 2025-03-06 RX ORDER — ACETAMINOPHEN 325 MG/1
650 TABLET ORAL ONCE
Status: CANCELLED | OUTPATIENT
Start: 2025-03-11

## 2025-03-06 RX ORDER — SODIUM CHLORIDE 9 MG/ML
20 INJECTION, SOLUTION INTRAVENOUS ONCE
Status: CANCELLED | OUTPATIENT
Start: 2025-03-11

## 2025-03-06 RX ORDER — ACETAMINOPHEN 325 MG/1
650 TABLET ORAL ONCE
Status: CANCELLED | OUTPATIENT
Start: 2025-03-18

## 2025-03-06 RX ORDER — ACETAMINOPHEN 325 MG/1
650 TABLET ORAL ONCE
Status: CANCELLED | OUTPATIENT
Start: 2025-03-25

## 2025-03-06 RX ORDER — ACYCLOVIR 400 MG/1
400 TABLET ORAL 2 TIMES DAILY
Qty: 180 TABLET | Refills: 3 | Status: SHIPPED | OUTPATIENT
Start: 2025-03-16 | End: 2026-03-16

## 2025-03-06 RX ORDER — DEXAMETHASONE 4 MG/1
20 TABLET ORAL ONCE
Status: CANCELLED | OUTPATIENT
Start: 2025-03-25

## 2025-03-06 NOTE — TELEPHONE ENCOUNTER
E mail received:    From: Torres Lamar <[email or web address]>   Sent: Thursday, March 6, 2025 2:18 PM  To: Edda Lazcano <[email or web address]>; Leatha White <[email or web address]>; [employer] <[email or web address]>  Cc: [facility] <[email or web address]>  Subject: RE: CN revlimid Scott    Prior auth required, but another one of those that'll pay for a one-time transitional fill while the prior auth is pending, though it came back with a high copay: $1985.34.  Medicare Rx details listed below for the prior auth, and just a reminder that we can't fill Revlimid here…    BIN:       [id number]  PCN:      [id number]  ID:          [id number]  GRP:      [id number]    Torres Cooley    From: Poly Link <[email or web address]>   Sent: Thursday, March 6, 2025 11:59 AM  To: Edda Lazcano <[email or web address]>; Torres Lamar <[email or web address]>; Leatha White <[email or web address]>  Cc: Yuly Gray <[email or web address]>; [facility] <[email or web address]>  Subject: CN revlimid Nidia Godfrey 1955    Revlimid 10mg PO daily 21 days on, 7 days off    Thank you,    Poly HORTA, RN, OCN  [facility] Hematology/Oncology                 Fausto Obtained:

## 2025-03-06 NOTE — TELEPHONE ENCOUNTER
Patient called and was very tearful. States she was speaking to a friend who told her she was supposed to be taking the 5 tablets of Dexamethasone weekly since she saw Dr. Chavez last. Patient reports she has not started this and was a bit confused and thought she had to take this when she starts chemo. She had surgery for Carpal Tunnel 2/17 and will go in a few weeks for a consult to have the other arm done. However, patient would like to get on track with whatever she needs to do to get chemo started. I did schedule her for a follow up visit on 3/17/25 at 11:40 to discuss her care plan. States she is very concerned about rides for chemo and when she does start this would like to be set up for transportation. She is also interested in having labs drawn through [facility] lab. I did provide her with that phone number.    She requests a call back today at [phone] to let her know if she should now be starting the Dexamethasone or if she should just wait until she sees Dr. Chavez on 3/17 since she hasn't been taking it this whole time. She also wants to confirm what is needed before that visit. There are lab orders in there but please advise if she should proceed with those or if she should have anything else drawn.

## 2025-03-06 NOTE — PROGRESS NOTES
Outreach made to Nidia to discuss transportation resources. At this time Nidia does not qualify for STAR. I went over transportation resource for carbon transit. I provided her all of the information pertaining to this. I let her know I will also send her this information via [employer] and via mail. I provided Nidia my direct phone number as well if she has any questions or concerns. She was very appreciative of my call.

## 2025-03-06 NOTE — TELEPHONE ENCOUNTER
Returned call to Nidia. Reviewed treatment plan thoroughly. All questions answered. Pt to start dexamethasone once weekly today. She is aware infusion scheduling will call with start date and then she should reach out to mobile labs to schedule lab draw to line up with start date. Follow up appt on 3/17 cancelled and to be rescheduled after cycle 1 once we know start date. Pt verbalized understanding of plan.

## 2025-03-07 ENCOUNTER — DOCUMENTATION (OUTPATIENT)
Age: 70
End: 2025-03-07

## 2025-03-07 ENCOUNTER — TELEPHONE (OUTPATIENT)
Dept: LAB | Facility: HOSPITAL | Age: 70
End: 2025-03-07

## 2025-03-07 ENCOUNTER — TELEPHONE (OUTPATIENT)
Dept: SURGICAL ONCOLOGY | Facility: CLINIC | Age: 70
End: 2025-03-07

## 2025-03-07 ENCOUNTER — TELEPHONE (OUTPATIENT)
Age: 70
End: 2025-03-07

## 2025-03-07 NOTE — PROGRESS NOTES
Received request from clinical for patient to start on Revlimid 10 MG daily for 21 days on 7 days off. Auth has been submitted via cover my meds and this has been marked as urgent.  (Key: [id number])    BIN:       [id number]  PCN:      MEDDAET  ID:          [id number]  GRP:      [id number]      [employer] Medication Precertification Request Form  Phone: [phone]  Fax: [phone]

## 2025-03-07 NOTE — TELEPHONE ENCOUNTER
FYI:  Pt calling stating she got a message not sure from who maybe about the Revlimid but she said they talked so fast she couldn't write it all down and they asked for her medicare number and she's concerned with scammers.    Advised pt will have someone reach out to her to further discuss the plan that is in process for the Revlimid,pt understood.    Pls call back to pt today

## 2025-03-07 NOTE — TELEPHONE ENCOUNTER
Spoke with patient who stated she received a call from [facility] today and Darzalex Faspro has been approved. I also provided education on acyclovir and made her aware she can start taking it on 3/16, sent to [facility] pharmacy. She verbalized understanding and stated she will call to set up mobile labs prior to treatment now.

## 2025-03-07 NOTE — TELEPHONE ENCOUNTER
Patient calls message received regarding the patient and a call she received this morning:    ALEXANDRA  Ran will review.     Vicky Cerna, RN  [employer] [employer]; Hematology Oncology [facility]41 minutes ago (10:38 AM)       RAVINDER Cerna RN43 minutes ago (10:36 AM)       FYI:  Pt calling stating she got a message not sure from who maybe about the Revlimid but she said they talked so fast she couldn't write it all down and they asked for her medicare number and she's concerned with scammers.     Advised pt will have someone reach out to her to further discuss the plan that is in process for the Revlimid,pt understood.     Pls call back to pt today         Note        I called back the patient and stated that the call was not from me regarding her Revlimid if in fact was for her Revlimid. I stated that I had obtained a nacho from [employer] for her Revlimid which had a high co pay of $1,985.34 and the the nacho would help with that.    I then left a VM for Torres POLLOCK at [facility] in case that call may have come from the pharmacy to confirm her prescription delivery instructions.  When I find out that information I will out reach the patient regarding her possible fill.

## 2025-03-10 ENCOUNTER — RESULTS FOLLOW-UP (OUTPATIENT)
Dept: OTHER | Facility: HOSPITAL | Age: 70
End: 2025-03-10

## 2025-03-10 ENCOUNTER — APPOINTMENT (OUTPATIENT)
Dept: LAB | Facility: HOSPITAL | Age: 70
End: 2025-03-10
Payer: COMMERCIAL

## 2025-03-10 DIAGNOSIS — N18.32 STAGE 3B CHRONIC KIDNEY DISEASE (HCC): ICD-10-CM

## 2025-03-10 DIAGNOSIS — E87.1 HYPONATREMIA: ICD-10-CM

## 2025-03-10 DIAGNOSIS — E55.9 VITAMIN D DEFICIENCY: ICD-10-CM

## 2025-03-10 DIAGNOSIS — C90.00 MULTIPLE MYELOMA NOT HAVING ACHIEVED REMISSION (HCC): Primary | ICD-10-CM

## 2025-03-10 DIAGNOSIS — D64.9 ANEMIA, UNSPECIFIED TYPE: ICD-10-CM

## 2025-03-10 DIAGNOSIS — R80.1 PERSISTENT PROTEINURIA: ICD-10-CM

## 2025-03-10 DIAGNOSIS — E83.42 HYPOMAGNESEMIA: ICD-10-CM

## 2025-03-10 LAB
25(OH)D3 SERPL-MCNC: 18.3 NG/ML (ref 30–100)
ANION GAP SERPL CALCULATED.3IONS-SCNC: 9 MMOL/L (ref 4–13)
BACTERIA UR QL AUTO: ABNORMAL /HPF
BASOPHILS # BLD AUTO: 0.04 THOUSANDS/ÂΜL (ref 0–0.1)
BASOPHILS NFR BLD AUTO: 1 % (ref 0–1)
BILIRUB UR QL STRIP: NEGATIVE
BUN SERPL-MCNC: 35 MG/DL (ref 5–25)
CALCIUM SERPL-MCNC: 8.9 MG/DL (ref 8.4–10.2)
CHLORIDE SERPL-SCNC: 103 MMOL/L (ref 96–108)
CLARITY UR: CLEAR
CO2 SERPL-SCNC: 22 MMOL/L (ref 21–32)
COLOR UR: ABNORMAL
CREAT SERPL-MCNC: 1.2 MG/DL (ref 0.6–1.3)
CREAT UR-MCNC: 50.5 MG/DL
EOSINOPHIL # BLD AUTO: 0.2 THOUSAND/ÂΜL (ref 0–0.61)
EOSINOPHIL NFR BLD AUTO: 2 % (ref 0–6)
ERYTHROCYTE [DISTWIDTH] IN BLOOD BY AUTOMATED COUNT: 17.9 % (ref 11.6–15.1)
GFR SERPL CREATININE-BSD FRML MDRD: 46 ML/MIN/1.73SQ M
GLUCOSE P FAST SERPL-MCNC: 144 MG/DL (ref 65–99)
GLUCOSE UR STRIP-MCNC: NEGATIVE MG/DL
HCT VFR BLD AUTO: 28.4 % (ref 34.8–46.1)
HGB BLD-MCNC: 8.8 G/DL (ref 11.5–15.4)
HGB UR QL STRIP.AUTO: ABNORMAL
IMM GRANULOCYTES # BLD AUTO: 0.05 THOUSAND/UL (ref 0–0.2)
IMM GRANULOCYTES NFR BLD AUTO: 1 % (ref 0–2)
KETONES UR STRIP-MCNC: NEGATIVE MG/DL
LEUKOCYTE ESTERASE UR QL STRIP: NEGATIVE
LYMPHOCYTES # BLD AUTO: 4.08 THOUSANDS/ÂΜL (ref 0.6–4.47)
LYMPHOCYTES NFR BLD AUTO: 47 % (ref 14–44)
MAGNESIUM SERPL-MCNC: 1.9 MG/DL (ref 1.9–2.7)
MCH RBC QN AUTO: 30.8 PG (ref 26.8–34.3)
MCHC RBC AUTO-ENTMCNC: 31 G/DL (ref 31.4–37.4)
MCV RBC AUTO: 99 FL (ref 82–98)
MICROALBUMIN UR-MCNC: 127.2 MG/L
MICROALBUMIN/CREAT 24H UR: 252 MG/G CREATININE (ref 0–30)
MONOCYTES # BLD AUTO: 0.62 THOUSAND/ÂΜL (ref 0.17–1.22)
MONOCYTES NFR BLD AUTO: 7 % (ref 4–12)
NEUTROPHILS # BLD AUTO: 3.54 THOUSANDS/ÂΜL (ref 1.85–7.62)
NEUTS SEG NFR BLD AUTO: 42 % (ref 43–75)
NITRITE UR QL STRIP: NEGATIVE
NON-SQ EPI CELLS URNS QL MICRO: ABNORMAL /HPF
NRBC BLD AUTO-RTO: 0 /100 WBCS
PH UR STRIP.AUTO: 5.5 [PH]
PLATELET # BLD AUTO: 257 THOUSANDS/UL (ref 149–390)
PMV BLD AUTO: 9.5 FL (ref 8.9–12.7)
POTASSIUM SERPL-SCNC: 4.6 MMOL/L (ref 3.5–5.3)
PROT UR STRIP-MCNC: ABNORMAL MG/DL
RBC # BLD AUTO: 2.86 MILLION/UL (ref 3.81–5.12)
RBC #/AREA URNS AUTO: ABNORMAL /HPF
SODIUM SERPL-SCNC: 134 MMOL/L (ref 135–147)
SP GR UR STRIP.AUTO: 1.01 (ref 1–1.03)
UROBILINOGEN UR STRIP-ACNC: <2 MG/DL
WBC # BLD AUTO: 8.53 THOUSAND/UL (ref 4.31–10.16)
WBC #/AREA URNS AUTO: ABNORMAL /HPF

## 2025-03-10 PROCEDURE — 82043 UR ALBUMIN QUANTITATIVE: CPT

## 2025-03-10 PROCEDURE — 36415 COLL VENOUS BLD VENIPUNCTURE: CPT

## 2025-03-10 PROCEDURE — 81001 URINALYSIS AUTO W/SCOPE: CPT

## 2025-03-10 PROCEDURE — 83735 ASSAY OF MAGNESIUM: CPT

## 2025-03-10 PROCEDURE — 80048 BASIC METABOLIC PNL TOTAL CA: CPT

## 2025-03-10 PROCEDURE — 82306 VITAMIN D 25 HYDROXY: CPT

## 2025-03-10 PROCEDURE — 85025 COMPLETE CBC W/AUTO DIFF WBC: CPT

## 2025-03-10 PROCEDURE — 82570 ASSAY OF URINE CREATININE: CPT

## 2025-03-10 RX ORDER — LENALIDOMIDE 10 MG/1
10 CAPSULE ORAL DAILY
Qty: 21 CAPSULE | Refills: 0 | Status: SHIPPED | OUTPATIENT
Start: 2025-03-10

## 2025-03-10 RX ORDER — DEXAMETHASONE 4 MG/1
20 TABLET ORAL ONCE
OUTPATIENT
Start: 2025-04-07

## 2025-03-10 RX ORDER — SODIUM CHLORIDE 9 MG/ML
20 INJECTION, SOLUTION INTRAVENOUS ONCE
Status: CANCELLED | OUTPATIENT
Start: 2025-03-24

## 2025-03-10 RX ORDER — ACETAMINOPHEN 325 MG/1
650 TABLET ORAL ONCE
Status: CANCELLED | OUTPATIENT
Start: 2025-03-24

## 2025-03-10 RX ORDER — ACETAMINOPHEN 325 MG/1
650 TABLET ORAL ONCE
OUTPATIENT
Start: 2025-04-07

## 2025-03-10 RX ORDER — DEXAMETHASONE 4 MG/1
20 TABLET ORAL ONCE
OUTPATIENT
Start: 2025-03-31

## 2025-03-10 RX ORDER — DIPHENHYDRAMINE HCL 25 MG
25 TABLET ORAL ONCE
OUTPATIENT
Start: 2025-03-31

## 2025-03-10 RX ORDER — ERGOCALCIFEROL 1.25 MG/1
50000 CAPSULE, LIQUID FILLED ORAL WEEKLY
Qty: 13 CAPSULE | Refills: 1
Start: 2025-03-10

## 2025-03-10 RX ORDER — ACETAMINOPHEN 325 MG/1
650 TABLET ORAL ONCE
OUTPATIENT
Start: 2025-03-31

## 2025-03-10 RX ORDER — SODIUM CHLORIDE 9 MG/ML
20 INJECTION, SOLUTION INTRAVENOUS ONCE
Status: CANCELLED | OUTPATIENT
Start: 2025-03-17

## 2025-03-10 RX ORDER — ACETAMINOPHEN 325 MG/1
650 TABLET ORAL ONCE
Status: CANCELLED | OUTPATIENT
Start: 2025-03-17

## 2025-03-10 RX ORDER — DIPHENHYDRAMINE HCL 25 MG
25 TABLET ORAL ONCE
OUTPATIENT
Start: 2025-04-07

## 2025-03-10 NOTE — PROGRESS NOTES
----- Message from CORI GARCIA RN sent at 3/10/2025  4:21 PM EDT -----  Regarding: Referral  Spouse reports that patient is unable to fill his prescription for Thick-It at the local pharmacy due to insurance coverage issues. Patient could benefit from a referral to the Ambulatory Clinical Pharmacy Team for further assessment and assistance; spouse is agreeable to this referral. Referral sent today.   Called IR to update on need for IR biopsy. They will outreach patient today and attempt to schedule   Call to patient who has opted to not move forward with bone marrow biopsy at this time. Will discuss with physician at upcoming appt.

## 2025-03-11 NOTE — TELEPHONE ENCOUNTER
3/11- pt needed later in the week for appts - we only service her area on Tuesdays - will not work for patient - pt will go to the lab

## 2025-03-12 NOTE — RESULT ENCOUNTER NOTE
I called and spoke with Nidia regarding her concern about stopping vitamins prior to her chemo treatment. She had no further questions/concerns.

## 2025-03-14 ENCOUNTER — TELEPHONE (OUTPATIENT)
Dept: HEMATOLOGY ONCOLOGY | Facility: CLINIC | Age: 70
End: 2025-03-14

## 2025-03-14 NOTE — TELEPHONE ENCOUNTER
Phoned patient to review need for blood work prior to treatment on Monday.  Patient states she will have completed today or Saturday.    Reviewed with Dr. Chavez.  Ok to proceed with treatment with out results.  Labs must be drawn prior to infusion.

## 2025-03-15 ENCOUNTER — APPOINTMENT (OUTPATIENT)
Dept: LAB | Facility: HOSPITAL | Age: 70
End: 2025-03-15
Payer: COMMERCIAL

## 2025-03-15 DIAGNOSIS — C90.00 MULTIPLE MYELOMA NOT HAVING ACHIEVED REMISSION (HCC): ICD-10-CM

## 2025-03-15 LAB
ABO GROUP BLD: NORMAL
ALBUMIN SERPL BCG-MCNC: 2.8 G/DL (ref 3.5–5)
ALP SERPL-CCNC: 48 U/L (ref 34–104)
ALT SERPL W P-5'-P-CCNC: 7 U/L (ref 7–52)
ANION GAP SERPL CALCULATED.3IONS-SCNC: 11 MMOL/L (ref 4–13)
AST SERPL W P-5'-P-CCNC: 6 U/L (ref 13–39)
BASOPHILS # BLD AUTO: 0.03 THOUSANDS/ÂΜL (ref 0–0.1)
BASOPHILS NFR BLD AUTO: 0 % (ref 0–1)
BILIRUB SERPL-MCNC: 0.3 MG/DL (ref 0.2–1)
BLD GP AB SCN SERPL QL: NEGATIVE
BUN SERPL-MCNC: 32 MG/DL (ref 5–25)
CALCIUM ALBUM COR SERPL-MCNC: 10 MG/DL (ref 8.3–10.1)
CALCIUM SERPL-MCNC: 9 MG/DL (ref 8.4–10.2)
CHLORIDE SERPL-SCNC: 104 MMOL/L (ref 96–108)
CO2 SERPL-SCNC: 22 MMOL/L (ref 21–32)
CREAT SERPL-MCNC: 1.23 MG/DL (ref 0.6–1.3)
EOSINOPHIL # BLD AUTO: 0.09 THOUSAND/ÂΜL (ref 0–0.61)
EOSINOPHIL NFR BLD AUTO: 1 % (ref 0–6)
ERYTHROCYTE [DISTWIDTH] IN BLOOD BY AUTOMATED COUNT: 18.1 % (ref 11.6–15.1)
GFR SERPL CREATININE-BSD FRML MDRD: 45 ML/MIN/1.73SQ M
GLUCOSE SERPL-MCNC: 152 MG/DL (ref 65–140)
HCT VFR BLD AUTO: 28.7 % (ref 34.8–46.1)
HGB BLD-MCNC: 8.9 G/DL (ref 11.5–15.4)
IMM GRANULOCYTES # BLD AUTO: 0.12 THOUSAND/UL (ref 0–0.2)
IMM GRANULOCYTES NFR BLD AUTO: 1 % (ref 0–2)
LYMPHOCYTES # BLD AUTO: 3.67 THOUSANDS/ÂΜL (ref 0.6–4.47)
LYMPHOCYTES NFR BLD AUTO: 39 % (ref 14–44)
MCH RBC QN AUTO: 30.5 PG (ref 26.8–34.3)
MCHC RBC AUTO-ENTMCNC: 31 G/DL (ref 31.4–37.4)
MCV RBC AUTO: 98 FL (ref 82–98)
MONOCYTES # BLD AUTO: 0.64 THOUSAND/ÂΜL (ref 0.17–1.22)
MONOCYTES NFR BLD AUTO: 7 % (ref 4–12)
NEUTROPHILS # BLD AUTO: 4.99 THOUSANDS/ÂΜL (ref 1.85–7.62)
NEUTS SEG NFR BLD AUTO: 52 % (ref 43–75)
NRBC BLD AUTO-RTO: 0 /100 WBCS
PLATELET # BLD AUTO: 278 THOUSANDS/UL (ref 149–390)
PMV BLD AUTO: 8.9 FL (ref 8.9–12.7)
POTASSIUM SERPL-SCNC: 4.5 MMOL/L (ref 3.5–5.3)
PROT SERPL-MCNC: 10.4 G/DL (ref 6.4–8.4)
RBC # BLD AUTO: 2.92 MILLION/UL (ref 3.81–5.12)
RH BLD: POSITIVE
SODIUM SERPL-SCNC: 137 MMOL/L (ref 135–147)
SPECIMEN EXPIRATION DATE: NORMAL
WBC # BLD AUTO: 9.54 THOUSAND/UL (ref 4.31–10.16)

## 2025-03-15 PROCEDURE — 85025 COMPLETE CBC W/AUTO DIFF WBC: CPT

## 2025-03-15 PROCEDURE — 86901 BLOOD TYPING SEROLOGIC RH(D): CPT

## 2025-03-15 PROCEDURE — 86870 RBC ANTIBODY IDENTIFICATION: CPT

## 2025-03-15 PROCEDURE — 86850 RBC ANTIBODY SCREEN: CPT

## 2025-03-15 PROCEDURE — 87340 HEPATITIS B SURFACE AG IA: CPT

## 2025-03-15 PROCEDURE — 80053 COMPREHEN METABOLIC PANEL: CPT

## 2025-03-15 PROCEDURE — 86706 HEP B SURFACE ANTIBODY: CPT

## 2025-03-15 PROCEDURE — 86900 BLOOD TYPING SEROLOGIC ABO: CPT

## 2025-03-15 PROCEDURE — 36415 COLL VENOUS BLD VENIPUNCTURE: CPT

## 2025-03-15 PROCEDURE — 86704 HEP B CORE ANTIBODY TOTAL: CPT

## 2025-03-16 LAB
HBV CORE AB SER QL: NORMAL
HBV SURFACE AB SER-ACNC: <3 MIU/ML
HBV SURFACE AG SER QL: NORMAL

## 2025-03-17 ENCOUNTER — HOSPITAL ENCOUNTER (OUTPATIENT)
Dept: INFUSION CENTER | Facility: HOSPITAL | Age: 70
Discharge: HOME | End: 2025-03-17
Attending: INTERNAL MEDICINE
Payer: COMMERCIAL

## 2025-03-17 VITALS
OXYGEN SATURATION: 98 % | RESPIRATION RATE: 16 BRPM | HEART RATE: 96 BPM | DIASTOLIC BLOOD PRESSURE: 80 MMHG | SYSTOLIC BLOOD PRESSURE: 140 MMHG | TEMPERATURE: 97.9 F

## 2025-03-17 DIAGNOSIS — C90.00 MULTIPLE MYELOMA NOT HAVING ACHIEVED REMISSION (HCC): Primary | ICD-10-CM

## 2025-03-17 PROCEDURE — 96365 THER/PROPH/DIAG IV INF INIT: CPT

## 2025-03-17 PROCEDURE — 96367 TX/PROPH/DG ADDL SEQ IV INF: CPT

## 2025-03-17 PROCEDURE — 96401 CHEMO ANTI-NEOPL SQ/IM: CPT

## 2025-03-17 RX ORDER — ACETAMINOPHEN 325 MG/1
650 TABLET ORAL ONCE
Status: COMPLETED | OUTPATIENT
Start: 2025-03-17 | End: 2025-03-17

## 2025-03-17 RX ORDER — ASPIRIN 81 MG/1
81 TABLET, CHEWABLE ORAL DAILY
COMMUNITY

## 2025-03-17 RX ORDER — SODIUM CHLORIDE 9 MG/ML
20 INJECTION, SOLUTION INTRAVENOUS ONCE
Status: COMPLETED | OUTPATIENT
Start: 2025-03-17 | End: 2025-03-17

## 2025-03-17 RX ADMIN — DARATUMUMAB AND HYALURONIDASE-FIHJ (HUMAN RECOMBINANT) 1800 MG: 1800; 30000 INJECTION SUBCUTANEOUS at 11:17

## 2025-03-17 RX ADMIN — DIPHENHYDRAMINE HYDROCHLORIDE 25 MG: 50 INJECTION, SOLUTION INTRAMUSCULAR; INTRAVENOUS at 09:06

## 2025-03-17 RX ADMIN — ACETAMINOPHEN 650 MG: 325 TABLET, FILM COATED ORAL at 09:04

## 2025-03-17 RX ADMIN — DEXAMETHASONE SODIUM PHOSPHATE 20 MG: 100 INJECTION INTRAMUSCULAR; INTRAVENOUS at 09:35

## 2025-03-17 RX ADMIN — SODIUM CHLORIDE 20 ML/HR: 0.9 INJECTION, SOLUTION INTRAVENOUS at 09:06

## 2025-03-17 NOTE — PLAN OF CARE
Problem: Potential for Falls  Goal: Patient will remain free of falls  Description: INTERVENTIONS:  - Educate patient/family on patient safety including physical limitations  - Instruct patient to call for assistance with activity   - Consult OT/PT to assist with strengthening/mobility   - Keep Call bell within reach  - Keep bed low and locked with side rails adjusted as appropriate  - Keep care items and personal belongings within reach  - Initiate and maintain comfort rounds  - Make Fall Risk Sign visible to staff  - Consider moving patient to room near nurses station  Outcome: Progressing     Problem: INFECTION - ADULT  Goal: Absence or prevention of progression during hospitalization  Description: INTERVENTIONS:  - Assess and monitor for signs and symptoms of infection  - Monitor lab/diagnostic results  - Monitor all insertion sites, i.e. indwelling lines, tubes, and drains  - Monitor endotracheal if appropriate and nasal secretions for changes in amount and color  - [place] appropriate cooling/warming therapies per order  - Administer medications as ordered  - Instruct and encourage patient and family to use good hand hygiene technique  - Identify and instruct in appropriate isolation precautions for identified infection/condition  Outcome: Progressing     Problem: Knowledge Deficit  Goal: Patient/family/caregiver demonstrates understanding of disease process, treatment plan, medications, and discharge instructions  Description: Complete learning assessment and assess knowledge base.  Interventions:  - Provide teaching at level of understanding  - Provide teaching via preferred learning methods  Outcome: Progressing

## 2025-03-17 NOTE — PROGRESS NOTES
Recent labs reviewed. Pt tolerated Darzalex Faspro injections x1 R abdomen & x1 L abdomen well without any complications. 2 hour post observation time completed. No s/s of reaction noted. PIV removed without incident.     Nidia Hamlin is aware of future appt on 3/24/25 at 11AM.     AVS printed and given to Nidia Hamlin.    Pt discharged off unit in stable condition in w/c with all personal belongings accompanied by friend.

## 2025-03-18 LAB — SCAN RESULT: NORMAL

## 2025-03-19 LAB — SCAN RESULT: NORMAL

## 2025-03-21 ENCOUNTER — APPOINTMENT (OUTPATIENT)
Dept: LAB | Facility: HOSPITAL | Age: 70
End: 2025-03-21
Payer: COMMERCIAL

## 2025-03-21 DIAGNOSIS — C90.00 MULTIPLE MYELOMA NOT HAVING ACHIEVED REMISSION (HCC): ICD-10-CM

## 2025-03-21 LAB
ABO GROUP BLD: NORMAL
ALBUMIN SERPL BCG-MCNC: 3 G/DL (ref 3.5–5)
ALP SERPL-CCNC: 49 U/L (ref 34–104)
ALT SERPL W P-5'-P-CCNC: 9 U/L (ref 7–52)
ANION GAP SERPL CALCULATED.3IONS-SCNC: 10 MMOL/L (ref 4–13)
AST SERPL W P-5'-P-CCNC: 4 U/L (ref 13–39)
BASOPHILS # BLD AUTO: 0 THOUSANDS/ÂΜL (ref 0–0.1)
BASOPHILS NFR BLD AUTO: 0 % (ref 0–1)
BILIRUB SERPL-MCNC: 0.3 MG/DL (ref 0.2–1)
BLD GP AB SCN SERPL QL: POSITIVE
BUN SERPL-MCNC: 42 MG/DL (ref 5–25)
CALCIUM ALBUM COR SERPL-MCNC: 9.9 MG/DL (ref 8.3–10.1)
CALCIUM SERPL-MCNC: 9.1 MG/DL (ref 8.4–10.2)
CHLORIDE SERPL-SCNC: 94 MMOL/L (ref 96–108)
CO2 SERPL-SCNC: 22 MMOL/L (ref 21–32)
CREAT SERPL-MCNC: 1.31 MG/DL (ref 0.6–1.3)
EOSINOPHIL # BLD AUTO: 0.01 THOUSAND/ÂΜL (ref 0–0.61)
EOSINOPHIL NFR BLD AUTO: 0 % (ref 0–6)
ERYTHROCYTE [DISTWIDTH] IN BLOOD BY AUTOMATED COUNT: 17.4 % (ref 11.6–15.1)
GFR SERPL CREATININE-BSD FRML MDRD: 41 ML/MIN/1.73SQ M
GLUCOSE SERPL-MCNC: 380 MG/DL (ref 65–140)
HCT VFR BLD AUTO: 26.7 % (ref 34.8–46.1)
HGB BLD-MCNC: 8.9 G/DL (ref 11.5–15.4)
IMM GRANULOCYTES # BLD AUTO: 0.1 THOUSAND/UL (ref 0–0.2)
IMM GRANULOCYTES NFR BLD AUTO: 1 % (ref 0–2)
LYMPHOCYTES # BLD AUTO: 0.96 THOUSANDS/ÂΜL (ref 0.6–4.47)
LYMPHOCYTES NFR BLD AUTO: 11 % (ref 14–44)
MCH RBC QN AUTO: 31.4 PG (ref 26.8–34.3)
MCHC RBC AUTO-ENTMCNC: 33.3 G/DL (ref 31.4–37.4)
MCV RBC AUTO: 94 FL (ref 82–98)
MONOCYTES # BLD AUTO: 0.6 THOUSAND/ÂΜL (ref 0.17–1.22)
MONOCYTES NFR BLD AUTO: 7 % (ref 4–12)
NEUTROPHILS # BLD AUTO: 6.91 THOUSANDS/ÂΜL (ref 1.85–7.62)
NEUTS SEG NFR BLD AUTO: 81 % (ref 43–75)
NRBC BLD AUTO-RTO: 0 /100 WBCS
PLATELET # BLD AUTO: 178 THOUSANDS/UL (ref 149–390)
PMV BLD AUTO: 10.6 FL (ref 8.9–12.7)
POTASSIUM SERPL-SCNC: 4.2 MMOL/L (ref 3.5–5.3)
PROT SERPL-MCNC: 9.7 G/DL (ref 6.4–8.4)
RBC # BLD AUTO: 2.83 MILLION/UL (ref 3.81–5.12)
RH BLD: POSITIVE
SODIUM SERPL-SCNC: 126 MMOL/L (ref 135–147)
SPECIMEN EXPIRATION DATE: NORMAL
WBC # BLD AUTO: 8.58 THOUSAND/UL (ref 4.31–10.16)

## 2025-03-21 PROCEDURE — 0084U RBC DNA GNOTYP 10 BLD GROUPS: CPT

## 2025-03-21 PROCEDURE — 86870 RBC ANTIBODY IDENTIFICATION: CPT

## 2025-03-21 PROCEDURE — 36415 COLL VENOUS BLD VENIPUNCTURE: CPT

## 2025-03-21 PROCEDURE — 85025 COMPLETE CBC W/AUTO DIFF WBC: CPT

## 2025-03-21 PROCEDURE — 86900 BLOOD TYPING SEROLOGIC ABO: CPT

## 2025-03-21 PROCEDURE — 86850 RBC ANTIBODY SCREEN: CPT

## 2025-03-21 PROCEDURE — 86901 BLOOD TYPING SEROLOGIC RH(D): CPT

## 2025-03-21 PROCEDURE — 80053 COMPREHEN METABOLIC PANEL: CPT

## 2025-03-24 ENCOUNTER — TELEPHONE (OUTPATIENT)
Dept: HEMATOLOGY ONCOLOGY | Facility: CLINIC | Age: 70
End: 2025-03-24

## 2025-03-24 ENCOUNTER — HOSPITAL ENCOUNTER (OUTPATIENT)
Dept: INFUSION CENTER | Facility: HOSPITAL | Age: 70
Discharge: HOME | End: 2025-03-24
Attending: INTERNAL MEDICINE
Payer: COMMERCIAL

## 2025-03-24 DIAGNOSIS — C90.00 MULTIPLE MYELOMA NOT HAVING ACHIEVED REMISSION (HCC): Primary | ICD-10-CM

## 2025-03-24 PROCEDURE — 96367 TX/PROPH/DG ADDL SEQ IV INF: CPT

## 2025-03-24 PROCEDURE — 96401 CHEMO ANTI-NEOPL SQ/IM: CPT

## 2025-03-24 PROCEDURE — 96365 THER/PROPH/DIAG IV INF INIT: CPT

## 2025-03-24 RX ORDER — SODIUM CHLORIDE 9 MG/ML
20 INJECTION, SOLUTION INTRAVENOUS ONCE
Status: COMPLETED | OUTPATIENT
Start: 2025-03-24 | End: 2025-03-24

## 2025-03-24 RX ORDER — ACETAMINOPHEN 325 MG/1
650 TABLET ORAL ONCE
Status: COMPLETED | OUTPATIENT
Start: 2025-03-24 | End: 2025-03-24

## 2025-03-24 RX ADMIN — DEXAMETHASONE SODIUM PHOSPHATE 20 MG: 100 INJECTION INTRAMUSCULAR; INTRAVENOUS at 11:16

## 2025-03-24 RX ADMIN — SODIUM CHLORIDE 20 ML/HR: 0.9 INJECTION, SOLUTION INTRAVENOUS at 11:20

## 2025-03-24 RX ADMIN — ACETAMINOPHEN 650 MG: 325 TABLET, FILM COATED ORAL at 11:45

## 2025-03-24 RX ADMIN — DARATUMUMAB AND HYALURONIDASE-FIHJ (HUMAN RECOMBINANT) 1800 MG: 1800; 30000 INJECTION SUBCUTANEOUS at 13:30

## 2025-03-24 RX ADMIN — DIPHENHYDRAMINE HYDROCHLORIDE 25 MG: 50 INJECTION, SOLUTION INTRAMUSCULAR; INTRAVENOUS at 11:48

## 2025-03-24 NOTE — PROGRESS NOTES
Recent labs reviewed. Pt tolerated Darzalex Faspro injections x1 R abdomen & x1 L abdomen well without any complications. 30 minute post observation completed per order.    Nidia Hamlin is aware of future appt on 3/31/25 at 1130.     AVS printed and given to Nidia Hamlin.

## 2025-03-24 NOTE — PROGRESS NOTES
Spoke with pt who tells me she has been struggling with taste changes ever since she was in the hospital. Revlimid began 3/17, so likely not related. Per her home scale she has lost 5 pounds. Declined nutrition consult at this time. Discussed Boost/Ensure supplements. Taste changes have primarily been related to meet and chicken.     Pt denies any Revlimid questions/concerns at this time.       03/24/25 1600   Oral Chemo Follow up Call: Medication 1   7 Day call or Refill call? 7 Day call   Medication name Revlimid   Consent obtained? Y   Specialty pharmacy providing medication [id number]   Medication start date 03/17/25   Patient taking medication with or without food Patient taking either with or without food   Confirmed drug, dose and schedule with patient? Y   Adherence No barriers   Side effects reported by patient Other (see comment)  (taste changes)   Date of follow up appointment 04/08/25   Blood work schedule Weekly

## 2025-03-25 ENCOUNTER — TELEPHONE (OUTPATIENT)
Dept: HEMATOLOGY ONCOLOGY | Facility: CLINIC | Age: 70
End: 2025-03-25

## 2025-03-25 NOTE — TELEPHONE ENCOUNTER
Called pt and let her know [facility] does not have Ensure or Boost samples at this time. [facility] do. Pt will let her daughter know so she can plan to .

## 2025-03-26 LAB — SCAN RESULT: NORMAL

## 2025-03-28 ENCOUNTER — APPOINTMENT (OUTPATIENT)
Dept: LAB | Facility: HOSPITAL | Age: 70
End: 2025-03-28
Payer: COMMERCIAL

## 2025-03-28 DIAGNOSIS — C90.00 MULTIPLE MYELOMA NOT HAVING ACHIEVED REMISSION (HCC): ICD-10-CM

## 2025-03-28 LAB
ALBUMIN SERPL BCG-MCNC: 3.1 G/DL (ref 3.5–5)
ALP SERPL-CCNC: 51 U/L (ref 34–104)
ALT SERPL W P-5'-P-CCNC: 11 U/L (ref 7–52)
ANION GAP SERPL CALCULATED.3IONS-SCNC: 11 MMOL/L (ref 4–13)
AST SERPL W P-5'-P-CCNC: 5 U/L (ref 13–39)
BASOPHILS # BLD AUTO: 0 THOUSANDS/ÂΜL (ref 0–0.1)
BASOPHILS NFR BLD AUTO: 0 % (ref 0–1)
BILIRUB SERPL-MCNC: 0.43 MG/DL (ref 0.2–1)
BUN SERPL-MCNC: 37 MG/DL (ref 5–25)
CALCIUM ALBUM COR SERPL-MCNC: 9.3 MG/DL (ref 8.3–10.1)
CALCIUM SERPL-MCNC: 8.6 MG/DL (ref 8.4–10.2)
CHLORIDE SERPL-SCNC: 97 MMOL/L (ref 96–108)
CO2 SERPL-SCNC: 20 MMOL/L (ref 21–32)
CREAT SERPL-MCNC: 1.23 MG/DL (ref 0.6–1.3)
EOSINOPHIL # BLD AUTO: 0 THOUSAND/ÂΜL (ref 0–0.61)
EOSINOPHIL NFR BLD AUTO: 0 % (ref 0–6)
ERYTHROCYTE [DISTWIDTH] IN BLOOD BY AUTOMATED COUNT: 17.7 % (ref 11.6–15.1)
GFR SERPL CREATININE-BSD FRML MDRD: 45 ML/MIN/1.73SQ M
GLUCOSE SERPL-MCNC: 204 MG/DL (ref 65–140)
HCT VFR BLD AUTO: 26.5 % (ref 34.8–46.1)
HGB BLD-MCNC: 8.6 G/DL (ref 11.5–15.4)
IMM GRANULOCYTES # BLD AUTO: 0.03 THOUSAND/UL (ref 0–0.2)
IMM GRANULOCYTES NFR BLD AUTO: 1 % (ref 0–2)
LYMPHOCYTES # BLD AUTO: 1.02 THOUSANDS/ÂΜL (ref 0.6–4.47)
LYMPHOCYTES NFR BLD AUTO: 19 % (ref 14–44)
MCH RBC QN AUTO: 30.4 PG (ref 26.8–34.3)
MCHC RBC AUTO-ENTMCNC: 32.5 G/DL (ref 31.4–37.4)
MCV RBC AUTO: 94 FL (ref 82–98)
MONOCYTES # BLD AUTO: 0.51 THOUSAND/ÂΜL (ref 0.17–1.22)
MONOCYTES NFR BLD AUTO: 10 % (ref 4–12)
NEUTROPHILS # BLD AUTO: 3.78 THOUSANDS/ÂΜL (ref 1.85–7.62)
NEUTS SEG NFR BLD AUTO: 70 % (ref 43–75)
NRBC BLD AUTO-RTO: 0 /100 WBCS
PLATELET # BLD AUTO: 173 THOUSANDS/UL (ref 149–390)
PMV BLD AUTO: 11.2 FL (ref 8.9–12.7)
POTASSIUM SERPL-SCNC: 4.1 MMOL/L (ref 3.5–5.3)
PROT SERPL-MCNC: 7.8 G/DL (ref 6.4–8.4)
RBC # BLD AUTO: 2.83 MILLION/UL (ref 3.81–5.12)
SODIUM SERPL-SCNC: 128 MMOL/L (ref 135–147)
WBC # BLD AUTO: 5.34 THOUSAND/UL (ref 4.31–10.16)

## 2025-03-28 PROCEDURE — 36415 COLL VENOUS BLD VENIPUNCTURE: CPT

## 2025-03-28 PROCEDURE — 80053 COMPREHEN METABOLIC PANEL: CPT

## 2025-03-28 PROCEDURE — 85025 COMPLETE CBC W/AUTO DIFF WBC: CPT

## 2025-03-31 ENCOUNTER — OFFICE VISIT (OUTPATIENT)
Dept: URGENT CARE | Facility: MEDICAL CENTER | Age: 70
End: 2025-03-31
Payer: COMMERCIAL

## 2025-03-31 ENCOUNTER — HOSPITAL ENCOUNTER (OUTPATIENT)
Dept: INFUSION CENTER | Facility: HOSPITAL | Age: 70
Discharge: HOME | End: 2025-03-31
Attending: INTERNAL MEDICINE

## 2025-03-31 ENCOUNTER — NURSE TRIAGE (OUTPATIENT)
Age: 70
End: 2025-03-31

## 2025-03-31 ENCOUNTER — APPOINTMENT (OUTPATIENT)
Dept: RADIOLOGY | Facility: MEDICAL CENTER | Age: 70
End: 2025-03-31
Payer: COMMERCIAL

## 2025-03-31 VITALS
OXYGEN SATURATION: 98 % | TEMPERATURE: 97.6 F | BODY MASS INDEX: 28.32 KG/M2 | HEART RATE: 93 BPM | SYSTOLIC BLOOD PRESSURE: 126 MMHG | RESPIRATION RATE: 23 BRPM | DIASTOLIC BLOOD PRESSURE: 70 MMHG | WEIGHT: 145 LBS

## 2025-03-31 DIAGNOSIS — R05.1 ACUTE COUGH: ICD-10-CM

## 2025-03-31 DIAGNOSIS — R05.2 SUBACUTE COUGH: Primary | ICD-10-CM

## 2025-03-31 DIAGNOSIS — D84.821 IMMUNOCOMPROMISED STATE DUE TO DRUG THERAPY  (HCC): ICD-10-CM

## 2025-03-31 DIAGNOSIS — Z79.899 IMMUNOCOMPROMISED STATE DUE TO DRUG THERAPY  (HCC): ICD-10-CM

## 2025-03-31 PROCEDURE — 99214 OFFICE O/P EST MOD 30 MIN: CPT | Performed by: PHYSICIAN ASSISTANT

## 2025-03-31 PROCEDURE — 71046 X-RAY EXAM CHEST 2 VIEWS: CPT

## 2025-03-31 PROCEDURE — 94640 AIRWAY INHALATION TREATMENT: CPT | Performed by: PHYSICIAN ASSISTANT

## 2025-03-31 RX ORDER — ALBUTEROL SULFATE 90 UG/1
2 INHALANT RESPIRATORY (INHALATION) EVERY 4 HOURS PRN
Qty: 8.5 G | Refills: 0 | Status: SHIPPED | OUTPATIENT
Start: 2025-03-31

## 2025-03-31 RX ORDER — IPRATROPIUM BROMIDE AND ALBUTEROL SULFATE 2.5; .5 MG/3ML; MG/3ML
3 SOLUTION RESPIRATORY (INHALATION) ONCE
Status: COMPLETED | OUTPATIENT
Start: 2025-03-31 | End: 2025-03-31

## 2025-03-31 RX ORDER — DOXYCYCLINE 100 MG/1
100 TABLET ORAL 2 TIMES DAILY
Qty: 20 TABLET | Refills: 0 | Status: SHIPPED | OUTPATIENT
Start: 2025-03-31 | End: 2025-04-10

## 2025-03-31 RX ADMIN — IPRATROPIUM BROMIDE AND ALBUTEROL SULFATE 3 ML: 2.5; .5 SOLUTION RESPIRATORY (INHALATION) at 12:05

## 2025-03-31 NOTE — PROGRESS NOTES
Name: Nidia Hamlin      : 1955      MRN: [id number]  Encounter Provider: NATHAN Cardozo  Encounter Date: 2025   Encounter department: [facility]  :  Assessment & Plan  Stage 3b chronic kidney disease (HCC)  Lab Results   Component Value Date    EGFR 45 2025    EGFR 41 2025    EGFR 45 03/15/2025    CREATININE 1.23 2025    CREATININE 1.31 (H) 2025    CREATININE 1.23 03/15/2025   Baseline creatinine 1.3-1.4 mg/dL   Creatinine 1.23 mg/dL, GFR 45 mL/min, 3/28.  Etiology diabetic nephropathy, previous JOSE ELIAS events, age-related nephron loss and former smoker.  Needs to establish with nephrologist  Recent JOSE ELIAS with peak creatinine 1.58 mg/dL 2024.    UA with small blood, trace protein, 0-1 RBC, UACR 252 mg/g, 3/10/2025.  CT A/P with numerous bilateral renal cysts, the majority of which are measuring simple fluid density.  There is an additional 4 mm hyperdense interpolar lesion within the left lateral kidney which is nonspecific but favors a proteinaceous cyst, the ureters are unremarkable, 2024.  Avoid NSAIDs and nephrotoxins.  At office visit in 2025 advised would consider initiation of RAAS if indicated.  Currently receiving chemotherapy for multiple myeloma. Will defer  starting RAAS at this time.      Hyponatremia  Sodium 128 mmol/L, 3/28.  Continue 2 g sodium restriction and high-protein diet. Has been getting chemo for multiple myeloma with decreased appetite. Vivienne n/v/d. Continue 1.8 L fluid restriction.  Primary hypertension  Blood pressure 126/74  Home blood pressure does not check at home.   Continue 2 g sodium restriction. No current blood pressure medications.      Vitamin D deficiency  Vitamin D 18.3 NG/mL, 3/10.   Advised by oncology to stop taking vitamin D while getting chemo. Orders:    Vitamin D 25 hydroxy; Future    Anemia, unspecified type  Hemoglobin 8.6 g/dL, 3/28.  Iron studies  appropriate in December 2024.  Will continue to monitor.Orders:    CBC and differential; Future    Type 2 diabetes mellitus with hyperglycemia, without long-term current use of insulin (HCC)    Lab Results   Component Value Date    HGBA1C 7.0 (H) 12/07/2024   Continue encourage appropriate glycemic control.     Persistent proteinuria    Orders:    Albumin / creatinine urine ratio; Future    Urinalysis with microscopic; Future    Secondary hyperparathyroidism of renal origin (HCC)    Orders:    Phosphorus; Future    PTH, intact; Future    Hypomagnesemia    Orders:    Magnesium; Future        History of Present Illness   HPI  Nidia Hamlin is a 69 y.o. female who presents to [facility] for follow-up of stage IIIb chronic kidney disease, hyponatremia, hypertension, vitamin D deficiency and anemia.  PMH includes T2DM, NSTEMI, hypothyroidism and multiple myeloma.  Denies recent hospitalizations, or emergency department visits. Was to urgent care yesterday with cough, prescribed antibiotic and inhaler. States has had a cough since around March 17th when she started chemo. Most recent labs are from 3/28 which we reviewed together.          Review of Systems   Constitutional:  Negative for activity change, appetite change, chills, fatigue and fever.        Difficulty eating protein due to altered taste from chemo   HENT:  Negative for ear pain and sore throat.    Eyes:  Negative for pain and visual disturbance.   Respiratory:  Negative for cough and shortness of breath.    Cardiovascular:  Negative for chest pain and palpitations.   Gastrointestinal:  Negative for abdominal pain, constipation, diarrhea, nausea and vomiting.   Endocrine: Negative.    Genitourinary:  Negative for decreased urine volume, difficulty urinating, dysuria, frequency, hematuria and urgency.   Musculoskeletal:  Negative for arthralgias and back pain.   Skin:  Negative for color change and rash.   Neurological:  Negative for dizziness, seizures,  syncope, weakness, light-headedness and headaches.   Hematological: Negative.    Psychiatric/Behavioral: Negative.     All other systems reviewed and are negative.         Objective   /74   Pulse 84   Temp 97.7 °F (36.5 °C)   Resp 16   Ht 5' (1.524 m)   Wt 66.2 kg (146 lb)   SpO2 99%   BMI 28.51 kg/m²      Physical Exam  Vitals reviewed.   Constitutional:       General: She is not in acute distress.     Appearance: Normal appearance. She is well-developed and normal weight. She is not ill-appearing.   HENT:      Head: Normocephalic and atraumatic.      Right Ear: External ear normal.      Left Ear: External ear normal.      Nose: Nose normal.      Mouth/Throat:      Mouth: Mucous membranes are moist.      Pharynx: Oropharynx is clear.   Eyes:      Extraocular Movements: Extraocular movements intact.      Conjunctiva/sclera: Conjunctivae normal.      Pupils: Pupils are equal, round, and reactive to light.   Cardiovascular:      Rate and Rhythm: Normal rate and regular rhythm.      Heart sounds: Normal heart sounds. No murmur heard.  Pulmonary:      Effort: Pulmonary effort is normal. No respiratory distress.      Breath sounds: Normal breath sounds.   Abdominal:      Palpations: Abdomen is soft.      Tenderness: There is no abdominal tenderness.   Musculoskeletal:         General: No swelling.      Cervical back: Neck supple.      Right lower leg: No edema.      Left lower leg: No edema.   Skin:     General: Skin is warm and dry.      Capillary Refill: Capillary refill takes less than 2 seconds.   Neurological:      General: No focal deficit present.      Mental Status: She is alert and oriented to person, place, and time.   Psychiatric:         Mood and Affect: Mood normal.

## 2025-03-31 NOTE — PROGRESS NOTES
[facility]        NAME: Nidia Hamlin is a 69 y.o. female  : 1955    MRN: [id number]  DATE: 2025  TIME: 12:48 PM    Assessment and Plan   Subacute cough [R05.2]  1. Subacute cough  XR chest pa and lateral    ipratropium-albuterol (DUO-NEB) 0.5-2.5 mg/3 mL inhalation solution 3 mL    doxycycline (ADOXA) 100 MG tablet    albuterol (ProAir HFA) 90 mcg/act inhaler      2. Immunocompromised state due to drug therapy  (HCC)          Nidia has significant PMH of multiple myeloma and is undergoing chemotherapy.  She presented with 2 week history of cough.  CXR independently reviewed by myself and was determined to have no cardiopulmonary findings or osseous malformations. Will await formal radiology read.   She tolerated a duoneb treatment well and reported subjective improvement.  Start doxycycline PO BID x 10 days.   Start albuterol inhaler 2 puffs every 4 hours PRN. Reviewed priming and demonstrated proper administration for patient.   Stay well hydrated.   To ER with significant worsening.     Patient Instructions       Follow up with PCP in 3-5 days.  Proceed to  ER if symptoms worsen.    If tests have been performed at [facility], our office will contact you with results if changes need to be made to the care plan discussed with you at the visit.  [facility]    Chief Complaint     Chief Complaint   Patient presents with    Cough     Cough started on  and has been getting worse. States that she started taking acyclovin and that is when the cough started . Shortness of breath noted          History of Present Illness       Nidia presents with her best friend for evaluation of cough that started after starting acyclovir on 3/16. The patient was diagnosed with multiple myeloma in 2024 and has been undergoing chemotherapy since then. She is established with Dr. Burr and was supposed to undergo an infusion today. She called Dr. Burr's  office to ask about her cough as a medication side effect. Dr. Burr's office directed her to urgent care to get checked out first. Her infusion for today has been postponed.    Patient reports she sleeps on a recliner due to neuropathy. Since she has started coughing, she finds herself sleeping more upright than in the past. Patient has abdominal pain, which she attributes to soreness from coughing and at the injection site.   She has been using halls cough drops and increased her water intake. Patient feels these help somewhat.   Normal appetite, drinking well. Normal urine output and bowel movements.   Denies pain in chest with cough. No fevers.         Review of Systems   Review of Systems   Constitutional:  Negative for activity change, appetite change, fatigue and fever.   HENT:  Negative for congestion, ear pain, rhinorrhea, sinus pressure, sinus pain, sneezing, sore throat and trouble swallowing.    Eyes:  Negative for discharge and redness.   Respiratory:  Positive for cough. Negative for shortness of breath and wheezing.    Gastrointestinal:  Negative for abdominal pain, constipation, diarrhea, nausea and vomiting.   Skin:  Negative for rash.         Current Medications       Current Outpatient Medications:     acetaminophen (TYLENOL) 500 mg tablet, Take 1 tablet (500 mg total) by mouth every 6 (six) hours as needed for mild pain, Disp: 60 tablet, Rfl: 0    acyclovir (ZOVIRAX) 400 MG tablet, Take 1 tablet (400 mg total) by mouth 2 (two) times a day Do not start before March 16, 2025., Disp: 180 tablet, Rfl: 3    albuterol (ProAir HFA) 90 mcg/act inhaler, Inhale 2 puffs every 4 (four) hours as needed for wheezing, Disp: 8.5 g, Rfl: 0    aspirin 81 mg chewable tablet, Chew 81 mg daily, Disp: , Rfl:     clindamycin (CLEOCIN T) 1 % lotion, Apply topically 2 (two) times a day To affected areas, Disp: , Rfl:     dexamethasone (DECADRON) 4 mg tablet, Take 5 tabs by mouth with breakfast once a week., Disp: 20  tablet, Rfl: 5    doxycycline (ADOXA) 100 MG tablet, Take 1 tablet (100 mg total) by mouth 2 (two) times a day for 10 days, Disp: 20 tablet, Rfl: 0    ergocalciferol (VITAMIN D2) 50,000 units, Take 1 capsule (50,000 Units total) by mouth once a week, Disp: 13 capsule, Rfl: 1    lenalidomide (REVLIMID) 10 MG CAPS, Take 1 capsule (10 mg total) by mouth daily 21 days on, then 7 days off, Disp: 21 capsule, Rfl: 0    Cholecalciferol (VITAMIN D3) 1,000 units tablet, Take 2 tablets (2,000 Units total) by mouth daily (Patient not taking: Reported on 3/17/2025), Disp: , Rfl:     gabapentin (NEURONTIN) 100 mg capsule, Take 1 capsule (100 mg total) by mouth 3 (three) times a day (Patient not taking: Reported on 3/17/2025), Disp: 90 capsule, Rfl: 0  No current facility-administered medications for this visit.    Current Allergies     Allergies as of 03/31/2025 - Reviewed 03/31/2025   Allergen Reaction Noted    Fosamax [alendronate] Diarrhea 05/19/2016    Paxil [paroxetine hcl] Rash 05/19/2016            The following portions of the patient's history were reviewed and updated as appropriate: allergies, current medications, past family history, past medical history, past social history, past surgical history and problem list.     Past Medical History:   Diagnosis Date    Acidosis 12/23/2024    Adrenal insufficiency (HCC)     JOSE ELIAS (acute kidney injury) (HCC) 12/19/2024    Anxiety     Bacterial sepsis (HCC)     last assessed: 06/13/16    Cancer (HCC)     Per pt blood cancer    Chronic kidney disease     Cushing's syndrome (HCC)     last assessed: 05/29/16    Depression     last assessed: 01/02/18    Diabetes mellitus (HCC)     no meds or insulin    Elevated lipase 12/20/2024    Elevated total protein 05/19/2016    History of transfusion 12/22/2024    Hydradenitis     Hyperlipidemia     Positive FIT (fecal immunochemical test) 06/22/2018    Added automatically from request for surgery [id number]    Rib contusion, left, sequela  06/14/2022    Trigger finger, right middle finger 03/07/2022       Past Surgical History:   Procedure Laterality Date    ADRENALECTOMY  12/2002    CHOLECYSTECTOMY      HERNIA REPAIR      umbilical    OTHER SURGICAL HISTORY      Injection of trigger joint    OK COLONOSCOPY FLX DX W/COLLJ SPEC WHEN PFRMD N/A 7/10/2018    Procedure: COLONOSCOPY;  Surgeon: Pako Nieves MD;  Location: [facility];  Service: Gastroenterology    OK NEUROPLASTY &/TRANSPOS MEDIAN NRV CARPAL TUNNE Right 2/17/2025    Procedure: RELEASE CARPAL TUNNEL - RIGHT;  Surgeon: Kenji Whalen MD;  Location: [facility];  Service: Orthopedics    TONSILLECTOMY AND ADENOIDECTOMY      TUBAL LIGATION         Family History   Problem Relation Age of Onset    Breast cancer Mother         unknown age    No Known Problems Sister     No Known Problems Daughter     No Known Problems Daughter     No Known Problems Maternal Grandmother     No Known Problems Maternal Grandfather     No Known Problems Paternal Grandmother     No Known Problems Paternal Grandfather     No Known Problems Paternal Aunt     No Known Problems Paternal Aunt     No Known Problems Paternal Aunt     No Known Problems Half-Sister     No Known Problems Half-Sister     No Known Problems Half-Sister     No Known Problems Half-Sister     No Known Problems Half-Sister          Medications have been verified.        Objective   /70   Pulse 93   Temp 97.6 °F (36.4 °C)   Resp (!) 23   Wt 65.8 kg (145 lb)   SpO2 98%   BMI 28.32 kg/m²   No LMP recorded. Patient is postmenopausal.       Physical Exam     Physical Exam  Vitals and nursing note reviewed.   Constitutional:       General: She is awake.      Appearance: She is well-developed, well-groomed and overweight. She is not ill-appearing.   HENT:      Head: Normocephalic.      Right Ear: Tympanic membrane, ear canal and external ear normal.      Left Ear: Tympanic membrane, ear canal and external ear normal.      Nose: Nose normal. No nasal  deformity.      Mouth/Throat:      Lips: Pink. No lesions.      Mouth: Mucous membranes are moist.      Dentition: Normal dentition.      Pharynx: Oropharynx is clear. Uvula midline.      Comments: Upper dentures present  Eyes:      General: Lids are normal.      Conjunctiva/sclera: Conjunctivae normal.      Pupils: Pupils are equal, round, and reactive to light.   Neck:      Thyroid: No thyromegaly.   Cardiovascular:      Rate and Rhythm: Normal rate and regular rhythm.      Heart sounds: Normal heart sounds. No murmur heard.  Pulmonary:      Effort: Pulmonary effort is normal. No accessory muscle usage, respiratory distress or retractions.      Breath sounds: Normal breath sounds and air entry. No stridor, decreased air movement or transmitted upper airway sounds. No decreased breath sounds, wheezing, rhonchi or rales.      Comments: Hacking cough  Abdominal:      General: Bowel sounds are normal.      Palpations: Abdomen is soft.      Tenderness: There is no abdominal tenderness.      Hernia: No hernia is present.   Musculoskeletal:      Cervical back: Normal range of motion and neck supple.      Thoracic back: No scoliosis.   Lymphadenopathy:      Head:      Right side of head: No submandibular, tonsillar, preauricular or posterior auricular adenopathy.      Left side of head: No submandibular, tonsillar, preauricular or posterior auricular adenopathy.      Cervical: No cervical adenopathy.   Skin:     General: Skin is warm and dry.      Capillary Refill: Capillary refill takes less than 2 seconds.      Coloration: Skin is ashen. Skin is not pale.      Findings: No rash.   Neurological:      Mental Status: She is alert and oriented to person, place, and time.      Comments: CN II-X grossly intact.   Psychiatric:         Speech: Speech normal.         Behavior: Behavior normal. Behavior is cooperative.           Mini neb    Performed by: Danielle Lee Seiple, PA-C  Authorized by: Danielle Lee Seiple, PA-C   Universal Protocol:  procedure performed by consultantConsent: Verbal consent obtained.  Risks and benefits: risks, benefits and alternatives were discussed  Consent given by: patient  Patient understanding: patient states understanding of the procedure being performed  Patient consent: the patient's understanding of the procedure matches consent given  Procedure consent: procedure consent matches procedure scheduled  Relevant documents: relevant documents present and verified  Test results: test results available and properly labeled  Site marked: the operative site was not marked  Radiology Images displayed and confirmed. If images not available, report reviewed: imaging studies available  Patient identity confirmed: verbally with patient    Number of treatments:  1  Treatment 1:   Pre-Procedure     Symptoms:  Cough    Lung Sounds:  Decreased in bases with reactive cough with deep breaths    SP02:  98%    Medication Administered:  Duoneb - Albuterol 2.5 mg/Atrovent 0.5 mg  Post-Procedure     Symptoms:  Cough    Lung sounds:  CTA B/L without reactive cough    SP02:  98%

## 2025-03-31 NOTE — TELEPHONE ENCOUNTER
"Reason for Disposition   MILD difficulty breathing (e.g., minimal/no SOB at rest, SOB with walking, pulse <100) and still present when not coughing    Answer Assessment - Initial Assessment Questions  1. ONSET: \"When did the cough begin?\"       March 17th  2. SEVERITY: \"How bad is the cough today?\"       constant  3. SPUTUM: \"Describe the color of your sputum\" (e.g., none, dry cough; clear, white, yellow, green)      dry  4. HEMOPTYSIS: \"Are you coughing up any blood?\" If Yes, ask: \"How much?\" (e.g., flecks, streaks, tablespoons, etc.)      none  5. DIFFICULTY BREATHING: \"Are you having difficulty breathing?\" If Yes, ask: \"How bad is it?\" (e.g., mild, moderate, severe)       sometimes  6. FEVER: \"Do you have a fever?\" If Yes, ask: \"What is your temperature, how was it measured, and when did it start?\"      no  7. CARDIAC HISTORY: \"Do you have any history of heart disease?\" (e.g., heart attack, congestive heart failure)       no  8. LUNG HISTORY: \"Do you have any history of lung disease?\"  (e.g., pulmonary embolus, asthma, emphysema)      no  9. PE RISK FACTORS: \"Do you have a history of blood clots?\" (or: recent major surgery, recent prolonged travel, bedridden)      nobno  10. OTHER SYMPTOMS: \"Do you have any other symptoms?\" (e.g., runny nose, wheezing, chest pain)        Clear sputum    12. TRAVEL: \"Have you traveled out of the country in the last month?\" (e.g., travel history, exposures)        no    Protocols used: Cough-Adult-OH  Subjective     Nidia Hurtadof is a 69 y.o. female here for evaluation of a cough. Onset of symptoms was 3 weeks ago. Symptoms have been gradually worsening since that time. The cough is barky and is aggravated by  pt thinks it is the acyclovir . Associated symptoms include:  constant cough . Patient does not have a history of asthma. Patient does not have a history of environmental allergens. Patient has not traveled recently. Patient does not have a history of smoking. Patient has not " had a previous chest x-ray. Patient has not had a PPD done.    .        .

## 2025-03-31 NOTE — ASSESSMENT & PLAN NOTE
Vitamin D 18.3 NG/mL, 3/10.   Advised by oncology to stop taking vitamin D while getting chemo. Orders:    Vitamin D 25 hydroxy; Future

## 2025-03-31 NOTE — PATIENT INSTRUCTIONS
Start antibiotic twice a day for 10 days.  Use albuterol inhaler every 4 hours as needed. Rinse mouth after use.   Stay well hydrated.   To ER with significant worsening.

## 2025-03-31 NOTE — ASSESSMENT & PLAN NOTE
Lab Results   Component Value Date    EGFR 45 03/28/2025    EGFR 41 03/21/2025    EGFR 45 03/15/2025    CREATININE 1.23 03/28/2025    CREATININE 1.31 (H) 03/21/2025    CREATININE 1.23 03/15/2025   Baseline creatinine 1.3-1.4 mg/dL   Creatinine 1.23 mg/dL, GFR 45 mL/min, 3/28.  Etiology diabetic nephropathy, previous JOSE ELIAS events, age-related nephron loss and former smoker.  Needs to establish with nephrologist  Recent JOSE ELIAS with peak creatinine 1.58 mg/dL 12/24/2024.    UA with small blood, trace protein, 0-1 RBC, UACR 252 mg/g, 3/10/2025.  CT A/P with numerous bilateral renal cysts, the majority of which are measuring simple fluid density.  There is an additional 4 mm hyperdense interpolar lesion within the left lateral kidney which is nonspecific but favors a proteinaceous cyst, the ureters are unremarkable, 12/19/2024.  Avoid NSAIDs and nephrotoxins.  At office visit in January 2025 advised would consider initiation of RAAS if indicated.  Currently receiving chemotherapy for multiple myeloma. Will defer  starting RAAS at this time.

## 2025-03-31 NOTE — ASSESSMENT & PLAN NOTE
Lab Results   Component Value Date    HGBA1C 7.0 (H) 12/07/2024   Continue encourage appropriate glycemic control.

## 2025-03-31 NOTE — ASSESSMENT & PLAN NOTE
Sodium 128 mmol/L, 3/28.  Continue 2 g sodium restriction and high-protein diet. Has been getting chemo for multiple myeloma with decreased appetite. Vivienne n/v/d. Continue 1.8 L fluid restriction.

## 2025-03-31 NOTE — ASSESSMENT & PLAN NOTE
Blood pressure 126/74  Home blood pressure does not check at home.   Continue 2 g sodium restriction. No current blood pressure medications.

## 2025-03-31 NOTE — ASSESSMENT & PLAN NOTE
Hemoglobin 8.6 g/dL, 3/28.  Iron studies appropriate in December 2024.  Will continue to monitor.Orders:    CBC and differential; Future

## 2025-04-01 ENCOUNTER — RESULTS FOLLOW-UP (OUTPATIENT)
Dept: URGENT CARE | Facility: MEDICAL CENTER | Age: 70
End: 2025-04-01

## 2025-04-01 ENCOUNTER — OFFICE VISIT (OUTPATIENT)
Dept: NEPHROLOGY | Facility: CLINIC | Age: 70
End: 2025-04-01
Payer: COMMERCIAL

## 2025-04-01 VITALS
WEIGHT: 146 LBS | BODY MASS INDEX: 28.66 KG/M2 | HEIGHT: 60 IN | TEMPERATURE: 97.7 F | DIASTOLIC BLOOD PRESSURE: 74 MMHG | RESPIRATION RATE: 16 BRPM | HEART RATE: 84 BPM | OXYGEN SATURATION: 99 % | SYSTOLIC BLOOD PRESSURE: 126 MMHG

## 2025-04-01 DIAGNOSIS — N18.32 STAGE 3B CHRONIC KIDNEY DISEASE (HCC): Primary | ICD-10-CM

## 2025-04-01 DIAGNOSIS — D64.9 ANEMIA, UNSPECIFIED TYPE: ICD-10-CM

## 2025-04-01 DIAGNOSIS — E55.9 VITAMIN D DEFICIENCY: ICD-10-CM

## 2025-04-01 DIAGNOSIS — I10 PRIMARY HYPERTENSION: ICD-10-CM

## 2025-04-01 DIAGNOSIS — E11.65 TYPE 2 DIABETES MELLITUS WITH HYPERGLYCEMIA, WITHOUT LONG-TERM CURRENT USE OF INSULIN (HCC): ICD-10-CM

## 2025-04-01 DIAGNOSIS — E87.1 HYPONATREMIA: ICD-10-CM

## 2025-04-01 DIAGNOSIS — N25.81 SECONDARY HYPERPARATHYROIDISM OF RENAL ORIGIN (HCC): ICD-10-CM

## 2025-04-01 DIAGNOSIS — R80.1 PERSISTENT PROTEINURIA: ICD-10-CM

## 2025-04-01 DIAGNOSIS — E83.42 HYPOMAGNESEMIA: ICD-10-CM

## 2025-04-01 PROCEDURE — 99214 OFFICE O/P EST MOD 30 MIN: CPT

## 2025-04-01 NOTE — PATIENT INSTRUCTIONS
Pleasure seeing you today.     Your kidney function is stable with a creatinine of 1.23 mg/dL and a GFR of 45 mL/min.  Please continue to avoid NSAIDs such as Advil, Motrin, Aleve, ibuprofen and naproxen.  Please continue to follow 2 g sodium restriction.    Please continue checking blood pressure at home 3-4 times per week and keep a record of readings.  If blood pressure upper number is consistently less than 100 or greater than 150 please contact the office.  Please also call the office if you are experiencing increased headaches, dizziness, lightheadedness, chest pain or blurred vision.  Please continue to maintain a 2 g sodium restriction.    Please continue taking all medications as previously ordered.    Please return for follow-up appointment in 6 months with lab work completed prior to that visit.

## 2025-04-02 LAB — SCAN RESULT: NORMAL

## 2025-04-03 DIAGNOSIS — C90.00 MULTIPLE MYELOMA NOT HAVING ACHIEVED REMISSION (HCC): ICD-10-CM

## 2025-04-03 RX ORDER — LENALIDOMIDE 10 MG/1
10 CAPSULE ORAL DAILY
Qty: 21 CAPSULE | Refills: 0 | Status: SHIPPED | OUTPATIENT
Start: 2025-04-03

## 2025-04-04 ENCOUNTER — APPOINTMENT (OUTPATIENT)
Dept: LAB | Facility: HOSPITAL | Age: 70
End: 2025-04-04
Payer: COMMERCIAL

## 2025-04-04 ENCOUNTER — TELEPHONE (OUTPATIENT)
Age: 70
End: 2025-04-04

## 2025-04-04 ENCOUNTER — TELEPHONE (OUTPATIENT)
Dept: SURGICAL ONCOLOGY | Facility: CLINIC | Age: 70
End: 2025-04-04

## 2025-04-04 DIAGNOSIS — C90.00 MULTIPLE MYELOMA NOT HAVING ACHIEVED REMISSION (HCC): ICD-10-CM

## 2025-04-04 DIAGNOSIS — C90.00 MULTIPLE MYELOMA NOT HAVING ACHIEVED REMISSION (HCC): Primary | ICD-10-CM

## 2025-04-04 LAB
ALBUMIN SERPL BCG-MCNC: 3.4 G/DL (ref 3.5–5)
ALP SERPL-CCNC: 65 U/L (ref 34–104)
ALT SERPL W P-5'-P-CCNC: 22 U/L (ref 7–52)
ANION GAP SERPL CALCULATED.3IONS-SCNC: 12 MMOL/L (ref 4–13)
AST SERPL W P-5'-P-CCNC: 8 U/L (ref 13–39)
BASOPHILS # BLD MANUAL: 0.03 THOUSAND/UL (ref 0–0.1)
BASOPHILS NFR MAR MANUAL: 1 % (ref 0–1)
BILIRUB SERPL-MCNC: 0.54 MG/DL (ref 0.2–1)
BUN SERPL-MCNC: 34 MG/DL (ref 5–25)
CALCIUM ALBUM COR SERPL-MCNC: 8.9 MG/DL (ref 8.3–10.1)
CALCIUM SERPL-MCNC: 8.4 MG/DL (ref 8.4–10.2)
CHLORIDE SERPL-SCNC: 100 MMOL/L (ref 96–108)
CO2 SERPL-SCNC: 22 MMOL/L (ref 21–32)
CREAT SERPL-MCNC: 1.31 MG/DL (ref 0.6–1.3)
DACRYOCYTES BLD QL SMEAR: PRESENT
EOSINOPHIL # BLD MANUAL: 0.05 THOUSAND/UL (ref 0–0.4)
EOSINOPHIL NFR BLD MANUAL: 2 % (ref 0–6)
ERYTHROCYTE [DISTWIDTH] IN BLOOD BY AUTOMATED COUNT: 17 % (ref 11.6–15.1)
GFR SERPL CREATININE-BSD FRML MDRD: 41 ML/MIN/1.73SQ M
GIANT PLATELETS BLD QL SMEAR: PRESENT
GLUCOSE SERPL-MCNC: 178 MG/DL (ref 65–140)
HCT VFR BLD AUTO: 27.5 % (ref 34.8–46.1)
HGB BLD-MCNC: 9.1 G/DL (ref 11.5–15.4)
LYMPHOCYTES # BLD AUTO: 1.25 THOUSAND/UL (ref 0.6–4.47)
LYMPHOCYTES # BLD AUTO: 43 % (ref 14–44)
MCH RBC QN AUTO: 31 PG (ref 26.8–34.3)
MCHC RBC AUTO-ENTMCNC: 33.1 G/DL (ref 31.4–37.4)
MCV RBC AUTO: 94 FL (ref 82–98)
MONOCYTES # BLD AUTO: 0.27 THOUSAND/UL (ref 0–1.22)
MONOCYTES NFR BLD: 10 % (ref 4–12)
NEUTROPHILS # BLD MANUAL: 1.06 THOUSAND/UL (ref 1.85–7.62)
NEUTS BAND NFR BLD MANUAL: 2 % (ref 0–8)
NEUTS SEG NFR BLD AUTO: 38 % (ref 43–75)
OVALOCYTES BLD QL SMEAR: PRESENT
PLASMA CELLS NFR BLD: 3 % (ref 0–0)
PLATELET # BLD AUTO: 148 THOUSANDS/UL (ref 149–390)
PLATELET BLD QL SMEAR: ADEQUATE
PMV BLD AUTO: 10.6 FL (ref 8.9–12.7)
POTASSIUM SERPL-SCNC: 3.6 MMOL/L (ref 3.5–5.3)
PROT SERPL-MCNC: 7.5 G/DL (ref 6.4–8.4)
RBC # BLD AUTO: 2.94 MILLION/UL (ref 3.81–5.12)
RBC MORPH BLD: PRESENT
SODIUM SERPL-SCNC: 134 MMOL/L (ref 135–147)
VARIANT LYMPHS # BLD AUTO: 1 %
WBC # BLD AUTO: 2.65 THOUSAND/UL (ref 4.31–10.16)

## 2025-04-04 PROCEDURE — 85027 COMPLETE CBC AUTOMATED: CPT

## 2025-04-04 PROCEDURE — 80053 COMPREHEN METABOLIC PANEL: CPT

## 2025-04-04 PROCEDURE — 36415 COLL VENOUS BLD VENIPUNCTURE: CPT

## 2025-04-04 PROCEDURE — 85007 BL SMEAR W/DIFF WBC COUNT: CPT

## 2025-04-04 NOTE — TELEPHONE ENCOUNTER
Pt is requesting to speak with a nurse about her chemo treatments. She was very tearful and wondering if Dr. Chavez had any options to assist with her chemo treatment. Pt was told by her daughter that it would be 1,000 a week for her treatments which she can't afford. Pt would like to know if there's a cheaper treatment or program that Dr. Chavez can refer her to for financial support. Pt would like a call back at [phone].

## 2025-04-04 NOTE — TELEPHONE ENCOUNTER
Oncology Finance Advocacy Intake and Intervention  Oncology Finance Counselor/Advocate placed call to patient. This writer informed patient that this writer is here to assist patient with billing questions, financial assistance, payment/payment plans, quotes, copayment assistance, insurance optimization, and insurance navigation.    This writer conducted a thorough benefit review of copayment, deductible, and out of pocket cost. This information is documented below and has been reviewed with patient.     Copayment: $35.00  Deductible:  Out of Pocket Cost: $6,350.00 REMAINING $3,829.35  Insurance optimization (Limited benefit vs self-pay):  Patient assistance status:Patient outreach   Free Drug Applications:N/A  Oral Chemo Application:N/A  BIN#:  PCN#:  GRP#:  Copay:$  Interventions:  In basket message received today:    Yuly Gray RN  Oncology Financial Advocacy; Oncology Social Work1 hour ago (10:38 AM)       Please reach out to patient to discuss concerns     Jeannette Rodrigues routed conversation to Hematology Oncology [facility]1 hour ago (10:28 AM)     Jeannette Rodrigues1 hour ago (10:26 AM)     AD  Pt is requesting to speak with a nurse about her chemo treatments. She was very tearful and wondering if Dr. Chavez had any options to assist with her chemo treatment. Pt was told by her daughter that it would be 1,000 a week for her treatments which she can't afford. Pt would like to know if there's a cheaper treatment or program that Dr. Chavez can refer her to for financial support. Pt would like a call back at [phone].           Spoke with the patient who was very upset and I stated that she would not be paying $1,000.00 a week for the rest of her life for every treatment. I would tell her what her out of pocket was for the remainder of the year to be covered by her insurance 100% once it has been met and that a request for possible kanchan care was mailed out today by the [facility] team. That they can call them  to find a campus to walk in with the necessary documents to start their application.     Before calling the patient's daughter Cally I called Zander VINCENT to confirm that she was mailing an application for financial assistance out to the patient so I don't duplicate the request. Once that was confirmed I called the daughter Cally to repeat that her mother was upset but I would state what her remaining out of pocket was with her insurance and that an application was in the mail to possibly assist with their other bills. I provided Cally with the number of the Hospital Financial Counselor team [phone]) to confirm where they can go to bring in the documents needed to complete their application. The application should come in the mail within 7-10 business days.       Information above was review thoroughly with patient and patient was advise of possible assistance programs/interventions. If any question arise patient can contact this writer at below information. This information was given to patient at time of contact.      Kevan Barriga  Phone:[phone]  Email: [email or web address]

## 2025-04-07 ENCOUNTER — HOSPITAL ENCOUNTER (OUTPATIENT)
Dept: INFUSION CENTER | Facility: HOSPITAL | Age: 70
Discharge: HOME | End: 2025-04-07
Attending: INTERNAL MEDICINE
Payer: COMMERCIAL

## 2025-04-07 VITALS
HEART RATE: 65 BPM | OXYGEN SATURATION: 95 % | RESPIRATION RATE: 18 BRPM | TEMPERATURE: 98.2 F | SYSTOLIC BLOOD PRESSURE: 103 MMHG | DIASTOLIC BLOOD PRESSURE: 58 MMHG

## 2025-04-07 DIAGNOSIS — C90.00 MULTIPLE MYELOMA NOT HAVING ACHIEVED REMISSION (HCC): Primary | ICD-10-CM

## 2025-04-07 PROCEDURE — 96401 CHEMO ANTI-NEOPL SQ/IM: CPT

## 2025-04-07 RX ORDER — ACETAMINOPHEN 325 MG/1
650 TABLET ORAL ONCE
Status: COMPLETED | OUTPATIENT
Start: 2025-04-07 | End: 2025-04-07

## 2025-04-07 RX ORDER — DEXAMETHASONE 4 MG/1
20 TABLET ORAL ONCE
Status: COMPLETED | OUTPATIENT
Start: 2025-04-07 | End: 2025-04-07

## 2025-04-07 RX ORDER — DIPHENHYDRAMINE HCL 25 MG
25 TABLET ORAL ONCE
Status: COMPLETED | OUTPATIENT
Start: 2025-04-07 | End: 2025-04-07

## 2025-04-07 RX ADMIN — DEXAMETHASONE 20 MG: 4 TABLET ORAL at 08:43

## 2025-04-07 RX ADMIN — ACETAMINOPHEN 650 MG: 325 TABLET, FILM COATED ORAL at 08:43

## 2025-04-07 RX ADMIN — DIPHENHYDRAMINE HYDROCHLORIDE 25 MG: 25 TABLET ORAL at 08:43

## 2025-04-07 RX ADMIN — DARATUMUMAB AND HYALURONIDASE-FIHJ (HUMAN RECOMBINANT) 1800 MG: 1800; 30000 INJECTION SUBCUTANEOUS at 09:45

## 2025-04-07 NOTE — PLAN OF CARE
Problem: Potential for Falls  Goal: Patient will remain free of falls  Description: INTERVENTIONS:- Educate patient/family on patient safety including physical limitations- Instruct patient to call for assistance with activity   4/7/2025 0854 by Anamaria Gordon RN  Outcome: Progressing  4/7/2025 0854 by Anamaria Gordon RN  Outcome: Progressing     Problem: Knowledge Deficit  Goal: Patient/family/caregiver demonstrates understanding of disease process, treatment plan, medications, and discharge instructions  Description: Complete learning assessment and assess knowledge base.Interventions:- Provide teaching at level of understanding- Provide teaching via preferred learning methods  Outcome: Progressing

## 2025-04-07 NOTE — PROGRESS NOTES
Nidia Hamlin  tolerated Darzalex Faspro injection well with no complications.  30 minute post observation complete with no issues.     Nidia Hamlin is aware of future appt on 4/14/25 at 1030am.     AVS printed and given to Nidia Hamlin:  No (Declined by Nidia Hamlin)

## 2025-04-08 ENCOUNTER — OFFICE VISIT (OUTPATIENT)
Dept: HEMATOLOGY ONCOLOGY | Facility: CLINIC | Age: 70
End: 2025-04-08
Payer: COMMERCIAL

## 2025-04-08 VITALS
TEMPERATURE: 97.9 F | HEIGHT: 60 IN | HEART RATE: 62 BPM | BODY MASS INDEX: 28.66 KG/M2 | DIASTOLIC BLOOD PRESSURE: 62 MMHG | OXYGEN SATURATION: 99 % | SYSTOLIC BLOOD PRESSURE: 110 MMHG | WEIGHT: 146 LBS

## 2025-04-08 DIAGNOSIS — D61.818 PANCYTOPENIA (HCC): ICD-10-CM

## 2025-04-08 DIAGNOSIS — G56.03 BILATERAL CARPAL TUNNEL SYNDROME: ICD-10-CM

## 2025-04-08 DIAGNOSIS — C90.00 MULTIPLE MYELOMA NOT HAVING ACHIEVED REMISSION (HCC): Primary | ICD-10-CM

## 2025-04-08 PROBLEM — D70.9 NEUTROPENIA, UNSPECIFIED TYPE (HCC): Status: RESOLVED | Noted: 2025-01-15 | Resolved: 2025-04-08

## 2025-04-08 PROBLEM — D72.819 LEUKOPENIA: Status: RESOLVED | Noted: 2024-12-22 | Resolved: 2025-04-08

## 2025-04-08 PROCEDURE — G2211 COMPLEX E/M VISIT ADD ON: HCPCS | Performed by: INTERNAL MEDICINE

## 2025-04-08 PROCEDURE — 99214 OFFICE O/P EST MOD 30 MIN: CPT | Performed by: INTERNAL MEDICINE

## 2025-04-08 NOTE — ASSESSMENT & PLAN NOTE
2/17/2025 patient had right carpal tunnel repair.  No bleeding or infection complications.  She notes that both right and left hand paresthesias have somewhat improved, possibly related to intercurrent steroid therapy and/or myeloma therapy.  Monitor for now.

## 2025-04-08 NOTE — PROGRESS NOTES
Name: Nidia Hamlin      : 1955      MRN: [id number]  Encounter Provider: Alvin Chavez DO  Encounter Date: 2025   Encounter department: [facility] [place]  :  Assessment & Plan  Multiple myeloma not having achieved remission (HCC)  3/11/2025 DRCRISTOPHER initiated.  Tolerating reasonably well.  She had cough and sinus congestion starting about 2 weeks ago.  She thought this was related to acyclovir prophylaxis.  Discontinued a few days ago.  I think it is more likely upper respiratory infection, seems to be improving clinically without specific intervention.  We agreed to restart acyclovir prophylaxis daily, increase to twice daily after a week, if well-tolerated.  WBC 2.6, hemoglobin 9.1, platelet count 148, normal differential.  CMP shows glucose 144, creatinine 1.3, otherwise normal.    Orders:  •  IgG, IgA, IgM; Future  •  Immunoglobulin free LT chains blood; Future  •  Protein electrophoresis, serum; Future    Pancytopenia (HCC)  Likely secondary to disease and treatment, Revlimid.  Asymptomatic.  Monitor.       Bilateral carpal tunnel syndrome  2025 patient had right carpal tunnel repair.  No bleeding or infection complications.  She notes that both right and left hand paresthesias have somewhat improved, possibly related to intercurrent steroid therapy and/or myeloma therapy.  Monitor for now.           Return in about 4 weeks (around 2025) for Labs - See orders, Infusion - See Treatment Plan, Labs - See Treatment Plan.    History of Present Illness   No chief complaint on file.    Oncology History   Cancer Staging   Multiple myeloma not having achieved remission (HCC)  Staging form: Plasma Cell Myeloma and Disorders, AJCC 8th Edition  - Clinical stage from 2025: High-risk cytogenetics: Absent - Signed by Alvin Chavez DO on 2025  Stage prefix: Initial diagnosis  Cytogenetics: 1q addition, Other mutation  Serum calcium level: Normal  Serum  creatinine level: Elevated  Creatinine (mg/dL): 1.5  Oncology History   Multiple myeloma not having achieved remission (HCC)   12/24/2024 Initial Diagnosis    Multiple myeloma not having achieved remission (HCC)     1/9/2025 -  Cancer Staged    Staging form: Plasma Cell Myeloma and Disorders, AJCC 8th Edition  - Clinical stage from 1/9/2025: High-risk cytogenetics: Absent - Signed by Alvin Chavez DO on 1/24/2025  Stage prefix: Initial diagnosis  Cytogenetics: 1q addition, Other mutation  Serum calcium level: Normal  Serum creatinine level: Elevated  Creatinine (mg/dL): 1.5       3/11/2025 - 3/11/2025 Chemotherapy    alteplase (CATHFLO), 2 mg, Intracatheter, Every 1 Minute as needed, 0 of 14 cycles  daratumumab-hyaluronidase (DARZALEX FASPRO), 1,800 mg, Subcutaneous (abdomen only), Once, 0 of 14 cycles     3/17/2025 -  Chemotherapy    daratumumab-hyaluronidase (DARZALEX FASPRO), 1,800 mg, 1 of 12 cycles  Administration: 1,800 mg (3/17/2025), 1,800 mg (3/24/2025), 1,800 mg (4/7/2025)        Pertinent Medical History    December 19, 2024 nausea and vomiting.  Sodium 129, creatinine 1.5, glucose 44, hemoglobin 7.6.  CT abdomen pelvis no acute findings.  Vitamin B12 199, iron saturation 41%.  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  Serum free kappa 8.8, lambda 904.2, ratio 0.01.  IgG 5.5 g.  January 9, 2025 bone marrow biopsy showed 11% plasma cells.  Lambda light chain restriction.  Normal karyotype.  13 q. deletion.  1 q. +.  3/11/2025 patient started DRD.     Review of Systems   Constitutional:  Negative for appetite change, diaphoresis, fatigue and fever.   HENT:  Negative for sinus pain.    Eyes:  Negative for discharge.   Respiratory:  Negative for cough and shortness of breath.    Cardiovascular:  Negative for chest pain.   Gastrointestinal:  Negative for abdominal pain, constipation and diarrhea.   Endocrine: Negative for cold intolerance.   Genitourinary:  Negative for  difficulty urinating and hematuria.   Musculoskeletal:  Negative for joint swelling.   Skin:  Negative for rash.   Allergic/Immunologic: Negative for environmental allergies.   Neurological:  Negative for dizziness and headaches.   Hematological:  Negative for adenopathy.   Psychiatric/Behavioral:  Negative for agitation.            Objective   /62 (BP Location: Left arm, Patient Position: Sitting, Cuff Size: Standard)   Pulse 62   Temp 97.9 °F (36.6 °C) (Temporal)   Ht 5' (1.524 m)   Wt 66.2 kg (146 lb)   SpO2 99%   BMI 28.51 kg/m²     Pain Screening:     ECOG ECOG Performance Status: 1 - Restricted in physically strenuous activity but ambulatory and able to carry out work of a light or sedentary nature, e.g., light house work, office work   Physical Exam  Constitutional:       Appearance: She is well-developed.   HENT:      Head: Normocephalic and atraumatic.   Eyes:      Pupils: Pupils are equal, round, and reactive to light.   Cardiovascular:      Rate and Rhythm: Normal rate.      Heart sounds: No murmur heard.  Pulmonary:      Effort: No respiratory distress.      Breath sounds: No wheezing or rales.   Abdominal:      General: There is no distension.      Palpations: Abdomen is soft.      Tenderness: There is no abdominal tenderness. There is no rebound.   Musculoskeletal:         General: No tenderness.      Cervical back: Neck supple.   Lymphadenopathy:      Cervical: No cervical adenopathy.   Skin:     General: Skin is warm.      Findings: No rash.   Neurological:      Mental Status: She is alert and oriented to person, place, and time.      Deep Tendon Reflexes: Reflexes normal.   Psychiatric:         Thought Content: Thought content normal.         Labs: I have reviewed the following labs:  Lab Results   Component Value Date/Time    WBC 2.65 (L) 04/04/2025 05:31 PM    RBC 2.94 (L) 04/04/2025 05:31 PM    Hemoglobin 9.1 (L) 04/04/2025 05:31 PM    Hematocrit 27.5 (L) 04/04/2025 05:31 PM    MCV 94  04/04/2025 05:31 PM    MCH 31.0 04/04/2025 05:31 PM    RDW 17.0 (H) 04/04/2025 05:31 PM    Platelets 148 (L) 04/04/2025 05:31 PM    Segmented % 70 03/28/2025 11:36 AM    Lymphocytes % 43 04/04/2025 05:31 PM    Lymphocytes % 19 03/28/2025 11:36 AM    Monocytes % 10 04/04/2025 05:31 PM    Monocytes % 10 03/28/2025 11:36 AM    Eosinophils % 2 04/04/2025 05:31 PM    Eosinophils Relative 0 03/28/2025 11:36 AM    Basophils % 1 04/04/2025 05:31 PM    Basophils Relative 0 03/28/2025 11:36 AM    Immature Grans % 1 03/28/2025 11:36 AM    Absolute Neutrophils 3.78 03/28/2025 11:36 AM     Lab Results   Component Value Date/Time    Potassium 3.6 04/04/2025 05:31 PM    Chloride 100 04/04/2025 05:31 PM    CO2 22 04/04/2025 05:31 PM    BUN 34 (H) 04/04/2025 05:31 PM    Creatinine 1.31 (H) 04/04/2025 05:31 PM    Glucose, Fasting 144 (H) 03/10/2025 08:33 AM    Calcium 8.4 04/04/2025 05:31 PM    Corrected Calcium 8.9 04/04/2025 05:31 PM    AST 8 (L) 04/04/2025 05:31 PM    ALT 22 04/04/2025 05:31 PM    Alkaline Phosphatase 65 04/04/2025 05:31 PM    Total Protein 7.5 04/04/2025 05:31 PM    Albumin 3.4 (L) 04/04/2025 05:31 PM    Total Bilirubin 0.54 04/04/2025 05:31 PM    eGFR 41 04/04/2025 05:31 PM

## 2025-04-08 NOTE — ASSESSMENT & PLAN NOTE
3/11/2025 DRCRISTOPHER initiated.  Tolerating reasonably well.  She had cough and sinus congestion starting about 2 weeks ago.  She thought this was related to acyclovir prophylaxis.  Discontinued a few days ago.  I think it is more likely upper respiratory infection, seems to be improving clinically without specific intervention.  We agreed to restart acyclovir prophylaxis daily, increase to twice daily after a week, if well-tolerated.  WBC 2.6, hemoglobin 9.1, platelet count 148, normal differential.  CMP shows glucose 144, creatinine 1.3, otherwise normal.    Orders:  •  IgG, IgA, IgM; Future  •  Immunoglobulin free LT chains blood; Future  •  Protein electrophoresis, serum; Future

## 2025-04-11 ENCOUNTER — TELEPHONE (OUTPATIENT)
Dept: OBGYN CLINIC | Facility: CLINIC | Age: 70
End: 2025-04-11

## 2025-04-11 ENCOUNTER — APPOINTMENT (OUTPATIENT)
Dept: LAB | Facility: HOSPITAL | Age: 70
End: 2025-04-11
Payer: COMMERCIAL

## 2025-04-11 DIAGNOSIS — C90.00 MULTIPLE MYELOMA NOT HAVING ACHIEVED REMISSION (HCC): ICD-10-CM

## 2025-04-11 LAB
ALBUMIN SERPL BCG-MCNC: 3.1 G/DL (ref 3.5–5)
ALP SERPL-CCNC: 84 U/L (ref 34–104)
ALT SERPL W P-5'-P-CCNC: 9 U/L (ref 7–52)
ANION GAP SERPL CALCULATED.3IONS-SCNC: 10 MMOL/L (ref 4–13)
AST SERPL W P-5'-P-CCNC: 7 U/L (ref 13–39)
BASOPHILS # BLD AUTO: 0.04 THOUSANDS/ÂΜL (ref 0–0.1)
BASOPHILS NFR BLD AUTO: 1 % (ref 0–1)
BILIRUB SERPL-MCNC: 0.52 MG/DL (ref 0.2–1)
BUN SERPL-MCNC: 18 MG/DL (ref 5–25)
CALCIUM ALBUM COR SERPL-MCNC: 9 MG/DL (ref 8.3–10.1)
CALCIUM SERPL-MCNC: 8.3 MG/DL (ref 8.4–10.2)
CHLORIDE SERPL-SCNC: 105 MMOL/L (ref 96–108)
CO2 SERPL-SCNC: 22 MMOL/L (ref 21–32)
CREAT SERPL-MCNC: 1.08 MG/DL (ref 0.6–1.3)
EOSINOPHIL # BLD AUTO: 0.05 THOUSAND/ÂΜL (ref 0–0.61)
EOSINOPHIL NFR BLD AUTO: 1 % (ref 0–6)
ERYTHROCYTE [DISTWIDTH] IN BLOOD BY AUTOMATED COUNT: 17.2 % (ref 11.6–15.1)
GFR SERPL CREATININE-BSD FRML MDRD: 52 ML/MIN/1.73SQ M
GLUCOSE SERPL-MCNC: 133 MG/DL (ref 65–140)
HCT VFR BLD AUTO: 31.5 % (ref 34.8–46.1)
HGB BLD-MCNC: 10 G/DL (ref 11.5–15.4)
IMM GRANULOCYTES # BLD AUTO: 0.01 THOUSAND/UL (ref 0–0.2)
IMM GRANULOCYTES NFR BLD AUTO: 0 % (ref 0–2)
LYMPHOCYTES # BLD AUTO: 2.8 THOUSANDS/ÂΜL (ref 0.6–4.47)
LYMPHOCYTES NFR BLD AUTO: 59 % (ref 14–44)
MCH RBC QN AUTO: 30.9 PG (ref 26.8–34.3)
MCHC RBC AUTO-ENTMCNC: 31.7 G/DL (ref 31.4–37.4)
MCV RBC AUTO: 97 FL (ref 82–98)
MONOCYTES # BLD AUTO: 0.55 THOUSAND/ÂΜL (ref 0.17–1.22)
MONOCYTES NFR BLD AUTO: 12 % (ref 4–12)
NEUTROPHILS # BLD AUTO: 1.3 THOUSANDS/ÂΜL (ref 1.85–7.62)
NEUTS SEG NFR BLD AUTO: 27 % (ref 43–75)
NRBC BLD AUTO-RTO: 0 /100 WBCS
PLATELET # BLD AUTO: 315 THOUSANDS/UL (ref 149–390)
PMV BLD AUTO: 9.7 FL (ref 8.9–12.7)
POTASSIUM SERPL-SCNC: 4 MMOL/L (ref 3.5–5.3)
PROT SERPL-MCNC: 6.6 G/DL (ref 6.4–8.4)
RBC # BLD AUTO: 3.24 MILLION/UL (ref 3.81–5.12)
SODIUM SERPL-SCNC: 137 MMOL/L (ref 135–147)
WBC # BLD AUTO: 4.75 THOUSAND/UL (ref 4.31–10.16)

## 2025-04-11 PROCEDURE — 36415 COLL VENOUS BLD VENIPUNCTURE: CPT

## 2025-04-11 PROCEDURE — 80053 COMPREHEN METABOLIC PANEL: CPT

## 2025-04-11 PROCEDURE — 85025 COMPLETE CBC W/AUTO DIFF WBC: CPT

## 2025-04-11 NOTE — TELEPHONE ENCOUNTER
I called to speak with the patient to see how she has been doing since surgery.  She had an appointment but she canceled it.  She stated the reason that me that she canceled it was because she has been doing well and has not had any issues.  She does still have some numbness and tingling in the tips of the index and middle fingers but these have improved since surgery.  She had numbness and tingling in the tips of her thumb, index, and middle fingers preoperatively which were very dense but she reports that this is all greatly improved since surgery.  Additionally she does not have any nighttime symptoms anymore.  Her wound has completely healed and she has returned to using her hand is normal.  For her currently the issue is that she is having difficulty with her chemotherapy and has been focused on that.  For this reason she has not wanted to come in for evaluation with me if she is having difficulty with the timing and coordination and additional exposure to the office environment.  She stated to me that she feels fine and does not want to come in to be seen at this time and is very happy with the surgery that was provided.

## 2025-04-14 ENCOUNTER — HOSPITAL ENCOUNTER (OUTPATIENT)
Dept: INFUSION CENTER | Facility: HOSPITAL | Age: 70
Discharge: HOME | End: 2025-04-14
Attending: INTERNAL MEDICINE
Payer: COMMERCIAL

## 2025-04-14 VITALS
BODY MASS INDEX: 28.51 KG/M2 | TEMPERATURE: 97.9 F | OXYGEN SATURATION: 94 % | SYSTOLIC BLOOD PRESSURE: 104 MMHG | HEIGHT: 60 IN | HEART RATE: 63 BPM | DIASTOLIC BLOOD PRESSURE: 69 MMHG

## 2025-04-14 DIAGNOSIS — C90.00 MULTIPLE MYELOMA NOT HAVING ACHIEVED REMISSION (HCC): Primary | ICD-10-CM

## 2025-04-14 PROCEDURE — 96401 CHEMO ANTI-NEOPL SQ/IM: CPT

## 2025-04-14 RX ADMIN — DARATUMUMAB AND HYALURONIDASE-FIHJ (HUMAN RECOMBINANT) 1800 MG: 1800; 30000 INJECTION SUBCUTANEOUS at 10:57

## 2025-04-14 NOTE — PROGRESS NOTES
Nidia MILTON Yakelin  tolerated Darzalex Faspro injections to abdomen well with no complications. Patient took all premeds at 0930 prior to coming for treatment.     Nidia MILTON Yakelin is aware of future appt on 4/21/25 at 0830.     AVS printed and given to Nidia MILTON Yakelin:  No (Declined by Nidia Hamlin)       
Walk in

## 2025-04-15 RX ORDER — ACETAMINOPHEN 325 MG/1
650 TABLET ORAL ONCE
OUTPATIENT
Start: 2025-04-28

## 2025-04-15 RX ORDER — DEXAMETHASONE 4 MG/1
20 TABLET ORAL ONCE
OUTPATIENT
Start: 2025-05-12

## 2025-04-15 RX ORDER — DIPHENHYDRAMINE HCL 25 MG
25 TABLET ORAL ONCE
OUTPATIENT
Start: 2025-05-05

## 2025-04-15 RX ORDER — DIPHENHYDRAMINE HCL 25 MG
25 TABLET ORAL ONCE
OUTPATIENT
Start: 2025-04-28

## 2025-04-15 RX ORDER — DIPHENHYDRAMINE HCL 25 MG
25 TABLET ORAL ONCE
OUTPATIENT
Start: 2025-05-12

## 2025-04-15 RX ORDER — DEXAMETHASONE 4 MG/1
20 TABLET ORAL ONCE
Status: CANCELLED | OUTPATIENT
Start: 2025-04-21

## 2025-04-15 RX ORDER — ACETAMINOPHEN 325 MG/1
650 TABLET ORAL ONCE
OUTPATIENT
Start: 2025-05-12

## 2025-04-15 RX ORDER — DEXAMETHASONE 4 MG/1
20 TABLET ORAL ONCE
OUTPATIENT
Start: 2025-05-05

## 2025-04-15 RX ORDER — DIPHENHYDRAMINE HCL 25 MG
25 TABLET ORAL ONCE
Status: CANCELLED | OUTPATIENT
Start: 2025-04-21

## 2025-04-15 RX ORDER — ACETAMINOPHEN 325 MG/1
650 TABLET ORAL ONCE
OUTPATIENT
Start: 2025-05-05

## 2025-04-15 RX ORDER — DEXAMETHASONE 4 MG/1
20 TABLET ORAL ONCE
OUTPATIENT
Start: 2025-04-28

## 2025-04-15 RX ORDER — ACETAMINOPHEN 325 MG/1
650 TABLET ORAL ONCE
Status: CANCELLED | OUTPATIENT
Start: 2025-04-21

## 2025-04-17 ENCOUNTER — TELEPHONE (OUTPATIENT)
Age: 70
End: 2025-04-17

## 2025-04-17 DIAGNOSIS — C90.00 MULTIPLE MYELOMA NOT HAVING ACHIEVED REMISSION (HCC): Primary | ICD-10-CM

## 2025-04-17 RX ORDER — VALACYCLOVIR HYDROCHLORIDE 500 MG/1
500 TABLET, FILM COATED ORAL DAILY
Qty: 30 TABLET | Refills: 0 | Status: SHIPPED | OUTPATIENT
Start: 2025-04-17 | End: 2025-05-17

## 2025-04-17 NOTE — TELEPHONE ENCOUNTER
Patient calling in regarding her acyclovir - pt states same symptoms are reported again regarding the sneezing and the coughing, patient would like to know if there is an alternate that she could take.    Thank you.

## 2025-04-17 NOTE — TELEPHONE ENCOUNTER
Discussed with Dr. Chavez. Plan to DC Acyclovir. Start Valtrex 500 mg daily for prophylaxis. Call placed to patient who verbalized understanding that medication will be sent for her to start. She is aware to call us with any symptoms that arise with new medication.

## 2025-04-18 ENCOUNTER — APPOINTMENT (OUTPATIENT)
Dept: LAB | Facility: HOSPITAL | Age: 70
End: 2025-04-18
Payer: COMMERCIAL

## 2025-04-18 DIAGNOSIS — C90.00 MULTIPLE MYELOMA NOT HAVING ACHIEVED REMISSION (HCC): ICD-10-CM

## 2025-04-18 LAB
ALBUMIN SERPL BCG-MCNC: 3.3 G/DL (ref 3.5–5)
ALP SERPL-CCNC: 85 U/L (ref 34–104)
ALT SERPL W P-5'-P-CCNC: 19 U/L (ref 7–52)
ANION GAP SERPL CALCULATED.3IONS-SCNC: 11 MMOL/L (ref 4–13)
AST SERPL W P-5'-P-CCNC: 10 U/L (ref 13–39)
BASOPHILS # BLD AUTO: 0.08 THOUSANDS/ÂΜL (ref 0–0.1)
BASOPHILS NFR BLD AUTO: 2 % (ref 0–1)
BILIRUB SERPL-MCNC: 0.54 MG/DL (ref 0.2–1)
BUN SERPL-MCNC: 16 MG/DL (ref 5–25)
CALCIUM ALBUM COR SERPL-MCNC: 9.3 MG/DL (ref 8.3–10.1)
CALCIUM SERPL-MCNC: 8.7 MG/DL (ref 8.4–10.2)
CHLORIDE SERPL-SCNC: 103 MMOL/L (ref 96–108)
CO2 SERPL-SCNC: 21 MMOL/L (ref 21–32)
CREAT SERPL-MCNC: 1.11 MG/DL (ref 0.6–1.3)
EOSINOPHIL # BLD AUTO: 0.16 THOUSAND/ÂΜL (ref 0–0.61)
EOSINOPHIL NFR BLD AUTO: 3 % (ref 0–6)
ERYTHROCYTE [DISTWIDTH] IN BLOOD BY AUTOMATED COUNT: 16.2 % (ref 11.6–15.1)
GFR SERPL CREATININE-BSD FRML MDRD: 51 ML/MIN/1.73SQ M
GLUCOSE SERPL-MCNC: 216 MG/DL (ref 65–140)
HCT VFR BLD AUTO: 32 % (ref 34.8–46.1)
HGB BLD-MCNC: 10.3 G/DL (ref 11.5–15.4)
IMM GRANULOCYTES # BLD AUTO: 0.02 THOUSAND/UL (ref 0–0.2)
IMM GRANULOCYTES NFR BLD AUTO: 0 % (ref 0–2)
LYMPHOCYTES # BLD AUTO: 2.78 THOUSANDS/ÂΜL (ref 0.6–4.47)
LYMPHOCYTES NFR BLD AUTO: 52 % (ref 14–44)
MCH RBC QN AUTO: 30.6 PG (ref 26.8–34.3)
MCHC RBC AUTO-ENTMCNC: 32.2 G/DL (ref 31.4–37.4)
MCV RBC AUTO: 95 FL (ref 82–98)
MONOCYTES # BLD AUTO: 0.43 THOUSAND/ÂΜL (ref 0.17–1.22)
MONOCYTES NFR BLD AUTO: 8 % (ref 4–12)
NEUTROPHILS # BLD AUTO: 1.85 THOUSANDS/ÂΜL (ref 1.85–7.62)
NEUTS SEG NFR BLD AUTO: 35 % (ref 43–75)
NRBC BLD AUTO-RTO: 0 /100 WBCS
PLATELET # BLD AUTO: 409 THOUSANDS/UL (ref 149–390)
PMV BLD AUTO: 10.5 FL (ref 8.9–12.7)
POTASSIUM SERPL-SCNC: 4 MMOL/L (ref 3.5–5.3)
PROT SERPL-MCNC: 6.4 G/DL (ref 6.4–8.4)
RBC # BLD AUTO: 3.37 MILLION/UL (ref 3.81–5.12)
SODIUM SERPL-SCNC: 135 MMOL/L (ref 135–147)
WBC # BLD AUTO: 5.32 THOUSAND/UL (ref 4.31–10.16)

## 2025-04-18 PROCEDURE — 36415 COLL VENOUS BLD VENIPUNCTURE: CPT

## 2025-04-18 PROCEDURE — 80053 COMPREHEN METABOLIC PANEL: CPT

## 2025-04-18 PROCEDURE — 85025 COMPLETE CBC W/AUTO DIFF WBC: CPT

## 2025-04-21 ENCOUNTER — HOSPITAL ENCOUNTER (OUTPATIENT)
Dept: INFUSION CENTER | Facility: HOSPITAL | Age: 70
Discharge: HOME | End: 2025-04-21
Attending: INTERNAL MEDICINE
Payer: COMMERCIAL

## 2025-04-21 VITALS
RESPIRATION RATE: 16 BRPM | SYSTOLIC BLOOD PRESSURE: 121 MMHG | HEART RATE: 61 BPM | OXYGEN SATURATION: 95 % | TEMPERATURE: 97 F | DIASTOLIC BLOOD PRESSURE: 76 MMHG

## 2025-04-21 DIAGNOSIS — C90.00 MULTIPLE MYELOMA NOT HAVING ACHIEVED REMISSION (HCC): Primary | ICD-10-CM

## 2025-04-21 PROCEDURE — 96401 CHEMO ANTI-NEOPL SQ/IM: CPT

## 2025-04-21 RX ORDER — DIPHENHYDRAMINE HCL 25 MG
25 TABLET ORAL ONCE
Status: DISCONTINUED | OUTPATIENT
Start: 2025-04-21 | End: 2025-04-24 | Stop reason: HOSPADM

## 2025-04-21 RX ORDER — DEXAMETHASONE 4 MG/1
20 TABLET ORAL ONCE
Status: DISCONTINUED | OUTPATIENT
Start: 2025-04-21 | End: 2025-04-24 | Stop reason: HOSPADM

## 2025-04-21 RX ORDER — ACETAMINOPHEN 325 MG/1
650 TABLET ORAL ONCE
Status: DISCONTINUED | OUTPATIENT
Start: 2025-04-21 | End: 2025-04-24 | Stop reason: HOSPADM

## 2025-04-21 RX ADMIN — DARATUMUMAB AND HYALURONIDASE-FIHJ (HUMAN RECOMBINANT) 1800 MG: 1800; 30000 INJECTION SUBCUTANEOUS at 09:45

## 2025-04-21 NOTE — PROGRESS NOTES
Nidia Hamlin  tolerated Darzalex Faspro injections to abdomen well with no complications. Patient took all premeds at 0730 prior to coming for treatment.      Nidia Hamlin is aware of future appt on 4/28/25 at 1130.      AVS declined.

## 2025-04-21 NOTE — PLAN OF CARE
Problem: Potential for Falls  Goal: Patient will remain free of falls  Description: INTERVENTIONS:- Educate patient/family on patient safety including physical limitations- Instruct patient to call for assistance with activity   Outcome: Progressing     Problem: Knowledge Deficit  Goal: Patient/family/caregiver demonstrates understanding of disease process, treatment plan, medications, and discharge instructions  Description: Complete learning assessment and assess knowledge base.Interventions:- Provide teaching at level of understanding- Provide teaching via preferred learning methods  Outcome: Progressing

## 2025-04-25 ENCOUNTER — APPOINTMENT (OUTPATIENT)
Dept: LAB | Facility: HOSPITAL | Age: 70
End: 2025-04-25
Payer: COMMERCIAL

## 2025-04-25 DIAGNOSIS — C90.00 MULTIPLE MYELOMA NOT HAVING ACHIEVED REMISSION (HCC): ICD-10-CM

## 2025-04-25 LAB
ALBUMIN SERPL BCG-MCNC: 3.6 G/DL (ref 3.5–5)
ALP SERPL-CCNC: 81 U/L (ref 34–104)
ALT SERPL W P-5'-P-CCNC: 11 U/L (ref 7–52)
ANION GAP SERPL CALCULATED.3IONS-SCNC: 9 MMOL/L (ref 4–13)
AST SERPL W P-5'-P-CCNC: 8 U/L (ref 13–39)
BASOPHILS # BLD AUTO: 0.12 THOUSANDS/ÂΜL (ref 0–0.1)
BASOPHILS NFR BLD AUTO: 2 % (ref 0–1)
BILIRUB SERPL-MCNC: 0.65 MG/DL (ref 0.2–1)
BUN SERPL-MCNC: 18 MG/DL (ref 5–25)
CALCIUM SERPL-MCNC: 8.8 MG/DL (ref 8.4–10.2)
CHLORIDE SERPL-SCNC: 102 MMOL/L (ref 96–108)
CO2 SERPL-SCNC: 24 MMOL/L (ref 21–32)
CREAT SERPL-MCNC: 1.06 MG/DL (ref 0.6–1.3)
EOSINOPHIL # BLD AUTO: 0.31 THOUSAND/ÂΜL (ref 0–0.61)
EOSINOPHIL NFR BLD AUTO: 4 % (ref 0–6)
ERYTHROCYTE [DISTWIDTH] IN BLOOD BY AUTOMATED COUNT: 15.9 % (ref 11.6–15.1)
GFR SERPL CREATININE-BSD FRML MDRD: 54 ML/MIN/1.73SQ M
GLUCOSE SERPL-MCNC: 156 MG/DL (ref 65–140)
HCT VFR BLD AUTO: 36.4 % (ref 34.8–46.1)
HGB BLD-MCNC: 11.4 G/DL (ref 11.5–15.4)
IMM GRANULOCYTES # BLD AUTO: 0.02 THOUSAND/UL (ref 0–0.2)
IMM GRANULOCYTES NFR BLD AUTO: 0 % (ref 0–2)
LYMPHOCYTES # BLD AUTO: 2.95 THOUSANDS/ÂΜL (ref 0.6–4.47)
LYMPHOCYTES NFR BLD AUTO: 39 % (ref 14–44)
MCH RBC QN AUTO: 29.6 PG (ref 26.8–34.3)
MCHC RBC AUTO-ENTMCNC: 31.3 G/DL (ref 31.4–37.4)
MCV RBC AUTO: 95 FL (ref 82–98)
MONOCYTES # BLD AUTO: 0.6 THOUSAND/ÂΜL (ref 0.17–1.22)
MONOCYTES NFR BLD AUTO: 8 % (ref 4–12)
NEUTROPHILS # BLD AUTO: 3.55 THOUSANDS/ÂΜL (ref 1.85–7.62)
NEUTS SEG NFR BLD AUTO: 47 % (ref 43–75)
NRBC BLD AUTO-RTO: 0 /100 WBCS
PLATELET # BLD AUTO: 293 THOUSANDS/UL (ref 149–390)
PMV BLD AUTO: 10.8 FL (ref 8.9–12.7)
POTASSIUM SERPL-SCNC: 4 MMOL/L (ref 3.5–5.3)
PROT SERPL-MCNC: 7 G/DL (ref 6.4–8.4)
RBC # BLD AUTO: 3.85 MILLION/UL (ref 3.81–5.12)
SODIUM SERPL-SCNC: 135 MMOL/L (ref 135–147)
WBC # BLD AUTO: 7.55 THOUSAND/UL (ref 4.31–10.16)

## 2025-04-25 PROCEDURE — 85025 COMPLETE CBC W/AUTO DIFF WBC: CPT

## 2025-04-25 PROCEDURE — 36415 COLL VENOUS BLD VENIPUNCTURE: CPT

## 2025-04-25 PROCEDURE — 80053 COMPREHEN METABOLIC PANEL: CPT

## 2025-04-28 ENCOUNTER — HOSPITAL ENCOUNTER (OUTPATIENT)
Dept: INFUSION CENTER | Facility: HOSPITAL | Age: 70
Discharge: HOME | End: 2025-04-28
Attending: INTERNAL MEDICINE
Payer: COMMERCIAL

## 2025-04-28 VITALS
OXYGEN SATURATION: 98 % | DIASTOLIC BLOOD PRESSURE: 90 MMHG | SYSTOLIC BLOOD PRESSURE: 122 MMHG | RESPIRATION RATE: 62 BRPM | HEART RATE: 62 BPM | TEMPERATURE: 97.3 F

## 2025-04-28 DIAGNOSIS — C90.00 MULTIPLE MYELOMA NOT HAVING ACHIEVED REMISSION (HCC): Primary | ICD-10-CM

## 2025-04-28 PROCEDURE — 96401 CHEMO ANTI-NEOPL SQ/IM: CPT

## 2025-04-28 RX ORDER — DEXAMETHASONE 4 MG/1
20 TABLET ORAL ONCE
Status: DISCONTINUED | OUTPATIENT
Start: 2025-04-28 | End: 2025-05-01 | Stop reason: HOSPADM

## 2025-04-28 RX ORDER — DIPHENHYDRAMINE HCL 25 MG
25 TABLET ORAL ONCE
Status: DISCONTINUED | OUTPATIENT
Start: 2025-04-28 | End: 2025-05-01 | Stop reason: HOSPADM

## 2025-04-28 RX ORDER — ACETAMINOPHEN 325 MG/1
650 TABLET ORAL ONCE
Status: DISCONTINUED | OUTPATIENT
Start: 2025-04-28 | End: 2025-05-01 | Stop reason: HOSPADM

## 2025-04-28 RX ADMIN — DARATUMUMAB AND HYALURONIDASE-FIHJ (HUMAN RECOMBINANT) 1800 MG: 1800; 30000 INJECTION SUBCUTANEOUS at 12:09

## 2025-04-28 NOTE — PROGRESS NOTES
Nidia MILTON Yakelin tolerated Darzalex Faspro injections to lower abdomen well with no complications.      Nidia Hamlin is aware of future appt on 5/5/25 at 0900.     AVS printed and given to Nidia Hamlin.

## 2025-04-29 DIAGNOSIS — C90.00 MULTIPLE MYELOMA NOT HAVING ACHIEVED REMISSION (HCC): ICD-10-CM

## 2025-04-29 RX ORDER — LENALIDOMIDE 10 MG/1
10 CAPSULE ORAL DAILY
Qty: 21 CAPSULE | Refills: 0 | Status: SHIPPED | OUTPATIENT
Start: 2025-04-29

## 2025-05-01 ENCOUNTER — TELEPHONE (OUTPATIENT)
Dept: HEMATOLOGY ONCOLOGY | Facility: CLINIC | Age: 70
End: 2025-05-01

## 2025-05-02 ENCOUNTER — APPOINTMENT (OUTPATIENT)
Dept: LAB | Facility: HOSPITAL | Age: 70
End: 2025-05-02
Payer: COMMERCIAL

## 2025-05-02 DIAGNOSIS — C90.00 MULTIPLE MYELOMA NOT HAVING ACHIEVED REMISSION (HCC): ICD-10-CM

## 2025-05-02 LAB
ALBUMIN SERPL BCG-MCNC: 3.7 G/DL (ref 3.5–5)
ALP SERPL-CCNC: 87 U/L (ref 34–104)
ALT SERPL W P-5'-P-CCNC: 10 U/L (ref 7–52)
ANION GAP SERPL CALCULATED.3IONS-SCNC: 11 MMOL/L (ref 4–13)
AST SERPL W P-5'-P-CCNC: 6 U/L (ref 13–39)
BASOPHILS # BLD AUTO: 0.09 THOUSANDS/ÂΜL (ref 0–0.1)
BASOPHILS NFR BLD AUTO: 1 % (ref 0–1)
BILIRUB SERPL-MCNC: 0.61 MG/DL (ref 0.2–1)
BUN SERPL-MCNC: 25 MG/DL (ref 5–25)
CALCIUM SERPL-MCNC: 9.5 MG/DL (ref 8.4–10.2)
CHLORIDE SERPL-SCNC: 104 MMOL/L (ref 96–108)
CO2 SERPL-SCNC: 20 MMOL/L (ref 21–32)
CREAT SERPL-MCNC: 1.14 MG/DL (ref 0.6–1.3)
EOSINOPHIL # BLD AUTO: 0.89 THOUSAND/ÂΜL (ref 0–0.61)
EOSINOPHIL NFR BLD AUTO: 13 % (ref 0–6)
ERYTHROCYTE [DISTWIDTH] IN BLOOD BY AUTOMATED COUNT: 15.5 % (ref 11.6–15.1)
GFR SERPL CREATININE-BSD FRML MDRD: 49 ML/MIN/1.73SQ M
GLUCOSE SERPL-MCNC: 191 MG/DL (ref 65–140)
HCT VFR BLD AUTO: 35.9 % (ref 34.8–46.1)
HGB BLD-MCNC: 11.9 G/DL (ref 11.5–15.4)
IMM GRANULOCYTES # BLD AUTO: 0.02 THOUSAND/UL (ref 0–0.2)
IMM GRANULOCYTES NFR BLD AUTO: 0 % (ref 0–2)
LYMPHOCYTES # BLD AUTO: 2.95 THOUSANDS/ÂΜL (ref 0.6–4.47)
LYMPHOCYTES NFR BLD AUTO: 42 % (ref 14–44)
MCH RBC QN AUTO: 30.3 PG (ref 26.8–34.3)
MCHC RBC AUTO-ENTMCNC: 33.1 G/DL (ref 31.4–37.4)
MCV RBC AUTO: 91 FL (ref 82–98)
MONOCYTES # BLD AUTO: 0.77 THOUSAND/ÂΜL (ref 0.17–1.22)
MONOCYTES NFR BLD AUTO: 11 % (ref 4–12)
NEUTROPHILS # BLD AUTO: 2.34 THOUSANDS/ÂΜL (ref 1.85–7.62)
NEUTS SEG NFR BLD AUTO: 33 % (ref 43–75)
NRBC BLD AUTO-RTO: 0 /100 WBCS
PLATELET # BLD AUTO: 200 THOUSANDS/UL (ref 149–390)
PMV BLD AUTO: 11.8 FL (ref 8.9–12.7)
POTASSIUM SERPL-SCNC: 4.1 MMOL/L (ref 3.5–5.3)
PROT SERPL-MCNC: 7 G/DL (ref 6.4–8.4)
RBC # BLD AUTO: 3.93 MILLION/UL (ref 3.81–5.12)
SODIUM SERPL-SCNC: 135 MMOL/L (ref 135–147)
WBC # BLD AUTO: 7.06 THOUSAND/UL (ref 4.31–10.16)

## 2025-05-02 PROCEDURE — 80053 COMPREHEN METABOLIC PANEL: CPT

## 2025-05-02 PROCEDURE — 85025 COMPLETE CBC W/AUTO DIFF WBC: CPT

## 2025-05-02 PROCEDURE — 36415 COLL VENOUS BLD VENIPUNCTURE: CPT

## 2025-05-05 ENCOUNTER — HOSPITAL ENCOUNTER (OUTPATIENT)
Dept: INFUSION CENTER | Facility: HOSPITAL | Age: 70
Discharge: HOME | End: 2025-05-05
Attending: INTERNAL MEDICINE
Payer: COMMERCIAL

## 2025-05-05 VITALS
RESPIRATION RATE: 16 BRPM | HEART RATE: 67 BPM | SYSTOLIC BLOOD PRESSURE: 126 MMHG | TEMPERATURE: 97.3 F | DIASTOLIC BLOOD PRESSURE: 60 MMHG | OXYGEN SATURATION: 97 %

## 2025-05-05 DIAGNOSIS — C90.00 MULTIPLE MYELOMA NOT HAVING ACHIEVED REMISSION (HCC): Primary | ICD-10-CM

## 2025-05-05 PROCEDURE — 96401 CHEMO ANTI-NEOPL SQ/IM: CPT

## 2025-05-05 RX ORDER — ACETAMINOPHEN 325 MG/1
650 TABLET ORAL ONCE
Status: DISCONTINUED | OUTPATIENT
Start: 2025-05-05 | End: 2025-05-08 | Stop reason: HOSPADM

## 2025-05-05 RX ORDER — DIPHENHYDRAMINE HCL 25 MG
25 TABLET ORAL ONCE
Status: DISCONTINUED | OUTPATIENT
Start: 2025-05-05 | End: 2025-05-08 | Stop reason: HOSPADM

## 2025-05-05 RX ORDER — DEXAMETHASONE 4 MG/1
20 TABLET ORAL ONCE
Status: DISCONTINUED | OUTPATIENT
Start: 2025-05-05 | End: 2025-05-08 | Stop reason: HOSPADM

## 2025-05-05 RX ADMIN — DARATUMUMAB AND HYALURONIDASE-FIHJ (HUMAN RECOMBINANT) 1800 MG: 1800; 30000 INJECTION SUBCUTANEOUS at 10:07

## 2025-05-05 NOTE — PLAN OF CARE
Problem: Knowledge Deficit  Goal: Patient/family/caregiver demonstrates understanding of disease process, treatment plan, medications, and discharge instructions  Description: Complete learning assessment and assess knowledge base.Interventions:- Provide teaching at level of understanding- Provide teaching via preferred learning methods  Outcome: Progressing      Visit Information Date & Time Provider Department Dept. Phone Encounter #  
 8/25/2017  9:15 AM Carmina Fitch MD [facility] [phone] [id number] Follow-up Instructions Return in about 2 months (around 10/25/2017). Upcoming Health Maintenance Date Due DTaP/Tdap/Td series (1 - Tdap) 9/29/1998 PAP AKA CERVICAL CYTOLOGY 9/29/1998 INFLUENZA AGE 9 TO ADULT 8/1/2017 Allergies as of 8/25/2017  Review Complete On: 8/25/2017 By: Carmina Fitch MD  
 Not on File Current Immunizations  Never Reviewed No immunizations on file. Not reviewed this visit You Were Diagnosed With   
  
 Codes Comments Uncontrolled type 2 diabetes mellitus with hyperglycemia, without long-term current use of insulin ([facility]    -  Primary ICD-10-CM: E11.65 ICD-9-CM: 250.02 Vitals BP Pulse Temp Resp Height(growth percentile) Weight(growth percentile) 127/82 (BP 1 Location: Right arm, BP Patient Position: Sitting) 96 96.3 °F (35.7 °C) (Oral) 16 6' (1.829 m) (!) 405 lb 12.8 oz (184.1 kg) SpO2 BMI OB Status Smoking Status 97% 55.04 kg/m2 Hysterectomy Former Smoker BMI and BSA Data Body Mass Index Body Surface Area 55.04 kg/m 2 3.06 m 2 Preferred Pharmacy Pharmacy Name Phone [facility] [address] Your Updated Medication List  
  
   
This list is accurate as of: 8/25/17 10:02 AM.  Always use your most recent med list.  
  
  
  
  
 citalopram 10 mg tablet Commonly known as:  Oj Copa Take 10 mg by mouth daily. ELIQUIS 5 mg tablet Generic drug:  apixaban  
5 mg two (2) times a day.  
  
 gabapentin 300 mg capsule Commonly known as:  NEURONTIN Take 300 mg by mouth two (2) times a day.  
  
 ketoconazole 2 % shampoo Commonly known as:  NIZORAL Apply  to affected area as needed. losartan 50 mg tablet Commonly known as:  COZAAR Take 50 mg by mouth daily. raNITIdine hcl 150 mg capsule 150 mg two (2) times a day. simvastatin 20 mg tablet Commonly known as:  ZOCOR Take 20 mg by mouth nightly. triamcinolone acetonide 0.1 % ointment Commonly known as:  KENALOG Apply  to affected area as needed. VICTOZA 3-YOUSUF 0.6 mg/0.1 mL (18 mg/3 mL) Pnij Generic drug:  Liraglutide 1.8 mg by SubCUTAneous route daily. Follow-up Instructions Return in about 2 months (around 10/25/2017). Patient Instructions Do not skip meals Do not eat in between meals Reduce carbs- pasta, rice, potatoes, bread Do not drink juices or sodas Do not eat peanut butter Do cheese Do not eat sugar free cookies and cakes Do not eat peaches, grapes, ornages, pineapples and  Raisins  
 
------------------------------------------------------------------------------------------------------------------------------------- Start on jentadueto 2.5/1000 mg twice a day with meals Glipizide  10  Mg twice a day, twenty min before b-fast and dinner Stop basaglar Stay on  Victoza  At 1.8 mg a day Introducing [facility]! [employer] introduces "[employer]" patient portal. Now you can access parts of your medical record, email your doctor's office, and request medication refills online. 1. In your internet browser, go to [email or web address] 2. Click on the First Time User? Click Here link in the Sign In box. You will see the New Member Sign Up page. 3. Enter your "[employer]" Access Code exactly as it appears below. You will not need to use this code after youve completed the sign-up process. If you do not sign up before the expiration date, you must request a new code. · "[employer]" Access Code: [email or web address] Expires: 11/23/2017  9:47 AM 
 
4.  Enter the last four digits of your Social Security Number (xxxx) and Date of Birth (mm/dd/yyyy) as indicated and click Submit. You will be taken to the next sign-up page. 5. Create a [employer] ID. This will be your [employer] login ID and cannot be changed, so think of one that is secure and easy to remember. 6. Create a [employer] password. You can change your password at any time. 7. Enter your Password Reset Question and Answer. This can be used at a later time if you forget your password. 8. Enter your e-mail address. You will receive e-mail notification when new information is available in [address]. 9. Click Sign Up. You can now view and download portions of your medical record. 10. Click the Download Summary menu link to download a portable copy of your medical information. If you have questions, please visit the Frequently Asked Questions section of the [employer] website. Remember, [employer] is NOT to be used for urgent needs. For medical emergencies, dial 911. Now available from your iPhone and Android! Please provide this summary of care documentation to your next provider. Your primary care clinician is listed as Jeff Yan. If you have any questions after today's visit, please call [phone].

## 2025-05-05 NOTE — PROGRESS NOTES
Nidia Hamlin  tolerated Darzalex Faspro injections well with no complications.      Niida Hamlin is aware of future appt on 5/12/25 at 0930.     AVS printed and given to Nidia Hamlin:  No (Declined by Nidia Hamlin)

## 2025-05-06 ENCOUNTER — OFFICE VISIT (OUTPATIENT)
Dept: HEMATOLOGY ONCOLOGY | Facility: CLINIC | Age: 70
End: 2025-05-06
Payer: COMMERCIAL

## 2025-05-06 ENCOUNTER — TELEPHONE (OUTPATIENT)
Dept: PULMONOLOGY | Facility: CLINIC | Age: 70
End: 2025-05-06

## 2025-05-06 VITALS
OXYGEN SATURATION: 97 % | WEIGHT: 144 LBS | SYSTOLIC BLOOD PRESSURE: 144 MMHG | HEART RATE: 66 BPM | DIASTOLIC BLOOD PRESSURE: 80 MMHG | BODY MASS INDEX: 28.12 KG/M2 | TEMPERATURE: 97.6 F

## 2025-05-06 DIAGNOSIS — T45.1X5A CHEMOTHERAPY-INDUCED PERIPHERAL NEUROPATHY (HCC): ICD-10-CM

## 2025-05-06 DIAGNOSIS — E11.65 TYPE 2 DIABETES MELLITUS WITH HYPERGLYCEMIA, WITHOUT LONG-TERM CURRENT USE OF INSULIN (HCC): ICD-10-CM

## 2025-05-06 DIAGNOSIS — R21 RASH OF BOTH HANDS: ICD-10-CM

## 2025-05-06 DIAGNOSIS — C90.00 MULTIPLE MYELOMA NOT HAVING ACHIEVED REMISSION (HCC): Primary | ICD-10-CM

## 2025-05-06 DIAGNOSIS — G62.0 CHEMOTHERAPY-INDUCED PERIPHERAL NEUROPATHY (HCC): ICD-10-CM

## 2025-05-06 PROCEDURE — 99215 OFFICE O/P EST HI 40 MIN: CPT | Performed by: INTERNAL MEDICINE

## 2025-05-06 PROCEDURE — G2211 COMPLEX E/M VISIT ADD ON: HCPCS | Performed by: INTERNAL MEDICINE

## 2025-05-06 RX ORDER — VALACYCLOVIR HYDROCHLORIDE 500 MG/1
500 TABLET, FILM COATED ORAL DAILY
Qty: 30 TABLET | Refills: 5 | Status: SHIPPED | OUTPATIENT
Start: 2025-05-06 | End: 2025-06-05

## 2025-05-06 NOTE — PROGRESS NOTES
Name: Nidia Hamlin      : 1955      MRN: [id number]  Encounter Provider: Alvin Chavez DO  Encounter Date: 2025   Encounter department: [facility] [place]  :  Assessment & Plan  Multiple myeloma not having achieved remission (HCC)  Signals of myeloma have improved, anemia, renal dysfunction.  Unfortunately, myeloma parameters were not drawn a month ago, as requested.  Will rerequest at this time.  Again, I think it is highly likely that she is improving based on indirect signal.  Daratumumab schedule to increase to a 2-week interval for the next 4 months, monthly thereafter.  I reviewed the above considerations with the patient and her daughter.  They voiced understanding and agreement.  Orders:  •  IgG, IgA, IgM; Future  •  Immunoglobulin free LT chains blood; Future  •  Protein electrophoresis, serum; Future  •  IgG, IgA, IgM; Future  •  Immunoglobulin free LT chains blood; Future  •  Protein electrophoresis, serum; Future  •  valACYclovir (VALTREX) 500 mg tablet; Take 1 tablet (500 mg total) by mouth daily    Chemotherapy-induced peripheral neuropathy (HCC)  Bilateral fingertip neuropathy likely secondary to lenalidomide.  Continue to monitor.       Rash of both hands  Mild erythema bilateral palms.  Etiology unclear.  Somewhat unusual for her chemotherapy program.  No cracking, desquamation.  Monitor.       Type 2 diabetes mellitus with hyperglycemia, without long-term current use of insulin (HCC)    Lab Results   Component Value Date    HGBA1C 7.0 (H) 2024   Blood sugars have been running 100-200.  Last hemoglobin A1c was 2024.  Another value is arranged prior to visit with her PCP to discuss utility of treatment for her diabetes.    Orders:  •  Hemoglobin A1C; Future        Return in about 3 months (around 2025) for Labs - See Treatment Plan, Infusion - See Treatment Plan.    History of Present Illness   Chief Complaint   Patient presents  with   • Follow-up     Oncology History   Cancer Staging   Multiple myeloma not having achieved remission (HCC)  Staging form: Plasma Cell Myeloma and Disorders, AJCC 8th Edition  - Clinical stage from 1/9/2025: High-risk cytogenetics: Absent - Signed by Alvin Chavez DO on 1/24/2025  Stage prefix: Initial diagnosis  Cytogenetics: 1q addition, Other mutation  Serum calcium level: Normal  Serum creatinine level: Elevated  Creatinine (mg/dL): 1.5  Oncology History   Multiple myeloma not having achieved remission (HCC)   12/24/2024 Initial Diagnosis    Multiple myeloma not having achieved remission (HCC)     1/9/2025 -  Cancer Staged    Staging form: Plasma Cell Myeloma and Disorders, AJCC 8th Edition  - Clinical stage from 1/9/2025: High-risk cytogenetics: Absent - Signed by Alvin Chavez DO on 1/24/2025  Stage prefix: Initial diagnosis  Cytogenetics: 1q addition, Other mutation  Serum calcium level: Normal  Serum creatinine level: Elevated  Creatinine (mg/dL): 1.5       3/11/2025 - 3/11/2025 Chemotherapy    alteplase (CATHFLO), 2 mg, Intracatheter, Every 1 Minute as needed, 0 of 14 cycles  daratumumab-hyaluronidase (DARZALEX FASPRO), 1,800 mg, Subcutaneous (abdomen only), Once, 0 of 14 cycles     3/17/2025 -  Chemotherapy    daratumumab-hyaluronidase (DARZALEX FASPRO), 1,800 mg, 2 of 12 cycles  Administration: 1,800 mg (3/17/2025), 1,800 mg (3/24/2025), 1,800 mg (4/7/2025), 1,800 mg (4/14/2025), 1,800 mg (4/21/2025), 1,800 mg (4/28/2025), 1,800 mg (5/5/2025)        Pertinent Medical History   December 19, 2024 nausea and vomiting.  Sodium 129, creatinine 1.5, glucose 44, hemoglobin 7.6.  CT abdomen pelvis no acute findings.  Vitamin B12 199, iron saturation 41%.  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  Serum free kappa 8.8, lambda 904.2, ratio 0.01.  IgG 5.5 g.  January 9, 2025 bone marrow biopsy showed 11% plasma cells.  Lambda light chain restriction.  Normal karyotype.   13 q. deletion.  1 q. +.  3/11/2025 patient started DRD.     Review of Systems   Constitutional:  Negative for appetite change, diaphoresis, fatigue and fever.   HENT:  Negative for sinus pain.    Eyes:  Negative for discharge.   Respiratory:  Negative for cough and shortness of breath.    Cardiovascular:  Negative for chest pain.   Gastrointestinal:  Negative for abdominal pain, constipation and diarrhea.   Endocrine: Negative for cold intolerance.   Genitourinary:  Negative for difficulty urinating and hematuria.   Musculoskeletal:  Negative for joint swelling.        Night time leg cramps, occasional   Skin:  Negative for rash.   Allergic/Immunologic: Negative for environmental allergies.   Neurological:  Negative for dizziness and headaches.   Hematological:  Negative for adenopathy.   Psychiatric/Behavioral:  Negative for agitation.            Objective   /80 (BP Location: Left arm, Patient Position: Sitting, Cuff Size: Standard)   Pulse 66   Temp 97.6 °F (36.4 °C) (Temporal)   Wt 65.3 kg (144 lb)   SpO2 97%   BMI 28.12 kg/m²     Pain Screening:     ECOG ECOG Performance Status: 1 - Restricted in physically strenuous activity but ambulatory and able to carry out work of a light or sedentary nature, e.g., light house work, office work   Physical Exam  Constitutional:       Appearance: She is well-developed.   HENT:      Head: Normocephalic and atraumatic.   Eyes:      Pupils: Pupils are equal, round, and reactive to light.   Cardiovascular:      Rate and Rhythm: Normal rate.      Heart sounds: No murmur heard.  Pulmonary:      Effort: No respiratory distress.      Breath sounds: No wheezing or rales.   Abdominal:      General: There is no distension.      Palpations: Abdomen is soft.      Tenderness: There is no abdominal tenderness. There is no rebound.   Musculoskeletal:         General: No tenderness.      Cervical back: Neck supple.   Lymphadenopathy:      Cervical: No cervical adenopathy.    Skin:     General: Skin is warm.      Findings: No rash.   Neurological:      Mental Status: She is alert and oriented to person, place, and time.      Deep Tendon Reflexes: Reflexes normal.   Psychiatric:         Thought Content: Thought content normal.         Labs: I have reviewed the following labs:  Lab Results   Component Value Date/Time    WBC 7.06 05/02/2025 11:09 AM    RBC 3.93 05/02/2025 11:09 AM    Hemoglobin 11.9 05/02/2025 11:09 AM    Hematocrit 35.9 05/02/2025 11:09 AM    MCV 91 05/02/2025 11:09 AM    MCH 30.3 05/02/2025 11:09 AM    RDW 15.5 (H) 05/02/2025 11:09 AM    Platelets 200 05/02/2025 11:09 AM    Segmented % 33 (L) 05/02/2025 11:09 AM    Lymphocytes % 42 05/02/2025 11:09 AM    Monocytes % 11 05/02/2025 11:09 AM    Eosinophils Relative 13 (H) 05/02/2025 11:09 AM    Basophils Relative 1 05/02/2025 11:09 AM    Immature Grans % 0 05/02/2025 11:09 AM    Absolute Neutrophils 2.34 05/02/2025 11:09 AM     Lab Results   Component Value Date/Time    Potassium 4.1 05/02/2025 11:09 AM    Chloride 104 05/02/2025 11:09 AM    CO2 20 (L) 05/02/2025 11:09 AM    BUN 25 05/02/2025 11:09 AM    Creatinine 1.14 05/02/2025 11:09 AM    Glucose, Fasting 144 (H) 03/10/2025 08:33 AM    Calcium 9.5 05/02/2025 11:09 AM    Corrected Calcium 9.3 04/18/2025 09:21 AM    AST 6 (L) 05/02/2025 11:09 AM    ALT 10 05/02/2025 11:09 AM    Alkaline Phosphatase 87 05/02/2025 11:09 AM    Total Protein 7.0 05/02/2025 11:09 AM    Albumin 3.7 05/02/2025 11:09 AM    Total Bilirubin 0.61 05/02/2025 11:09 AM    eGFR 49 05/02/2025 11:09 AM

## 2025-05-06 NOTE — ASSESSMENT & PLAN NOTE
Lab Results   Component Value Date    HGBA1C 7.0 (H) 12/07/2024   Blood sugars have been running 100-200.  Last hemoglobin A1c was December 2024.  Another value is arranged prior to visit with her PCP to discuss utility of treatment for her diabetes.    Orders:  •  Hemoglobin A1C; Future

## 2025-05-06 NOTE — ASSESSMENT & PLAN NOTE
Signals of myeloma have improved, anemia, renal dysfunction.  Unfortunately, myeloma parameters were not drawn a month ago, as requested.  Will rerequest at this time.  Again, I think it is highly likely that she is improving based on indirect signal.  Daratumumab schedule to increase to a 2-week interval for the next 4 months, monthly thereafter.  I reviewed the above considerations with the patient and her daughter.  They voiced understanding and agreement.  Orders:  •  IgG, IgA, IgM; Future  •  Immunoglobulin free LT chains blood; Future  •  Protein electrophoresis, serum; Future  •  IgG, IgA, IgM; Future  •  Immunoglobulin free LT chains blood; Future  •  Protein electrophoresis, serum; Future  •  valACYclovir (VALTREX) 500 mg tablet; Take 1 tablet (500 mg total) by mouth daily

## 2025-05-06 NOTE — LETTER
May 6, 2025     Tasha Palmer DO  [address] [facility] [address]    Patient: Nidia Hamlin   YOB: 1955   Date of Visit: 2025       Dear Dr. Tasha Palmer DO:    Thank you for referring Nidia Hamlin to me for evaluation. Below are my notes for this consultation.    If you have questions, please do not hesitate to call me. I look forward to following your patient along with you.         Sincerely,        Alvin Chavez DO        CC: No Recipients    Alvin Chavez DO  2025 10:41 AM  Sign when Signing Visit  Name: Nidia Hamlin      : 1955      MRN: [id number]  Encounter Provider: Alvin Chavez DO  Encounter Date: 2025   Encounter department: [facility] [place]  :  Assessment & Plan  Multiple myeloma not having achieved remission (HCC)  Signals of myeloma have improved, anemia, renal dysfunction.  Unfortunately, myeloma parameters were not drawn a month ago, as requested.  Will rerequest at this time.  Again, I think it is highly likely that she is improving based on indirect signal.  Daratumumab schedule to increase to a 2-week interval for the next 4 months, monthly thereafter.  I reviewed the above considerations with the patient and her daughter.  They voiced understanding and agreement.  Orders:  •  IgG, IgA, IgM; Future  •  Immunoglobulin free LT chains blood; Future  •  Protein electrophoresis, serum; Future  •  IgG, IgA, IgM; Future  •  Immunoglobulin free LT chains blood; Future  •  Protein electrophoresis, serum; Future  •  valACYclovir (VALTREX) 500 mg tablet; Take 1 tablet (500 mg total) by mouth daily    Chemotherapy-induced peripheral neuropathy (HCC)  Bilateral fingertip neuropathy likely secondary to lenalidomide.  Continue to monitor.       Rash of both hands  Mild erythema bilateral palms.  Etiology unclear.  Somewhat unusual for her chemotherapy program.  No cracking, desquamation.  Monitor.       Type 2  diabetes mellitus with hyperglycemia, without long-term current use of insulin ([facility])    Lab Results   Component Value Date    HGBA1C 7.0 (H) 12/07/2024   Blood sugars have been running 100-200.  Last hemoglobin A1c was December 2024.  Another value is arranged prior to visit with her PCP to discuss utility of treatment for her diabetes.    Orders:  •  Hemoglobin A1C; Future        Return in about 3 months (around 8/6/2025) for Labs - See Treatment Plan, Infusion - See Treatment Plan.    History of Present Illness  Chief Complaint   Patient presents with   • Follow-up     Oncology History  Cancer Staging   Multiple myeloma not having achieved remission (HCC)  Staging form: Plasma Cell Myeloma and Disorders, AJCC 8th Edition  - Clinical stage from 1/9/2025: High-risk cytogenetics: Absent - Signed by Alvin Chavez DO on 1/24/2025  Stage prefix: Initial diagnosis  Cytogenetics: 1q addition, Other mutation  Serum calcium level: Normal  Serum creatinine level: Elevated  Creatinine (mg/dL): 1.5  Oncology History   Multiple myeloma not having achieved remission (HCC)   12/24/2024 Initial Diagnosis    Multiple myeloma not having achieved remission (HCC)     1/9/2025 -  Cancer Staged    Staging form: Plasma Cell Myeloma and Disorders, AJCC 8th Edition  - Clinical stage from 1/9/2025: High-risk cytogenetics: Absent - Signed by Alvin Chavez DO on 1/24/2025  Stage prefix: Initial diagnosis  Cytogenetics: 1q addition, Other mutation  Serum calcium level: Normal  Serum creatinine level: Elevated  Creatinine (mg/dL): 1.5       3/11/2025 - 3/11/2025 Chemotherapy    alteplase (CATHFLO), 2 mg, Intracatheter, Every 1 Minute as needed, 0 of 14 cycles  daratumumab-hyaluronidase (DARZALEX FASPRO), 1,800 mg, Subcutaneous (abdomen only), Once, 0 of 14 cycles     3/17/2025 -  Chemotherapy    daratumumab-hyaluronidase (DARZALEX FASPRO), 1,800 mg, 2 of 12 cycles  Administration: 1,800 mg (3/17/2025), 1,800 mg (3/24/2025), 1,800 mg  (4/7/2025), 1,800 mg (4/14/2025), 1,800 mg (4/21/2025), 1,800 mg (4/28/2025), 1,800 mg (5/5/2025)       Pertinent Medical History  December 19, 2024 nausea and vomiting.  Sodium 129, creatinine 1.5, glucose 44, hemoglobin 7.6.  CT abdomen pelvis no acute findings.  Vitamin B12 199, iron saturation 41%.  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  Serum free kappa 8.8, lambda 904.2, ratio 0.01.  IgG 5.5 g.  January 9, 2025 bone marrow biopsy showed 11% plasma cells.  Lambda light chain restriction.  Normal karyotype.  13 q. deletion.  1 q. +.  3/11/2025 patient started DRD.    Review of Systems   Constitutional:  Negative for appetite change, diaphoresis, fatigue and fever.   HENT:  Negative for sinus pain.    Eyes:  Negative for discharge.   Respiratory:  Negative for cough and shortness of breath.    Cardiovascular:  Negative for chest pain.   Gastrointestinal:  Negative for abdominal pain, constipation and diarrhea.   Endocrine: Negative for cold intolerance.   Genitourinary:  Negative for difficulty urinating and hematuria.   Musculoskeletal:  Negative for joint swelling.        Night time leg cramps, occasional   Skin:  Negative for rash.   Allergic/Immunologic: Negative for environmental allergies.   Neurological:  Negative for dizziness and headaches.   Hematological:  Negative for adenopathy.   Psychiatric/Behavioral:  Negative for agitation.            Objective  /80 (BP Location: Left arm, Patient Position: Sitting, Cuff Size: Standard)   Pulse 66   Temp 97.6 °F (36.4 °C) (Temporal)   Wt 65.3 kg (144 lb)   SpO2 97%   BMI 28.12 kg/m²     Pain Screening:     ECOG ECOG Performance Status: 1 - Restricted in physically strenuous activity but ambulatory and able to carry out work of a light or sedentary nature, e.g., light house work, office work   Physical Exam  Constitutional:       Appearance: She is well-developed.   HENT:      Head: Normocephalic and atraumatic.    Eyes:      Pupils: Pupils are equal, round, and reactive to light.   Cardiovascular:      Rate and Rhythm: Normal rate.      Heart sounds: No murmur heard.  Pulmonary:      Effort: No respiratory distress.      Breath sounds: No wheezing or rales.   Abdominal:      General: There is no distension.      Palpations: Abdomen is soft.      Tenderness: There is no abdominal tenderness. There is no rebound.   Musculoskeletal:         General: No tenderness.      Cervical back: Neck supple.   Lymphadenopathy:      Cervical: No cervical adenopathy.   Skin:     General: Skin is warm.      Findings: No rash.   Neurological:      Mental Status: She is alert and oriented to person, place, and time.      Deep Tendon Reflexes: Reflexes normal.   Psychiatric:         Thought Content: Thought content normal.         Labs: I have reviewed the following labs:  Lab Results   Component Value Date/Time    WBC 7.06 05/02/2025 11:09 AM    RBC 3.93 05/02/2025 11:09 AM    Hemoglobin 11.9 05/02/2025 11:09 AM    Hematocrit 35.9 05/02/2025 11:09 AM    MCV 91 05/02/2025 11:09 AM    MCH 30.3 05/02/2025 11:09 AM    RDW 15.5 (H) 05/02/2025 11:09 AM    Platelets 200 05/02/2025 11:09 AM    Segmented % 33 (L) 05/02/2025 11:09 AM    Lymphocytes % 42 05/02/2025 11:09 AM    Monocytes % 11 05/02/2025 11:09 AM    Eosinophils Relative 13 (H) 05/02/2025 11:09 AM    Basophils Relative 1 05/02/2025 11:09 AM    Immature Grans % 0 05/02/2025 11:09 AM    Absolute Neutrophils 2.34 05/02/2025 11:09 AM     Lab Results   Component Value Date/Time    Potassium 4.1 05/02/2025 11:09 AM    Chloride 104 05/02/2025 11:09 AM    CO2 20 (L) 05/02/2025 11:09 AM    BUN 25 05/02/2025 11:09 AM    Creatinine 1.14 05/02/2025 11:09 AM    Glucose, Fasting 144 (H) 03/10/2025 08:33 AM    Calcium 9.5 05/02/2025 11:09 AM    Corrected Calcium 9.3 04/18/2025 09:21 AM    AST 6 (L) 05/02/2025 11:09 AM    ALT 10 05/02/2025 11:09 AM    Alkaline Phosphatase 87 05/02/2025 11:09 AM    Total  Protein 7.0 05/02/2025 11:09 AM    Albumin 3.7 05/02/2025 11:09 AM    Total Bilirubin 0.61 05/02/2025 11:09 AM    eGFR 49 05/02/2025 11:09 AM

## 2025-05-06 NOTE — ASSESSMENT & PLAN NOTE
Mild erythema bilateral palms.  Etiology unclear.  Somewhat unusual for her chemotherapy program.  No cracking, desquamation.  Monitor.

## 2025-05-09 ENCOUNTER — APPOINTMENT (OUTPATIENT)
Dept: LAB | Facility: HOSPITAL | Age: 70
End: 2025-05-09
Payer: COMMERCIAL

## 2025-05-09 DIAGNOSIS — E11.65 TYPE 2 DIABETES MELLITUS WITH HYPERGLYCEMIA, WITHOUT LONG-TERM CURRENT USE OF INSULIN (HCC): ICD-10-CM

## 2025-05-09 DIAGNOSIS — C90.00 MULTIPLE MYELOMA NOT HAVING ACHIEVED REMISSION (HCC): ICD-10-CM

## 2025-05-09 LAB
ALBUMIN SERPL BCG-MCNC: 4 G/DL (ref 3.5–5)
ALP SERPL-CCNC: 89 U/L (ref 34–104)
ALT SERPL W P-5'-P-CCNC: 13 U/L (ref 7–52)
ANION GAP SERPL CALCULATED.3IONS-SCNC: 9 MMOL/L (ref 4–13)
AST SERPL W P-5'-P-CCNC: 9 U/L (ref 13–39)
BASOPHILS # BLD AUTO: 0.12 THOUSANDS/ÂΜL (ref 0–0.1)
BASOPHILS NFR BLD AUTO: 1 % (ref 0–1)
BILIRUB SERPL-MCNC: 0.57 MG/DL (ref 0.2–1)
BUN SERPL-MCNC: 20 MG/DL (ref 5–25)
CALCIUM SERPL-MCNC: 8.8 MG/DL (ref 8.4–10.2)
CHLORIDE SERPL-SCNC: 105 MMOL/L (ref 96–108)
CO2 SERPL-SCNC: 20 MMOL/L (ref 21–32)
CREAT SERPL-MCNC: 1.16 MG/DL (ref 0.6–1.3)
EOSINOPHIL # BLD AUTO: 0.36 THOUSAND/ÂΜL (ref 0–0.61)
EOSINOPHIL NFR BLD AUTO: 4 % (ref 0–6)
ERYTHROCYTE [DISTWIDTH] IN BLOOD BY AUTOMATED COUNT: 15.4 % (ref 11.6–15.1)
EST. AVERAGE GLUCOSE BLD GHB EST-MCNC: 197 MG/DL
GFR SERPL CREATININE-BSD FRML MDRD: 48 ML/MIN/1.73SQ M
GLUCOSE SERPL-MCNC: 163 MG/DL (ref 65–140)
HBA1C MFR BLD: 8.5 %
HCT VFR BLD AUTO: 39.3 % (ref 34.8–46.1)
HGB BLD-MCNC: 12.6 G/DL (ref 11.5–15.4)
IGA SERPL-MCNC: 80 MG/DL (ref 66–433)
IGG SERPL-MCNC: 1161 MG/DL (ref 635–1741)
IGM SERPL-MCNC: <20 MG/DL (ref 45–281)
IMM GRANULOCYTES # BLD AUTO: 0.03 THOUSAND/UL (ref 0–0.2)
IMM GRANULOCYTES NFR BLD AUTO: 0 % (ref 0–2)
LYMPHOCYTES # BLD AUTO: 3.77 THOUSANDS/ÂΜL (ref 0.6–4.47)
LYMPHOCYTES NFR BLD AUTO: 43 % (ref 14–44)
MCH RBC QN AUTO: 30 PG (ref 26.8–34.3)
MCHC RBC AUTO-ENTMCNC: 32.1 G/DL (ref 31.4–37.4)
MCV RBC AUTO: 94 FL (ref 82–98)
MONOCYTES # BLD AUTO: 0.98 THOUSAND/ÂΜL (ref 0.17–1.22)
MONOCYTES NFR BLD AUTO: 11 % (ref 4–12)
NEUTROPHILS # BLD AUTO: 3.58 THOUSANDS/ÂΜL (ref 1.85–7.62)
NEUTS SEG NFR BLD AUTO: 41 % (ref 43–75)
NRBC BLD AUTO-RTO: 0 /100 WBCS
PLATELET # BLD AUTO: 292 THOUSANDS/UL (ref 149–390)
PMV BLD AUTO: 10.1 FL (ref 8.9–12.7)
POTASSIUM SERPL-SCNC: 4.2 MMOL/L (ref 3.5–5.3)
PROT SERPL-MCNC: 7 G/DL (ref 6.4–8.4)
RBC # BLD AUTO: 4.2 MILLION/UL (ref 3.81–5.12)
SODIUM SERPL-SCNC: 134 MMOL/L (ref 135–147)
WBC # BLD AUTO: 8.84 THOUSAND/UL (ref 4.31–10.16)

## 2025-05-09 PROCEDURE — 82784 ASSAY IGA/IGD/IGG/IGM EACH: CPT

## 2025-05-09 PROCEDURE — 83036 HEMOGLOBIN GLYCOSYLATED A1C: CPT

## 2025-05-09 PROCEDURE — 86334 IMMUNOFIX E-PHORESIS SERUM: CPT

## 2025-05-09 PROCEDURE — 80053 COMPREHEN METABOLIC PANEL: CPT

## 2025-05-09 PROCEDURE — 84165 PROTEIN E-PHORESIS SERUM: CPT

## 2025-05-09 PROCEDURE — 83521 IG LIGHT CHAINS FREE EACH: CPT

## 2025-05-09 PROCEDURE — 85025 COMPLETE CBC W/AUTO DIFF WBC: CPT

## 2025-05-09 PROCEDURE — 36415 COLL VENOUS BLD VENIPUNCTURE: CPT

## 2025-05-10 LAB
KAPPA LC FREE SER-MCNC: 10 MG/L (ref 3.3–19.4)
KAPPA LC FREE/LAMBDA FREE SER: 0.24 {RATIO} (ref 0.26–1.65)
LAMBDA LC FREE SERPL-MCNC: 42.4 MG/L (ref 5.7–26.3)

## 2025-05-12 ENCOUNTER — HOSPITAL ENCOUNTER (OUTPATIENT)
Dept: INFUSION CENTER | Facility: HOSPITAL | Age: 70
Discharge: HOME | End: 2025-05-12
Attending: INTERNAL MEDICINE
Payer: COMMERCIAL

## 2025-05-12 VITALS
HEART RATE: 68 BPM | SYSTOLIC BLOOD PRESSURE: 126 MMHG | OXYGEN SATURATION: 96 % | DIASTOLIC BLOOD PRESSURE: 58 MMHG | TEMPERATURE: 97.4 F

## 2025-05-12 DIAGNOSIS — C90.00 MULTIPLE MYELOMA NOT HAVING ACHIEVED REMISSION (HCC): Primary | ICD-10-CM

## 2025-05-12 LAB
ALBUMIN SERPL ELPH-MCNC: 3.79 G/DL (ref 3.2–5.1)
ALBUMIN SERPL ELPH-MCNC: 55.7 % (ref 48–70)
ALPHA1 GLOB SERPL ELPH-MCNC: 0.35 G/DL (ref 0.15–0.47)
ALPHA1 GLOB SERPL ELPH-MCNC: 5.1 % (ref 1.8–7)
ALPHA2 GLOB SERPL ELPH-MCNC: 0.8 G/DL (ref 0.42–1.04)
ALPHA2 GLOB SERPL ELPH-MCNC: 11.8 % (ref 5.9–14.9)
BETA GLOB ABNORMAL SERPL ELPH-MCNC: 0.44 G/DL (ref 0.31–0.57)
BETA1 GLOB SERPL ELPH-MCNC: 6.4 % (ref 4.7–7.7)
BETA2 GLOB SERPL ELPH-MCNC: 4.5 % (ref 3.1–7.9)
BETA2+GAMMA GLOB SERPL ELPH-MCNC: 0.31 G/DL (ref 0.2–0.58)
GAMMA GLOB ABNORMAL SERPL ELPH-MCNC: 1.12 G/DL (ref 0.4–1.66)
GAMMA GLOB SERPL ELPH-MCNC: 16.5 % (ref 6.9–22.3)
IGG/ALB SER: 1.26 {RATIO} (ref 1.1–1.8)
M PROTEIN 1 SERPL ELPH-MCNC: 0.83 G/DL
M PROTEIN 2 SERPL ELPH-MCNC: 0.09 G/DL
PROT SERPL-MCNC: 6.8 G/DL (ref 6.4–8.4)

## 2025-05-12 PROCEDURE — 86334 IMMUNOFIX E-PHORESIS SERUM: CPT | Performed by: PATHOLOGY

## 2025-05-12 PROCEDURE — 96401 CHEMO ANTI-NEOPL SQ/IM: CPT

## 2025-05-12 PROCEDURE — 84165 PROTEIN E-PHORESIS SERUM: CPT | Performed by: PATHOLOGY

## 2025-05-12 RX ORDER — ACETAMINOPHEN 325 MG/1
650 TABLET ORAL ONCE
OUTPATIENT
Start: 2025-06-18

## 2025-05-12 RX ORDER — DIPHENHYDRAMINE HCL 25 MG
25 TABLET ORAL ONCE
OUTPATIENT
Start: 2025-06-18

## 2025-05-12 RX ORDER — DEXAMETHASONE 4 MG/1
20 TABLET ORAL ONCE
OUTPATIENT
Start: 2025-06-18

## 2025-05-12 RX ORDER — DIPHENHYDRAMINE HCL 25 MG
25 TABLET ORAL ONCE
OUTPATIENT
Start: 2025-05-27

## 2025-05-12 RX ORDER — ACETAMINOPHEN 325 MG/1
650 TABLET ORAL ONCE
OUTPATIENT
Start: 2025-05-27

## 2025-05-12 RX ORDER — DEXAMETHASONE 4 MG/1
20 TABLET ORAL ONCE
OUTPATIENT
Start: 2025-05-27

## 2025-05-12 RX ADMIN — DARATUMUMAB AND HYALURONIDASE-FIHJ (HUMAN RECOMBINANT) 1800 MG: 1800; 30000 INJECTION SUBCUTANEOUS at 10:18

## 2025-05-12 NOTE — PROGRESS NOTES
Nidia Hamlin  tolerated Darzalex Faspro injections well with no complications.      Nidia Hamlin is aware of future appt on 5/27/25 at 0900.     AVS printed and given to Nidia Hamlin:    No (Declined by Nidia Hamlin)

## 2025-05-15 ENCOUNTER — TELEPHONE (OUTPATIENT)
Dept: HEMATOLOGY ONCOLOGY | Facility: CLINIC | Age: 70
End: 2025-05-15

## 2025-05-15 NOTE — TELEPHONE ENCOUNTER
Patient called requesting refill for Valtrex. Patient made aware medication was refilled on 5/5/25 for 30 with 5 refills to [facility]  pharmacy. Patient instructed to contact the pharmacy and speak with someone directly to obtain refills of medication. Patient advised to call back for refill if their pharmacy is unable to assist them. Patient verbalized understanding.

## 2025-05-23 ENCOUNTER — APPOINTMENT (OUTPATIENT)
Dept: LAB | Facility: HOSPITAL | Age: 70
End: 2025-05-23
Payer: COMMERCIAL

## 2025-05-23 DIAGNOSIS — C90.00 MULTIPLE MYELOMA NOT HAVING ACHIEVED REMISSION (HCC): ICD-10-CM

## 2025-05-23 LAB
ALBUMIN SERPL BCG-MCNC: 3.7 G/DL (ref 3.5–5)
ALP SERPL-CCNC: 74 U/L (ref 34–104)
ALT SERPL W P-5'-P-CCNC: 9 U/L (ref 7–52)
ANION GAP SERPL CALCULATED.3IONS-SCNC: 10 MMOL/L (ref 4–13)
AST SERPL W P-5'-P-CCNC: 8 U/L (ref 13–39)
BASOPHILS # BLD AUTO: 0.15 THOUSANDS/ÂΜL (ref 0–0.1)
BASOPHILS NFR BLD AUTO: 2 % (ref 0–1)
BILIRUB SERPL-MCNC: 0.65 MG/DL (ref 0.2–1)
BUN SERPL-MCNC: 16 MG/DL (ref 5–25)
CALCIUM SERPL-MCNC: 8.8 MG/DL (ref 8.4–10.2)
CHLORIDE SERPL-SCNC: 105 MMOL/L (ref 96–108)
CO2 SERPL-SCNC: 21 MMOL/L (ref 21–32)
CREAT SERPL-MCNC: 1.21 MG/DL (ref 0.6–1.3)
EOSINOPHIL # BLD AUTO: 0.41 THOUSAND/ÂΜL (ref 0–0.61)
EOSINOPHIL NFR BLD AUTO: 6 % (ref 0–6)
ERYTHROCYTE [DISTWIDTH] IN BLOOD BY AUTOMATED COUNT: 15 % (ref 11.6–15.1)
GFR SERPL CREATININE-BSD FRML MDRD: 46 ML/MIN/1.73SQ M
GLUCOSE SERPL-MCNC: 174 MG/DL (ref 65–140)
HCT VFR BLD AUTO: 36.6 % (ref 34.8–46.1)
HGB BLD-MCNC: 12.1 G/DL (ref 11.5–15.4)
IMM GRANULOCYTES # BLD AUTO: 0.02 THOUSAND/UL (ref 0–0.2)
IMM GRANULOCYTES NFR BLD AUTO: 0 % (ref 0–2)
LYMPHOCYTES # BLD AUTO: 2.67 THOUSANDS/ÂΜL (ref 0.6–4.47)
LYMPHOCYTES NFR BLD AUTO: 40 % (ref 14–44)
MCH RBC QN AUTO: 30.3 PG (ref 26.8–34.3)
MCHC RBC AUTO-ENTMCNC: 33.1 G/DL (ref 31.4–37.4)
MCV RBC AUTO: 92 FL (ref 82–98)
MONOCYTES # BLD AUTO: 0.7 THOUSAND/ÂΜL (ref 0.17–1.22)
MONOCYTES NFR BLD AUTO: 10 % (ref 4–12)
NEUTROPHILS # BLD AUTO: 2.76 THOUSANDS/ÂΜL (ref 1.85–7.62)
NEUTS SEG NFR BLD AUTO: 42 % (ref 43–75)
NRBC BLD AUTO-RTO: 0 /100 WBCS
PLATELET # BLD AUTO: 191 THOUSANDS/UL (ref 149–390)
PMV BLD AUTO: 10.7 FL (ref 8.9–12.7)
POTASSIUM SERPL-SCNC: 3.9 MMOL/L (ref 3.5–5.3)
PROT SERPL-MCNC: 6.3 G/DL (ref 6.4–8.4)
RBC # BLD AUTO: 3.99 MILLION/UL (ref 3.81–5.12)
SODIUM SERPL-SCNC: 136 MMOL/L (ref 135–147)
WBC # BLD AUTO: 6.71 THOUSAND/UL (ref 4.31–10.16)

## 2025-05-23 PROCEDURE — 80053 COMPREHEN METABOLIC PANEL: CPT

## 2025-05-23 PROCEDURE — 36415 COLL VENOUS BLD VENIPUNCTURE: CPT

## 2025-05-23 PROCEDURE — 85025 COMPLETE CBC W/AUTO DIFF WBC: CPT

## 2025-05-27 ENCOUNTER — HOSPITAL ENCOUNTER (OUTPATIENT)
Dept: INFUSION CENTER | Facility: HOSPITAL | Age: 70
Discharge: HOME | End: 2025-05-27
Attending: INTERNAL MEDICINE
Payer: COMMERCIAL

## 2025-05-27 VITALS
RESPIRATION RATE: 16 BRPM | HEART RATE: 66 BPM | SYSTOLIC BLOOD PRESSURE: 127 MMHG | DIASTOLIC BLOOD PRESSURE: 71 MMHG | OXYGEN SATURATION: 98 % | TEMPERATURE: 97.1 F

## 2025-05-27 DIAGNOSIS — C90.00 MULTIPLE MYELOMA NOT HAVING ACHIEVED REMISSION (HCC): ICD-10-CM

## 2025-05-27 DIAGNOSIS — C90.00 MULTIPLE MYELOMA NOT HAVING ACHIEVED REMISSION (HCC): Primary | ICD-10-CM

## 2025-05-27 PROCEDURE — 96401 CHEMO ANTI-NEOPL SQ/IM: CPT

## 2025-05-27 RX ORDER — DEXAMETHASONE 4 MG/1
40 TABLET ORAL
Qty: 20 TABLET | Refills: 0 | Status: SHIPPED | OUTPATIENT
Start: 2025-05-27 | End: 2025-05-29

## 2025-05-27 RX ORDER — ACETAMINOPHEN 325 MG/1
650 TABLET ORAL ONCE
Status: DISCONTINUED | OUTPATIENT
Start: 2025-05-27 | End: 2025-05-30 | Stop reason: HOSPADM

## 2025-05-27 RX ORDER — DIPHENHYDRAMINE HCL 25 MG
25 TABLET ORAL ONCE
Status: DISCONTINUED | OUTPATIENT
Start: 2025-05-27 | End: 2025-05-30 | Stop reason: HOSPADM

## 2025-05-27 RX ORDER — DEXAMETHASONE 4 MG/1
20 TABLET ORAL ONCE
Status: DISCONTINUED | OUTPATIENT
Start: 2025-05-27 | End: 2025-05-30 | Stop reason: HOSPADM

## 2025-05-27 RX ORDER — LENALIDOMIDE 10 MG/1
10 CAPSULE ORAL DAILY
Qty: 21 CAPSULE | Refills: 0 | Status: SHIPPED | OUTPATIENT
Start: 2025-05-27

## 2025-05-27 RX ADMIN — DARATUMUMAB AND HYALURONIDASE-FIHJ (HUMAN RECOMBINANT) 1800 MG: 1800; 30000 INJECTION SUBCUTANEOUS at 10:14

## 2025-05-27 NOTE — PROGRESS NOTES
Nidia MILTON Yakelin tolerated Darzalex Faspro injections to lower abdomen well with no complications.      Nidia Hamlin is aware of future appt on 6/9/25 at 0930.     AVS printed and given to Nidia Hamlin.

## 2025-06-06 ENCOUNTER — APPOINTMENT (OUTPATIENT)
Dept: LAB | Facility: HOSPITAL | Age: 70
End: 2025-06-06
Payer: COMMERCIAL

## 2025-06-06 DIAGNOSIS — C90.00 MULTIPLE MYELOMA NOT HAVING ACHIEVED REMISSION (HCC): ICD-10-CM

## 2025-06-06 LAB
ALBUMIN SERPL BCG-MCNC: 3.7 G/DL (ref 3.5–5)
ALP SERPL-CCNC: 68 U/L (ref 34–104)
ALT SERPL W P-5'-P-CCNC: 16 U/L (ref 7–52)
ANION GAP SERPL CALCULATED.3IONS-SCNC: 9 MMOL/L (ref 4–13)
AST SERPL W P-5'-P-CCNC: 8 U/L (ref 13–39)
BASOPHILS # BLD AUTO: 0.14 THOUSANDS/ÂΜL (ref 0–0.1)
BASOPHILS NFR BLD AUTO: 2 % (ref 0–1)
BILIRUB SERPL-MCNC: 0.58 MG/DL (ref 0.2–1)
BUN SERPL-MCNC: 24 MG/DL (ref 5–25)
CALCIUM SERPL-MCNC: 8.7 MG/DL (ref 8.4–10.2)
CHLORIDE SERPL-SCNC: 107 MMOL/L (ref 96–108)
CO2 SERPL-SCNC: 22 MMOL/L (ref 21–32)
CREAT SERPL-MCNC: 1.17 MG/DL (ref 0.6–1.3)
EOSINOPHIL # BLD AUTO: 0.35 THOUSAND/ÂΜL (ref 0–0.61)
EOSINOPHIL NFR BLD AUTO: 5 % (ref 0–6)
ERYTHROCYTE [DISTWIDTH] IN BLOOD BY AUTOMATED COUNT: 15.2 % (ref 11.6–15.1)
GFR SERPL CREATININE-BSD FRML MDRD: 47 ML/MIN/1.73SQ M
GLUCOSE P FAST SERPL-MCNC: 146 MG/DL (ref 65–99)
HCT VFR BLD AUTO: 37.6 % (ref 34.8–46.1)
HGB BLD-MCNC: 12.2 G/DL (ref 11.5–15.4)
IMM GRANULOCYTES # BLD AUTO: 0.02 THOUSAND/UL (ref 0–0.2)
IMM GRANULOCYTES NFR BLD AUTO: 0 % (ref 0–2)
LYMPHOCYTES # BLD AUTO: 3.59 THOUSANDS/ÂΜL (ref 0.6–4.47)
LYMPHOCYTES NFR BLD AUTO: 47 % (ref 14–44)
MCH RBC QN AUTO: 29.7 PG (ref 26.8–34.3)
MCHC RBC AUTO-ENTMCNC: 32.4 G/DL (ref 31.4–37.4)
MCV RBC AUTO: 92 FL (ref 82–98)
MONOCYTES # BLD AUTO: 0.84 THOUSAND/ÂΜL (ref 0.17–1.22)
MONOCYTES NFR BLD AUTO: 11 % (ref 4–12)
NEUTROPHILS # BLD AUTO: 2.72 THOUSANDS/ÂΜL (ref 1.85–7.62)
NEUTS SEG NFR BLD AUTO: 35 % (ref 43–75)
NRBC BLD AUTO-RTO: 0 /100 WBCS
PLATELET # BLD AUTO: 262 THOUSANDS/UL (ref 149–390)
PMV BLD AUTO: 10.3 FL (ref 8.9–12.7)
POTASSIUM SERPL-SCNC: 4.3 MMOL/L (ref 3.5–5.3)
PROT SERPL-MCNC: 6.4 G/DL (ref 6.4–8.4)
RBC # BLD AUTO: 4.11 MILLION/UL (ref 3.81–5.12)
SODIUM SERPL-SCNC: 138 MMOL/L (ref 135–147)
WBC # BLD AUTO: 7.66 THOUSAND/UL (ref 4.31–10.16)

## 2025-06-06 PROCEDURE — 85025 COMPLETE CBC W/AUTO DIFF WBC: CPT

## 2025-06-06 PROCEDURE — 36415 COLL VENOUS BLD VENIPUNCTURE: CPT

## 2025-06-06 PROCEDURE — 80053 COMPREHEN METABOLIC PANEL: CPT

## 2025-06-09 ENCOUNTER — HOSPITAL ENCOUNTER (OUTPATIENT)
Dept: INFUSION CENTER | Facility: HOSPITAL | Age: 70
Discharge: HOME | End: 2025-06-09
Attending: INTERNAL MEDICINE
Payer: COMMERCIAL

## 2025-06-09 VITALS
DIASTOLIC BLOOD PRESSURE: 68 MMHG | HEART RATE: 68 BPM | OXYGEN SATURATION: 96 % | SYSTOLIC BLOOD PRESSURE: 108 MMHG | RESPIRATION RATE: 16 BRPM | TEMPERATURE: 96.9 F

## 2025-06-09 DIAGNOSIS — C90.00 MULTIPLE MYELOMA NOT HAVING ACHIEVED REMISSION (HCC): Primary | ICD-10-CM

## 2025-06-09 PROCEDURE — 96401 CHEMO ANTI-NEOPL SQ/IM: CPT

## 2025-06-09 RX ORDER — DIPHENHYDRAMINE HCL 25 MG
25 TABLET ORAL ONCE
Status: DISCONTINUED | OUTPATIENT
Start: 2025-06-09 | End: 2025-06-12 | Stop reason: HOSPADM

## 2025-06-09 RX ORDER — DEXAMETHASONE 4 MG/1
20 TABLET ORAL ONCE
Status: DISCONTINUED | OUTPATIENT
Start: 2025-06-09 | End: 2025-06-12 | Stop reason: HOSPADM

## 2025-06-09 RX ORDER — ACETAMINOPHEN 325 MG/1
650 TABLET ORAL ONCE
Status: DISCONTINUED | OUTPATIENT
Start: 2025-06-09 | End: 2025-06-12 | Stop reason: HOSPADM

## 2025-06-09 RX ADMIN — DARATUMUMAB AND HYALURONIDASE-FIHJ (HUMAN RECOMBINANT) 1800 MG: 1800; 30000 INJECTION SUBCUTANEOUS at 10:25

## 2025-06-09 NOTE — PLAN OF CARE
Problem: INFECTION - ADULT  Goal: Absence or prevention of progression during hospitalization  Description: INTERVENTIONS:  - Assess and monitor for signs and symptoms of infection  - Monitor lab/diagnostic results  - Monitor all insertion sites, i.e. indwelling lines, tubes, and drains  - Monitor endotracheal if appropriate and nasal secretions for changes in amount and color  - [place] appropriate cooling/warming therapies per order  - Administer medications as ordered  - Instruct and encourage patient and family to use good hand hygiene technique  - Identify and instruct in appropriate isolation precautions for identified infection/condition  Outcome: Progressing     Problem: Knowledge Deficit  Goal: Patient/family/caregiver demonstrates understanding of disease process, treatment plan, medications, and discharge instructions  Description: Complete learning assessment and assess knowledge base.  Interventions:  - Provide teaching at level of understanding  - Provide teaching via preferred learning methods  Outcome: Progressing

## 2025-06-09 NOTE — PROGRESS NOTES
Recent labs reviewed. Pt tolerated Darzalex Faspro injections x1 R abdomen & x1 L abdomen well without any complications.      Nidia Hamlin is aware of future appt on 6/23/25 at 9AM.      AVS printed and given to Nidia Hamlin.     Pt discharged off unit in stable condition with all personal belongings.

## 2025-06-12 LAB — BLOOD GROUP ANTIBODIES SERPL: NORMAL

## 2025-06-16 RX ORDER — DEXAMETHASONE 4 MG/1
20 TABLET ORAL ONCE
OUTPATIENT
Start: 2025-07-07

## 2025-06-16 RX ORDER — ACETAMINOPHEN 325 MG/1
650 TABLET ORAL ONCE
Status: CANCELLED | OUTPATIENT
Start: 2025-06-23

## 2025-06-16 RX ORDER — DIPHENHYDRAMINE HCL 25 MG
25 TABLET ORAL ONCE
Status: CANCELLED | OUTPATIENT
Start: 2025-06-23

## 2025-06-16 RX ORDER — DEXAMETHASONE 4 MG/1
20 TABLET ORAL ONCE
Status: CANCELLED | OUTPATIENT
Start: 2025-06-23

## 2025-06-16 RX ORDER — ACETAMINOPHEN 325 MG/1
650 TABLET ORAL ONCE
OUTPATIENT
Start: 2025-07-07

## 2025-06-16 RX ORDER — DIPHENHYDRAMINE HCL 25 MG
25 TABLET ORAL ONCE
OUTPATIENT
Start: 2025-07-07

## 2025-06-20 ENCOUNTER — APPOINTMENT (OUTPATIENT)
Dept: LAB | Facility: HOSPITAL | Age: 70
End: 2025-06-20
Payer: COMMERCIAL

## 2025-06-20 DIAGNOSIS — C90.00 MULTIPLE MYELOMA NOT HAVING ACHIEVED REMISSION (HCC): ICD-10-CM

## 2025-06-20 LAB
ALBUMIN SERPL BCG-MCNC: 3.8 G/DL (ref 3.5–5)
ALP SERPL-CCNC: 80 U/L (ref 34–104)
ALT SERPL W P-5'-P-CCNC: 11 U/L (ref 7–52)
ANION GAP SERPL CALCULATED.3IONS-SCNC: 10 MMOL/L (ref 4–13)
AST SERPL W P-5'-P-CCNC: 7 U/L (ref 13–39)
BASOPHILS # BLD AUTO: 0.12 THOUSANDS/ÂΜL (ref 0–0.1)
BASOPHILS NFR BLD AUTO: 1 % (ref 0–1)
BILIRUB SERPL-MCNC: 0.71 MG/DL (ref 0.2–1)
BUN SERPL-MCNC: 25 MG/DL (ref 5–25)
CALCIUM SERPL-MCNC: 9.3 MG/DL (ref 8.4–10.2)
CHLORIDE SERPL-SCNC: 102 MMOL/L (ref 96–108)
CO2 SERPL-SCNC: 24 MMOL/L (ref 21–32)
CREAT SERPL-MCNC: 1.12 MG/DL (ref 0.6–1.3)
EOSINOPHIL # BLD AUTO: 0.35 THOUSAND/ÂΜL (ref 0–0.61)
EOSINOPHIL NFR BLD AUTO: 4 % (ref 0–6)
ERYTHROCYTE [DISTWIDTH] IN BLOOD BY AUTOMATED COUNT: 15 % (ref 11.6–15.1)
GFR SERPL CREATININE-BSD FRML MDRD: 50 ML/MIN/1.73SQ M
GLUCOSE SERPL-MCNC: 258 MG/DL (ref 65–140)
HCT VFR BLD AUTO: 39.1 % (ref 34.8–46.1)
HGB BLD-MCNC: 12.9 G/DL (ref 11.5–15.4)
IMM GRANULOCYTES # BLD AUTO: 0.03 THOUSAND/UL (ref 0–0.2)
IMM GRANULOCYTES NFR BLD AUTO: 0 % (ref 0–2)
LYMPHOCYTES # BLD AUTO: 2.66 THOUSANDS/ÂΜL (ref 0.6–4.47)
LYMPHOCYTES NFR BLD AUTO: 31 % (ref 14–44)
MCH RBC QN AUTO: 29.5 PG (ref 26.8–34.3)
MCHC RBC AUTO-ENTMCNC: 33 G/DL (ref 31.4–37.4)
MCV RBC AUTO: 90 FL (ref 82–98)
MONOCYTES # BLD AUTO: 0.76 THOUSAND/ÂΜL (ref 0.17–1.22)
MONOCYTES NFR BLD AUTO: 9 % (ref 4–12)
NEUTROPHILS # BLD AUTO: 4.65 THOUSANDS/ÂΜL (ref 1.85–7.62)
NEUTS SEG NFR BLD AUTO: 55 % (ref 43–75)
NRBC BLD AUTO-RTO: 0 /100 WBCS
PLATELET # BLD AUTO: 247 THOUSANDS/UL (ref 149–390)
PMV BLD AUTO: 11.3 FL (ref 8.9–12.7)
POTASSIUM SERPL-SCNC: 4 MMOL/L (ref 3.5–5.3)
PROT SERPL-MCNC: 6.6 G/DL (ref 6.4–8.4)
RBC # BLD AUTO: 4.37 MILLION/UL (ref 3.81–5.12)
SODIUM SERPL-SCNC: 136 MMOL/L (ref 135–147)
WBC # BLD AUTO: 8.57 THOUSAND/UL (ref 4.31–10.16)

## 2025-06-20 PROCEDURE — 36415 COLL VENOUS BLD VENIPUNCTURE: CPT

## 2025-06-20 PROCEDURE — 85025 COMPLETE CBC W/AUTO DIFF WBC: CPT

## 2025-06-20 PROCEDURE — 80053 COMPREHEN METABOLIC PANEL: CPT

## 2025-06-20 RX ORDER — LENALIDOMIDE 10 MG/1
10 CAPSULE ORAL DAILY
Qty: 21 CAPSULE | Refills: 0 | Status: SHIPPED | OUTPATIENT
Start: 2025-06-20

## 2025-06-23 ENCOUNTER — HOSPITAL ENCOUNTER (OUTPATIENT)
Dept: INFUSION CENTER | Facility: HOSPITAL | Age: 70
Discharge: HOME | End: 2025-06-23
Attending: INTERNAL MEDICINE
Payer: COMMERCIAL

## 2025-06-23 VITALS
SYSTOLIC BLOOD PRESSURE: 108 MMHG | DIASTOLIC BLOOD PRESSURE: 54 MMHG | TEMPERATURE: 97.4 F | RESPIRATION RATE: 16 BRPM | OXYGEN SATURATION: 95 % | HEART RATE: 66 BPM

## 2025-06-23 DIAGNOSIS — C90.00 MULTIPLE MYELOMA NOT HAVING ACHIEVED REMISSION (HCC): Primary | ICD-10-CM

## 2025-06-23 PROCEDURE — 96401 CHEMO ANTI-NEOPL SQ/IM: CPT

## 2025-06-23 RX ADMIN — DARATUMUMAB AND HYALURONIDASE-FIHJ (HUMAN RECOMBINANT) 1800 MG: 1800; 30000 INJECTION SUBCUTANEOUS at 10:06

## 2025-06-23 NOTE — PLAN OF CARE
Problem: INFECTION - ADULT  Goal: Absence or prevention of progression during hospitalization  Description: INTERVENTIONS:  - Assess and monitor for signs and symptoms of infection  - Monitor lab/diagnostic results  - Monitor all insertion sites, i.e. indwelling lines, tubes, and drains  - Monitor endotracheal if appropriate and nasal secretions for changes in amount and color  - [place] appropriate cooling/warming therapies per order  - Administer medications as ordered  - Instruct and encourage patient and family to use good hand hygiene technique  - Identify and instruct in appropriate isolation precautions for identified infection/condition  Outcome: Progressing     Problem: Knowledge Deficit  Goal: Patient/family/caregiver demonstrates understanding of disease process, treatment plan, medications, and discharge instructions  Description: Complete learning assessment and assess knowledge base.  Interventions:  - Provide teaching at level of understanding  - Provide teaching via preferred learning methods  Outcome: Progressing

## 2025-06-23 NOTE — PROGRESS NOTES
Recent labs reviewed. Pt tolerated Darzalex Faspro injections x1 R abdomen & x1 L abdomen well without any complications.      Nidia Hamlin is aware of future appt on 7/7/25 at 9AM.      AVS declined by Nidia Hamlin.     Pt discharged off unit in stable condition with all personal belongings.

## 2025-07-03 ENCOUNTER — OFFICE VISIT (OUTPATIENT)
Dept: URGENT CARE | Facility: MEDICAL CENTER | Age: 70
End: 2025-07-03
Payer: COMMERCIAL

## 2025-07-03 ENCOUNTER — APPOINTMENT (OUTPATIENT)
Dept: LAB | Facility: MEDICAL CENTER | Age: 70
End: 2025-07-03
Attending: INTERNAL MEDICINE
Payer: COMMERCIAL

## 2025-07-03 VITALS
RESPIRATION RATE: 20 BRPM | DIASTOLIC BLOOD PRESSURE: 68 MMHG | TEMPERATURE: 98 F | SYSTOLIC BLOOD PRESSURE: 138 MMHG | OXYGEN SATURATION: 100 % | HEART RATE: 64 BPM

## 2025-07-03 DIAGNOSIS — C90.00 MULTIPLE MYELOMA NOT HAVING ACHIEVED REMISSION (HCC): ICD-10-CM

## 2025-07-03 DIAGNOSIS — L08.9 LOCAL INFECTION OF THE SKIN AND SUBCUTANEOUS TISSUE, UNSPECIFIED: Primary | ICD-10-CM

## 2025-07-03 LAB
ALBUMIN SERPL BCG-MCNC: 3.6 G/DL (ref 3.5–5)
ALP SERPL-CCNC: 70 U/L (ref 34–104)
ALT SERPL W P-5'-P-CCNC: 24 U/L (ref 7–52)
ANION GAP SERPL CALCULATED.3IONS-SCNC: 11 MMOL/L (ref 4–13)
AST SERPL W P-5'-P-CCNC: 12 U/L (ref 13–39)
BASOPHILS # BLD AUTO: 0.11 THOUSANDS/ÂΜL (ref 0–0.1)
BASOPHILS NFR BLD AUTO: 2 % (ref 0–1)
BILIRUB SERPL-MCNC: 0.37 MG/DL (ref 0.2–1)
BUN SERPL-MCNC: 29 MG/DL (ref 5–25)
CALCIUM SERPL-MCNC: 9.1 MG/DL (ref 8.4–10.2)
CHLORIDE SERPL-SCNC: 103 MMOL/L (ref 96–108)
CO2 SERPL-SCNC: 22 MMOL/L (ref 21–32)
CREAT SERPL-MCNC: 1.29 MG/DL (ref 0.6–1.3)
EOSINOPHIL # BLD AUTO: 0.21 THOUSAND/ÂΜL (ref 0–0.61)
EOSINOPHIL NFR BLD AUTO: 3 % (ref 0–6)
ERYTHROCYTE [DISTWIDTH] IN BLOOD BY AUTOMATED COUNT: 15.1 % (ref 11.6–15.1)
GFR SERPL CREATININE-BSD FRML MDRD: 42 ML/MIN/1.73SQ M
GLUCOSE P FAST SERPL-MCNC: 192 MG/DL (ref 65–99)
HCT VFR BLD AUTO: 36 % (ref 34.8–46.1)
HGB BLD-MCNC: 11.9 G/DL (ref 11.5–15.4)
IMM GRANULOCYTES # BLD AUTO: 0.02 THOUSAND/UL (ref 0–0.2)
IMM GRANULOCYTES NFR BLD AUTO: 0 % (ref 0–2)
LYMPHOCYTES # BLD AUTO: 2.78 THOUSANDS/ÂΜL (ref 0.6–4.47)
LYMPHOCYTES NFR BLD AUTO: 40 % (ref 14–44)
MCH RBC QN AUTO: 29.6 PG (ref 26.8–34.3)
MCHC RBC AUTO-ENTMCNC: 33.1 G/DL (ref 31.4–37.4)
MCV RBC AUTO: 90 FL (ref 82–98)
MONOCYTES # BLD AUTO: 0.96 THOUSAND/ÂΜL (ref 0.17–1.22)
MONOCYTES NFR BLD AUTO: 14 % (ref 4–12)
NEUTROPHILS # BLD AUTO: 2.89 THOUSANDS/ÂΜL (ref 1.85–7.62)
NEUTS SEG NFR BLD AUTO: 41 % (ref 43–75)
NRBC BLD AUTO-RTO: 0 /100 WBCS
PLATELET # BLD AUTO: 256 THOUSANDS/UL (ref 149–390)
PMV BLD AUTO: 10.7 FL (ref 8.9–12.7)
POTASSIUM SERPL-SCNC: 4.5 MMOL/L (ref 3.5–5.3)
PROT SERPL-MCNC: 6.4 G/DL (ref 6.4–8.4)
RBC # BLD AUTO: 4.02 MILLION/UL (ref 3.81–5.12)
SODIUM SERPL-SCNC: 136 MMOL/L (ref 135–147)
WBC # BLD AUTO: 6.97 THOUSAND/UL (ref 4.31–10.16)

## 2025-07-03 PROCEDURE — 36415 COLL VENOUS BLD VENIPUNCTURE: CPT

## 2025-07-03 PROCEDURE — 99213 OFFICE O/P EST LOW 20 MIN: CPT

## 2025-07-03 PROCEDURE — 80053 COMPREHEN METABOLIC PANEL: CPT

## 2025-07-03 PROCEDURE — 85025 COMPLETE CBC W/AUTO DIFF WBC: CPT

## 2025-07-03 RX ORDER — CEPHALEXIN 500 MG/1
500 CAPSULE ORAL EVERY 6 HOURS SCHEDULED
Qty: 28 CAPSULE | Refills: 0 | Status: SHIPPED | OUTPATIENT
Start: 2025-07-03 | End: 2025-07-11

## 2025-07-03 NOTE — PROGRESS NOTES
[facility]  Name: Nidia Hamlin      : 1955      MRN: [id number]  Encounter Provider: NATHAN Sutton  Encounter Date: 7/3/2025   Encounter department: [facility] [place]  :  Assessment & Plan  Local infection of the skin and subcutaneous tissue, unspecified  Patient presents with signs and physical exam findings consistent with cellulitis involving the right great toe and dorsal aspect of the right foot.  The area of induration appears consistent with insect bites.  With surrounding erythema, making infectious cellulitis secondary to bug bites the most likely etiology.  Differential considerations include gout and rheumatoid arthritis, though less likely based on current presentation.Initiated appropriate antibiotic treatment for cellulitis at this time.  Patient educated on the importance of close follow-up with her primary care provider as soon as possible.  Advised that if symptoms worsen including development of fever, spreading erythema or increased pain/ swelling she should present to the emergency department.  Orders:    cephalexin (KEFLEX) 500 mg capsule; Take 1 capsule (500 mg total) by mouth every 6 (six) hours for 7 days        Patient Instructions  Prescribed course of Keflex, take as directed.  The area of infection was marked with a marker in the clinic today. If the area of redness extends beyond marked line proceed to the ER.   If you develop fevers proceed to the ER   Tylenol as needed for pain.     Follow up with PCP in 3-5 days.  Proceed to  ER if symptoms worsen.  Follow up with PCP in 3-5 days.  Proceed to  ER if symptoms worsen.    If tests are performed, our office will contact you with results only if changes need to made to the care plan discussed with you at the visit. You can review your full results on [facility].    Chief Complaint:   Chief Complaint   Patient presents with    Foot Pain     Patient states that on Tuesday the bottom  of her right foot started hurting. Yesterday the pain started getting worse and the joint at her great toe swelled and started getting red. Today the pain is 8/10, throbbing pain, more red than yesterday. Client did take Tylenol today for the pain around 0755.      History of Present Illness   Patient presents with redness and swelling to base of right great toe and the top of her foot.  Patient reports that redness and swelling appeared on Tuesday morning.  Patient reports she is unsure if she got bitten by a bug.  Patient denies history of gout and rheumatoid arthritis.  Patient denies fevers nausea vomiting chest pain shortness of breath and trouble breathing.  Patient reports that redness has increased in the past 2 days.          Review of Systems   Skin:         Redness and swelling to right great toe.      Past Medical History   Past Medical History[1]  Past Surgical History[2]  Family History[3]  she reports that she has quit smoking. Her smoking use included cigarettes. She has a 12.5 pack-year smoking history. She has never used smokeless tobacco. She reports that she does not drink alcohol and does not use drugs.  Current Outpatient Medications   Medication Instructions    acetaminophen (TYLENOL) 500 mg, Oral, Every 6 hours PRN    acyclovir (ZOVIRAX) 400 mg, Oral, 2 times daily    albuterol (ProAir HFA) 90 mcg/act inhaler 2 puffs, Inhalation, Every 4 hours PRN    aspirin 81 mg, Daily    cephalexin (KEFLEX) 500 mg, Oral, Every 6 hours scheduled    Cholecalciferol (VITAMIN D3) 2,000 Units, Oral, Daily    clindamycin (CLEOCIN T) 1 % lotion 2 times daily    dexamethasone (DECADRON) 4 mg tablet Take 5 tabs by mouth with breakfast once a week.    ergocalciferol (VITAMIN D2) 50,000 Units, Oral, Weekly    gabapentin (NEURONTIN) 100 mg, Oral, 3 times daily    lenalidomide (REVLIMID) 10 mg, Oral, Daily, 21 days on, then 7 days off    valACYclovir (VALTREX) 500 mg, Oral, Daily   Allergies[4]     Objective   BP  138/68   Pulse 64   Temp 98 °F (36.7 °C)   Resp 20   SpO2 100%      Physical Exam  Constitutional:       General: She is not in acute distress.     Appearance: Normal appearance. She is normal weight. She is not ill-appearing, toxic-appearing or diaphoretic.   HENT:      Head: Normocephalic and atraumatic.      Right Ear: Tympanic membrane, ear canal and external ear normal. There is no impacted cerumen.      Left Ear: Ear canal and external ear normal. There is no impacted cerumen.      Nose: Nose normal. No congestion or rhinorrhea.      Mouth/Throat:      Mouth: Mucous membranes are moist.      Pharynx: No oropharyngeal exudate or posterior oropharyngeal erythema.     Eyes:      General: No scleral icterus.        Right eye: No discharge.         Left eye: No discharge.      Extraocular Movements: Extraocular movements intact.      Conjunctiva/sclera: Conjunctivae normal.      Pupils: Pupils are equal, round, and reactive to light.     Neck:      Vascular: No carotid bruit.     Cardiovascular:      Rate and Rhythm: Normal rate and regular rhythm.      Pulses: Normal pulses.      Heart sounds: Normal heart sounds.   Pulmonary:      Effort: Pulmonary effort is normal.      Breath sounds: Normal breath sounds.   Abdominal:      Palpations: Abdomen is soft.     Musculoskeletal:         General: Swelling (Swellin gnoted to patients right great toe. Patient reports tenderness with palpation) present. Normal range of motion.      Cervical back: Normal range of motion and neck supple. No rigidity or tenderness.   Lymphadenopathy:      Cervical: No cervical adenopathy.     Skin:     Capillary Refill: Capillary refill takes less than 2 seconds.           Comments: Erythema and edema noted to the base of patients right great toe. Redness extends slightly on the top of the foot.  Also 2 small areas of induration appearing to be a bug bite present. No open areas or drainage noted. Patient has full ROM of toe and sensation  "is intact. Cap refill is less than 2 .      Neurological:      General: No focal deficit present.      Mental Status: She is alert and oriented to person, place, and time. Mental status is at baseline.     Psychiatric:         Mood and Affect: Mood normal.         Behavior: Behavior normal.         Thought Content: Thought content normal.         Judgment: Judgment normal.         Portions of the record may have been created with voice recognition software.  Occasional wrong word or \"sound a like\" substitutions may have occurred due to the inherent limitations of voice recognition software.  Read the chart carefully and recognize, using context, where substitutions have occurred.       [1]   Past Medical History:  Diagnosis Date    Acidosis 12/23/2024    Adrenal insufficiency (HCC)     JOSE ELIAS (acute kidney injury) (HCC) 12/19/2024    Anxiety     Bacterial sepsis (HCC)     last assessed: 06/13/16    Cancer (HCC)     Per pt blood cancer    Chronic kidney disease     Cushing's syndrome (HCC)     last assessed: 05/29/16    Depression     last assessed: 01/02/18    Diabetes mellitus (HCC)     no meds or insulin    Elevated lipase 12/20/2024    Elevated total protein 05/19/2016    History of transfusion 12/22/2024    Hydradenitis     Hyperlipidemia     Positive FIT (fecal immunochemical test) 06/22/2018    Added automatically from request for surgery [id number]    Rib contusion, left, sequela 06/14/2022    Trigger finger, right middle finger 03/07/2022   [2]   Past Surgical History:  Procedure Laterality Date    ADRENALECTOMY  12/2002    CHOLECYSTECTOMY      HERNIA REPAIR      umbilical    OTHER SURGICAL HISTORY      Injection of trigger joint    IA COLONOSCOPY FLX DX W/COLLJ SPEC WHEN PFRMD N/A 7/10/2018    Procedure: COLONOSCOPY;  Surgeon: Pako Nieves MD;  Location: [facility];  Service: Gastroenterology    IA NEUROPLASTY &/TRANSPOS MEDIAN NRV CARPAL TUNNE Right 2/17/2025    Procedure: RELEASE CARPAL TUNNEL - RIGHT;  " Surgeon: Kenji Whalen MD;  Location: [facility];  Service: Orthopedics    TONSILLECTOMY AND ADENOIDECTOMY      TUBAL LIGATION     [3]   Family History  Problem Relation Name Age of Onset    Breast cancer Mother          unknown age    No Known Problems Sister syd     No Known Problems Daughter      No Known Problems Daughter      No Known Problems Maternal Grandmother      No Known Problems Maternal Grandfather      No Known Problems Paternal Grandmother      No Known Problems Paternal Grandfather      No Known Problems Paternal Aunt      No Known Problems Paternal Aunt      No Known Problems Paternal Aunt      No Known Problems Half-Sister emilee     No Known Problems Half-Sister alek     No Known Problems Half-Sister may     No Known Problems Half-Sister carley     No Known Problems Half-Sister roc    [4]   Allergies  Allergen Reactions    Fosamax [Alendronate] Diarrhea    Paxil [Paroxetine Hcl] Rash

## 2025-07-03 NOTE — PATIENT INSTRUCTIONS
Patient Instructions  Prescribed course of Keflex, take as directed.  The area of infection was marked with a marker in the clinic today. If the area of redness extends beyond marked line proceed to the ER.   If you develop fevers proceed to the ER   Tylenol as needed for pain.     Follow up with PCP in 3-5 days.  Proceed to  ER if symptoms worsen.

## 2025-07-06 ENCOUNTER — APPOINTMENT (EMERGENCY)
Dept: RADIOLOGY | Facility: HOSPITAL | Age: 70
End: 2025-07-06
Payer: COMMERCIAL

## 2025-07-06 ENCOUNTER — HOSPITAL ENCOUNTER (EMERGENCY)
Facility: HOSPITAL | Age: 70
Discharge: HOME | End: 2025-07-06
Attending: EMERGENCY MEDICINE | Admitting: EMERGENCY MEDICINE
Payer: COMMERCIAL

## 2025-07-06 VITALS
TEMPERATURE: 97.6 F | RESPIRATION RATE: 18 BRPM | SYSTOLIC BLOOD PRESSURE: 153 MMHG | OXYGEN SATURATION: 97 % | DIASTOLIC BLOOD PRESSURE: 68 MMHG | HEART RATE: 68 BPM

## 2025-07-06 DIAGNOSIS — M10.9: Primary | ICD-10-CM

## 2025-07-06 LAB
ALBUMIN SERPL BCG-MCNC: 3.9 G/DL (ref 3.5–5)
ALP SERPL-CCNC: 72 U/L (ref 34–104)
ALT SERPL W P-5'-P-CCNC: 16 U/L (ref 7–52)
ANION GAP SERPL CALCULATED.3IONS-SCNC: 10 MMOL/L (ref 4–13)
AST SERPL W P-5'-P-CCNC: 10 U/L (ref 13–39)
BASOPHILS # BLD AUTO: 0.13 THOUSANDS/ÂΜL (ref 0–0.1)
BASOPHILS NFR BLD AUTO: 2 % (ref 0–1)
BILIRUB DIRECT SERPL-MCNC: 0.09 MG/DL (ref 0–0.2)
BILIRUB SERPL-MCNC: 0.39 MG/DL (ref 0.2–1)
BUN SERPL-MCNC: 24 MG/DL (ref 5–25)
CALCIUM SERPL-MCNC: 9.8 MG/DL (ref 8.4–10.2)
CHLORIDE SERPL-SCNC: 103 MMOL/L (ref 96–108)
CO2 SERPL-SCNC: 22 MMOL/L (ref 21–32)
CREAT SERPL-MCNC: 1.2 MG/DL (ref 0.6–1.3)
EOSINOPHIL # BLD AUTO: 0.17 THOUSAND/ÂΜL (ref 0–0.61)
EOSINOPHIL NFR BLD AUTO: 2 % (ref 0–6)
ERYTHROCYTE [DISTWIDTH] IN BLOOD BY AUTOMATED COUNT: 15 % (ref 11.6–15.1)
GFR SERPL CREATININE-BSD FRML MDRD: 46 ML/MIN/1.73SQ M
GLUCOSE SERPL-MCNC: 136 MG/DL (ref 65–140)
HCT VFR BLD AUTO: 38.3 % (ref 34.8–46.1)
HGB BLD-MCNC: 12.4 G/DL (ref 11.5–15.4)
IMM GRANULOCYTES # BLD AUTO: 0.02 THOUSAND/UL (ref 0–0.2)
IMM GRANULOCYTES NFR BLD AUTO: 0 % (ref 0–2)
LACTATE SERPL-SCNC: 0.8 MMOL/L (ref 0.5–2)
LYMPHOCYTES # BLD AUTO: 3.73 THOUSANDS/ÂΜL (ref 0.6–4.47)
LYMPHOCYTES NFR BLD AUTO: 42 % (ref 14–44)
MCH RBC QN AUTO: 29 PG (ref 26.8–34.3)
MCHC RBC AUTO-ENTMCNC: 32.4 G/DL (ref 31.4–37.4)
MCV RBC AUTO: 90 FL (ref 82–98)
MONOCYTES # BLD AUTO: 1.04 THOUSAND/ÂΜL (ref 0.17–1.22)
MONOCYTES NFR BLD AUTO: 12 % (ref 4–12)
NEUTROPHILS # BLD AUTO: 3.86 THOUSANDS/ÂΜL (ref 1.85–7.62)
NEUTS SEG NFR BLD AUTO: 42 % (ref 43–75)
NRBC BLD AUTO-RTO: 0 /100 WBCS
PLATELET # BLD AUTO: 410 THOUSANDS/UL (ref 149–390)
PMV BLD AUTO: 9.6 FL (ref 8.9–12.7)
POTASSIUM SERPL-SCNC: 4.4 MMOL/L (ref 3.5–5.3)
PROCALCITONIN SERPL-MCNC: 0.22 NG/ML
PROT SERPL-MCNC: 7.4 G/DL (ref 6.4–8.4)
RBC # BLD AUTO: 4.27 MILLION/UL (ref 3.81–5.12)
SODIUM SERPL-SCNC: 135 MMOL/L (ref 135–147)
WBC # BLD AUTO: 8.95 THOUSAND/UL (ref 4.31–10.16)

## 2025-07-06 PROCEDURE — 73630 X-RAY EXAM OF FOOT: CPT

## 2025-07-06 PROCEDURE — 85025 COMPLETE CBC W/AUTO DIFF WBC: CPT | Performed by: EMERGENCY MEDICINE

## 2025-07-06 PROCEDURE — 36415 COLL VENOUS BLD VENIPUNCTURE: CPT | Performed by: EMERGENCY MEDICINE

## 2025-07-06 PROCEDURE — 99284 EMERGENCY DEPT VISIT MOD MDM: CPT

## 2025-07-06 PROCEDURE — 83605 ASSAY OF LACTIC ACID: CPT | Performed by: EMERGENCY MEDICINE

## 2025-07-06 PROCEDURE — 80076 HEPATIC FUNCTION PANEL: CPT | Performed by: EMERGENCY MEDICINE

## 2025-07-06 PROCEDURE — 80048 BASIC METABOLIC PNL TOTAL CA: CPT | Performed by: EMERGENCY MEDICINE

## 2025-07-06 PROCEDURE — 84145 PROCALCITONIN (PCT): CPT | Performed by: EMERGENCY MEDICINE

## 2025-07-06 PROCEDURE — 99285 EMERGENCY DEPT VISIT HI MDM: CPT | Performed by: EMERGENCY MEDICINE

## 2025-07-06 RX ORDER — COLCHICINE 0.6 MG/1
1.2 TABLET ORAL ONCE
Status: COMPLETED | OUTPATIENT
Start: 2025-07-06 | End: 2025-07-06

## 2025-07-06 RX ORDER — COLCHICINE 0.6 MG/1
0.6 TABLET ORAL ONCE
Status: COMPLETED | OUTPATIENT
Start: 2025-07-06 | End: 2025-07-06

## 2025-07-06 RX ORDER — PREDNISONE 20 MG/1
40 TABLET ORAL DAILY
Qty: 8 TABLET | Refills: 0 | Status: SHIPPED | OUTPATIENT
Start: 2025-07-07 | End: 2025-07-11

## 2025-07-06 RX ORDER — OXYCODONE HYDROCHLORIDE 5 MG/1
5 TABLET ORAL EVERY 6 HOURS PRN
Qty: 10 TABLET | Refills: 0 | Status: SHIPPED | OUTPATIENT
Start: 2025-07-06 | End: 2025-07-16

## 2025-07-06 RX ORDER — OXYCODONE AND ACETAMINOPHEN 5; 325 MG/1; MG/1
1 TABLET ORAL ONCE
Refills: 0 | Status: COMPLETED | OUTPATIENT
Start: 2025-07-06 | End: 2025-07-06

## 2025-07-06 RX ADMIN — COLCHICINE 0.6 MG: 0.6 TABLET ORAL at 17:23

## 2025-07-06 RX ADMIN — PREDNISONE 50 MG: 20 TABLET ORAL at 18:14

## 2025-07-06 RX ADMIN — COLCHICINE 1.2 MG: 0.6 TABLET ORAL at 16:31

## 2025-07-06 RX ADMIN — OXYCODONE HYDROCHLORIDE AND ACETAMINOPHEN 1 TABLET: 5; 325 TABLET ORAL at 16:25

## 2025-07-06 NOTE — ED PROVIDER NOTES
Time reflects when diagnosis was documented in both MDM as applicable and the Disposition within this note       Time User Action Codes Description Comment    7/6/2025  6:15 PM Whitney Meier Add [M10.9] Acute gout involving toe of right foot           ED Disposition       ED Disposition   Discharge    Condition   Stable    Date/Time   Sun Jul 6, 2025  6:15 PM    Comment   Nidia MILTON Yakelin discharge to home/self care.                   Assessment & Plan       Medical Decision Making  69-year-old female with history of multiple myeloma on chemo, presents to the ED for pain, swelling and redness over the right first MTP joint.  Patient started on Keflex 3 days ago for presumed cellulitis.  I suspect patient likely has gout flareup.  Will check basic labs given that she is immunocompromised and on chemo.  Will obtain x-ray of the right foot and will treat symptomatically with colchicine and Percocet.  If workup is unremarkable, will likely discharge home, refer to podiatry, add on Bactrim as well as either short course oxycodone versus prednisone.    Amount and/or Complexity of Data Reviewed  Labs: ordered. Decision-making details documented in ED Course.  Radiology: ordered and independent interpretation performed. Decision-making details documented in ED Course.    Risk  Prescription drug management.        ED Course as of 07/06/25 1817   Sun Jul 06, 2025   1651 LACTIC ACID: 0.8   1651 WBC: 8.95   1652 GFR, Calculated: 46   1652 Creatinine: 1.20   1653 Procalcitonin: 0.22   1813 Updated patient about normal blood work and no elevated infectious markers.  Patient reports feeling a lot better after medication.  Overall I suspect gout but advised her to continue the Keflex until it is completed.  Will start patient on prednisone for a few days and sent home with oxycodone for severe pain and advised her to take Tylenol for mild to moderate pain.  Advised close follow-up with PCP and will refer to podiatry.  Discussed ED  return parameters.       Medications   oxyCODONE-acetaminophen (PERCOCET) 5-325 mg per tablet 1 tablet (1 tablet Oral Given 7/6/25 1625)   colchicine (COLCRYS) tablet 1.2 mg (1.2 mg Oral Given 7/6/25 1631)   colchicine (COLCRYS) tablet 0.6 mg (0.6 mg Oral Given 7/6/25 1723)   predniSONE tablet 50 mg (50 mg Oral Given 7/6/25 1814)       ED Risk Strat Scores                    No data recorded        SBIRT 22yo+      Flowsheet Row Most Recent Value   Initial Alcohol Screen: US AUDIT-C     1. How often do you have a drink containing alcohol? 0 Filed at: 07/06/2025 1542   2. How many drinks containing alcohol do you have on a typical day you are drinking?  0 Filed at: 07/06/2025 1542   3a. Male UNDER 65: How often do you have five or more drinks on one occasion? 0 Filed at: 07/06/2025 1542   3b. FEMALE Any Age, or MALE 65+: How often do you have 4 or more drinks on one occassion? 0 Filed at: 07/06/2025 1542   Audit-C Score 0 Filed at: 07/06/2025 7202   ALEXSANDRA: How many times in the past year have you...    Used an illegal drug or used a prescription medication for non-medical reasons? Never Filed at: 07/06/2025 1542                            History of Present Illness       Chief Complaint   Patient presents with    Foot Swelling     Patient reports rt foot pain and swelling since last Tuesday. Pt was seen at urgent care and started on abx.        Past Medical History[1]   Past Surgical History[2]   Family History[3]   Social History[4]   E-Cigarette/Vaping    E-Cigarette Use Never User       E-Cigarette/Vaping Substances    Nicotine No     THC No     CBD No     Flavoring No     Other No     Unknown No       I have reviewed and agree with the history as documented.     Patient is a 69-year-old female with past medical history of multiple myeloma receiving chemotherapy every 2 weeks, chronic kidney disease, hyperlipidemia, diabetes, depression, anxiety, gout, presents to the emergency department for pain and swelling over  the right foot.  Patient states on Tuesday she woke up with redness, swelling and pain to the right first MTP joint region.  She states that she soaked her foot in Epsom salt bath, applied magnesium spray as well as Neosporin however by Thursday, the pain, redness and swelling was worse so she went to urgent care.  They told her she either had a skin infection/cellulitis or gout and started her on Keflex.  She states she has been taking the Keflex every 6 hours as prescribed since Thursday evening but reports that there is slight redness outside of the marker line that urgent care had drawn in her foot and she was told to come to the ED if it spread beyond the lines.  She also reports she has persistent pain and feels a tingling sensation running up her shin.  She states she has had gout once before and she believes it did affect this joint in the past.  She denies any recent injury to the foot or toe.  She denies any fevers, chills or other systemic symptoms and rest of review of systems is negative.  Patient states she is due for her next chemo tomorrow.  Patient took Tylenol earlier this morning and last night she took oxycodone but states she does not normally take pain medications.      History provided by:  Patient   used: No        Review of Systems   Constitutional:  Negative for chills and fever.   HENT:  Negative for congestion, ear pain, rhinorrhea and sore throat.    Respiratory:  Negative for cough, chest tightness, shortness of breath and wheezing.    Cardiovascular:  Negative for chest pain and palpitations.   Gastrointestinal:  Negative for abdominal pain, constipation, diarrhea, nausea and vomiting.   Genitourinary:  Negative for dysuria, flank pain, frequency and hematuria.   Musculoskeletal:  Positive for arthralgias and joint swelling. Negative for back pain and neck pain.        +Right great toe/foot pain, swelling, redness.   Skin:  Negative for color change and rash.    Allergic/Immunologic: Positive for immunocompromised state.   Neurological:  Negative for dizziness, syncope, weakness, light-headedness, numbness and headaches.   Hematological:  Negative for adenopathy.   Psychiatric/Behavioral:  Negative for confusion and decreased concentration.    All other systems reviewed and are negative.          Objective       ED Triage Vitals [07/06/25 1542]   Temperature Pulse Blood Pressure Respirations SpO2 Patient Position - Orthostatic VS   98.2 °F (36.8 °C) 84 124/55 16 97 % Sitting      Temp Source Heart Rate Source BP Location FiO2 (%) Pain Score    Temporal -- -- -- 9      Vitals      Date and Time Temp Pulse SpO2 Resp BP Pain Score FACES Pain Rating User   07/06/25 1723 -- -- -- -- -- 3 -- RTM   07/06/25 1700 -- 63 96 % 18 123/59 -- -- RTM   07/06/25 1625 -- 68 96 % 17 118/72 9 -- RTM   07/06/25 1542 98.2 °F (36.8 °C) 84 97 % 16 124/55 9 -- AM            Physical Exam  Vitals and nursing note reviewed.   Constitutional:       General: She is not in acute distress.     Appearance: Normal appearance. She is well-developed. She is not ill-appearing, toxic-appearing or diaphoretic.   HENT:      Head: Normocephalic and atraumatic.      Right Ear: External ear normal.      Left Ear: External ear normal.      Mouth/Throat:      Mouth: Mucous membranes are moist.      Pharynx: Oropharynx is clear.     Eyes:      Extraocular Movements: Extraocular movements intact.      Conjunctiva/sclera: Conjunctivae normal.     Neck:      Vascular: No JVD.     Cardiovascular:      Rate and Rhythm: Normal rate and regular rhythm.      Pulses: Normal pulses.      Heart sounds: Normal heart sounds. No murmur heard.     No friction rub. No gallop.   Pulmonary:      Effort: Pulmonary effort is normal. No respiratory distress.      Breath sounds: Normal breath sounds. No wheezing, rhonchi or rales.   Abdominal:      General: Bowel sounds are normal. There is no distension.      Palpations: Abdomen is  soft.      Tenderness: There is no abdominal tenderness. There is no guarding or rebound.     Musculoskeletal:         General: Swelling and tenderness present. No deformity. Normal range of motion.      Cervical back: Normal range of motion and neck supple. No rigidity.      Comments: Right first MTP joint is erythematous, mildly swollen and significantly tender to touch.  No soft tissue crepitus or obvious fluctuant abscess.  2+ DP and PT pulse.  The dorsum of the right foot is mildly edematous compared to the left but no other tenderness noted and erythema is limited to the first MTP joint and mild surrounding area, about 2 inches in diameter total.     Skin:     General: Skin is warm and dry.      Coloration: Skin is not pale.      Findings: Erythema present. No rash.      Comments: See MSK exam above.      Neurological:      General: No focal deficit present.      Mental Status: She is alert and oriented to person, place, and time.      Sensory: No sensory deficit.      Motor: No weakness.     Psychiatric:         Mood and Affect: Mood normal.         Behavior: Behavior normal.         Results Reviewed       Procedure Component Value Units Date/Time    Procalcitonin [[id number]]  (Normal) Collected: 07/06/25 1621    Lab Status: Final result Specimen: Blood from Arm, Right Updated: 07/06/25 1652     Procalcitonin 0.22 ng/ml     Lactic acid, plasma (w/reflex if result > 2.0) [[id number]]  (Normal) Collected: 07/06/25 1621    Lab Status: Final result Specimen: Blood from Arm, Right Updated: 07/06/25 1643     LACTIC ACID 0.8 mmol/L     Narrative:      Result may be elevated if tourniquet was used during collection.    Basic metabolic panel [[id number]] Collected: 07/06/25 1621    Lab Status: Final result Specimen: Blood from Arm, Right Updated: 07/06/25 1642     Sodium 135 mmol/L      Potassium 4.4 mmol/L      Chloride 103 mmol/L      CO2 22 mmol/L      ANION GAP 10 mmol/L      BUN 24 mg/dL      Creatinine 1.20 mg/dL       Glucose 136 mg/dL      Calcium 9.8 mg/dL      eGFR 46 ml/min/1.73sq m     Narrative:      National Kid[employer] guidelines for Chronic Kidney Disease (CKD):     Stage 1 with normal or high GFR (GFR > 90 mL/min/1.73 square meters)    Stage 2 Mild CKD (GFR = 60-89 mL/min/1.73 square meters)    Stage 3A Moderate CKD (GFR = 45-59 mL/min/1.73 square meters)    Stage 3B Moderate CKD (GFR = 30-44 mL/min/1.73 square meters)    Stage 4 Severe CKD (GFR = 15-29 mL/min/1.73 square meters)    Stage 5 End Stage CKD (GFR <15 mL/min/1.73 square meters)  Note: GFR calculation is accurate only with a steady state creatinine    Hepatic function panel [[id number]]  (Abnormal) Collected: 07/06/25 1621    Lab Status: Final result Specimen: Blood from Arm, Right Updated: 07/06/25 1642     Total Bilirubin 0.39 mg/dL      Bilirubin, Direct 0.09 mg/dL      Alkaline Phosphatase 72 U/L      AST 10 U/L      ALT 16 U/L      Total Protein 7.4 g/dL      Albumin 3.9 g/dL     CBC and differential [[id number]]  (Abnormal) Collected: 07/06/25 1621    Lab Status: Final result Specimen: Blood from Arm, Right Updated: 07/06/25 1628     WBC 8.95 Thousand/uL      RBC 4.27 Million/uL      Hemoglobin 12.4 g/dL      Hematocrit 38.3 %      MCV 90 fL      MCH 29.0 pg      MCHC 32.4 g/dL      RDW 15.0 %      MPV 9.6 fL      Platelets 410 Thousands/uL      nRBC 0 /100 WBCs      Segmented % 42 %      Immature Grans % 0 %      Lymphocytes % 42 %      Monocytes % 12 %      Eosinophils Relative 2 %      Basophils Relative 2 %      Absolute Neutrophils 3.86 Thousands/µL      Absolute Immature Grans 0.02 Thousand/uL      Absolute Lymphocytes 3.73 Thousands/µL      Absolute Monocytes 1.04 Thousand/µL      Eosinophils Absolute 0.17 Thousand/µL      Basophils Absolute 0.13 Thousands/µL             XR foot 3+ views RIGHT   ED Interpretation by Whitney Meier DO (07/06 1817)   Gouty tophi right first MTP joint.  No acute osseous abnormality.           Procedures    ED Medication and Procedure Management   Prior to Admission Medications   Prescriptions Last Dose Informant Patient Reported? Taking?   Cholecalciferol (VITAMIN D3) 1,000 units tablet  Self No No   Sig: Take 2 tablets (2,000 Units total) by mouth daily   Patient not taking: Reported on 3/17/2025   acetaminophen (TYLENOL) 500 mg tablet  Self No No   Sig: Take 1 tablet (500 mg total) by mouth every 6 (six) hours as needed for mild pain   acyclovir (ZOVIRAX) 400 MG tablet  Self No No   Sig: Take 1 tablet (400 mg total) by mouth 2 (two) times a day Do not start before March 16, 2025.   Patient not taking: Reported on 7/3/2025   albuterol (ProAir HFA) 90 mcg/act inhaler  Self No No   Sig: Inhale 2 puffs every 4 (four) hours as needed for wheezing   aspirin 81 mg chewable tablet  Self Yes No   Sig: Chew 81 mg in the morning.   cephalexin (KEFLEX) 500 mg capsule   No No   Sig: Take 1 capsule (500 mg total) by mouth every 6 (six) hours for 7 days   clindamycin (CLEOCIN T) 1 % lotion  Self Yes No   Sig: Apply topically 2 (two) times a day To affected areas   Patient not taking: Reported on 7/3/2025   dexamethasone (DECADRON) 4 mg tablet  Self No No   Sig: Take 5 tabs by mouth with breakfast once a week.   ergocalciferol (VITAMIN D2) 50,000 units  Self No No   Sig: Take 1 capsule (50,000 Units total) by mouth once a week   Patient not taking: Reported on 4/1/2025   gabapentin (NEURONTIN) 100 mg capsule  Self No No   Sig: Take 1 capsule (100 mg total) by mouth 3 (three) times a day   Patient not taking: Reported on 3/17/2025   lenalidomide (REVLIMID) 10 MG CAPS   No No   Sig: Take 1 capsule (10 mg total) by mouth daily 21 days on, then 7 days off   valACYclovir (VALTREX) 500 mg tablet   No No   Sig: Take 1 tablet (500 mg total) by mouth daily      Facility-Administered Medications: None     Patient's Medications   Discharge Prescriptions    OXYCODONE (ROXICODONE) 5 IMMEDIATE RELEASE TABLET    Take 1 tablet  (5 mg total) by mouth every 6 (six) hours as needed for severe pain for up to 10 days Max Daily Amount: 20 mg       Start Date: 7/6/2025  End Date: 7/16/2025       Order Dose: 5 mg       Quantity: 10 tablet    Refills: 0    PREDNISONE 20 MG TABLET    Take 2 tablets (40 mg total) by mouth daily for 4 days Do not start before July 7, 2025.       Start Date: 7/7/2025  End Date: 7/11/2025       Order Dose: 40 mg       Quantity: 8 tablet    Refills: 0       ED SEPSIS DOCUMENTATION   Time reflects when diagnosis was documented in both MDM as applicable and the Disposition within this note       Time User Action Codes Description Comment    7/6/2025  6:15 PM Whitney Meier Add [M10.9] Acute gout involving toe of right foot                    [1]   Past Medical History:  Diagnosis Date    Acidosis 12/23/2024    Adrenal insufficiency (HCC)     JOSE ELIAS (acute kidney injury) (HCC) 12/19/2024    Anxiety     Bacterial sepsis (HCC)     last assessed: 06/13/16    Cancer (HCC)     Per pt blood cancer    Chronic kidney disease     Cushing's syndrome (HCC)     last assessed: 05/29/16    Depression     last assessed: 01/02/18    Diabetes mellitus (HCC)     no meds or insulin    Elevated lipase 12/20/2024    Elevated total protein 05/19/2016    History of transfusion 12/22/2024    Hydradenitis     Hyperlipidemia     Positive FIT (fecal immunochemical test) 06/22/2018    Added automatically from request for surgery [id number]    Rib contusion, left, sequela 06/14/2022    Trigger finger, right middle finger 03/07/2022   [2]   Past Surgical History:  Procedure Laterality Date    ADRENALECTOMY  12/2002    CHOLECYSTECTOMY      HERNIA REPAIR      umbilical    OTHER SURGICAL HISTORY      Injection of trigger joint    KY COLONOSCOPY FLX DX W/COLLJ SPEC WHEN PFRMD N/A 7/10/2018    Procedure: COLONOSCOPY;  Surgeon: Pako Nieves MD;  Location: [facility];  Service: Gastroenterology    KY NEUROPLASTY &/TRANSPOS MEDIAN NRV CARPAL TUNNE Right 2/17/2025     Procedure: RELEASE CARPAL TUNNEL - RIGHT;  Surgeon: Kenji Whalen MD;  Location: [facility];  Service: Orthopedics    TONSILLECTOMY AND ADENOIDECTOMY      TUBAL LIGATION     [3]   Family History  Problem Relation Name Age of Onset    Breast cancer Mother          unknown age    No Known Problems Sister syd     No Known Problems Daughter      No Known Problems Daughter      No Known Problems Maternal Grandmother      No Known Problems Maternal Grandfather      No Known Problems Paternal Grandmother      No Known Problems Paternal Grandfather      No Known Problems Paternal Aunt      No Known Problems Paternal Aunt      No Known Problems Paternal Aunt      No Known Problems Half-Sister emilee     No Known Problems Half-Sister alek     No Known Problems Half-Sister may     No Known Problems Half-Sister carley     No Known Problems Half-Sister roc    [4]   Social History  Tobacco Use    Smoking status: Former     Current packs/day: 0.25     Average packs/day: 0.3 packs/day for 50.0 years (12.5 ttl pk-yrs)     Types: Cigarettes    Smokeless tobacco: Never    Tobacco comments:     Quit 12-19-24   Vaping Use    Vaping status: Never Used   Substance Use Topics    Alcohol use: Never    Drug use: No        Whitney Meier,   07/06/25 [id number]

## 2025-07-07 ENCOUNTER — TELEPHONE (OUTPATIENT)
Dept: INFUSION CENTER | Facility: HOSPITAL | Age: 70
End: 2025-07-07

## 2025-07-07 ENCOUNTER — VBI (OUTPATIENT)
Dept: FAMILY MEDICINE CLINIC | Facility: CLINIC | Age: 70
End: 2025-07-07

## 2025-07-07 ENCOUNTER — HOSPITAL ENCOUNTER (OUTPATIENT)
Dept: INFUSION CENTER | Facility: HOSPITAL | Age: 70
Discharge: HOME | End: 2025-07-07
Attending: INTERNAL MEDICINE

## 2025-07-07 DIAGNOSIS — C90.00 MULTIPLE MYELOMA NOT HAVING ACHIEVED REMISSION (HCC): Primary | ICD-10-CM

## 2025-07-07 NOTE — TELEPHONE ENCOUNTER
Patient left us a VM stating that she needs to cancel todays treatment.   Patient in ED yesterday - per patient, she has an infection & was told to cancel 7/7 infusion appt.  Please advise on when patient should be rescheduled for D15C4.

## 2025-07-09 NOTE — TELEPHONE ENCOUNTER
07/09/25 10:54 AM    Patient contacted post ED visit, [facility] department spoke with patient/caregiver and outreach was successful.    Thank you.  Sylvia Allen MA  [facility]

## 2025-07-11 ENCOUNTER — APPOINTMENT (OUTPATIENT)
Dept: LAB | Facility: HOSPITAL | Age: 70
End: 2025-07-11
Payer: COMMERCIAL

## 2025-07-11 ENCOUNTER — OFFICE VISIT (OUTPATIENT)
Dept: FAMILY MEDICINE CLINIC | Facility: CLINIC | Age: 70
End: 2025-07-11
Payer: COMMERCIAL

## 2025-07-11 VITALS
SYSTOLIC BLOOD PRESSURE: 126 MMHG | BODY MASS INDEX: 28.47 KG/M2 | HEART RATE: 79 BPM | DIASTOLIC BLOOD PRESSURE: 78 MMHG | WEIGHT: 145 LBS | TEMPERATURE: 97.5 F | OXYGEN SATURATION: 98 % | RESPIRATION RATE: 18 BRPM | HEIGHT: 60 IN

## 2025-07-11 DIAGNOSIS — C90.00 MULTIPLE MYELOMA NOT HAVING ACHIEVED REMISSION (HCC): ICD-10-CM

## 2025-07-11 DIAGNOSIS — M10.00 IDIOPATHIC GOUT, UNSPECIFIED CHRONICITY, UNSPECIFIED SITE: ICD-10-CM

## 2025-07-11 DIAGNOSIS — E11.65 TYPE 2 DIABETES MELLITUS WITH HYPERGLYCEMIA, WITHOUT LONG-TERM CURRENT USE OF INSULIN (HCC): ICD-10-CM

## 2025-07-11 DIAGNOSIS — M10.00 IDIOPATHIC GOUT, UNSPECIFIED CHRONICITY, UNSPECIFIED SITE: Primary | ICD-10-CM

## 2025-07-11 LAB
ALBUMIN SERPL BCG-MCNC: 3.8 G/DL (ref 3.5–5)
ALP SERPL-CCNC: 57 U/L (ref 34–104)
ALT SERPL W P-5'-P-CCNC: 12 U/L (ref 7–52)
ANION GAP SERPL CALCULATED.3IONS-SCNC: 10 MMOL/L (ref 4–13)
AST SERPL W P-5'-P-CCNC: 8 U/L (ref 13–39)
BASOPHILS # BLD AUTO: 0.12 THOUSANDS/ÂΜL (ref 0–0.1)
BASOPHILS NFR BLD AUTO: 2 % (ref 0–1)
BILIRUB SERPL-MCNC: 0.41 MG/DL (ref 0.2–1)
BUN SERPL-MCNC: 32 MG/DL (ref 5–25)
CALCIUM SERPL-MCNC: 9.5 MG/DL (ref 8.4–10.2)
CHLORIDE SERPL-SCNC: 104 MMOL/L (ref 96–108)
CO2 SERPL-SCNC: 26 MMOL/L (ref 21–32)
CREAT SERPL-MCNC: 1.14 MG/DL (ref 0.6–1.3)
EOSINOPHIL # BLD AUTO: 0.4 THOUSAND/ÂΜL (ref 0–0.61)
EOSINOPHIL NFR BLD AUTO: 5 % (ref 0–6)
ERYTHROCYTE [DISTWIDTH] IN BLOOD BY AUTOMATED COUNT: 15.5 % (ref 11.6–15.1)
GFR SERPL CREATININE-BSD FRML MDRD: 49 ML/MIN/1.73SQ M
GLUCOSE P FAST SERPL-MCNC: 105 MG/DL (ref 65–99)
HCT VFR BLD AUTO: 36.1 % (ref 34.8–46.1)
HGB BLD-MCNC: 11.8 G/DL (ref 11.5–15.4)
IMM GRANULOCYTES # BLD AUTO: 0.1 THOUSAND/UL (ref 0–0.2)
IMM GRANULOCYTES NFR BLD AUTO: 1 % (ref 0–2)
LYMPHOCYTES # BLD AUTO: 3.83 THOUSANDS/ÂΜL (ref 0.6–4.47)
LYMPHOCYTES NFR BLD AUTO: 47 % (ref 14–44)
MCH RBC QN AUTO: 29.1 PG (ref 26.8–34.3)
MCHC RBC AUTO-ENTMCNC: 32.7 G/DL (ref 31.4–37.4)
MCV RBC AUTO: 89 FL (ref 82–98)
MONOCYTES # BLD AUTO: 0.94 THOUSAND/ÂΜL (ref 0.17–1.22)
MONOCYTES NFR BLD AUTO: 12 % (ref 4–12)
NEUTROPHILS # BLD AUTO: 2.67 THOUSANDS/ÂΜL (ref 1.85–7.62)
NEUTS SEG NFR BLD AUTO: 33 % (ref 43–75)
NRBC BLD AUTO-RTO: 0 /100 WBCS
PLATELET # BLD AUTO: 415 THOUSANDS/UL (ref 149–390)
PMV BLD AUTO: 10.4 FL (ref 8.9–12.7)
POTASSIUM SERPL-SCNC: 3.8 MMOL/L (ref 3.5–5.3)
PROT SERPL-MCNC: 6.4 G/DL (ref 6.4–8.4)
RBC # BLD AUTO: 4.05 MILLION/UL (ref 3.81–5.12)
SODIUM SERPL-SCNC: 140 MMOL/L (ref 135–147)
URATE SERPL-MCNC: 7.2 MG/DL (ref 2–7.5)
WBC # BLD AUTO: 8.06 THOUSAND/UL (ref 4.31–10.16)

## 2025-07-11 PROCEDURE — 84550 ASSAY OF BLOOD/URIC ACID: CPT

## 2025-07-11 PROCEDURE — 80053 COMPREHEN METABOLIC PANEL: CPT

## 2025-07-11 PROCEDURE — 99214 OFFICE O/P EST MOD 30 MIN: CPT | Performed by: INTERNAL MEDICINE

## 2025-07-11 PROCEDURE — G2211 COMPLEX E/M VISIT ADD ON: HCPCS | Performed by: INTERNAL MEDICINE

## 2025-07-11 PROCEDURE — 36415 COLL VENOUS BLD VENIPUNCTURE: CPT

## 2025-07-11 PROCEDURE — 85025 COMPLETE CBC W/AUTO DIFF WBC: CPT

## 2025-07-11 RX ORDER — COLCHICINE 0.6 MG/1
0.6 TABLET ORAL DAILY
Qty: 7 TABLET | Refills: 0 | Status: SHIPPED | OUTPATIENT
Start: 2025-07-11

## 2025-07-11 NOTE — PROGRESS NOTES
Name: Nidia Hamlin      : 1955      MRN: [id number]  Encounter Provider: Tasha Palmer DO  Encounter Date: 2025   Encounter department: [facility]  :  Assessment & Plan  Idiopathic gout, unspecified chronicity, unspecified site    Orders:  •  Uric acid; Future  •  Ambulatory Referral to Podiatry; Future  •  colchicine (COLCRYS) 0.6 mg tablet; Take 1 tablet (0.6 mg total) by mouth daily  CHeck Uric acid today  Finish prednisone Monitor BS - encouraged consider reeval woth endocrine  Podiatry followup   Multiple myeloma not having achieved remission (HCC)  Pt following with oncology Scheduled for next infusion Monday        Type 2 diabetes mellitus with hyperglycemia, without long-term current use of insulin (HCC)    Lab Results   Component Value Date    HGBA1C 8.5 (H) 2025   Monitor BS Finish prednisone   Recommend consider reeval with Endocrine A1c due August        Rto September/prn         History of Present Illness   HPI  [facility] for foot pain/toe swelling dx with gout and finishing steroid and antibx Sxs are slightly better and she is still on meds for couple days She does not recall prior hx of gout Her chemo tx last week was held due to infection No fever or chills Her BS have been higher especially on prednisone She is supposed to have chemo next week  Review of Systems   Constitutional:  Negative for chills and fever.   HENT: Negative.     Respiratory: Negative.     Cardiovascular: Negative.    Gastrointestinal: Negative.    Genitourinary: Negative.    Musculoskeletal:  Positive for arthralgias, gait problem and joint swelling.   Skin:  Positive for color change. Negative for wound.   Neurological:  Positive for numbness. Negative for dizziness, light-headedness and headaches.   Psychiatric/Behavioral:  Negative for sleep disturbance. The patient is not nervous/anxious.      Past Medical History[1]  Past Surgical History[2]  Social History      Socioeconomic History   • Marital status:      Spouse name: Not on file   • Number of children: Not on file   • Years of education: Not on file   • Highest education level: Not on file   Occupational History   • Not on file   Tobacco Use   • Smoking status: Former     Current packs/day: 0.25     Average packs/day: 0.3 packs/day for 50.0 years (12.5 ttl pk-yrs)     Types: Cigarettes   • Smokeless tobacco: Never   • Tobacco comments:     Quit 12-19-24   Vaping Use   • Vaping status: Never Used   Substance and Sexual Activity   • Alcohol use: Never   • Drug use: No   • Sexual activity: Not Currently   Other Topics Concern   • Not on file   Social History Narrative    Always uses seat belt    Always uses sunscreen    Caffeine use    Dental care, regularly    No living will    Uses safety equipment - seatbelts     Social Drivers of Health     Financial Resource Strain: Low Risk  (5/16/2023)    Overall Financial Resource Strain (CARDIA)    • Difficulty of Paying Living Expenses: Not very hard   Food Insecurity: No Food Insecurity (12/19/2024)    Nursing - Inadequate Food Risk Classification    • Worried About Running Out of Food in the Last Year: Not on file    • Ran Out of Food in the Last Year: Not on file    • Ran Out of Food in the Last Year: Never true   Transportation Needs: No Transportation Needs (12/19/2024)    Nursing - Transportation Risk Classification    • Lack of Transportation: Not on file    • Lack of Transportation: No   Physical Activity: Not on file   Stress: Not on file   Social Connections: Not on file   Intimate Partner Violence: Unknown (12/19/2024)    Nursing IPS    • Feels Physically and Emotionally Safe: Not on file    • Physically Hurt by Someone: Not on file    • Humiliated or Emotionally Abused by Someone: Not on file    • Physically Hurt by Someone: No    • Hurt or Threatened by Someone: No   Housing Stability: Unknown (12/19/2024)    Nursing: Inadequate Housing Risk Classification     • Has Housing: Not on file    • Worried About Losing Housing: Not on file    • Unable to Get Utilities: Not on file    • Unable to Pay for Housing in the Last Year: No    • Has Housing: No     Allergies   Allergen Reactions   • Fosamax [Alendronate] Diarrhea   • Paxil [Paroxetine Hcl] Rash         Objective   /78   Pulse 79   Temp 97.5 °F (36.4 °C) (Temporal)   Resp 18   Ht 5' (1.524 m)   Wt 65.8 kg (145 lb)   SpO2 98%   BMI 28.32 kg/m²      Physical Exam  Vitals and nursing note reviewed.   Constitutional:       General: She is not in acute distress.     Appearance: Normal appearance. She is not ill-appearing, toxic-appearing or diaphoretic.   HENT:      Head: Normocephalic and atraumatic.      Right Ear: External ear normal.      Left Ear: External ear normal.      Nose: Nose normal.      Mouth/Throat:      Mouth: Mucous membranes are moist.     Eyes:      Extraocular Movements: Extraocular movements intact.      Conjunctiva/sclera: Conjunctivae normal.      Pupils: Pupils are equal, round, and reactive to light.       Cardiovascular:      Rate and Rhythm: Normal rate and regular rhythm.      Pulses: Normal pulses.   Pulmonary:      Effort: Pulmonary effort is normal. No respiratory distress.      Breath sounds: Normal breath sounds. No wheezing.   Abdominal:      General: There is no distension.      Palpations: Abdomen is soft.      Tenderness: There is no abdominal tenderness.     Musculoskeletal:         General: Swelling and tenderness present.      Cervical back: Normal range of motion and neck supple.   Lymphadenopathy:      Cervical: No cervical adenopathy.     Skin:     General: Skin is warm and dry.      Findings: Erythema present.     Neurological:      General: No focal deficit present.      Mental Status: She is alert and oriented to person, place, and time. Mental status is at baseline.      Cranial Nerves: No cranial nerve deficit.      Sensory: Sensory deficit present.      Psychiatric:         Mood and Affect: Mood normal.         Behavior: Behavior normal.         Thought Content: Thought content normal.         Judgment: Judgment normal.                [1]  Past Medical History:  Diagnosis Date   • Acidosis 12/23/2024   • Adrenal insufficiency (HCC)    • JOSE ELIAS (acute kidney injury) (HCC) 12/19/2024   • Anxiety    • Bacterial sepsis (HCC)     last assessed: 06/13/16   • Cancer (HCC)     Per pt blood cancer   • Chronic kidney disease    • Cushing's syndrome (HCC)     last assessed: 05/29/16   • Depression     last assessed: 01/02/18   • Diabetes mellitus (HCC)     no meds or insulin   • Elevated lipase 12/20/2024   • Elevated total protein 05/19/2016   • History of transfusion 12/22/2024   • Hydradenitis    • Hyperlipidemia    • Positive FIT (fecal immunochemical test) 06/22/2018    Added automatically from request for surgery [id number]   • Rib contusion, left, sequela 06/14/2022   • Trigger finger, right middle finger 03/07/2022   [2]  Past Surgical History:  Procedure Laterality Date   • ADRENALECTOMY  12/2002   • CHOLECYSTECTOMY     • HERNIA REPAIR      umbilical   • OTHER SURGICAL HISTORY      Injection of trigger joint   • NC COLONOSCOPY FLX DX W/COLLJ SPEC WHEN PFRMD N/A 7/10/2018    Procedure: COLONOSCOPY;  Surgeon: Pako Nieves MD;  Location: [facility];  Service: Gastroenterology   • NC NEUROPLASTY &/TRANSPOS MEDIAN NRV CARPAL TUNNE Right 2/17/2025    Procedure: RELEASE CARPAL TUNNEL - RIGHT;  Surgeon: Kenji Whalen MD;  Location: [facility];  Service: Orthopedics   • TONSILLECTOMY AND ADENOIDECTOMY     • TUBAL LIGATION

## 2025-07-11 NOTE — ASSESSMENT & PLAN NOTE
Lab Results   Component Value Date    HGBA1C 8.5 (H) 05/09/2025   Monitor BS Finish prednisone   Recommend consider reeval with Endocrine A1c due August        Rto September/prn

## 2025-07-14 ENCOUNTER — HOSPITAL ENCOUNTER (OUTPATIENT)
Dept: INFUSION CENTER | Facility: HOSPITAL | Age: 70
Discharge: HOME | End: 2025-07-14
Attending: INTERNAL MEDICINE
Payer: COMMERCIAL

## 2025-07-14 VITALS
HEIGHT: 60 IN | TEMPERATURE: 97.4 F | WEIGHT: 142.2 LBS | RESPIRATION RATE: 17 BRPM | DIASTOLIC BLOOD PRESSURE: 79 MMHG | OXYGEN SATURATION: 99 % | BODY MASS INDEX: 27.92 KG/M2 | HEART RATE: 71 BPM | SYSTOLIC BLOOD PRESSURE: 135 MMHG

## 2025-07-14 DIAGNOSIS — C90.00 MULTIPLE MYELOMA NOT HAVING ACHIEVED REMISSION (HCC): Primary | ICD-10-CM

## 2025-07-14 PROCEDURE — 96401 CHEMO ANTI-NEOPL SQ/IM: CPT

## 2025-07-14 RX ORDER — DIPHENHYDRAMINE HCL 25 MG
25 TABLET ORAL ONCE
Status: DISCONTINUED | OUTPATIENT
Start: 2025-07-14 | End: 2025-07-17 | Stop reason: HOSPADM

## 2025-07-14 RX ORDER — ACETAMINOPHEN 325 MG/1
650 TABLET ORAL ONCE
Status: DISCONTINUED | OUTPATIENT
Start: 2025-07-14 | End: 2025-07-17 | Stop reason: HOSPADM

## 2025-07-14 RX ORDER — DEXAMETHASONE 4 MG/1
20 TABLET ORAL ONCE
Status: DISCONTINUED | OUTPATIENT
Start: 2025-07-14 | End: 2025-07-17 | Stop reason: HOSPADM

## 2025-07-14 RX ADMIN — DARATUMUMAB AND HYALURONIDASE-FIHJ (HUMAN RECOMBINANT) 1800 MG: 1800; 30000 INJECTION SUBCUTANEOUS at 11:26

## 2025-07-14 NOTE — PROGRESS NOTES
Recent labs reviewed. Pt tolerated Darzalex Faspro injections x1 R abdomen & x1 L abdomen well without any complications.      Nidia Hamlin is aware of future appt on 7/28/25 at 8AM.      AVS declined by Nidia Hamlin. Pt uses Mychart.     Pt discharged off unit in stable condition with all personal belongings in w/c accompanied by friend.

## 2025-07-14 NOTE — PLAN OF CARE
Problem: INFECTION - ADULT  Goal: Absence or prevention of progression during hospitalization  Description: INTERVENTIONS:  - Assess and monitor for signs and symptoms of infection  - Monitor lab/diagnostic results  - Monitor all insertion sites, i.e. indwelling lines, tubes, and drains  - Monitor endotracheal if appropriate and nasal secretions for changes in amount and color  - Williamson appropriate cooling/warming therapies per order  - Administer medications as ordered  - Instruct and encourage patient and family to use good hand hygiene technique  - Identify and instruct in appropriate isolation precautions for identified infection/condition  Outcome: Progressing     Problem: Knowledge Deficit  Goal: Patient/family/caregiver demonstrates understanding of disease process, treatment plan, medications, and discharge instructions  Description: Complete learning assessment and assess knowledge base.  Interventions:  - Provide teaching at level of understanding  - Provide teaching via preferred learning methods  Outcome: Progressing

## 2025-07-15 DIAGNOSIS — C90.00 MULTIPLE MYELOMA NOT HAVING ACHIEVED REMISSION (HCC): ICD-10-CM

## 2025-07-15 RX ORDER — LENALIDOMIDE 10 MG/1
10 CAPSULE ORAL DAILY
Qty: 21 CAPSULE | Refills: 0 | Status: SHIPPED | OUTPATIENT
Start: 2025-07-15

## 2025-07-18 ENCOUNTER — TELEPHONE (OUTPATIENT)
Age: 70
End: 2025-07-18

## 2025-07-18 ENCOUNTER — OFFICE VISIT (OUTPATIENT)
Age: 70
End: 2025-07-18
Attending: EMERGENCY MEDICINE
Payer: COMMERCIAL

## 2025-07-18 VITALS — BODY MASS INDEX: 28.47 KG/M2 | WEIGHT: 145 LBS | HEIGHT: 60 IN

## 2025-07-18 DIAGNOSIS — L03.031 CELLULITIS OF TOE OF RIGHT FOOT: Primary | ICD-10-CM

## 2025-07-18 DIAGNOSIS — E11.65 TYPE 2 DIABETES MELLITUS WITH HYPERGLYCEMIA, WITHOUT LONG-TERM CURRENT USE OF INSULIN (HCC): ICD-10-CM

## 2025-07-18 DIAGNOSIS — M79.671 PAIN IN RIGHT FOOT: ICD-10-CM

## 2025-07-18 DIAGNOSIS — M10.9 ACUTE GOUT INVOLVING TOE OF RIGHT FOOT, UNSPECIFIED CAUSE: ICD-10-CM

## 2025-07-18 PROCEDURE — 99202 OFFICE O/P NEW SF 15 MIN: CPT | Performed by: PODIATRIST

## 2025-07-18 NOTE — TELEPHONE ENCOUNTER
FYI - Patient called in stated that she is in so much pain, she does not want to go to the urgent care and she does not want to got to the ER. I did call and transferred her to the podiatrist to see if she would be seen to as requested.

## 2025-07-18 NOTE — TELEPHONE ENCOUNTER
Hello,    Please advise if a forced appointment can be accommodated for the patient:    Call back #: [phone]     Insurance: [employer]     Reason for appointment: Patient is in extreme pain she was seen in the er back on 7/2 they told her she has gout but the maikel has not let up it is only worse. She asked if there is anyway you can get her in soon      Requested doctor and/or location: [facility]       Thank you.

## 2025-07-18 NOTE — PROGRESS NOTES
Name: Nidia Hamlin      : 1955      MRN: [id number]  Encounter Provider: Mark Bridges DPM  Encounter Date: 2025   Encounter department: [facility] [place] [facility]  :  Assessment & Plan  Type 2 diabetes mellitus with hyperglycemia, without long-term current use of insulin ([facility])    Lab Results   Component Value Date    HGBA1C 8.5 (H) 2025            Acute gout involving toe of right foot, unspecified cause    Orders:    Ambulatory Referral to Podiatry    MRI foot/forefoot toest right wo contrast; Future    Cellulitis of toe of right foot    Orders:    MRI foot/forefoot toest right wo contrast; Future    Pain in right foot    Orders:    MRI foot/forefoot toest right wo contrast; Future    Reviewed urgent care hours note from 7/3/2025.  Patient relates to having 2 red marks on the big toe. patient was placed on a course of Keflex and told to use Tylenol for pain  Reviewed note from ED on 2025.  She was sent home with Percocet and colchicine  Reviewed PCPs note from 2025  Reviewed x-rays from urgent care and were negative for any fractures or any pathology    Return in about 4 days (around 2025).     History of Present Illness   HPI  Nidia Hamlin is a 69 y.o. female who presents with extreme pain in her right foot.  Patient relates that she had gone to urgent care on 7/3/2025 and then emergency room on 2025 and to her PCP on 2025 for the same problem.  She has been on colchicine and also was given pain meds and on an antibiotic for possible infection and acute gout attack.  The pain has severely limited her ambulation.  She was seen urgent care she states that there were 2 black marks present on the first MPJ.  She went to the ED because the cellulitis spread over a tuan that was present on the foot from the urgent care.  She is also receiving chemo for multiple myeloma.  History obtained from: patient    Review of Systems  Medical History Reviewed by  provider this encounter:     .  Medications Ordered Prior to Encounter[1]   Social History[2]     Objective   Ht 5' (1.524 m)   Wt 65.8 kg (145 lb)   BMI 28.32 kg/m²      Physical Exam  Vascular status is 1/4 DP PT: Negative digital hair, normal distal cooling, immediate capillary refill with slight edema present right extremity.    Ortho there is extreme pain with light palpation of the first MPJ second MPJ third MPJ and along the extensor tendons of the 2nd and 3rd digits.  No pain with palpation of the 4th and 5th toes.  There is pain with light range of motion of the first MPJ no pain with range of motion of the DIPJ and PIPJ's of the digits.  There is localized edema over the entire dorsal aspect of the foot into the ankle area.  No ecchymosis is seen.  Slight amount of erythema is seen on the dorsal aspect of the foot around the first MPJ going towards the second.  No pain with palpation of the plantar fascia at its insertion or origination of the calcaneus.  Minimal to no pain with palpation of the midfoot area.    Derm no pathology is noted.       [1]   Current Outpatient Medications on File Prior to Visit   Medication Sig Dispense Refill    acetaminophen (TYLENOL) 500 mg tablet Take 1 tablet (500 mg total) by mouth every 6 (six) hours as needed for mild pain 60 tablet 0    albuterol (ProAir HFA) 90 mcg/act inhaler Inhale 2 puffs every 4 (four) hours as needed for wheezing 8.5 g 0    aspirin 81 mg chewable tablet Chew 81 mg in the morning.      colchicine (COLCRYS) 0.6 mg tablet Take 1 tablet (0.6 mg total) by mouth daily 7 tablet 0    dexamethasone (DECADRON) 4 mg tablet Take 5 tabs by mouth with breakfast once a week. 20 tablet 5    lenalidomide (REVLIMID) 10 MG CAPS Take 1 capsule (10 mg total) by mouth daily 21 days on, then 7 days off 21 capsule 0    acyclovir (ZOVIRAX) 400 MG tablet Take 1 tablet (400 mg total) by mouth 2 (two) times a day Do not start before March 16, 2025. (Patient not taking:  Reported on 7/3/2025) 180 tablet 3    clindamycin (CLEOCIN T) 1 % lotion Apply topically 2 (two) times a day To affected areas (Patient not taking: Reported on 7/3/2025)      valACYclovir (VALTREX) 500 mg tablet Take 1 tablet (500 mg total) by mouth daily 30 tablet 5     No current facility-administered medications on file prior to visit.   [2]   Social History  Tobacco Use    Smoking status: Former     Current packs/day: 0.25     Average packs/day: 0.3 packs/day for 50.0 years (12.5 ttl pk-yrs)     Types: Cigarettes    Smokeless tobacco: Never    Tobacco comments:     Quit 12-19-24   Vaping Use    Vaping status: Never Used   Substance and Sexual Activity    Alcohol use: Never    Drug use: No    Sexual activity: Not Currently

## 2025-07-18 NOTE — TELEPHONE ENCOUNTER
Patient calls stating she finished gout medication prescribed at 7/11 visit but is not feeling better. She is looking for next steps.     Office unavailable for call to check for available Same Days (none seen). Please advise.    Urgent Care recommended if patient does not receive a response with guidance by end of day.    No new symptoms since 7/11 visit.

## 2025-07-18 NOTE — LETTER
2025     Whitney Meier DO  [address] [facility] [address]    Patient: Nidia Hamlin   YOB: 1955   Date of Visit: 2025       Dear Dr. Whitney Meier, :    Thank you for referring Nidia Hamlin to me for evaluation. Below are my notes for this consultation.    If you have questions, please do not hesitate to call me. I look forward to following your patient along with you.         Sincerely,        Mark Bridges DPM        CC: No Recipients    Mark Bridges DPM  2025  3:29 PM  Signed  Name: Nidia Hamlin      : 1955      MRN: [id number]  Encounter Provider: Mark Bridges DPM  Encounter Date: 2025   Encounter department: [facility] [address]  :  Assessment & Plan  Type 2 diabetes mellitus with hyperglycemia, without long-term current use of insulin ([facility])    Lab Results   Component Value Date    HGBA1C 8.5 (H) 2025            Acute gout involving toe of right foot, unspecified cause    Orders:  •  Ambulatory Referral to Podiatry  •  MRI foot/forefoot toest right wo contrast; Future    Cellulitis of toe of right foot    Orders:  •  MRI foot/forefoot toest right wo contrast; Future    Pain in right foot    Orders:  •  MRI foot/forefoot toest right wo contrast; Future    Reviewed urgent care hours note from 7/3/2025.  Patient relates to having 2 red marks on the big toe. patient was placed on a course of Keflex and told to use Tylenol for pain  Reviewed note from ED on 2025.  She was sent home with Percocet and colchicine  Reviewed PCPs note from 2025  Reviewed x-rays from urgent care and were negative for any fractures or any pathology    Return in about 4 days (around 2025).     History of Present Illness  HPI  Nidia Hamlin is a 69 y.o. female who presents with extreme pain in her right foot.  Patient relates that she had gone to urgent care on 7/3/2025 and then emergency room on  7/6/2025 and to her PCP on 11/7/2025 for the same problem.  She has been on colchicine and also was given pain meds and on an antibiotic for possible infection and acute gout attack.  The pain has severely limited her ambulation.  She was seen urgent care she states that there were 2 black marks present on the first MPJ.  She went to the ED because the cellulitis spread over a tuan that was present on the foot from the urgent care.  She is also receiving chemo for multiple myeloma.  History obtained from: patient    Review of Systems  Medical History Reviewed by provider this encounter:     .  Medications Ordered Prior to Encounter[1]   Social History[2]     Objective  Ht 5' (1.524 m)   Wt 65.8 kg (145 lb)   BMI 28.32 kg/m²      Physical Exam  Vascular status is 1/4 DP PT: Negative digital hair, normal distal cooling, immediate capillary refill with slight edema present right extremity.    Ortho there is extreme pain with light palpation of the first MPJ second MPJ third MPJ and along the extensor tendons of the 2nd and 3rd digits.  No pain with palpation of the 4th and 5th toes.  There is pain with light range of motion of the first MPJ no pain with range of motion of the DIPJ and PIPJ's of the digits.  There is localized edema over the entire dorsal aspect of the foot into the ankle area.  No ecchymosis is seen.  Slight amount of erythema is seen on the dorsal aspect of the foot around the first MPJ going towards the second.  No pain with palpation of the plantar fascia at its insertion or origination of the calcaneus.  Minimal to no pain with palpation of the midfoot area.    Derm no pathology is noted.       [1]   Current Outpatient Medications on File Prior to Visit   Medication Sig Dispense Refill   • acetaminophen (TYLENOL) 500 mg tablet Take 1 tablet (500 mg total) by mouth every 6 (six) hours as needed for mild pain 60 tablet 0   • albuterol (ProAir HFA) 90 mcg/act inhaler Inhale 2 puffs every 4 (four)  hours as needed for wheezing 8.5 g 0   • aspirin 81 mg chewable tablet Chew 81 mg in the morning.     • colchicine (COLCRYS) 0.6 mg tablet Take 1 tablet (0.6 mg total) by mouth daily 7 tablet 0   • dexamethasone (DECADRON) 4 mg tablet Take 5 tabs by mouth with breakfast once a week. 20 tablet 5   • lenalidomide (REVLIMID) 10 MG CAPS Take 1 capsule (10 mg total) by mouth daily 21 days on, then 7 days off 21 capsule 0   • acyclovir (ZOVIRAX) 400 MG tablet Take 1 tablet (400 mg total) by mouth 2 (two) times a day Do not start before March 16, 2025. (Patient not taking: Reported on 7/3/2025) 180 tablet 3   • clindamycin (CLEOCIN T) 1 % lotion Apply topically 2 (two) times a day To affected areas (Patient not taking: Reported on 7/3/2025)     • valACYclovir (VALTREX) 500 mg tablet Take 1 tablet (500 mg total) by mouth daily 30 tablet 5     No current facility-administered medications on file prior to visit.   [2]   Social History  Tobacco Use   • Smoking status: Former     Current packs/day: 0.25     Average packs/day: 0.3 packs/day for 50.0 years (12.5 ttl pk-yrs)     Types: Cigarettes   • Smokeless tobacco: Never   • Tobacco comments:     Quit 12-19-24   Vaping Use   • Vaping status: Never Used   Substance and Sexual Activity   • Alcohol use: Never   • Drug use: No   • Sexual activity: Not Currently

## 2025-07-18 NOTE — TELEPHONE ENCOUNTER
Urgent care today if concerning sxs  Did patient hear from podiatry - referral was placed for followup with them??

## 2025-07-19 ENCOUNTER — HOSPITAL ENCOUNTER (OUTPATIENT)
Dept: MRI IMAGING | Facility: HOSPITAL | Age: 70
Discharge: HOME | End: 2025-07-19
Attending: PODIATRIST
Payer: COMMERCIAL

## 2025-07-19 DIAGNOSIS — M10.9 ACUTE GOUT INVOLVING TOE OF RIGHT FOOT, UNSPECIFIED CAUSE: ICD-10-CM

## 2025-07-19 DIAGNOSIS — L03.031 CELLULITIS OF TOE OF RIGHT FOOT: ICD-10-CM

## 2025-07-19 DIAGNOSIS — M79.671 PAIN IN RIGHT FOOT: ICD-10-CM

## 2025-07-19 PROCEDURE — 73718 MRI LOWER EXTREMITY W/O DYE: CPT

## 2025-07-21 ENCOUNTER — OFFICE VISIT (OUTPATIENT)
Dept: PODIATRY | Facility: CLINIC | Age: 70
End: 2025-07-21
Payer: COMMERCIAL

## 2025-07-21 VITALS
TEMPERATURE: 97.9 F | HEIGHT: 60 IN | WEIGHT: 141 LBS | BODY MASS INDEX: 27.68 KG/M2 | HEART RATE: 82 BPM | RESPIRATION RATE: 16 BRPM | OXYGEN SATURATION: 98 %

## 2025-07-21 DIAGNOSIS — R60.0 LOCALIZED EDEMA: ICD-10-CM

## 2025-07-21 DIAGNOSIS — M25.871 SESAMOIDITIS OF RIGHT FOOT: Primary | ICD-10-CM

## 2025-07-21 DIAGNOSIS — M79.671 PAIN IN RIGHT FOOT: ICD-10-CM

## 2025-07-21 PROCEDURE — 20600 DRAIN/INJ JOINT/BURSA W/O US: CPT | Performed by: PODIATRIST

## 2025-07-21 PROCEDURE — 99213 OFFICE O/P EST LOW 20 MIN: CPT | Performed by: PODIATRIST

## 2025-07-21 RX ORDER — ACETAMINOPHEN 325 MG/1
650 TABLET ORAL ONCE
OUTPATIENT
Start: 2025-08-11

## 2025-07-21 RX ORDER — DIPHENHYDRAMINE HCL 25 MG
25 TABLET ORAL ONCE
OUTPATIENT
Start: 2025-08-11

## 2025-07-21 RX ORDER — TRIAMCINOLONE ACETONIDE 40 MG/ML
30 INJECTION, SUSPENSION INTRA-ARTICULAR; INTRAMUSCULAR ONCE
Status: COMPLETED | OUTPATIENT
Start: 2025-07-21 | End: 2025-07-21

## 2025-07-21 RX ORDER — OXYCODONE AND ACETAMINOPHEN 5; 325 MG/1; MG/1
1 TABLET ORAL DAILY
Qty: 7 TABLET | Refills: 0 | Status: SHIPPED | OUTPATIENT
Start: 2025-07-21 | End: 2025-07-28

## 2025-07-21 RX ORDER — ACETAMINOPHEN 325 MG/1
650 TABLET ORAL ONCE
OUTPATIENT
Start: 2025-07-28

## 2025-07-21 RX ORDER — DEXAMETHASONE 4 MG/1
20 TABLET ORAL ONCE
OUTPATIENT
Start: 2025-07-28

## 2025-07-21 RX ORDER — DEXAMETHASONE SODIUM PHOSPHATE 4 MG/ML
3 INJECTION, SOLUTION INTRA-ARTICULAR; INTRALESIONAL; INTRAMUSCULAR; INTRAVENOUS; SOFT TISSUE ONCE
Status: COMPLETED | OUTPATIENT
Start: 2025-07-21 | End: 2025-07-21

## 2025-07-21 RX ORDER — LIDOCAINE HYDROCHLORIDE 20 MG/ML
1.5 INJECTION, SOLUTION EPIDURAL; INFILTRATION; INTRACAUDAL; PERINEURAL ONCE
Status: COMPLETED | OUTPATIENT
Start: 2025-07-21 | End: 2025-07-21

## 2025-07-21 RX ORDER — DEXAMETHASONE 4 MG/1
20 TABLET ORAL ONCE
OUTPATIENT
Start: 2025-08-11

## 2025-07-21 RX ORDER — TRIAMCINOLONE ACETONIDE 40 MG/ML
30 INJECTION, SUSPENSION INTRA-ARTICULAR; INTRAMUSCULAR
Status: SHIPPED | OUTPATIENT
Start: 2025-07-21

## 2025-07-21 RX ORDER — DIPHENHYDRAMINE HCL 25 MG
25 TABLET ORAL ONCE
OUTPATIENT
Start: 2025-07-28

## 2025-07-21 RX ORDER — LIDOCAINE HYDROCHLORIDE 20 MG/ML
1.5 INJECTION, SOLUTION INFILTRATION; PERINEURAL
Status: SHIPPED | OUTPATIENT
Start: 2025-07-21

## 2025-07-21 RX ADMIN — LIDOCAINE HYDROCHLORIDE 1.5 ML: 20 INJECTION, SOLUTION EPIDURAL; INFILTRATION; INTRACAUDAL; PERINEURAL at 13:28

## 2025-07-21 RX ADMIN — LIDOCAINE HYDROCHLORIDE 1.5 ML: 20 INJECTION, SOLUTION INFILTRATION; PERINEURAL at 09:30

## 2025-07-21 RX ADMIN — TRIAMCINOLONE ACETONIDE 30 MG: 40 INJECTION, SUSPENSION INTRA-ARTICULAR; INTRAMUSCULAR at 13:28

## 2025-07-21 RX ADMIN — TRIAMCINOLONE ACETONIDE 30 MG: 40 INJECTION, SUSPENSION INTRA-ARTICULAR; INTRAMUSCULAR at 09:30

## 2025-07-21 RX ADMIN — DEXAMETHASONE SODIUM PHOSPHATE 3 MG: 4 INJECTION, SOLUTION INTRA-ARTICULAR; INTRALESIONAL; INTRAMUSCULAR; INTRAVENOUS; SOFT TISSUE at 13:27

## 2025-07-21 NOTE — PROGRESS NOTES
Name: Nidia Hamlin      : 1955      MRN: [id number]  Encounter Provider: Mark Bridges DPM  Encounter Date: 2025   [facility]  :  Assessment & Plan  Sesamoiditis of right foot    Orders:    oxyCODONE-acetaminophen (Percocet) 5-325 mg per tablet; Take 1 tablet by mouth in the morning for 7 days Max Daily Amount: 1 tablet    Cam Boot    lidocaine (PF) (XYLOCAINE-MPF) 2 % injection 1.5 mL    dexamethasone (DECADRON) injection 3 mg    triamcinolone acetonide (KENALOG-40) 40 mg/mL injection 30 mg  Splints the short leg Cam walker for the right extremity.  Patient is to use the cam walker for all ambulation's but she does not need to sleep in it.  Discussed and even up through [employer] for the left leg to help level out the hips to reduce pressure in that area.  Patient is to ice the area when she gets home.    Localized edema    Orders:    lidocaine (PF) (XYLOCAINE-MPF) 2 % injection 1.5 mL    dexamethasone (DECADRON) injection 3 mg    triamcinolone acetonide (KENALOG-40) 40 mg/mL injection 30 mg    Pain in right foot    Orders:    lidocaine (PF) (XYLOCAINE-MPF) 2 % injection 1.5 mL    dexamethasone (DECADRON) injection 3 mg    triamcinolone acetonide (KENALOG-40) 40 mg/mL injection 30 mg    Small joint arthrocentesis: R great MTP    Performed by: Mark Bridges DPM  Authorized by: Mark Bridges DPM    Universal Protocol:  procedure performed by consultantConsent: Verbal consent obtained  Risks and benefits: risks, benefits and alternatives were discussed  Consent given by: patient  Patient understanding: patient states understanding of the procedure being performed  Patient consent: the patient's understanding of the procedure matches consent given  Procedure consent: procedure consent matches procedure scheduled  Patient identity confirmed: verbally with patient  Supporting Documentation  Indications: pain and joint swelling   Procedure  Details  Location: great toe - R great MTP  Preparation: Patient was prepped and draped in the usual sterile fashion  Needle size: 25 G  Ultrasound guidance: no  Approach: medial  Medications administered: 1.5 mL lidocaine 2 %; 30 mg triamcinolone acetonide 40 mg/mL    Patient tolerance: patient tolerated the procedure well with no immediate complications  Dressing:  Sterile dressing applied    Area was prepped with alcohol wipe.  Ethyl Chloride was sprayed on skin to aid in pain relief with injection.  Hemostasis achieved to the area.  Injection completed without incident, dry sterile dressing applied.  Patient should leave this intact for twenty four hours and then can remove. If the patient notices any redness, swelling or any signs or symptoms of infection notify the office immediately.        Return in about 1 week (around 7/28/2025).      History of Present Illness   HPI  Nidia Hamlin is a 69 y.o. female who presents for her MRI results of her right first MPJ.  Patient relates that the other night she was in extreme pain and was out of her pain meds that were given to her by the ED.  She takes only 1 Percocet at night to help control the pain.  Patient relates that the pain was extremely painful and she was unable to get around and even light touch since her through the roof.  History obtained from: patient    Review of Systems  Medical History Reviewed by provider this encounter:     .  Medications Ordered Prior to Encounter[1]   Social History[2]     Objective   Pulse 82   Temp 97.9 °F (36.6 °C) (Temporal)   Resp 16   Ht 5' (1.524 m)   Wt 64 kg (141 lb)   SpO2 98%   BMI 27.54 kg/m²      MRI results were negative for any bone infection or gout of the first MPJ area.  MRI did show positive for tibial sesamoiditis on the right foot and mild inflammation of the plantar fascia also.    Physical Exam  Vascular status is unchanged from prior to his appointment.    Ortho there is pain with light palpation of  the tibial sesamoid.  There is also pain with range of motion of the first MPJ with localized edema surrounding the first MPJ.    Derm the erythema that was present at last visit is significantly improved.       [1]   Current Outpatient Medications on File Prior to Visit   Medication Sig Dispense Refill    acetaminophen (TYLENOL) 500 mg tablet Take 1 tablet (500 mg total) by mouth every 6 (six) hours as needed for mild pain 60 tablet 0    albuterol (ProAir HFA) 90 mcg/act inhaler Inhale 2 puffs every 4 (four) hours as needed for wheezing 8.5 g 0    aspirin 81 mg chewable tablet Chew 81 mg in the morning.      colchicine (COLCRYS) 0.6 mg tablet Take 1 tablet (0.6 mg total) by mouth daily 7 tablet 0    dexamethasone (DECADRON) 4 mg tablet Take 5 tabs by mouth with breakfast once a week. 20 tablet 5    lenalidomide (REVLIMID) 10 MG CAPS Take 1 capsule (10 mg total) by mouth daily 21 days on, then 7 days off 21 capsule 0    acyclovir (ZOVIRAX) 400 MG tablet Take 1 tablet (400 mg total) by mouth 2 (two) times a day Do not start before March 16, 2025. (Patient not taking: Reported on 7/3/2025) 180 tablet 3    clindamycin (CLEOCIN T) 1 % lotion Apply topically 2 (two) times a day To affected areas (Patient not taking: Reported on 7/3/2025)      valACYclovir (VALTREX) 500 mg tablet Take 1 tablet (500 mg total) by mouth daily 30 tablet 5     No current facility-administered medications on file prior to visit.   [2]   Social History  Tobacco Use    Smoking status: Former     Current packs/day: 0.25     Average packs/day: 0.3 packs/day for 50.0 years (12.5 ttl pk-yrs)     Types: Cigarettes    Smokeless tobacco: Never    Tobacco comments:     Quit 12-19-24   Vaping Use    Vaping status: Never Used   Substance and Sexual Activity    Alcohol use: Never    Drug use: No    Sexual activity: Not Currently

## 2025-07-21 NOTE — ASSESSMENT & PLAN NOTE
Orders:    oxyCODONE-acetaminophen (Percocet) 5-325 mg per tablet; Take 1 tablet by mouth in the morning for 7 days Max Daily Amount: 1 tablet    Cam Boot    lidocaine (PF) (XYLOCAINE-MPF) 2 % injection 1.5 mL    dexamethasone (DECADRON) injection 3 mg    triamcinolone acetonide (KENALOG-40) 40 mg/mL injection 30 mg  Splints the short leg Cam walker for the right extremity.  Patient is to use the cam walker for all ambulation's but she does not need to sleep in it.  Discussed and even up through [employer] for the left leg to help level out the hips to reduce pressure in that area.  Patient is to ice the area when she gets home.

## 2025-07-22 ENCOUNTER — TELEPHONE (OUTPATIENT)
Age: 70
End: 2025-07-22

## 2025-07-22 NOTE — TELEPHONE ENCOUNTER
Patient is calling to update Dr Chavez on what has been going on with her health recently.  She was in the ER on 7/6 with foot pain, there are multiple notes in the notes in the chart.  She did not wish to speak to CTS.

## 2025-07-25 ENCOUNTER — APPOINTMENT (OUTPATIENT)
Dept: LAB | Facility: HOSPITAL | Age: 70
End: 2025-07-25
Payer: COMMERCIAL

## 2025-07-25 DIAGNOSIS — C90.00 MULTIPLE MYELOMA NOT HAVING ACHIEVED REMISSION (HCC): ICD-10-CM

## 2025-07-25 LAB
ALBUMIN SERPL BCG-MCNC: 4.1 G/DL (ref 3.5–5)
ALP SERPL-CCNC: 63 U/L (ref 34–104)
ALT SERPL W P-5'-P-CCNC: 9 U/L (ref 7–52)
ANION GAP SERPL CALCULATED.3IONS-SCNC: 11 MMOL/L (ref 4–13)
AST SERPL W P-5'-P-CCNC: 6 U/L (ref 13–39)
BASOPHILS # BLD AUTO: 0.07 THOUSANDS/ÂΜL (ref 0–0.1)
BASOPHILS NFR BLD AUTO: 1 % (ref 0–1)
BILIRUB SERPL-MCNC: 0.8 MG/DL (ref 0.2–1)
BUN SERPL-MCNC: 30 MG/DL (ref 5–25)
CALCIUM SERPL-MCNC: 9.3 MG/DL (ref 8.4–10.2)
CHLORIDE SERPL-SCNC: 102 MMOL/L (ref 96–108)
CO2 SERPL-SCNC: 22 MMOL/L (ref 21–32)
CREAT SERPL-MCNC: 1.26 MG/DL (ref 0.6–1.3)
EOSINOPHIL # BLD AUTO: 0.58 THOUSAND/ÂΜL (ref 0–0.61)
EOSINOPHIL NFR BLD AUTO: 9 % (ref 0–6)
ERYTHROCYTE [DISTWIDTH] IN BLOOD BY AUTOMATED COUNT: 15.8 % (ref 11.6–15.1)
GFR SERPL CREATININE-BSD FRML MDRD: 43 ML/MIN/1.73SQ M
GLUCOSE SERPL-MCNC: 155 MG/DL (ref 65–140)
HCT VFR BLD AUTO: 40.7 % (ref 34.8–46.1)
HGB BLD-MCNC: 13.4 G/DL (ref 11.5–15.4)
IMM GRANULOCYTES # BLD AUTO: 0.02 THOUSAND/UL (ref 0–0.2)
IMM GRANULOCYTES NFR BLD AUTO: 0 % (ref 0–2)
LYMPHOCYTES # BLD AUTO: 2.67 THOUSANDS/ÂΜL (ref 0.6–4.47)
LYMPHOCYTES NFR BLD AUTO: 40 % (ref 14–44)
MCH RBC QN AUTO: 28.9 PG (ref 26.8–34.3)
MCHC RBC AUTO-ENTMCNC: 32.9 G/DL (ref 31.4–37.4)
MCV RBC AUTO: 88 FL (ref 82–98)
MONOCYTES # BLD AUTO: 0.87 THOUSAND/ÂΜL (ref 0.17–1.22)
MONOCYTES NFR BLD AUTO: 13 % (ref 4–12)
NEUTROPHILS # BLD AUTO: 2.5 THOUSANDS/ÂΜL (ref 1.85–7.62)
NEUTS SEG NFR BLD AUTO: 37 % (ref 43–75)
NRBC BLD AUTO-RTO: 0 /100 WBCS
PLATELET # BLD AUTO: 136 THOUSANDS/UL (ref 149–390)
PMV BLD AUTO: 11.4 FL (ref 8.9–12.7)
POTASSIUM SERPL-SCNC: 4 MMOL/L (ref 3.5–5.3)
PROT SERPL-MCNC: 6.8 G/DL (ref 6.4–8.4)
RBC # BLD AUTO: 4.63 MILLION/UL (ref 3.81–5.12)
SODIUM SERPL-SCNC: 135 MMOL/L (ref 135–147)
WBC # BLD AUTO: 6.71 THOUSAND/UL (ref 4.31–10.16)

## 2025-07-25 PROCEDURE — 80053 COMPREHEN METABOLIC PANEL: CPT

## 2025-07-25 PROCEDURE — 85025 COMPLETE CBC W/AUTO DIFF WBC: CPT

## 2025-07-25 PROCEDURE — 36415 COLL VENOUS BLD VENIPUNCTURE: CPT

## 2025-07-28 ENCOUNTER — HOSPITAL ENCOUNTER (OUTPATIENT)
Dept: INFUSION CENTER | Facility: HOSPITAL | Age: 70
Discharge: HOME | End: 2025-07-28
Attending: INTERNAL MEDICINE
Payer: COMMERCIAL

## 2025-07-28 VITALS
HEART RATE: 82 BPM | SYSTOLIC BLOOD PRESSURE: 139 MMHG | DIASTOLIC BLOOD PRESSURE: 58 MMHG | RESPIRATION RATE: 16 BRPM | OXYGEN SATURATION: 98 % | TEMPERATURE: 97.1 F

## 2025-07-28 DIAGNOSIS — C90.00 MULTIPLE MYELOMA NOT HAVING ACHIEVED REMISSION (HCC): Primary | ICD-10-CM

## 2025-07-28 PROCEDURE — 96401 CHEMO ANTI-NEOPL SQ/IM: CPT

## 2025-07-28 RX ORDER — DIPHENHYDRAMINE HCL 25 MG
25 TABLET ORAL ONCE
Status: DISCONTINUED | OUTPATIENT
Start: 2025-07-28 | End: 2025-07-31 | Stop reason: HOSPADM

## 2025-07-28 RX ORDER — DEXAMETHASONE 4 MG/1
40 TABLET ORAL
Qty: 20 TABLET | Refills: 0 | Status: SHIPPED | OUTPATIENT
Start: 2025-07-28 | End: 2025-07-30

## 2025-07-28 RX ORDER — DEXAMETHASONE 4 MG/1
20 TABLET ORAL ONCE
Status: DISCONTINUED | OUTPATIENT
Start: 2025-07-28 | End: 2025-07-31 | Stop reason: HOSPADM

## 2025-07-28 RX ORDER — ACETAMINOPHEN 325 MG/1
650 TABLET ORAL ONCE
Status: DISCONTINUED | OUTPATIENT
Start: 2025-07-28 | End: 2025-07-31 | Stop reason: HOSPADM

## 2025-07-28 RX ADMIN — DARATUMUMAB AND HYALURONIDASE-FIHJ (HUMAN RECOMBINANT) 1800 MG: 1800; 30000 INJECTION SUBCUTANEOUS at 08:57

## 2025-07-29 ENCOUNTER — OFFICE VISIT (OUTPATIENT)
Dept: PODIATRY | Facility: CLINIC | Age: 70
End: 2025-07-29
Payer: COMMERCIAL

## 2025-07-29 VITALS — HEIGHT: 60 IN | WEIGHT: 141 LBS | BODY MASS INDEX: 27.68 KG/M2

## 2025-07-29 DIAGNOSIS — R60.0 LOCALIZED EDEMA: ICD-10-CM

## 2025-07-29 DIAGNOSIS — M25.871 SESAMOIDITIS OF RIGHT FOOT: Primary | ICD-10-CM

## 2025-07-29 DIAGNOSIS — M79.671 PAIN IN RIGHT FOOT: ICD-10-CM

## 2025-07-29 DIAGNOSIS — E11.65 TYPE 2 DIABETES MELLITUS WITH HYPERGLYCEMIA, WITHOUT LONG-TERM CURRENT USE OF INSULIN (HCC): ICD-10-CM

## 2025-07-29 PROCEDURE — 99212 OFFICE O/P EST SF 10 MIN: CPT | Performed by: PODIATRIST

## 2025-08-08 ENCOUNTER — APPOINTMENT (OUTPATIENT)
Dept: LAB | Facility: HOSPITAL | Age: 70
End: 2025-08-08
Payer: COMMERCIAL

## 2025-08-11 ENCOUNTER — HOSPITAL ENCOUNTER (OUTPATIENT)
Dept: INFUSION CENTER | Facility: HOSPITAL | Age: 70
Discharge: HOME | End: 2025-08-11
Attending: INTERNAL MEDICINE
Payer: COMMERCIAL

## 2025-08-18 RX ORDER — DIPHENHYDRAMINE HCL 25 MG
25 TABLET ORAL ONCE
Status: CANCELLED | OUTPATIENT
Start: 2025-08-25

## 2025-08-18 RX ORDER — DIPHENHYDRAMINE HCL 25 MG
25 TABLET ORAL ONCE
OUTPATIENT
Start: 2025-09-08

## 2025-08-18 RX ORDER — DEXAMETHASONE 4 MG/1
20 TABLET ORAL ONCE
Status: CANCELLED | OUTPATIENT
Start: 2025-08-25

## 2025-08-18 RX ORDER — ACETAMINOPHEN 325 MG/1
650 TABLET ORAL ONCE
OUTPATIENT
Start: 2025-09-08

## 2025-08-18 RX ORDER — ACETAMINOPHEN 325 MG/1
650 TABLET ORAL ONCE
Status: CANCELLED | OUTPATIENT
Start: 2025-08-25

## 2025-08-18 RX ORDER — DEXAMETHASONE 4 MG/1
20 TABLET ORAL ONCE
OUTPATIENT
Start: 2025-09-08

## 2025-08-19 ENCOUNTER — HOSPITAL ENCOUNTER (OUTPATIENT)
Dept: BONE DENSITY | Facility: HOSPITAL | Age: 70
Discharge: HOME | End: 2025-08-19
Attending: STUDENT IN AN ORGANIZED HEALTH CARE EDUCATION/TRAINING PROGRAM
Payer: COMMERCIAL

## 2025-08-19 VITALS — BODY MASS INDEX: 26.7 KG/M2 | HEIGHT: 60 IN | WEIGHT: 136 LBS

## 2025-08-19 DIAGNOSIS — M81.0 OSTEOPOROSIS, UNSPECIFIED OSTEOPOROSIS TYPE, UNSPECIFIED PATHOLOGICAL FRACTURE PRESENCE: ICD-10-CM

## 2025-08-19 PROCEDURE — 77080 DXA BONE DENSITY AXIAL: CPT

## (undated) DEVICE — CABLE BIPOLAR DISP MEGADYNE

## (undated) DEVICE — GLOVE INDICATOR PI UNDERGLOVE SZ 6.5 BLUE

## (undated) DEVICE — CHLORAPREP HI-LITE 26ML ORANGE

## (undated) DEVICE — NEEDLE 25G X 1 1/2

## (undated) DEVICE — ALCOHOL PREP, 2 PLY, LARGE, MADE IN USA, SATURATED WITH 70% ISOPROPYL ALCOHOL, FOR EXTERNAL USE ONLY: Brand: WEBCOL

## (undated) DEVICE — GLOVE INDICATOR PI UNDERGLOVE SZ 7.5 BLUE

## (undated) DEVICE — BETHLEHEM UNIVERSAL  MIONR EXT: Brand: CARDINAL HEALTH

## (undated) DEVICE — LUBRICANT SURGILUBE TUBE 4 OZ  FLIP TOP

## (undated) DEVICE — GAUZE SPONGES,16 PLY: Brand: CURITY

## (undated) DEVICE — TUBING SUCTION 5MM X 12 FT

## (undated) DEVICE — OCCLUSIVE GAUZE STRIP,3% BISMUTH TRIBROMOPHENATE IN PETROLATUM BLEND: Brand: XEROFORM

## (undated) DEVICE — PAD CAST 4 IN COTTON NON STERILE

## (undated) DEVICE — IMPERVIOUS STOCKINETTE: Brand: DEROYAL

## (undated) DEVICE — SUT ETHILON 4-0 PS-2 18 IN 1667H

## (undated) DEVICE — THREE-QUARTER SHEET: Brand: CONVERTORS

## (undated) DEVICE — 2000CC GUARDIAN II: Brand: GUARDIAN

## (undated) DEVICE — INTENDED FOR TISSUE SEPARATION, AND OTHER PROCEDURES THAT REQUIRE A SHARP SURGICAL BLADE TO PUNCTURE OR CUT.: Brand: BARD-PARKER ® CARBON RIB-BACK BLADES

## (undated) DEVICE — THE LARIAT SNARE IS AN ELECTROSURGICAL DEVICE USED TO ENDOSCOPICALLY GRASP, DISSECT, AND TRANSECT TISSUE DURING GASTROINTESTINAL (GI) ENDOSCOPIC PROCEDURES.  THE SNARE CAN BE USED WITH OR WITHOUT THE USE OF MONOPOLAR DIATHERMIC ENERGY.: Brand: LARIAT

## (undated) DEVICE — COBAN 4 IN STERILE

## (undated) DEVICE — SPONGE SCRUB 4 PCT CHLORHEXIDINE